# Patient Record
Sex: FEMALE | Race: AMERICAN INDIAN OR ALASKA NATIVE | Employment: OTHER | URBAN - METROPOLITAN AREA
[De-identification: names, ages, dates, MRNs, and addresses within clinical notes are randomized per-mention and may not be internally consistent; named-entity substitution may affect disease eponyms.]

---

## 2017-01-27 RX ORDER — LEVOTHYROXINE SODIUM 88 UG/1
TABLET ORAL
Qty: 90 TAB | Refills: 3 | Status: SHIPPED | OUTPATIENT
Start: 2017-01-27 | End: 2018-01-05 | Stop reason: SDUPTHER

## 2017-05-03 ENCOUNTER — OFFICE VISIT (OUTPATIENT)
Dept: INTERNAL MEDICINE CLINIC | Age: 77
End: 2017-05-03

## 2017-05-03 VITALS
DIASTOLIC BLOOD PRESSURE: 78 MMHG | BODY MASS INDEX: 35.3 KG/M2 | SYSTOLIC BLOOD PRESSURE: 112 MMHG | HEIGHT: 60 IN | WEIGHT: 179.8 LBS | RESPIRATION RATE: 16 BRPM | OXYGEN SATURATION: 98 % | TEMPERATURE: 98 F | HEART RATE: 75 BPM

## 2017-05-03 DIAGNOSIS — M85.89 OSTEOPENIA OF MULTIPLE SITES: ICD-10-CM

## 2017-05-03 DIAGNOSIS — E78.5 HYPERLIPIDEMIA WITH TARGET LDL LESS THAN 100: ICD-10-CM

## 2017-05-03 DIAGNOSIS — I10 HYPERTENSION, ESSENTIAL: ICD-10-CM

## 2017-05-03 DIAGNOSIS — Z71.89 ACP (ADVANCE CARE PLANNING): ICD-10-CM

## 2017-05-03 DIAGNOSIS — E03.9 ACQUIRED HYPOTHYROIDISM: Primary | ICD-10-CM

## 2017-05-03 DIAGNOSIS — R73.01 IMPAIRED FASTING GLUCOSE: ICD-10-CM

## 2017-05-03 DIAGNOSIS — F32.5 MAJOR DEPRESSIVE DISORDER WITH SINGLE EPISODE, IN FULL REMISSION (HCC): ICD-10-CM

## 2017-05-03 RX ORDER — MIRTAZAPINE 15 MG/1
7.5 TABLET, FILM COATED ORAL
Qty: 30 TAB | Refills: 0
Start: 2017-05-03 | End: 2017-10-13

## 2017-05-03 NOTE — PROGRESS NOTES
Lupe Ames is a 78 y.o. female who was seen in clinic today (5/3/2017). Patient was seen with Dr Sirena Erickson (R2 at [facility]). Assessment & Plan: Cece Serrano was seen today for hypertension, cholesterol problem and osteoporosis. Diagnoses and all orders for this visit:    Acquired hypothyroidism- well controlled, continue current treatment pending review of labs   -     TSH 3RD GENERATION    Hypertension, essential- at goal, continue current treatment pending review of labs   -     METABOLIC PANEL, COMPREHENSIVE    Hyperlipidemia with target LDL less than 100- at goal, continue current treatment pending review of labs   -     METABOLIC PANEL, COMPREHENSIVE  -     LIPID PANEL    Impaired fasting glucose-   -     METABOLIC PANEL, COMPREHENSIVE  -     HEMOGLOBIN A1C WITH EAG    Osteopenia of multiple sites- reviewed tx options, will repeat imaging and then she will reassess & reconsider treatment options  -     DEXA BONE DENSITY STUDY AXIAL; Future    Major depressive disorder with single episode, in full remission ([facility] [address]- stable, reviewed treatment options with her, reviewed pros/cons to taking medications regularly (daily vs every other day), continue current treatment as it is working well for her   -     mirtazapine (REMERON) 15 mg tablet; Take 0.5 Tabs by mouth every three (3) days. ACP (advance care planning)- see AVS         Follow-up Disposition:  Return in about 6 months (around 11/3/2017) for FULL PHYSICAL - 30 minutes. ----------------------------------------------------------------------    Subjective:  Endocrine Review  Patient is seen for followup of hypothyroidism. Since last visit: no changes. She reports medication compliance: all the time and is taking separate from all her other meds. She reports the following concerns/problems/med side effects: none. Lab review: labs reviewed and discussed with patient. She is seen for pre-diabetes. Since last visit: no significant changes.   Home glucose monitoring: is not performed. Diabetic diet compliance: noncompliant some of the time. Lab review: labs reviewed and discussed with patient. Cardiovascular Review  The patient has hypertension and hyperlipidemia. Since last visit: no changes. She reports taking medications as instructed, no medication side effects noted, patient does not perform home BP monitoring. Diet and Lifestyle: generally follows a low fat low cholesterol diet, generally follows a low sodium diet, exercises sporadically. Lab review: labs reviewed and discussed with patient. Endocrine Review  She is seen for osteopenia w/ abnormal FRAX. Since last visit: no changes. She is on the following treatment: none. She  reports compliance with all meds, and denies any med side effects. She denies any falls or joint pain. Mental Health Review  Patient is seen for depression. She has been taking Remeron every 3-4 days. She reports she is scared to stop it all together. She does not think she needs it every day. She likes to be in control. She denies any: depressed mood, anhedonia. PHQ-9 reviewed. Prior to Admission medications    Medication Sig Start Date End Date Taking? Authorizing Provider   GUAIFENESIN (MUCINEX PO) Take  by mouth daily as needed. Yes Historical Provider   SYNTHROID 88 mcg tablet TAKE 1 TABLET BY MOUTH DAILY BEFORE BREAKFAST 1/27/17  Yes Peggy Maria MD   losartan-hydroCHLOROthiazide [facility]) 100-25 mg per tablet Take 1 Tab by mouth daily. 12/7/16  Yes Joe Pinto MD   atorvastatin (LIPITOR) 20 mg tablet TAKE 1 TABLET BY MOUTH DAILY 12/6/16  Yes Joe Pinto MD   FEXOFENADINE HCL (ALLEGRA PO) Take  by mouth daily. Yes Historical Provider   cholecalciferol (VITAMIN D3) 1,000 unit tablet Take  by mouth daily. Yes Historical Provider   b complex vitamins tablet Take 1 Tab by mouth daily.    Yes Historical Provider          Allergies   Allergen Reactions    Codeine Nausea Only    Epinephrine Palpitations    Erythromycin Other (comments)     GI upset    Pcn [Penicillins] Hives           Review of Systems   Constitutional: Negative for malaise/fatigue and weight loss. Respiratory: Negative for cough and shortness of breath. Cardiovascular: Negative for chest pain, palpitations and leg swelling. Gastrointestinal: Negative for abdominal pain, constipation, diarrhea, heartburn, nausea and vomiting. Genitourinary: Negative for frequency. Musculoskeletal: Negative for joint pain and myalgias. Skin: Negative for rash. Neurological: Negative for tingling, sensory change, focal weakness and headaches. Psychiatric/Behavioral: Negative for depression. The patient is not nervous/anxious and does not have insomnia. Objective:   Physical Exam   Constitutional: She appears well-developed. No distress. obese   HENT:   Mouth/Throat: Mucous membranes are normal.   Eyes: Lids are normal.   Neck: Neck supple. No thyromegaly present. Cardiovascular: Regular rhythm and normal heart sounds. No murmur heard. Pulmonary/Chest: Effort normal and breath sounds normal. She has no wheezes. She has no rales. Abdominal: Soft. Bowel sounds are normal. She exhibits no mass. There is no hepatosplenomegaly. There is no tenderness. Musculoskeletal: She exhibits no edema. Lymphadenopathy:     She has no cervical adenopathy. Skin: No rash noted. Psychiatric: She has a normal mood and affect. Her behavior is normal.         Visit Vitals    /78    Pulse 75    Temp 98 °F (36.7 °C) (Oral)    Resp 16    Ht 5' (1.524 m)    Wt 179 lb 12.8 oz (81.6 kg)    SpO2 98%    BMI 35.11 kg/m2         Disclaimer:  Advised her to call back or return to office if symptoms worsen/change/persist.  Discussed expected course/resolution/complications of diagnosis in detail with patient.     Medication risks/benefits/costs/interactions/alternatives discussed with patient. She was given an after visit summary which includes diagnoses, current medications, & vitals. She expressed understanding with the diagnosis and plan.         Malcolm Mckoy MD

## 2017-05-03 NOTE — ACP (ADVANCE CARE PLANNING)
Advance Care Planning (ACP) Provider Note - Comprehensive     Date of ACP Conversation: 05/03/17  Persons included in Conversation:  patient      Authorized Decision Maker (if patient is incapable of making informed decisions): This person is:  Healthcare Agent/Medical Power of  under Advance Directive        General ACP for ALL Patients with Decision Making Capacity:  Importance of advance care planning, including choosing a healthcare agent to communicate patient's healthcare decisions if patient lost the ability to make decisions, such as after a sudden illness or accident  Understanding of the healthcare agent role was assessed and information provided  Opportunity offered to explore how cultural, Taoist, spiritual, or personal beliefs would affect decisions for future care     For Serious or Chronic Illness:  Her medical problems were reviewed with them. Understanding of medical condition    Understanding of CPR, goals and expected outcomes, benefits and burdens discussed. Information on CPR success rates provided (e.g. for CPR in hospital, survival to d/c at two weeks is 22%, for chronically ill or elderly/frail survival is less than 3%); Individual asked to communicate understanding of information in his/her own words. Interventions Provided:  Recommended communicating the plan and making copies for the healthcare agent, personal physician, and others as appropriate (e.g., health system)  Recommended review of completed ACP document annually or upon change in health status      She has an advanced directive - a copy HAS NOT been provided. Reviewed DNR/DNI and patient is interested in remaining a DNR, no changes made.

## 2017-06-06 ENCOUNTER — HOSPITAL ENCOUNTER (OUTPATIENT)
Dept: LAB | Age: 77
Discharge: HOME | End: 2017-06-06
Payer: MEDICARE

## 2017-06-06 PROCEDURE — 80061 LIPID PANEL: CPT

## 2017-06-06 PROCEDURE — 80053 COMPREHEN METABOLIC PANEL: CPT

## 2017-06-06 PROCEDURE — 84443 ASSAY THYROID STIM HORMONE: CPT

## 2017-06-06 PROCEDURE — 83036 HEMOGLOBIN GLYCOSYLATED A1C: CPT

## 2017-06-07 LAB
ALBUMIN SERPL-MCNC: 4 G/DL (ref 3.5–4.8)
ALBUMIN/GLOB SERPL: 1.9 {RATIO} (ref 1.2–2.2)
ALP SERPL-CCNC: 59 IU/L (ref 39–117)
ALT SERPL-CCNC: 17 IU/L (ref 0–32)
AST SERPL-CCNC: 21 IU/L (ref 0–40)
BILIRUB SERPL-MCNC: 0.4 MG/DL (ref 0–1.2)
BUN SERPL-MCNC: 16 MG/DL (ref 8–27)
BUN/CREAT SERPL: 21 (ref 12–28)
CALCIUM SERPL-MCNC: 9.7 MG/DL (ref 8.7–10.3)
CHLORIDE SERPL-SCNC: 99 MMOL/L (ref 96–106)
CHOLEST SERPL-MCNC: 202 MG/DL (ref 100–199)
CO2 SERPL-SCNC: 25 MMOL/L (ref 18–29)
CREAT SERPL-MCNC: 0.78 MG/DL (ref 0.57–1)
EST. AVERAGE GLUCOSE BLD GHB EST-MCNC: 126 MG/DL
GLOBULIN SER CALC-MCNC: 2.1 G/DL (ref 1.5–4.5)
GLUCOSE SERPL-MCNC: 97 MG/DL (ref 65–99)
HBA1C MFR BLD: 6 % (ref 4.8–5.6)
HDLC SERPL-MCNC: 70 MG/DL
LDLC SERPL CALC-MCNC: 111 MG/DL (ref 0–99)
POTASSIUM SERPL-SCNC: 4.4 MMOL/L (ref 3.5–5.2)
PROT SERPL-MCNC: 6.1 G/DL (ref 6–8.5)
SODIUM SERPL-SCNC: 141 MMOL/L (ref 134–144)
TRIGL SERPL-MCNC: 105 MG/DL (ref 0–149)
TSH SERPL DL<=0.005 MIU/L-ACNC: 0.42 UIU/ML (ref 0.45–4.5)
VLDLC SERPL CALC-MCNC: 21 MG/DL (ref 5–40)

## 2017-06-08 NOTE — PROGRESS NOTES
Results released to patient via [employer]. All labs are stable or at goal for her, except for low TSH. Has been decreasing. Just barely low. No changes, will repeat in 3 months.

## 2017-06-13 ENCOUNTER — HOSPITAL ENCOUNTER (OUTPATIENT)
Dept: MAMMOGRAPHY | Age: 77
Discharge: HOME | End: 2017-06-13
Payer: MEDICARE

## 2017-06-13 DIAGNOSIS — M85.89 OSTEOPENIA OF MULTIPLE SITES: ICD-10-CM

## 2017-06-13 PROCEDURE — 77080 DXA BONE DENSITY AXIAL: CPT

## 2017-06-22 RX ORDER — LOSARTAN POTASSIUM AND HYDROCHLOROTHIAZIDE 25; 100 MG/1; MG/1
TABLET ORAL
Qty: 90 TAB | Refills: 1 | Status: SHIPPED | OUTPATIENT
Start: 2017-06-22 | End: 2017-12-17 | Stop reason: SDUPTHER

## 2017-07-05 DIAGNOSIS — E78.5 HYPERLIPIDEMIA WITH TARGET LDL LESS THAN 100: ICD-10-CM

## 2017-07-05 RX ORDER — ATORVASTATIN CALCIUM 20 MG/1
TABLET, FILM COATED ORAL
Qty: 90 TAB | Refills: 1 | Status: SHIPPED | OUTPATIENT
Start: 2017-07-05 | End: 2017-12-30 | Stop reason: SDUPTHER

## 2017-09-01 ENCOUNTER — OFFICE VISIT (OUTPATIENT)
Dept: INTERNAL MEDICINE CLINIC | Age: 77
End: 2017-09-01

## 2017-09-01 ENCOUNTER — HOSPITAL ENCOUNTER (OUTPATIENT)
Dept: LAB | Age: 77
Discharge: HOME | End: 2017-09-01
Payer: MEDICARE

## 2017-09-01 VITALS
HEIGHT: 60 IN | DIASTOLIC BLOOD PRESSURE: 76 MMHG | OXYGEN SATURATION: 97 % | TEMPERATURE: 98.2 F | SYSTOLIC BLOOD PRESSURE: 106 MMHG | HEART RATE: 76 BPM | RESPIRATION RATE: 16 BRPM | BODY MASS INDEX: 34.36 KG/M2 | WEIGHT: 175 LBS

## 2017-09-01 DIAGNOSIS — G47.00 INSOMNIA, UNSPECIFIED TYPE: Primary | ICD-10-CM

## 2017-09-01 DIAGNOSIS — E03.9 ACQUIRED HYPOTHYROIDISM: ICD-10-CM

## 2017-09-01 PROCEDURE — 84443 ASSAY THYROID STIM HORMONE: CPT

## 2017-09-01 PROCEDURE — 36415 COLL VENOUS BLD VENIPUNCTURE: CPT

## 2017-09-01 NOTE — PROGRESS NOTES
Cristino De La Torre is a 78 y.o. female who was seen in clinic today (9/1/2017). Assessment & Plan:  Diagnoses and all orders for this visit:    1. Insomnia, unspecified type- new dx, differential dx reviewed with the patient, unclear if related to stopping Remeron or problems with thyroid. I spent 15 minutes with the patient and >50% of the time was spent counseling on med options, expectations, and sleep hygiene. Due to weight gain will avoid Remeron. See AVS.  Will start with melatonin and if that does not work try trazodone. That we reviewed Ambien versus Restoril. 2. Acquired hypothyroidism- uncontrolled, last labs were abnormal, unsure if this is related to any of her issues above, will continue current meds pending review of labs. -     TSH 3RD GENERATION         Follow-up Disposition:  Return if symptoms worsen or fail to improve.       ----------------------------------------------------------------------    Subjective: Nimo Segovia was seen today for Insomnia and Hypothyroidism    Mental Health Review  Patient is seen for insomnia. Since last visit she has stopped Remeron and weight has been decreasing. She reports some trouble getting to sleep, but most issues are staying asleep. She reports waking up after 4 hrs and sometimes can't get back to sleep. She can't remember if her sleep was better on the Remeron. She has not tried any OTC meds. She has never been on any other Rx meds      Endocrine Review  Patient is seen for followup of hypothyroidism. Since last visit: TSH was low  She reports medication compliance: all the time and is taking separate from all her other meds. She reports the following concerns/problems/med side effects: none. Lab review: labs reviewed and discussed with patient.       ----------------------------------------------------------------------      Prior to Admission medications    Medication Sig Start Date End Date Taking?  Authorizing Provider   atorvastatin (LIPITOR) 20 mg tablet TAKE 1 TABLET BY MOUTH DAILY 7/5/17  Yes Wilma Swann MD   losartan-hydroCHLOROthiazide [facility]) 100-25 mg per tablet TAKE 1 TABLET BY MOUTH DAILY 6/22/17  Yes Wilma Swann MD   GUAIFENESIN (MUCINEX PO) Take  by mouth daily as needed. Yes Historical Provider   SYNTHROID 88 mcg tablet TAKE 1 TABLET BY MOUTH DAILY BEFORE BREAKFAST 1/27/17  Yes Bess Vicente MD   FEXOFENADINE HCL (ALLEGRA PO) Take  by mouth daily. Yes Historical Provider   cholecalciferol (VITAMIN D3) 1,000 unit tablet Take  by mouth daily. Yes Historical Provider   b complex vitamins tablet Take 1 Tab by mouth daily. Yes Historical Provider   mirtazapine (REMERON) 15 mg tablet Take 0.5 Tabs by mouth every three (3) days. 5/3/17   Wilma Swann MD          Allergies   Allergen Reactions    Codeine Nausea Only    Epinephrine Palpitations    Erythromycin Other (comments)     GI upset    Pcn [Penicillins] Hives           Review of Systems   Constitutional: Positive for malaise/fatigue and weight loss. Negative for chills and fever. Cardiovascular: Positive for palpitations (on/off). Psychiatric/Behavioral: Negative for depression. The patient is not nervous/anxious. Objective:   Physical Exam- well groomed, good eye contact      Visit Vitals    /76    Pulse 76    Temp 98.2 °F (36.8 °C) (Oral)    Resp 16    Ht 5' (1.524 m)    Wt 175 lb (79.4 kg)    SpO2 97%    BMI 34.18 kg/m2         Disclaimer:  Advised her to call back or return to office if symptoms worsen/change/persist.  Discussed expected course/resolution/complications of diagnosis in detail with patient. Medication risks/benefits/costs/interactions/alternatives discussed with patient. She was given an after visit summary which includes diagnoses, current medications, & vitals. She expressed understanding with the diagnosis and plan.         Wilma Swann MD

## 2017-09-01 NOTE — PATIENT INSTRUCTIONS
Sleep Medication Recommendations:    Over the counter:  Unisom (Doxylamine) - AVOID  Benadryl (Benadryl) - AVOID  Melatonin - start with 2 or 3mg, if no changes in 5 days then increase to 4 or 6mg    Prescriptions:  Trazodone - this would be the next option  Elavil (Amitriptyline) - AVOID    Hypnotics:  Ambien (Zolpidem) - POSSIBLE  Lunesta (Eszopiclone)  Sonata (Zaleplon)    Benzodiazapine:  Restoril (Temazepam)  Ativan (Lorazepam) - AVOID  Xanax (Alprazolam)  - AVOID           Learning About Sleeping Well  What does sleeping well mean? Sleeping well means getting enough sleep. How much sleep is enough varies among people. The number of hours you sleep is not as important as how you feel when you wake up. If you do not feel refreshed, you probably need more sleep. Another sign of not getting enough sleep is feeling tired during the day. The average total nightly sleep time is 7½ to 8 hours. Healthy adults may need a little more or a little less than this. Why is getting enough sleep important? Getting enough quality sleep is a basic part of good health. When your sleep suffers, your mood and your thoughts can suffer too. You may find yourself feeling more grumpy or stressed. Not getting enough sleep also can lead to serious problems, including injury, accidents, anxiety, and depression. What might cause poor sleeping? Many things can cause sleep problems, including:  · Stress. Stress can be caused by fear about a single event, such as giving a speech. Or you may have ongoing stress, such as worry about work or school. · Depression, anxiety, and other mental or emotional conditions. · Changes in your sleep habits or surroundings. This includes changes that happen where you sleep, such as noise, light, or sleeping in a different bed. It also includes changes in your sleep pattern, such as having jet lag or working a late shift.   · Health problems, such as pain, breathing problems, and restless legs syndrome. · Lack of regular exercise. How can you help yourself? Here are some tips that may help you sleep more soundly and wake up feeling more refreshed. Your sleeping area  · Use your bedroom only for sleeping and sex. A bit of light reading may help you fall asleep. But if it doesn't, do your reading elsewhere in the house. Don't watch TV in bed. · Be sure your bed is big enough to stretch out comfortably, especially if you have a sleep partner. · Keep your bedroom quiet, dark, and cool. Use curtains, blinds, or a sleep mask to block out light. To block out noise, use earplugs, soothing music, or a \"white noise\" machine. Your evening and bedtime routine  · Create a relaxing bedtime routine. You might want to take a warm shower or bath, listen to soothing music, or drink a cup of noncaffeinated tea. · Go to bed at the same time every night. And get up at the same time every morning, even if you feel tired. What to avoid  · Limit caffeine (coffee, tea, caffeinated sodas) during the day, and don't have any for at least 4 to 6 hours before bedtime. · Don't drink alcohol before bedtime. Alcohol can cause you to wake up more often during the night. · Don't smoke or use tobacco, especially in the evening. Nicotine can keep you awake. · Don't take naps during the day, especially close to bedtime. · Don't lie in bed awake for too long. If you can't fall asleep, or if you wake up in the middle of the night and can't get back to sleep within 15 minutes or so, get out of bed and go to another room until you feel sleepy. · Don't take medicine right before bed that may keep you awake or make you feel hyper or energized. Your doctor can tell you if your medicine may do this and if you can take it earlier in the day. If you can't sleep  · Imagine yourself in a peaceful, pleasant scene. Focus on the details and feelings of being in a place that is relaxing.   · Get up and do a quiet or boring activity until you feel sleepy. · Don't drink any liquids after 6 p.m. if you wake up often because you have to go to the bathroom. Where can you learn more? Go to http://francisco-isaac.info/. Enter L247 in the search box to learn more about \"Learning About Sleeping Well. \"  Current as of: July 26, 2016  Content Version: 11.3  © 0567-9069 [employer]. Care instructions adapted under license by [employer] (which disclaims liability or warranty for this information). If you have questions about a medical condition or this instruction, always ask your healthcare professional. [place] [address] any warranty or liability for your use of this information.

## 2017-09-01 NOTE — MR AVS SNAPSHOT
Visit Information Date & Time Provider Department Dept. Phone Encounter #  
 9/1/2017 10:15 AM Wilma Swann, [address] [facility] [phone] [id number] Follow-up Instructions Return if symptoms worsen or fail to improve. Your Appointments 10/13/2017  9:30 AM  
Medicare Physical with Wilma Swann MD  
[facility] Colin Cali) Appt Note: CPE (1yr) [address] [facility] [address]  
[address] [facility] [place] [address] Upcoming Health Maintenance Date Due Pneumococcal 65+ Low/Medium Risk (1 of 2 - PCV13) 4/13/2007 DTaP/Tdap/Td series (1 - Tdap) 6/3/2015 MEDICARE YEARLY EXAM 6/7/2017 INFLUENZA AGE 9 TO ADULT 8/1/2017 GLAUCOMA SCREENING Q2Y 1/30/2019 COLONOSCOPY 8/15/2022 Allergies as of 9/1/2017  Review Complete On: 9/1/2017 By: Wilma Swann MD  
  
 Severity Noted Reaction Type Reactions Codeine  02/27/2013    Nausea Only Epinephrine  02/27/2013    Palpitations Erythromycin  02/27/2013    Other (comments) GI upset Pcn [Penicillins]  02/27/2013    Hives Current Immunizations  Reviewed on 9/1/2017 Name Date Influenza High Dose Vaccine PF 11/1/2016, 10/9/2015 Pneumococcal Polysaccharide (PPSV-23) 6/2/2005 Td 6/2/2015 Zoster Vaccine, Live 1/9/2013 Reviewed by Earnestine Steve RN on 9/1/2017 at 10:14 AM  
You Were Diagnosed With   
  
 Codes Comments Insomnia, unspecified type    -  Primary ICD-10-CM: G47.00 ICD-9-CM: 780.52 Acquired hypothyroidism     ICD-10-CM: E03.9 ICD-9-CM: 787. 9 Vitals BP Pulse Temp Resp Height(growth percentile) Weight(growth percentile) 106/76 76 98.2 °F (36.8 °C) (Oral) 16 5' (1.524 m) 175 lb (79.4 kg) SpO2 BMI OB Status Smoking Status 97% 34.18 kg/m2 Hysterectomy Never Smoker Vitals History BMI and BSA Data Body Mass Index Body Surface Area  
 34.18 kg/m 2 1.83 m 2 Preferred Pharmacy Pharmacy Name Phone [address] [facility] STORE [address], [facility] [address] [phone] Your Updated Medication List  
  
   
This list is accurate as of: 9/1/17 10:39 AM.  Always use your most recent med list. ALLEGRA PO Take  by mouth daily. atorvastatin 20 mg tablet Commonly known as:  LIPITOR  
TAKE 1 TABLET BY MOUTH DAILY  
  
 b complex vitamins tablet Take 1 Tab by mouth daily. losartan-hydroCHLOROthiazide 100-25 mg per tablet Commonly known as:  HYZAAR  
TAKE 1 TABLET BY MOUTH DAILY  
  
 mirtazapine 15 mg tablet Commonly known as:  Marney Gold Take 0.5 Tabs by mouth every three (3) days. MUCINEX PO Take  by mouth daily as needed. SYNTHROID 88 mcg tablet Generic drug:  levothyroxine TAKE 1 TABLET BY MOUTH DAILY BEFORE BREAKFAST  
  
 VITAMIN D3 1,000 unit tablet Generic drug:  cholecalciferol Take  by mouth daily. We Performed the Following TSH 3RD GENERATION [81061 CPT(R)] Follow-up Instructions Return if symptoms worsen or fail to improve. Patient Instructions Sleep Medication Recommendations: 
 
Over the counter: 
Unisom (Doxylamine) - AVOID Benadryl (Benadryl) - AVOID Melatonin - start with 2 or 3mg, if no changes in 5 days then increase to 4 or 6mg Prescriptions: 
Trazodone - this would be the next option Elavil (Amitriptyline) - AVOID Hypnotics: 
Ambien (Zolpidem) - POSSIBLE Lunesta (Eszopiclone) Sonata (Zaleplon) Benzodiazapine: 
Restoril (Temazepam) Ativan (Lorazepam) - AVOID Xanax (Alprazolam)  - AVOID Learning About Sleeping Well What does sleeping well mean? Sleeping well means getting enough sleep. How much sleep is enough varies among people.  
The number of hours you sleep is not as important as how you feel when you wake up. If you do not feel refreshed, you probably need more sleep. Another sign of not getting enough sleep is feeling tired during the day. The average total nightly sleep time is 7½ to 8 hours. Healthy adults may need a little more or a little less than this. Why is getting enough sleep important? Getting enough quality sleep is a basic part of good health. When your sleep suffers, your mood and your thoughts can suffer too. You may find yourself feeling more grumpy or stressed. Not getting enough sleep also can lead to serious problems, including injury, accidents, anxiety, and depression. What might cause poor sleeping? Many things can cause sleep problems, including: · Stress. Stress can be caused by fear about a single event, such as giving a speech. Or you may have ongoing stress, such as worry about work or school. · Depression, anxiety, and other mental or emotional conditions. · Changes in your sleep habits or surroundings. This includes changes that happen where you sleep, such as noise, light, or sleeping in a different bed. It also includes changes in your sleep pattern, such as having jet lag or working a late shift. · Health problems, such as pain, breathing problems, and restless legs syndrome. · Lack of regular exercise. How can you help yourself? Here are some tips that may help you sleep more soundly and wake up feeling more refreshed. Your sleeping area · Use your bedroom only for sleeping and sex. A bit of light reading may help you fall asleep. But if it doesn't, do your reading elsewhere in the house. Don't watch TV in bed. · Be sure your bed is big enough to stretch out comfortably, especially if you have a sleep partner. · Keep your bedroom quiet, dark, and cool. Use curtains, blinds, or a sleep mask to block out light. To block out noise, use earplugs, soothing music, or a \"white noise\" machine. Your evening and bedtime routine · Create a relaxing bedtime routine. You might want to take a warm shower or bath, listen to soothing music, or drink a cup of noncaffeinated tea. · Go to bed at the same time every night. And get up at the same time every morning, even if you feel tired. What to avoid · Limit caffeine (coffee, tea, caffeinated sodas) during the day, and don't have any for at least 4 to 6 hours before bedtime. · Don't drink alcohol before bedtime. Alcohol can cause you to wake up more often during the night. · Don't smoke or use tobacco, especially in the evening. Nicotine can keep you awake. · Don't take naps during the day, especially close to bedtime. · Don't lie in bed awake for too long. If you can't fall asleep, or if you wake up in the middle of the night and can't get back to sleep within 15 minutes or so, get out of bed and go to another room until you feel sleepy. · Don't take medicine right before bed that may keep you awake or make you feel hyper or energized. Your doctor can tell you if your medicine may do this and if you can take it earlier in the day. If you can't sleep · Imagine yourself in a peaceful, pleasant scene. Focus on the details and feelings of being in a place that is relaxing. · Get up and do a quiet or boring activity until you feel sleepy. · Don't drink any liquids after 6 p.m. if you wake up often because you have to go to the bathroom. Where can you learn more? Go to http://francisco-isaac.info/. Enter X202 in the search box to learn more about \"Learning About Sleeping Well. \" Current as of: July 26, 2016 Content Version: 11.3 © 4963-0976 [employer]. Care instructions adapted under license by [employer] (which disclaims liability or warranty for this information).  If you have questions about a medical condition or this instruction, always ask your healthcare professional. [employer] disclaims any warranty or liability for your use of this information. Introducing [facility]! Dear Nhan Nuñez: 
Thank you for requesting a [employer] account. Our records indicate that you already have an active [employer] account. You can access your account anytime at [email or web address] Did you know that you can access your hospital and ER discharge instructions at any time in [employer]? You can also review all of your test results from your hospital stay or ER visit. Additional Information If you have questions, please visit the Frequently Asked Questions section of the [employer] website at [email or web address] Remember, [employer] is NOT to be used for urgent needs. For medical emergencies, dial 911. Now available from your iPhone and Android! Please provide this summary of care documentation to your next provider. Your primary care clinician is listed as Donna Person. If you have any questions after today's visit, please call [phone].

## 2017-09-02 LAB — TSH SERPL DL<=0.005 MIU/L-ACNC: 0.58 UIU/ML (ref 0.45–4.5)

## 2017-10-13 ENCOUNTER — OFFICE VISIT (OUTPATIENT)
Dept: INTERNAL MEDICINE CLINIC | Age: 77
End: 2017-10-13

## 2017-10-13 ENCOUNTER — HOSPITAL ENCOUNTER (OUTPATIENT)
Dept: LAB | Age: 77
Discharge: HOME | End: 2017-10-13
Payer: MEDICARE

## 2017-10-13 VITALS
SYSTOLIC BLOOD PRESSURE: 112 MMHG | RESPIRATION RATE: 16 BRPM | HEIGHT: 64 IN | DIASTOLIC BLOOD PRESSURE: 74 MMHG | BODY MASS INDEX: 29.53 KG/M2 | TEMPERATURE: 97.6 F | WEIGHT: 173 LBS | OXYGEN SATURATION: 98 %

## 2017-10-13 DIAGNOSIS — Z23 ENCOUNTER FOR IMMUNIZATION: ICD-10-CM

## 2017-10-13 DIAGNOSIS — E03.9 ACQUIRED HYPOTHYROIDISM: ICD-10-CM

## 2017-10-13 DIAGNOSIS — F51.01 PRIMARY INSOMNIA: ICD-10-CM

## 2017-10-13 DIAGNOSIS — F32.5 MAJOR DEPRESSIVE DISORDER WITH SINGLE EPISODE, IN FULL REMISSION (HCC): ICD-10-CM

## 2017-10-13 DIAGNOSIS — I10 HYPERTENSION, ESSENTIAL: ICD-10-CM

## 2017-10-13 DIAGNOSIS — Z71.89 ACP (ADVANCE CARE PLANNING): ICD-10-CM

## 2017-10-13 DIAGNOSIS — Z00.00 MEDICARE ANNUAL WELLNESS VISIT, SUBSEQUENT: Primary | ICD-10-CM

## 2017-10-13 DIAGNOSIS — Z13.39 SCREENING FOR ALCOHOLISM: ICD-10-CM

## 2017-10-13 DIAGNOSIS — M85.89 OSTEOPENIA OF MULTIPLE SITES: ICD-10-CM

## 2017-10-13 DIAGNOSIS — E78.00 PURE HYPERCHOLESTEROLEMIA: ICD-10-CM

## 2017-10-13 PROCEDURE — 85027 COMPLETE CBC AUTOMATED: CPT

## 2017-10-13 PROCEDURE — 84443 ASSAY THYROID STIM HORMONE: CPT

## 2017-10-13 PROCEDURE — 36415 COLL VENOUS BLD VENIPUNCTURE: CPT

## 2017-10-13 NOTE — ACP (ADVANCE CARE PLANNING)
Advance Care Planning (ACP) Provider Note - Comprehensive     Date of ACP Conversation: 10/13/17  Persons included in Conversation:  patient  Length of ACP Conversation in minutes: <16 minutes (Non-Billable)    Authorized Decision Maker (if patient is incapable of making informed decisions): This person is: Other Legally Authorized Decision Maker (e.g. Next of Kin)          General ACP for ALL Patients with Decision Making Capacity:  Importance of advance care planning, including choosing a healthcare agent to communicate patient's healthcare decisions if patient lost the ability to make decisions, such as after a sudden illness or accident  Understanding of the healthcare agent role was assessed and information provided  Opportunity offered to explore how cultural, Zoroastrianism, spiritual, or personal beliefs would affect decisions for future care  Exploration of values, goals, and preferences if recovery is not expected, even with continued medical treatment in the event of: Imminent death or severe, permanent brain injury    For Serious or Chronic Illness:  Understanding of CPR, goals and expected outcomes, benefits and burdens discussed. Understanding of medical condition  Information on CPR success rates provided (e.g. for CPR in hospital, survival to d/c at two weeks is 22%, for chronically ill or elderly/frail survival is less than 3%)    Interventions Provided:  Recommended communicating the plan and making copies for the healthcare agent, personal physician, and others as appropriate (e.g., health system)  Recommended review of completed ACP document annually or upon change in health status       She has an advanced directive - a copy HAS NOT been provided. Reviewed DNR/DNI and patient is interested in remaining a DNR, no changes made.

## 2017-10-13 NOTE — PROGRESS NOTES
Annual wellness. Wants to discuss sleeping concerns. Ezequiel Lyles is a 78 y.o. female  who present for routine immunizations. she  denies any symptoms , reactions or allergies that would exclude them from being immunized today. Risks and adverse reactions were discussed and the VIS was given to them. All questions were addressed. she was observed for 5 min post injection. There were no reactions observed.     Denilson Vivas RN

## 2017-10-13 NOTE — PATIENT INSTRUCTIONS
Preventing Falls: Care Instructions  Your Care Instructions  Getting around your home safely can be a challenge if you have injuries or health problems that make it easy for you to fall. Loose rugs and furniture in walkways are among the dangers for many older people who have problems walking or who have poor eyesight. People who have conditions such as arthritis, osteoporosis, or dementia also have to be careful not to fall. You can make your home safer with a few simple measures. Follow-up care is a key part of your treatment and safety. Be sure to make and go to all appointments, and call your doctor if you are having problems. It's also a good idea to know your test results and keep a list of the medicines you take. How can you care for yourself at home? Taking care of yourself  · You may get dizzy if you do not drink enough water. To prevent dehydration, drink plenty of fluids, enough so that your urine is light yellow or clear like water. Choose water and other caffeine-free clear liquids. If you have kidney, heart, or liver disease and have to limit fluids, talk with your doctor before you increase the amount of fluids you drink. · Exercise regularly to improve your strength, muscle tone, and balance. Walk if you can. Swimming may be a good choice if you cannot walk easily. · Have your vision and hearing checked each year or any time you notice a change. If you have trouble seeing and hearing, you might not be able to avoid objects and could lose your balance. · Know the side effects of the medicines you take. Ask your doctor or pharmacist whether the medicines you take can affect your balance. Sleeping pills or sedatives can affect your balance. · Limit the amount of alcohol you drink. Alcohol can impair your balance and other senses. · Ask your doctor whether calluses or corns on your feet need to be removed.  If you wear loose-fitting shoes because of calluses or corns, you can lose your balance and fall. · Talk to your doctor if you have numbness in your feet. Preventing falls at home  · Remove raised doorway thresholds, throw rugs, and clutter. Repair loose carpet or raised areas in the floor. · Move furniture and electrical cords to keep them out of walking paths. · Use nonskid floor wax, and wipe up spills right away, especially on ceramic tile floors. · If you use a walker or cane, put rubber tips on it. If you use crutches, clean the bottoms of them regularly with an abrasive pad, such as steel wool. · Keep your house well lit, especially E[employer]est [employer], and outside walkways. Use night-lights in areas such as hallways and bathrooms. Add extra light switches or use remote switches (such as switches that go on or off when you clap your hands) to make it easier to turn lights on if you have to get up during the night. · Install sturdy handrails on stairways. · Move items in your cabinets so that the things you use a lot are on the lower shelves (about waist level). · Keep a cordless phone and a flashlight with new batteries by your bed. If possible, put a phone in each of the main rooms of your house, or carry a cell phone in case you fall and cannot reach a phone. Or, you can wear a device around your neck or wrist. You push a button that sends a signal for help. · Wear low-heeled shoes that fit well and give your feet good support. Use footwear with nonskid soles. Check the heels and soles of your shoes for wear. Repair or replace worn heels or soles. · Do not wear socks without shoes on wood floors. · Walk on the grass when the sidewalks are slippery. If you live in an area that gets snow and ice in the winter, sprinkle salt on slippery steps and sidewalks. Preventing falls in the bath  · Install grab bars and nonskid mats inside and outside your shower or tub and near the toilet and sinks. · Use shower chairs and bath benches.   · Use a hand-held shower head that will allow you to sit while showering. · Get into a tub or shower by putting the weaker leg in first. Get out of a tub or shower with your strong side first.  · Repair loose toilet seats and consider installing a raised toilet seat to make getting on and off the toilet easier. · Keep your bathroom door unlocked while you are in the shower. Where can you learn more? Go to http://francisco-isaac.info/. Enter 0476 [id number] 69 [phone] in the search box to learn more about \"Preventing Falls: Care Instructions. \"  Current as of: August 4, 2016  Content Version: 11.3  © 2298-6662 [employer]. Care instructions adapted under license by [employer] (which disclaims liability or warranty for this information). If you have questions about a medical condition or this instruction, always ask your healthcare professional. [place] [address] any warranty or liability for your use of this information. Hearing Evaluations: [facility]   At [employer] [facility]  [phone]    [facility] (off [address])  [place] ([address] [facility] [address])  [place] ([address])  [place] (off [address])      [address] [facility]   [email or web address]    [phone] --> [address], [address]  [phone] --> [address]  ([place])  [phone] --> [address] [facility]  ([address] [facility])         Medicare Wellness Visit, Female    The best way to live healthy is to have a healthy lifestyle by eating a well-balanced diet, exercising regularly, limiting alcohol and stopping smoking. Regular physical exams and screening tests are another way to keep healthy. Preventive exams provided by your health care provider can find health problems before they become diseases or illnesses. Preventive services including immunizations, screening tests, monitoring and exams can help you take care of your own health.     All people over age 72 should have a pneumovax  and and a prevnar shot to prevent pneumonia. These are once in a lifetime unless you and your provider decide differently. All people over 65 should have a yearly flu shot and a tetanus vaccine every 10 years. A bone mass density to screen for osteoporosis or thinning of the bones should be done every 2 years after 65. Screening for diabetes mellitus with a blood sugar test should be done every year. Glaucoma is a disease of the eye due to increased ocular pressure that can lead to blindness and it should be done every year by an eye professional.    Cardiovascular screening tests that check for elevated lipids (fatty part of blood) which can lead to heart disease and strokes should be done every 5 years. Colorectal screening that evaluates for blood or polyps in your colon should be done yearly as a stool test or every five years as a flexible sigmoidoscope or every 10 years as a colonoscopy up to age 76. Breast cancer screening with a mammogram is recommended biennially  for women age 54-69. Screening for cervical cancer with a pap smear and pelvic exam is recommended for women after age 72 years every 2 years up to age 79 or when the provider and patient decide to stop. If there is a history of cervical abnormalities or other increased risk for cancer then the test is recommended yearly. Hepatitis C screening is also recommended for anyone born between [birth year] through [address]. A shingles vaccine is also recommended once in a lifetime after age 61. Your Medicare Wellness Exam is recommended annually.     Here is a list of your current Health Maintenance items with a due date:  Health Maintenance Due   Topic Date Due    Pneumococcal Vaccine (1 of 2 - PCV13) 04/13/2007    DTaP/Tdap/Td  (1 - Tdap) 06/03/2015    Annual Well Visit  06/07/2017    Flu Vaccine  08/01/2017

## 2017-10-13 NOTE — PROGRESS NOTES
Robyn Art is a 78 y.o. female who was seen in clinic today (10/13/2017) for a full physical.      Assessment & Plan:   Diagnoses and all orders for this visit:    1. Medicare annual wellness visit, subsequent    2. ACP (advance care planning)    3. Encounter for immunization  -     INFLUENZA VIRUS VACCINE, HIGH DOSE SEASONAL, PRESERVATIVE FREE  -     ADMIN INFLUENZA VIRUS VAC  -     pneumococcal 13 alice conj dip (PREVNAR-13) 0.5 mL syrg injection; 0.5 mL by IntraMUSCular route once for 1 dose.  -     diph,pertuss,acel,,tetanus vac,PF, (ADACEL) 2 Lf-(2.5-5-3-5 mcg)-5Lf/0.5 mL syrg vaccine; 0.5 mL by IntraMUSCular route once for 1 dose. 4. Screening for alcoholism  -     Annual  Alcohol Screen 15 min ()    5. Acquired hypothyroidism- levels fluctuating, will repeat labs, no med changes pending review of labs  -     TSH 3RD GENERATION    6. Major depressive disorder with single episode, in full [facility] ([facility]- well controlled, reviewed treatment options with her, reviewed life style changes to help improve mood, cont off meds. 7. Hypertension, essential- well controlled, continue current treatment   -     CBC W/O DIFF    8. Pure hypercholesterolemia- at goal, continue current treatment   -     CBC W/O DIFF    9. Primary insomnia- not as well controlled since stopping Remeron, reviewed med options (will increase Melatonin) and reviewed Trazodone if this does not work    10. Osteopenia of multiple sites- reviewed imaging w/ her, reviewed when to repeat, increase weight bearing exercises. Follow-up Disposition:  Return in about 6 months (around 4/13/2018), or if symptoms worsen or fail to improve.        ------------------------------------------------------------------------------------------    Subjective: Annual Wellness Visit- Subsequent Visit    End of Life Planning: This was discussed with her today and she has an advanced directive - a copy HAS NOT been provided.   Reviewed DNR/DNI and patient is not interested. Depression Screen:  PHQ over the last two weeks 10/13/2017   Little interest or pleasure in doing things Not at all   Feeling down, depressed or hopeless Not at all   Total Score PHQ 2 0   Trouble falling or staying asleep, or sleeping too much Nearly every day   Feeling tired or having little energy More than half the days   Poor appetite or overeating Not at all   Feeling bad about yourself - or that you are a failure or have let yourself or your family down Not at all   Trouble concentrating on things such as school, work, reading or watching TV Not at all   Moving or speaking so slowly that other people could have noticed; or the opposite being so fidgety that others notice Not at all   Thoughts of being better off dead, or hurting yourself in some way Not at all   PHQ 9 Score 5   How difficult have these problems made it for you to do your work, take care of your home and get along with others Not difficult at all         Fall Risk:   Fall Risk Assessment, last 12 mths 10/13/2017   Able to walk? Yes   Fall in past 12 months? Yes   Fall with injury? Yes   Number of falls in past 12 months 1   Fall Risk Score 2       Abuse Screen:  Abuse Screening Questionnaire 10/13/2017   Do you ever feel afraid of your partner? N   Are you in a relationship with someone who physically or mentally threatens you? N   Is it safe for you to go home? Y         Alcohol Risk Factor Screening: On any occasion during the past 3 months, have you had more than 3 drinks containing alcohol? No  Do you average more than 7 drinks per week? No    Hearing Loss: The patient needs further evaluation. Cognition Screen:  Has your family/caregiver stated any concerns about your memory: no  appropriate for age attention/concentration and appropriate safety awareness    Activities of Daily Living:    Requires assistance with: no ADLs  Home contains: no safety equipment.   Exercise: not active    Adult Nutrition Screen:  No risk factors noted. Health Maintenance  Daily Aspirin: yes  Bone Density: UTD - done in 6/13/17  Glaucoma Screening: UTD    Immunizations:    Influenza: will get this done today. Tetanus: she will let me know, she thinks she had it, nothing in [facility] website. Shingles: up to date. Pneumonia: she will let me know, she thinks she had it, nothing in [facility] website. Cancer screening:    Cervical: reviewed guidelines, n/a. Breast: reviewed guidelines, reviewed SBE with her, UTD, records requested. Colon: guidelines reviewed, UTD. Patient Care Team:  Gail García MD as PCP - General (Internal Medicine)  Nick Perla MD (Endocrinology)  Bhavik Moya MD (Gastroenterology)  Rigoberto Valencia MD (Radiology)  Sarina Phillips MD (Obstetrics & Gynecology)  Gino Perez MD (Ophthalmology)       In addition to the above issues we also reviewed the following acute and/or chronic problems:    Endocrine Review  Patient is seen for followup of hypothyroidism. She reports medication compliance: all the time and is taking separate from all her other meds. She reports the following concerns/problems/med side effects: none. Lab review: labs reviewed and discussed with patient. Cardiovascular Review  The patient has hypertension and hyperlipidemia. Since last visit: weight is down and is now out of the obesity range. She reports taking medications as instructed, no medication side effects noted, patient does not perform home BP monitoring. Diet and Lifestyle: generally follows a low fat low cholesterol diet, generally follows a low sodium diet, sedentary. Labs: reviewed and discussed with patient. The following sections were reviewed & updated as appropriate: PMH, PSH, FH, and SH.       Patient Active Problem List   Diagnosis Code    Acquired hypothyroidism E03.9    Impaired fasting glucose R73.01    Hypertension, essential I10    Pure hypercholesterolemia E78.00    Allergic rhinitis J30.9    Depression F32.9    Osteopenia M85.80    Uterine prolapse N81.4    Overweight E66.3          Prior to Admission medications    Medication Sig Start Date End Date Taking? Authorizing Provider   atorvastatin (LIPITOR) 20 mg tablet TAKE 1 TABLET BY MOUTH DAILY 7/5/17  Yes Matias Scott MD   losartan-hydroCHLOROthiazide [facility]) 100-25 mg per tablet TAKE 1 TABLET BY MOUTH DAILY 6/22/17  Yes Matias Scott MD   GUAIFENESIN (MUCINEX PO) Take  by mouth daily as needed. Yes Historical Provider   SYNTHROID 88 mcg tablet TAKE 1 TABLET BY MOUTH DAILY BEFORE BREAKFAST 1/27/17  Yes Yon Aquino MD   FEXOFENADINE HCL (ALLEGRA PO) Take  by mouth daily. Yes Historical Provider   cholecalciferol (VITAMIN D3) 1,000 unit tablet Take  by mouth daily. Yes Historical Provider   b complex vitamins tablet Take 1 Tab by mouth daily. Yes Historical Provider          Allergies   Allergen Reactions    Codeine Nausea Only    Epinephrine Palpitations    Erythromycin Other (comments)     GI upset    Pcn [Penicillins] Hives           Review of Systems   Constitutional: Negative for malaise/fatigue and weight loss. Respiratory: Negative for cough and shortness of breath. Cardiovascular: Negative for chest pain, palpitations and leg swelling. Gastrointestinal: Negative for abdominal pain, constipation, diarrhea, heartburn, nausea and vomiting. Genitourinary: Negative for frequency. Musculoskeletal: Negative for joint pain and myalgias. Skin: Negative for rash. Neurological: Negative for tingling, sensory change, focal weakness and headaches. Psychiatric/Behavioral: Negative for depression. The patient is not nervous/anxious and does not have insomnia. Objective:   Physical Exam   Constitutional: She appears well-developed. No distress. HENT:   Mouth/Throat: Mucous membranes are normal.   Eyes: Conjunctivae and lids are normal. No scleral icterus. Neck: Neck supple. No thyromegaly present. Cardiovascular: Regular rhythm and normal heart sounds. No murmur heard. Pulmonary/Chest: Effort normal and breath sounds normal. She has no wheezes. She has no rales. Abdominal: Soft. Bowel sounds are normal. She exhibits no mass. There is no hepatosplenomegaly. There is no tenderness. Musculoskeletal: She exhibits no edema. Lymphadenopathy:     She has no cervical adenopathy. Skin: No rash noted. Psychiatric: She has a normal mood and affect. Her behavior is normal.          Visit Vitals    /74    Temp 97.6 °F (36.4 °C) (Oral)    Resp 16    Ht 5' 4\" (1.626 m)    Wt 173 lb (78.5 kg)    SpO2 98%    PF 70 L/min    BMI 29.7 kg/m2          Advised her to call back or return to office if symptoms worsen/change/persist.  Discussed expected course/resolution/complications of diagnosis in detail with patient. Medication risks/benefits/costs/interactions/alternatives discussed with patient. She was given an after visit summary which includes diagnoses, current medications, & vitals. She expressed understanding with the diagnosis and plan.         Irena Adler MD

## 2017-10-14 LAB
ERYTHROCYTE [DISTWIDTH] IN BLOOD BY AUTOMATED COUNT: 13.5 % (ref 12.3–15.4)
HCT VFR BLD AUTO: 40.4 % (ref 34–46.6)
HGB BLD-MCNC: 13.2 G/DL (ref 11.1–15.9)
MCH RBC QN AUTO: 29.5 PG (ref 26.6–33)
MCHC RBC AUTO-ENTMCNC: 32.7 G/DL (ref 31.5–35.7)
MCV RBC AUTO: 90 FL (ref 79–97)
PLATELET # BLD AUTO: 180 X10E3/UL (ref 150–379)
RBC # BLD AUTO: 4.48 X10E6/UL (ref 3.77–5.28)
TSH SERPL DL<=0.005 MIU/L-ACNC: 0.64 UIU/ML (ref 0.45–4.5)
WBC # BLD AUTO: 7 X10E3/UL (ref 3.4–10.8)

## 2017-10-20 ENCOUNTER — OFFICE VISIT (OUTPATIENT)
Dept: INTERNAL MEDICINE CLINIC | Age: 77
End: 2017-10-20

## 2017-10-20 VITALS
RESPIRATION RATE: 14 BRPM | HEIGHT: 64 IN | OXYGEN SATURATION: 98 % | WEIGHT: 174.4 LBS | SYSTOLIC BLOOD PRESSURE: 102 MMHG | HEART RATE: 80 BPM | TEMPERATURE: 98.2 F | DIASTOLIC BLOOD PRESSURE: 72 MMHG | BODY MASS INDEX: 29.78 KG/M2

## 2017-10-20 DIAGNOSIS — F51.01 PRIMARY INSOMNIA: Primary | ICD-10-CM

## 2017-10-20 RX ORDER — TRAZODONE HYDROCHLORIDE 100 MG/1
50-100 TABLET ORAL
Qty: 30 TAB | Refills: 1 | Status: SHIPPED | OUTPATIENT
Start: 2017-10-20 | End: 2017-12-11 | Stop reason: SDUPTHER

## 2017-10-20 NOTE — PROGRESS NOTES
Marni Xie is a 78 y.o. female who was seen in clinic today (10/20/2017). Assessment & Plan:  Diagnoses and all orders for this visit:    1. Primary insomnia- chronic issue, uncontrolled, I spent 15 minutes with the patient and >50% of the time was spent counseling on sleep hygiene and med options (see AVS). Will stop Melatonin and will start on meds below. Reviewed expectations. She will up date me in 2wks. -     traZODone (DESYREL) 100 mg tablet; Take 0.5-1 Tabs by mouth nightly. Follow-up Disposition:  Return if symptoms worsen or fail to improve.       ----------------------------------------------------------------------    Subjective: Fransisco Pineda was seen today for Insomnia    Mental Health Review  Patient is seen for insomnia. In September she stopped Remeron (weight issues). We then tried sleep hygiene w/o any success. Last week we started on Melatonin. She has increased from 2mg to 6mg without much success. It will put her to sleep but only for a few hours. She does not feel like she is getting a good nights sleep. Prior to Admission medications    Medication Sig Start Date End Date Taking? Authorizing Provider   atorvastatin (LIPITOR) 20 mg tablet TAKE 1 TABLET BY MOUTH DAILY 7/5/17  Yes Luis Antonio Pritchett MD   losartan-hydroCHLOROthiazide [facility]) 100-25 mg per tablet TAKE 1 TABLET BY MOUTH DAILY 6/22/17  Yes Luis Antonio Pritchett MD   SYNTHROID 88 mcg tablet TAKE 1 TABLET BY MOUTH DAILY BEFORE BREAKFAST 1/27/17  Yes Rosemarie Matamoros MD   FEXOFENADINE HCL (ALLEGRA PO) Take  by mouth daily. Yes Historical Provider   cholecalciferol (VITAMIN D3) 1,000 unit tablet Take  by mouth daily. Yes Historical Provider   b complex vitamins tablet Take 1 Tab by mouth daily. Yes Historical Provider   GUAIFENESIN (MUCINEX PO) Take  by mouth daily as needed.     Historical Provider          Allergies   Allergen Reactions    Codeine Nausea Only    Epinephrine Palpitations    Erythromycin Other (comments)     GI upset    Pcn [Penicillins] Hives           ROS - deferred        Objective:   Physical Exam - deferred      Visit Vitals    /72    Pulse 80    Temp 98.2 °F (36.8 °C) (Oral)    Resp 14    Ht 5' 4\" (1.626 m)    Wt 174 lb 6.4 oz (79.1 kg)    SpO2 98%    BMI 29.94 kg/m2         Disclaimer:  Advised her to call back or return to office if symptoms worsen/change/persist.  Discussed expected course/resolution/complications of diagnosis in detail with patient. Medication risks/benefits/costs/interactions/alternatives discussed with patient. She was given an after visit summary which includes diagnoses, current medications, & vitals. She expressed understanding with the diagnosis and plan.         Elvin Lopez MD

## 2017-10-20 NOTE — MR AVS SNAPSHOT
Visit Information Date & Time Provider Department Dept. Phone Encounter #  
 10/20/2017  4:30 PM Pete Tabor, [address] [facility] Internal Medicine [phone] [id number] Upcoming Health Maintenance Date Due Pneumococcal 65+ Low/Medium Risk (1 of 2 - PCV13) 4/13/2007 DTaP/Tdap/Td series (1 - Tdap) 6/3/2015 MEDICARE YEARLY EXAM 10/14/2018 GLAUCOMA SCREENING Q2Y 1/30/2019 COLONOSCOPY 8/15/2022 Allergies as of 10/20/2017  Review Complete On: 10/20/2017 By: Pete Tabor MD  
  
 Severity Noted Reaction Type Reactions Codeine  02/27/2013    Nausea Only Epinephrine  02/27/2013    Palpitations Erythromycin  02/27/2013    Other (comments) GI upset Pcn [Penicillins]  02/27/2013    Hives Current Immunizations  Reviewed on 10/20/2017 Name Date Influenza High Dose Vaccine PF 10/13/2017, 11/1/2016, 10/9/2015 Pneumococcal Polysaccharide (PPSV-23) 6/2/2005 Td 6/2/2015 Zoster Vaccine, Live 1/9/2013 Reviewed by Freedom Henderson RN on 10/20/2017 at  4:30 PM  
You Were Diagnosed With   
  
 Codes Comments Primary insomnia    -  Primary ICD-10-CM: F51.01 
ICD-9-CM: 307.42 Vitals BP Pulse Temp Resp Height(growth percentile) Weight(growth percentile) 102/72 80 98.2 °F (36.8 °C) (Oral) 14 5' 4\" (1.626 m) 174 lb 6.4 oz (79.1 kg) SpO2 BMI OB Status Smoking Status 98% 29.94 kg/m2 Hysterectomy Never Smoker BMI and BSA Data Body Mass Index Body Surface Area  
 29.94 kg/m 2 1.89 m 2 Preferred Pharmacy Pharmacy Name Phone [address], [facility] [address] [phone] Your Updated Medication List  
  
   
This list is accurate as of: 10/20/17  5:00 PM.  Always use your most recent med list. ALLEGRA PO Take  by mouth daily. atorvastatin 20 mg tablet Commonly known as:  LIPITOR TAKE 1 TABLET BY MOUTH DAILY  
  
 b complex vitamins tablet Take 1 Tab by mouth daily. losartan-hydroCHLOROthiazide 100-25 mg per tablet Commonly known as:  HYZAAR  
TAKE 1 TABLET BY MOUTH DAILY MUCINEX PO Take  by mouth daily as needed. SYNTHROID 88 mcg tablet Generic drug:  levothyroxine TAKE 1 TABLET BY MOUTH DAILY BEFORE BREAKFAST  
  
 traZODone 100 mg tablet Commonly known as:  Jamar Handy Take 0.5-1 Tabs by mouth nightly. VITAMIN D3 1,000 unit tablet Generic drug:  cholecalciferol Take  by mouth daily. Prescriptions Sent to Pharmacy Refills  
 traZODone (DESYREL) 100 mg tablet 1 Sig: Take 0.5-1 Tabs by mouth nightly. Class: Normal  
 Pharmacy: [facility] [address] [place] [address], [address] [place] [address] [address] AT [facility] [address]  #: [phone] Route: Oral  
  
Patient Instructions Sleep Medication Recommendations: 
 
Over the counter: 
Unisom (Doxylamine) Benadryl (Benadryl) Melatonin Prescriptions: 
Trazodone Elavil (Amitriptyline) Hypnotics: 
Ambien (Zolpidem) Lunesta (Eszopiclone) Sonata (Zaleplon) Benzodiazapine: 
Restoril (Temazepam) Ativan (Lorazepam) Xanax (Alprazolam) Learning About Sleeping Well What does sleeping well mean? Sleeping well means getting enough sleep. How much sleep is enough varies among people. The number of hours you sleep is not as important as how you feel when you wake up. If you do not feel refreshed, you probably need more sleep. Another sign of not getting enough sleep is feeling tired during the day. The average total nightly sleep time is 7½ to 8 hours. Healthy adults may need a little more or a little less than this. Why is getting enough sleep important? Getting enough quality sleep is a basic part of good health. When your sleep suffers, your mood and your thoughts can suffer too.  You may find yourself feeling more grumpy or stressed. Not getting enough sleep also can lead to serious problems, including injury, accidents, anxiety, and depression. What might cause poor sleeping? Many things can cause sleep problems, including: · Stress. Stress can be caused by fear about a single event, such as giving a speech. Or you may have ongoing stress, such as worry about work or school. · Depression, anxiety, and other mental or emotional conditions. · Changes in your sleep habits or surroundings. This includes changes that happen where you sleep, such as noise, light, or sleeping in a different bed. It also includes changes in your sleep pattern, such as having jet lag or working a late shift. · Health problems, such as pain, breathing problems, and restless legs syndrome. · Lack of regular exercise. How can you help yourself? Here are some tips that may help you sleep more soundly and wake up feeling more refreshed. Your sleeping area · Use your bedroom only for sleeping and sex. A bit of light reading may help you fall asleep. But if it doesn't, do your reading elsewhere in the house. Don't watch TV in bed. · Be sure your bed is big enough to stretch out comfortably, especially if you have a sleep partner. · Keep your bedroom quiet, dark, and cool. Use curtains, blinds, or a sleep mask to block out light. To block out noise, use earplugs, soothing music, or a \"white noise\" machine. Your evening and bedtime routine · Create a relaxing bedtime routine. You might want to take a warm shower or bath, listen to soothing music, or drink a cup of noncaffeinated tea. · Go to bed at the same time every night. And get up at the same time every morning, even if you feel tired. What to avoid · Limit caffeine (coffee, tea, caffeinated sodas) during the day, and don't have any for at least 4 to 6 hours before bedtime. · Don't drink alcohol before bedtime.  Alcohol can cause you to wake up more often during the night. · Don't smoke or use tobacco, especially in the evening. Nicotine can keep you awake. · Don't take naps during the day, especially close to bedtime. · Don't lie in bed awake for too long. If you can't fall asleep, or if you wake up in the middle of the night and can't get back to sleep within 15 minutes or so, get out of bed and go to another room until you feel sleepy. · Don't take medicine right before bed that may keep you awake or make you feel hyper or energized. Your doctor can tell you if your medicine may do this and if you can take it earlier in the day. If you can't sleep · Imagine yourself in a peaceful, pleasant scene. Focus on the details and feelings of being in a place that is relaxing. · Get up and do a quiet or boring activity until you feel sleepy. · Don't drink any liquids after 6 p.m. if you wake up often because you have to go to the bathroom. Where can you learn more? Go to http://franciscoNeoAccelisaac.info/. Enter B515 in the search box to learn more about \"Learning About Sleeping Well. \" Current as of: July 26, 2016 Content Version: 11.3 © 8764-1561 [employer]. Care instructions adapted under license by [employer] (which disclaims liability or warranty for this information). If you have questions about a medical condition or this instruction, always ask your healthcare professional. [place] [address] any warranty or liability for your use of this information. Introducing [facility]! Dear Julian Price: 
Thank you for requesting a Five minutes account. Our records indicate that you already have an active Five minutes account. You can access your account anytime at [email or web address] Did you know that you can access your hospital and ER discharge instructions at any time in Five minutes? You can also review all of your test results from your hospital stay or ER visit. Additional Information If you have questions, please visit the Frequently Asked Questions section of the Augmenti[employer] website at [email or web address] Remember, Mamaherb is NOT to be used for urgent needs. For medical emergencies, dial 911. Now available from your iPhone and Android! Please provide this summary of care documentation to your next provider. Your primary care clinician is listed as Khushi Wu. If you have any questions after today's visit, please call [phone].

## 2017-10-20 NOTE — PATIENT INSTRUCTIONS
Sleep Medication Recommendations:    Over the counter:  Unisom (Doxylamine)  Benadryl (Benadryl)  Melatonin    Prescriptions:  Trazodone  Elavil (Amitriptyline)    Hypnotics:  Ambien (Zolpidem)  Lunesta (Eszopiclone)  Sonata (Zaleplon)    Benzodiazapine:  Restoril (Temazepam)  Ativan (Lorazepam)  Xanax (Alprazolam)               Learning About Sleeping Well  What does sleeping well mean? Sleeping well means getting enough sleep. How much sleep is enough varies among people. The number of hours you sleep is not as important as how you feel when you wake up. If you do not feel refreshed, you probably need more sleep. Another sign of not getting enough sleep is feeling tired during the day. The average total nightly sleep time is 7½ to 8 hours. Healthy adults may need a little more or a little less than this. Why is getting enough sleep important? Getting enough quality sleep is a basic part of good health. When your sleep suffers, your mood and your thoughts can suffer too. You may find yourself feeling more grumpy or stressed. Not getting enough sleep also can lead to serious problems, including injury, accidents, anxiety, and depression. What might cause poor sleeping? Many things can cause sleep problems, including:  · Stress. Stress can be caused by fear about a single event, such as giving a speech. Or you may have ongoing stress, such as worry about work or school. · Depression, anxiety, and other mental or emotional conditions. · Changes in your sleep habits or surroundings. This includes changes that happen where you sleep, such as noise, light, or sleeping in a different bed. It also includes changes in your sleep pattern, such as having jet lag or working a late shift. · Health problems, such as pain, breathing problems, and restless legs syndrome. · Lack of regular exercise. How can you help yourself?   Here are some tips that may help you sleep more soundly and wake up feeling more refreshed. Your sleeping area  · Use your bedroom only for sleeping and sex. A bit of light reading may help you fall asleep. But if it doesn't, do your reading elsewhere in the house. Don't watch TV in bed. · Be sure your bed is big enough to stretch out comfortably, especially if you have a sleep partner. · Keep your bedroom quiet, dark, and cool. Use curtains, blinds, or a sleep mask to block out light. To block out noise, use earplugs, soothing music, or a \"white noise\" machine. Your evening and bedtime routine  · Create a relaxing bedtime routine. You might want to take a warm shower or bath, listen to soothing music, or drink a cup of noncaffeinated tea. · Go to bed at the same time every night. And get up at the same time every morning, even if you feel tired. What to avoid  · Limit caffeine (coffee, tea, caffeinated sodas) during the day, and don't have any for at least 4 to 6 hours before bedtime. · Don't drink alcohol before bedtime. Alcohol can cause you to wake up more often during the night. · Don't smoke or use tobacco, especially in the evening. Nicotine can keep you awake. · Don't take naps during the day, especially close to bedtime. · Don't lie in bed awake for too long. If you can't fall asleep, or if you wake up in the middle of the night and can't get back to sleep within 15 minutes or so, get out of bed and go to another room until you feel sleepy. · Don't take medicine right before bed that may keep you awake or make you feel hyper or energized. Your doctor can tell you if your medicine may do this and if you can take it earlier in the day. If you can't sleep  · Imagine yourself in a peaceful, pleasant scene. Focus on the details and feelings of being in a place that is relaxing. · Get up and do a quiet or boring activity until you feel sleepy. · Don't drink any liquids after 6 p.m. if you wake up often because you have to go to the bathroom. Where can you learn more?   Go to http://enoch.info/. Enter W567 in the search box to learn more about \"Learning About Sleeping Well. \"  Current as of: July 26, 2016  Content Version: 11.3  © 0857-6156 [employer], Incorporated. Care instructions adapted under license by [employer] (which disclaims liability or warranty for this information). If you have questions about a medical condition or this instruction, always ask your healthcare professional. [place] [address] any warranty or liability for your use of this information.

## 2017-10-23 ENCOUNTER — TELEPHONE (OUTPATIENT)
Dept: INTERNAL MEDICINE CLINIC | Age: 77
End: 2017-10-23

## 2017-10-23 NOTE — TELEPHONE ENCOUNTER
She was just here on Friday. We had a long discussion about this. I am okay with that, but she had side effects (weight gain). I would try the Trazodone. Give it a good 1 week. If still not helping then yes we can go back to Remeron.

## 2017-10-23 NOTE — TELEPHONE ENCOUNTER
Pt is calling and asking if she can go back to taking Remeron. She states she feels she needs to go back on it. She states she knows herself and she feels she needs to go back on it. Will Dr Sridevi Prado allow her to go back on it since she feels she needs it.

## 2017-10-23 NOTE — TELEPHONE ENCOUNTER
Called pt and informed her that Dr Jean Carlos Moreira wants pt to try Trazadone 1st for 1 week and if does not help pt can go back to Remeron. Left pt detailed message.

## 2017-11-17 PROBLEM — R92.8 ABNORMAL MAMMOGRAM OF LEFT BREAST: Status: ACTIVE | Noted: 2017-11-15

## 2017-12-05 ENCOUNTER — OFFICE VISIT (OUTPATIENT)
Dept: INTERNAL MEDICINE CLINIC | Age: 77
End: 2017-12-05

## 2017-12-05 VITALS
HEIGHT: 70 IN | BODY MASS INDEX: 23.91 KG/M2 | TEMPERATURE: 97.8 F | RESPIRATION RATE: 16 BRPM | SYSTOLIC BLOOD PRESSURE: 104 MMHG | OXYGEN SATURATION: 98 % | WEIGHT: 167 LBS | HEART RATE: 76 BPM | DIASTOLIC BLOOD PRESSURE: 76 MMHG

## 2017-12-05 DIAGNOSIS — J30.89 CHRONIC NON-SEASONAL ALLERGIC RHINITIS, UNSPECIFIED TRIGGER: Primary | ICD-10-CM

## 2017-12-05 DIAGNOSIS — F51.01 PRIMARY INSOMNIA: ICD-10-CM

## 2017-12-05 DIAGNOSIS — Z23 ENCOUNTER FOR IMMUNIZATION: ICD-10-CM

## 2017-12-05 DIAGNOSIS — H90.6 MIXED CONDUCTIVE AND SENSORINEURAL HEARING LOSS OF BOTH EARS: ICD-10-CM

## 2017-12-05 RX ORDER — SIMETHICONE 80 MG
80 TABLET,CHEWABLE ORAL
COMMUNITY
End: 2019-02-25

## 2017-12-05 RX ORDER — FLUTICASONE PROPIONATE 50 MCG
2 SPRAY, SUSPENSION (ML) NASAL DAILY
Qty: 1 BOTTLE | Refills: 1 | Status: SHIPPED | OUTPATIENT
Start: 2017-12-05 | End: 2019-02-25

## 2017-12-05 NOTE — PROGRESS NOTES
Saw an ENT who suggested possible carotid dopplers. Dull headache. Belching and has a lot of drainage. Decreased vision, seeing Dr. Colin Smalls.   Had aspiration of left breast, benign

## 2017-12-05 NOTE — PROGRESS NOTES
Rebecca Rodriguez is a 78 y.o. female who was seen in clinic today (12/5/2017). Assessment & Plan:   Diagnoses and all orders for this visit:    1. Chronic non-seasonal allergic rhinitis, unspecified trigger- worsening, uncontrolled, no signs of infection, will adjust meds, reviewed OTC meds  -     fluticasone (FLONASE) 50 mcg/actuation nasal spray; 2 Sprays by Both Nostrils route daily. 2. Primary insomnia- improved, will cont current meds, do not think h/a is due to med side effect    3. Encounter for immunization  -     pneumococcal 13 alice conj dip (PREVNAR-13) 0.5 mL syrg injection; 0.5 mL by IntraMUSCular route once for 1 dose. 4. 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 Subjective: Alejandra Curiel was seen today for Headache    Mental Health Review  Patient is seen for insomnia. Since last visit she changed to Trazodone. She reports it is helping. Her weight is down. Her only concern is increase in headache and sinus congestion. She also reports post nasal drip, headache, rhinorrhea and sinus congestion. Treatments have included: Allegra. She also reports increase in belching after meals. Prior to Admission medications    Medication Sig Start Date End Date Taking? Authorizing Provider   simethicone (GAS-X) 80 mg chewable tablet Take 80 mg by mouth every six (6) hours as needed for Flatulence. Yes Historical Provider   traZODone (DESYREL) 100 mg tablet Take 0.5-1 Tabs by mouth nightly.  10/20/17  Yes Malou Garza MD   atorvastatin (LIPITOR) 20 mg tablet TAKE 1 TABLET BY MOUTH DAILY 7/5/17  Yes Malou Garza MD   losartan-hydroCHLOROthiazide [facility]) 100-25 mg per tablet TAKE 1 TABLET BY MOUTH DAILY 6/22/17  Yes Malou Garza MD   GUAIFENESIN (MUCINEX PO) Take  by mouth daily as needed. Yes Historical Provider   SYNTHROID 88 mcg tablet TAKE 1 TABLET BY MOUTH DAILY BEFORE BREAKFAST 1/27/17  Yes Marisa Swift MD   FEXOFENADINE HCL (ALLEGRA PO) Take  by mouth daily. Yes Historical Provider   cholecalciferol (VITAMIN D3) 1,000 unit tablet Take  by mouth daily. Yes Historical Provider   b complex vitamins tablet Take 1 Tab by mouth daily. Yes Historical Provider          Allergies   Allergen Reactions    Codeine Nausea Only    Epinephrine Palpitations    Erythromycin Other (comments)     GI upset    Pcn [Penicillins] Hives           Review of Systems   Respiratory: Positive for cough. Negative for shortness of breath. Cardiovascular: Negative for chest pain and palpitations. Gastrointestinal: Negative for abdominal pain, constipation, diarrhea, nausea and vomiting. Psychiatric/Behavioral: Negative for depression. The patient is not nervous/anxious and does not have insomnia. Objective:   Physical Exam   Constitutional: No distress. HENT:   Right Ear: Tympanic membrane is not erythematous and not bulging. No middle ear effusion. Left Ear: Tympanic membrane is not erythematous and not bulging. No middle ear effusion. Nose: No mucosal edema or rhinorrhea. Right sinus exhibits no maxillary sinus tenderness and no frontal sinus tenderness. Left sinus exhibits no maxillary sinus tenderness and no frontal sinus tenderness. Mouth/Throat: Uvula is midline and mucous membranes are normal. No oropharyngeal exudate or posterior oropharyngeal erythema. Eyes: Conjunctivae are normal. No scleral icterus. Neck: Neck supple. Cardiovascular: Regular rhythm and normal heart sounds. No murmur heard. Pulmonary/Chest: Effort normal and breath sounds normal. She has no wheezes. She has no rales. Lymphadenopathy:     She has no cervical adenopathy.          Visit Vitals    /76    Pulse 76    Temp 97.8 °F (36.6 °C) (Oral)    Resp 16    Ht 5' 10\" (1.778 m)    Wt 167 lb (75.8 kg)    SpO2 98%    BMI 23.96 kg/m2         Disclaimer:  Advised her to call back or return to office if symptoms worsen/change/persist.  Discussed expected course/resolution/complications of diagnosis in detail with patient. Medication risks/benefits/costs/interactions/alternatives discussed with patient. She was given an after visit summary which includes diagnoses, current medications, & vitals. She expressed understanding with the diagnosis and plan. Aspects of this note may have been generated using voice recognition software. Despite editing, there may be some syntax errors.        Cindi Corona MD

## 2017-12-11 DIAGNOSIS — F51.01 PRIMARY INSOMNIA: ICD-10-CM

## 2017-12-11 DIAGNOSIS — H93.A3 PULSATILE TINNITUS OF BOTH EARS: Primary | ICD-10-CM

## 2017-12-11 NOTE — PROGRESS NOTES
ENT records reviewed. Specialist recommended carotid doppler due to pulsatile tinnitus.     Please notify pt order placed

## 2017-12-12 RX ORDER — TRAZODONE HYDROCHLORIDE 100 MG/1
TABLET ORAL
Qty: 30 TAB | Refills: 1 | Status: SHIPPED | OUTPATIENT
Start: 2017-12-12 | End: 2018-02-15 | Stop reason: SDUPTHER

## 2017-12-17 RX ORDER — LOSARTAN POTASSIUM AND HYDROCHLOROTHIAZIDE 25; 100 MG/1; MG/1
TABLET ORAL
Qty: 90 TAB | Refills: 1 | Status: SHIPPED | OUTPATIENT
Start: 2017-12-17 | End: 2018-06-13 | Stop reason: SDUPTHER

## 2017-12-17 RX ORDER — LOSARTAN POTASSIUM AND HYDROCHLOROTHIAZIDE 25; 100 MG/1; MG/1
TABLET ORAL
Qty: 90 TAB | Refills: 0 | OUTPATIENT
Start: 2017-12-17

## 2017-12-30 DIAGNOSIS — E78.5 HYPERLIPIDEMIA WITH TARGET LDL LESS THAN 100: ICD-10-CM

## 2017-12-30 RX ORDER — ATORVASTATIN CALCIUM 20 MG/1
TABLET, FILM COATED ORAL
Qty: 90 TAB | Refills: 1 | Status: SHIPPED | OUTPATIENT
Start: 2017-12-30 | End: 2018-06-29 | Stop reason: SDUPTHER

## 2018-01-05 RX ORDER — LEVOTHYROXINE SODIUM 88 UG/1
TABLET ORAL
Qty: 90 TAB | Refills: 1 | Status: SHIPPED | OUTPATIENT
Start: 2018-01-05 | End: 2018-07-14 | Stop reason: SDUPTHER

## 2018-01-05 NOTE — TELEPHONE ENCOUNTER
Given she is seeing Dr. Josh Poe and does not have an appointment to come see me and he cecilio her last thyroid tests, I will forward refill for synthroid to him to fill.

## 2018-02-15 DIAGNOSIS — F51.01 PRIMARY INSOMNIA: ICD-10-CM

## 2018-02-16 RX ORDER — TRAZODONE HYDROCHLORIDE 100 MG/1
TABLET ORAL
Qty: 90 TAB | Refills: 1 | Status: SHIPPED | OUTPATIENT
Start: 2018-02-16 | End: 2018-02-23 | Stop reason: SINTOL

## 2018-02-23 ENCOUNTER — OFFICE VISIT (OUTPATIENT)
Dept: INTERNAL MEDICINE CLINIC | Age: 78
End: 2018-02-23

## 2018-02-23 VITALS
WEIGHT: 162 LBS | HEIGHT: 70 IN | SYSTOLIC BLOOD PRESSURE: 92 MMHG | BODY MASS INDEX: 23.19 KG/M2 | DIASTOLIC BLOOD PRESSURE: 66 MMHG | RESPIRATION RATE: 16 BRPM | OXYGEN SATURATION: 97 % | HEART RATE: 84 BPM | TEMPERATURE: 97.5 F

## 2018-02-23 DIAGNOSIS — R63.4 WEIGHT LOSS: ICD-10-CM

## 2018-02-23 DIAGNOSIS — J30.89 CHRONIC NON-SEASONAL ALLERGIC RHINITIS, UNSPECIFIED TRIGGER: ICD-10-CM

## 2018-02-23 DIAGNOSIS — F51.01 PRIMARY INSOMNIA: Primary | ICD-10-CM

## 2018-02-23 RX ORDER — MIRTAZAPINE 15 MG/1
15 TABLET, FILM COATED ORAL
Qty: 30 TAB | Refills: 2 | Status: SHIPPED | OUTPATIENT
Start: 2018-02-23 | End: 2018-06-06 | Stop reason: SDUPTHER

## 2018-02-23 NOTE — PROGRESS NOTES
Wants to discuss Trazodone. Feels hunger in the morning. Reports slight headache and occasional dizziness.

## 2018-02-23 NOTE — PATIENT INSTRUCTIONS

## 2018-02-23 NOTE — PROGRESS NOTES
Grover Reed is a 78 y.o. female who was seen in clinic today (2/23/2018). Assessment & Plan:   Diagnoses and all orders for this visit:    1. Primary insomnia- uncontrolled, have been trying several medications w/o much success. Will change back to Remeron x 2 months and then reassess. Curious if any of her complaints (h/a, fatigue) is related to meds. -     mirtazapine (REMERON) 15 mg tablet; Take 1 Tab by mouth nightly. 2. Chronic non-seasonal allergic rhinitis, unspecified trigger- uncontrolled, though improved with change from Allegra to Zyrtec, will continue Zyrtec and Flonase, but change medications to AM from PM.  Reviewed no evidence of silent reflux at this time, but will consider starting PPI. 3. Weight loss- has continued to decline, labs reviewed, no signs/symptoms of anything concerning, will restart Remeron to see if this is related. If weight does go up will be less concerning. If weight continues to decrease will need to further investigate. Follow-up Disposition:  Return in about 2 months (around 4/23/2018), or if symptoms worsen or fail to improve. Subjective: Nehemias Nixon was seen today for Sleep Problem    Mental Health Review  Patient is seen for insomnia. Was started on Trazodone recently. She reports feeling hung over during the day. also having some on/off dull headaches. She denies: trouble falling asleep and trouble staying asleep. These have all improved. Allergies  She has Allergic Rhinitis that be consistant throughout the year. Current symptoms: postnasal drip, sore throat, and it is gagging. She is using Mucinex as needed, Flonase each night, and Zyrtec each night. She denies heart burn, but is having some belching. Prior to Admission medications    Medication Sig Start Date End Date Taking? Authorizing Provider   Cetirizine (ZYRTEC) 10 mg cap Take  by mouth daily.    Yes Historical Provider   traZODone (DESYREL) 100 mg tablet TAKE 1/2 TO 1 TABLET BY MOUTH EVERY NIGHT 2/16/18  Yes Soni Ivey MD   SYNTHROID 88 mcg tablet TAKE 1 TABLET BY MOUTH DAILY BEFORE BREAKFAST 1/5/18  Yes Soni Ivey MD   atorvastatin (LIPITOR) 20 mg tablet TAKE 1 TABLET BY MOUTH DAILY 12/30/17  Yes Soni Ivey MD   losartan-hydroCHLOROthiazide [facility]) 100-25 mg per tablet TAKE 1 TABLET BY MOUTH DAILY 12/17/17  Yes Soni Ivey MD   simethicone (GAS-X) 80 mg chewable tablet Take 80 mg by mouth every six (6) hours as needed for Flatulence. Yes Historical Provider   fluticasone (FLONASE) 50 mcg/actuation nasal spray 2 Sprays by Both Nostrils route daily. 12/5/17  Yes Soni Ivey MD   GUAIFENESIN (MUCINEX PO) Take  by mouth daily as needed. Yes Historical Provider   cholecalciferol (VITAMIN D3) 1,000 unit tablet Take  by mouth daily. Yes Historical Provider   b complex vitamins tablet Take 1 Tab by mouth daily. Yes Historical Provider          Allergies   Allergen Reactions    Codeine Nausea Only    Epinephrine Palpitations    Erythromycin Other (comments)     GI upset    Pcn [Penicillins] Hives           Review of Systems   Constitutional: Positive for malaise/fatigue and weight loss. Negative for chills and fever. Respiratory: Negative for cough and shortness of breath. Cardiovascular: Negative for chest pain and palpitations. Gastrointestinal: Negative for abdominal pain, constipation, diarrhea, nausea and vomiting. Neurological: Positive for headaches. Psychiatric/Behavioral: The patient has insomnia. Objective:   Physical Exam   HENT:   Mouth/Throat: No oropharyngeal exudate or posterior oropharyngeal erythema. Neck: No thyromegaly present. Cardiovascular: Regular rhythm and normal heart sounds. No murmur heard. Pulmonary/Chest: Effort normal and breath sounds normal. She has no wheezes. She has no rales. Abdominal: Bowel sounds are normal. She exhibits no mass.  There is no hepatosplenomegaly. There is no tenderness. Lymphadenopathy:     She has no cervical adenopathy. Visit Vitals    BP 92/66    Pulse 84    Temp 97.5 °F (36.4 °C) (Oral)    Resp 16    Ht 5' 10\" (1.778 m)    Wt 162 lb (73.5 kg)    SpO2 97%    BMI 23.24 kg/m2         Disclaimer:  Advised her to call back or return to office if symptoms worsen/change/persist.  Discussed expected course/resolution/complications of diagnosis in detail with patient. Medication risks/benefits/costs/interactions/alternatives discussed with patient. She was given an after visit summary which includes diagnoses, current medications, & vitals. She expressed understanding with the diagnosis and plan. Aspects of this note may have been generated using voice recognition software. Despite editing, there may be some syntax errors.        Bernardo Verduzco MD

## 2018-05-23 ENCOUNTER — OFFICE VISIT (OUTPATIENT)
Dept: INTERNAL MEDICINE CLINIC | Age: 78
End: 2018-05-23

## 2018-05-23 ENCOUNTER — HOSPITAL ENCOUNTER (OUTPATIENT)
Dept: LAB | Age: 78
Discharge: HOME | End: 2018-05-23
Payer: MEDICARE

## 2018-05-23 VITALS
WEIGHT: 167 LBS | BODY MASS INDEX: 23.91 KG/M2 | HEIGHT: 70 IN | TEMPERATURE: 97.6 F | HEART RATE: 70 BPM | SYSTOLIC BLOOD PRESSURE: 122 MMHG | RESPIRATION RATE: 18 BRPM | DIASTOLIC BLOOD PRESSURE: 76 MMHG | OXYGEN SATURATION: 98 %

## 2018-05-23 DIAGNOSIS — Z23 ENCOUNTER FOR IMMUNIZATION: ICD-10-CM

## 2018-05-23 DIAGNOSIS — E03.9 ACQUIRED HYPOTHYROIDISM: ICD-10-CM

## 2018-05-23 DIAGNOSIS — I10 HYPERTENSION, ESSENTIAL: Primary | ICD-10-CM

## 2018-05-23 DIAGNOSIS — J30.89 NON-SEASONAL ALLERGIC RHINITIS, UNSPECIFIED TRIGGER: ICD-10-CM

## 2018-05-23 DIAGNOSIS — F51.01 PRIMARY INSOMNIA: ICD-10-CM

## 2018-05-23 DIAGNOSIS — R73.01 IMPAIRED FASTING GLUCOSE: ICD-10-CM

## 2018-05-23 DIAGNOSIS — E78.00 PURE HYPERCHOLESTEROLEMIA: ICD-10-CM

## 2018-05-23 PROCEDURE — 80053 COMPREHEN METABOLIC PANEL: CPT

## 2018-05-23 PROCEDURE — 83036 HEMOGLOBIN GLYCOSYLATED A1C: CPT

## 2018-05-23 PROCEDURE — 80061 LIPID PANEL: CPT

## 2018-05-23 PROCEDURE — 84443 ASSAY THYROID STIM HORMONE: CPT

## 2018-05-23 PROCEDURE — 85027 COMPLETE CBC AUTOMATED: CPT

## 2018-05-23 PROCEDURE — 36415 COLL VENOUS BLD VENIPUNCTURE: CPT

## 2018-05-23 NOTE — PROGRESS NOTES
Wendie Poe is a 70 y.o. female who was seen in clinic today (5/23/2018). Assessment & Plan:   Diagnoses and all orders for this visit:    1. Hypertension, essential- well controlled, continue current treatment pending review of labs   -     METABOLIC PANEL, COMPREHENSIVE  -     CBC W/O DIFF    2. Pure hypercholesterolemia- at goal, continue current treatment pending review of labs   -     METABOLIC PANEL, COMPREHENSIVE  -     LIPID PANEL    3. Acquired hypothyroidism- at goal, continue current treatment pending review of labs   -     TSH 3RD GENERATION    4. Impaired fasting glucose -due for labs, she reports diet has been poor  -     TSH 3RD GENERATION  -     HEMOGLOBIN A1C WITH EAG    5. Primary insomnia- improved, cont current meds, will monitor weight    6. Non-seasonal allergic rhinitis, unspecified trigger- well controlled, has changed OTC meds w/ good relief    7. Encounter for immunization  -     varicella-zoster recombinant, PF, (SHINGRIX, PF,) 50 mcg/0.5 mL susr injection; 0.5 ml IM once and then repeat in 2-6 months. -     pneumococcal 13 alice conj dip (PREVNAR-13) 0.5 mL syrg injection; 0.5 mL by IntraMUSCular route once for 1 dose. Follow-up Disposition:  Return in about 6 months (around 11/23/2018) for FULL PHYSICAL - 30 minutes. Subjective: Vito Bhat was seen today for Hypothyroidism; Hypertension; Insomnia; and Cholesterol Problem    Cardiovascular Review  The patient has hypertension and hyperlipidemia. Since last visit: no changes. She reports taking medications as instructed, no medication side effects noted, patient does not perform home BP monitoring. Diet and Lifestyle: generally follows a low fat low cholesterol diet, generally follows a low sodium diet, sedentary. Labs: reviewed and discussed with patient. Endocrine Review  Patient is seen for followup of hypothyroidism. Since last visit: no changes.   She reports medication compliance: all the time and is taking separate from all her other meds. She reports the following concerns/problems/med side effects: none. Lab review: labs reviewed and discussed with patient. She is seen for pre-diabetes. Since last visit: weight has increased. Home glucose monitoring: is not performed. Diabetic diet compliance: compliant most of the time, but reports chocolate has been her downfall recently. Lab review: labs reviewed and discussed with patient. Mental Health Review  Patient is seen for insomnia. Was started back on Remeron. Her weight is up. she reports feeling much better. She is taking 1/2 tab nightly. She denies: trouble falling asleep and trouble staying asleep. She denies any hung over feeling in the morning or fatigue. Brief Labs:     Lab Results   Component Value Date/Time    Sodium 141 06/06/2017 11:31 AM    Potassium 4.4 06/06/2017 11:31 AM    Creatinine 0.78 06/06/2017 11:31 AM    Cholesterol, total 202 06/06/2017 11:31 AM    HDL Cholesterol 70 06/06/2017 11:31 AM    LDL, calculated 111 06/06/2017 11:31 AM    Triglyceride 105 06/06/2017 11:31 AM    Hemoglobin A1c 6.0 06/06/2017 11:31 AM    TSH 0.638 10/13/2017 12:00 AM          Prior to Admission medications    Medication Sig Start Date End Date Taking? Authorizing Provider   Cetirizine (ZYRTEC) 10 mg cap Take  by mouth daily. Yes Historical Provider   mirtazapine (REMERON) 15 mg tablet Take 1 Tab by mouth nightly. 2/23/18  Yes Debra Phelps MD   SYNTHROID 88 mcg tablet TAKE 1 TABLET BY MOUTH DAILY BEFORE BREAKFAST 1/5/18  Yes Debra Phelps MD   atorvastatin (LIPITOR) 20 mg tablet TAKE 1 TABLET BY MOUTH DAILY 12/30/17  Yes Debra Phelps MD   losartan-hydroCHLOROthiazide [facility]) 100-25 mg per tablet TAKE 1 TABLET BY MOUTH DAILY 12/17/17  Yes Debra Phelps MD   simethicone (GAS-X) 80 mg chewable tablet Take 80 mg by mouth every six (6) hours as needed for Flatulence.    Yes Historical Provider   cholecalciferol (VITAMIN D3) 1,000 unit tablet Take  by mouth daily. Yes Historical Provider   b complex vitamins tablet Take 1 Tab by mouth daily. Yes Historical Provider   fluticasone (FLONASE) 50 mcg/actuation nasal spray 2 Sprays by Both Nostrils route daily. 12/5/17   Wilma Swann MD          Allergies   Allergen Reactions    Codeine Nausea Only    Epinephrine Palpitations    Erythromycin Other (comments)     GI upset    Pcn [Penicillins] Hives           Review of Systems   Constitutional: Negative for malaise/fatigue and weight loss. Respiratory: Negative for cough and shortness of breath. Cardiovascular: Negative for chest pain, palpitations and leg swelling. Gastrointestinal: Negative for abdominal pain, blood in stool, constipation, diarrhea, heartburn, melena, nausea and vomiting. Musculoskeletal: Negative for joint pain and myalgias. Skin: Negative for rash. Neurological: Negative for tingling, sensory change, focal weakness and headaches. Psychiatric/Behavioral: Negative for depression. The patient is not nervous/anxious and does not have insomnia. Objective:   Physical Exam   Constitutional: She appears well-developed. No distress. HENT:   Mouth/Throat: Mucous membranes are normal.   Eyes: Conjunctivae and lids are normal. No scleral icterus. Neck: Neck supple. No thyromegaly present. Cardiovascular: Regular rhythm and normal heart sounds. No murmur heard. Pulmonary/Chest: Effort normal and breath sounds normal. She has no wheezes. She has no rales. Abdominal: Soft. Bowel sounds are normal. She exhibits no mass. There is no hepatosplenomegaly. There is no tenderness. Musculoskeletal: She exhibits no edema. Lymphadenopathy:     She has no cervical adenopathy. Skin: No rash noted. Psychiatric: She has a normal mood and affect.  Her behavior is normal.         Visit Vitals    /76    Pulse 70    Temp 97.6 °F (36.4 °C) (Oral)    Resp 18    Ht 5' 10\" (1.778 m)    Wt 167 lb (75.8 kg)    SpO2 98%    BMI 23.96 kg/m2         Disclaimer:  Advised her to call back or return to office if symptoms worsen/change/persist.  Discussed expected course/resolution/complications of diagnosis in detail with patient. Medication risks/benefits/costs/interactions/alternatives discussed with patient. She was given an after visit summary which includes diagnoses, current medications, & vitals. She expressed understanding with the diagnosis and plan. Aspects of this note may have been generated using voice recognition software. Despite editing, there may be some syntax errors.        Carrie Smith MD

## 2018-05-24 LAB
ALBUMIN SERPL-MCNC: 4.2 G/DL (ref 3.5–4.8)
ALBUMIN/GLOB SERPL: 1.9 {RATIO} (ref 1.2–2.2)
ALP SERPL-CCNC: 55 IU/L (ref 39–117)
ALT SERPL-CCNC: 19 IU/L (ref 0–32)
AST SERPL-CCNC: 18 IU/L (ref 0–40)
BILIRUB SERPL-MCNC: 0.6 MG/DL (ref 0–1.2)
BUN SERPL-MCNC: 14 MG/DL (ref 8–27)
BUN/CREAT SERPL: 18 (ref 12–28)
CALCIUM SERPL-MCNC: 9.7 MG/DL (ref 8.7–10.3)
CHLORIDE SERPL-SCNC: 99 MMOL/L (ref 96–106)
CHOLEST SERPL-MCNC: 220 MG/DL (ref 100–199)
CO2 SERPL-SCNC: 27 MMOL/L (ref 18–29)
CREAT SERPL-MCNC: 0.77 MG/DL (ref 0.57–1)
ERYTHROCYTE [DISTWIDTH] IN BLOOD BY AUTOMATED COUNT: 13.5 % (ref 12.3–15.4)
EST. AVERAGE GLUCOSE BLD GHB EST-MCNC: 120 MG/DL
GFR SERPLBLD CREATININE-BSD FMLA CKD-EPI: 75 ML/MIN/1.73
GFR SERPLBLD CREATININE-BSD FMLA CKD-EPI: 87 ML/MIN/1.73
GLOBULIN SER CALC-MCNC: 2.2 G/DL (ref 1.5–4.5)
GLUCOSE SERPL-MCNC: 103 MG/DL (ref 65–99)
HBA1C MFR BLD: 5.8 % (ref 4.8–5.6)
HCT VFR BLD AUTO: 39.9 % (ref 34–46.6)
HDLC SERPL-MCNC: 80 MG/DL
HGB BLD-MCNC: 13.2 G/DL (ref 11.1–15.9)
LDLC SERPL CALC-MCNC: 118 MG/DL (ref 0–99)
MCH RBC QN AUTO: 29.9 PG (ref 26.6–33)
MCHC RBC AUTO-ENTMCNC: 33.1 G/DL (ref 31.5–35.7)
MCV RBC AUTO: 90 FL (ref 79–97)
PLATELET # BLD AUTO: 186 X10E3/UL (ref 150–379)
POTASSIUM SERPL-SCNC: 3.9 MMOL/L (ref 3.5–5.2)
PROT SERPL-MCNC: 6.4 G/DL (ref 6–8.5)
RBC # BLD AUTO: 4.42 X10E6/UL (ref 3.77–5.28)
SODIUM SERPL-SCNC: 140 MMOL/L (ref 134–144)
TRIGL SERPL-MCNC: 110 MG/DL (ref 0–149)
TSH SERPL DL<=0.005 MIU/L-ACNC: 0.27 UIU/ML (ref 0.45–4.5)
VLDLC SERPL CALC-MCNC: 22 MG/DL (ref 5–40)
WBC # BLD AUTO: 7 X10E3/UL (ref 3.4–10.8)

## 2018-05-24 NOTE — PROGRESS NOTES
Results released to patient via [employer]. All labs are stable or at goal for her, except for her TSH. Lipids & pre-DM are stable. TSH has been fluctuating. Will not make any changes this time. She is on brandname medication.

## 2018-06-06 DIAGNOSIS — F51.01 PRIMARY INSOMNIA: ICD-10-CM

## 2018-06-06 RX ORDER — MIRTAZAPINE 15 MG/1
15 TABLET, FILM COATED ORAL
Qty: 30 TAB | Refills: 4 | Status: SHIPPED | OUTPATIENT
Start: 2018-06-06 | End: 2018-09-20 | Stop reason: SDUPTHER

## 2018-06-13 RX ORDER — LOSARTAN POTASSIUM AND HYDROCHLOROTHIAZIDE 25; 100 MG/1; MG/1
TABLET ORAL
Qty: 90 TAB | Refills: 1 | Status: SHIPPED | OUTPATIENT
Start: 2018-06-13 | End: 2018-12-16 | Stop reason: SDUPTHER

## 2018-06-29 DIAGNOSIS — E78.5 HYPERLIPIDEMIA WITH TARGET LDL LESS THAN 100: ICD-10-CM

## 2018-06-29 RX ORDER — ATORVASTATIN CALCIUM 20 MG/1
TABLET, FILM COATED ORAL
Qty: 90 TAB | Refills: 1 | Status: SHIPPED | OUTPATIENT
Start: 2018-06-29 | End: 2018-12-19 | Stop reason: SDUPTHER

## 2018-07-15 RX ORDER — LEVOTHYROXINE SODIUM 88 UG/1
TABLET ORAL
Qty: 90 TAB | Refills: 1 | Status: SHIPPED | OUTPATIENT
Start: 2018-07-15 | End: 2018-12-17 | Stop reason: SDUPTHER

## 2018-09-20 DIAGNOSIS — F51.01 PRIMARY INSOMNIA: ICD-10-CM

## 2018-09-20 RX ORDER — MIRTAZAPINE 15 MG/1
TABLET, FILM COATED ORAL
Qty: 90 TAB | Refills: 0 | Status: SHIPPED | OUTPATIENT
Start: 2018-09-20 | End: 2019-03-09 | Stop reason: SDUPTHER

## 2018-10-16 ENCOUNTER — OFFICE VISIT (OUTPATIENT)
Dept: INTERNAL MEDICINE CLINIC | Age: 78
End: 2018-10-16

## 2018-10-16 VITALS
HEIGHT: 64 IN | HEART RATE: 66 BPM | WEIGHT: 171 LBS | BODY MASS INDEX: 29.19 KG/M2 | OXYGEN SATURATION: 97 % | DIASTOLIC BLOOD PRESSURE: 80 MMHG | SYSTOLIC BLOOD PRESSURE: 124 MMHG | RESPIRATION RATE: 14 BRPM | TEMPERATURE: 97.6 F

## 2018-10-16 DIAGNOSIS — R73.01 IMPAIRED FASTING GLUCOSE: ICD-10-CM

## 2018-10-16 DIAGNOSIS — Z13.31 SCREENING FOR DEPRESSION: ICD-10-CM

## 2018-10-16 DIAGNOSIS — Z71.89 ACP (ADVANCE CARE PLANNING): ICD-10-CM

## 2018-10-16 DIAGNOSIS — E03.9 ACQUIRED HYPOTHYROIDISM: ICD-10-CM

## 2018-10-16 DIAGNOSIS — Z23 ENCOUNTER FOR IMMUNIZATION: ICD-10-CM

## 2018-10-16 DIAGNOSIS — Z13.39 SCREENING FOR ALCOHOLISM: ICD-10-CM

## 2018-10-16 DIAGNOSIS — I10 HYPERTENSION, ESSENTIAL: ICD-10-CM

## 2018-10-16 DIAGNOSIS — F51.01 PRIMARY INSOMNIA: ICD-10-CM

## 2018-10-16 DIAGNOSIS — E78.00 PURE HYPERCHOLESTEROLEMIA: ICD-10-CM

## 2018-10-16 DIAGNOSIS — F33.42 RECURRENT MAJOR DEPRESSIVE DISORDER, IN FULL REMISSION (HCC): ICD-10-CM

## 2018-10-16 DIAGNOSIS — Z00.00 MEDICARE ANNUAL WELLNESS VISIT, SUBSEQUENT: Primary | ICD-10-CM

## 2018-10-16 DIAGNOSIS — E66.3 OVERWEIGHT (BMI 25.0-29.9): ICD-10-CM

## 2018-10-16 NOTE — PROGRESS NOTES
Birdie Cabrales is a 70 y.o. female who was seen in clinic today (10/16/2018) for a full physical.     
 
Assessment & Plan:  
Diagnoses and all orders for this visit: 
 
1. Medicare annual wellness visit, subsequent 2. ACP (advance care planning) 3. Encounter for immunization 
-     varicella-zoster recombinant, PF, (SHINGRIX) 50 mcg/0.5 mL susr injection; 0.5 mL IM once now and then repeat in 2-6 months 4. Screening for alcoholism -     Annual  Alcohol Screen 15 min () 5. Screening for depression -     Depression Screen Annual 
 
6. Recurrent major depressive disorder, in full remission ([facility] [address]- well controlled off medications 7. Acquired hypothyroidism- asymptomatic, has been fluctuating, will repeat labs (low at last check). -     TSH 3RD GENERATION 8. Impaired fasting glucose- due for labs since she is having weight gain, reviewed correlation -     METABOLIC PANEL, COMPREHENSIVE 
-     HEMOGLOBIN A1C WITH EAG 
 
9. Hypertension, essential- at goal, continue current treatment pending review of labs -     METABOLIC PANEL, COMPREHENSIVE 10. Pure hypercholesterolemia- at goal, continue current treatment -     METABOLIC PANEL, COMPREHENSIVE 11. Primary insomnia- stable, continue current treatment, [facility]  reviewed 12. Overweight (BMI 25.0-29. 9)- poorly controlled, worsening, I have reviewed/discussed the above normal BMI with the patient. I have recommended the following interventions: encourage exercise and lifestyle education regarding diet. Follow-up Disposition: 
Return in about 6 months (around 4/16/2019), or if symptoms worsen or fail to improve.     
 
------------------------------------------------------------------------------------------ Subjective: Annual Wellness Visit- Subsequent VisitEnd of Life Planning:  This was discussed with her today and she has an advanced directive - a copy HAS NOT been provided. Reviewed DNR/DNI and patient is interested in remaining a DNR, no changes made. Depression Screen: PHQ over the last two weeks 10/16/2018 Little interest or pleasure in doing things Not at all Feeling down, depressed, irritable, or hopeless Not at all Total Score PHQ 2 0 Fall Risk:  
Fall Risk Assessment, last 12 mths 10/16/2018 Able to walk? Yes Fall in past 12 months? No  
Fall with injury? -  
Number of falls in past 12 months - Fall Risk Score -  
 
 
Abuse Screen: 
Abuse Screening Questionnaire 10/16/2018 Do you ever feel afraid of your partner? Rigoberto Binder Are you in a relationship with someone who physically or mentally threatens you? Rigoberto Binder Is it safe for you to go home? Ana Pryor Alcohol Risk Factor Screening: On any occasion during the past 3 months, have you had more than 3 drinks containing alcohol? No 
Do you average more than 7 drinks per week? No 
 
Hearing Loss: The patient wears hearing aids. Cognition Screen: 
Has your family/caregiver stated any concerns about your memory: no 
 
Activities of Daily Living:   
Requires assistance with: no ADLs Home contains: no safety equipment. Exercise: very active Adult Nutrition Screen: No risk factors noted. Health Maintenance Daily Aspirin: no 
Bone Density: UTD - done in 6/13/17 Glaucoma Screening: UTD Immunizations:  
 Influenza: up to date. Tetanus: not UTD- script provided. Shingles: not up to date - prescription provided. Pneumonia: up to date. Cancer screening:  
 Cervical: reviewed guidelines, n/a. Breast: reviewed guidelines, reviewed SBE with her, UTD. Colon: guidelines reviewed, UTD. Patient Care Team: 
Marcio Henriquez MD as PCP - General (Internal Medicine) Urvashi Castillo MD (Endocrinology) Rob Sylvester MD (Gastroenterology) Jeff Easton MD (Radiology) Antoni Riley MD (Obstetrics & Gynecology) Bjorn Holstein, MD as Physician (Ophthalmology) Milton Calderón MD as Physician (Otolaryngology) Jesus Navarro MD as Physician (Obstetrics & Gynecology) In addition to the above issues we also reviewed the following acute and/or chronic problems: 
 
Cardiovascular Review The patient has hypertension and hyperlipidemia. Since last visit: no changes. She reports taking medications as instructed, no medication side effects noted, patient does not perform home BP monitoring. Diet and Lifestyle: generally follows a low fat low cholesterol diet, generally follows a low sodium diet, exercising regularly. Labs: reviewed and discussed with patient.    
  
Endocrine Review Patient is seen for followup of hypothyroidism. Since last visit: no changes. She reports medication compliance: all the time and is taking separate from all her other meds. She reports the following concerns/problems/med side effects: none. Lab review: labs reviewed and discussed with patient.   
  
She is seen for pre-diabetes. Since last visit: weight has increased. Home glucose monitoring: is not performed. Diabetic diet compliance: compliant most of the time, but reports chocolate has been her downfall recently. Lab review: labs reviewed and discussed with patient.     
  
Mental Health Review Patient is seen for insomnia and depression.  Since last visit: no concerns or changes. She is taking 1/2 tab nightly.  She denies: trouble falling asleep and trouble staying asleep.   
  
 
 
The following sections were reviewed & updated as appropriate: PMH, PSH, FH, and SH. Patient Active Problem List  
Diagnosis Code  Acquired hypothyroidism E03.9  Impaired fasting glucose R73.01  
 Hypertension, essential I10  Pure hypercholesterolemia E78.00  Allergic rhinitis J30.9  Depression F32.9  Osteopenia M85.80  Uterine prolapse N81.4  Primary insomnia F51.01  
 Abnormal mammogram of left breast R92.8 Prior to Admission medications Medication Sig Start Date End Date Taking? Authorizing Provider  
mirtazapine (REMERON) 15 mg tablet TAKE 1 TABLET BY MOUTH EVERY NIGHT 9/20/18  Yes Altagracia Collins MD  
SYNTHROID 88 mcg tablet TAKE 1 TABLET BY MOUTH DAILY BEFORE BREAKFAST 7/15/18  Yes Altagracia Collins MD  
atorvastatin (LIPITOR) 20 mg tablet TAKE 1 TABLET BY MOUTH DAILY 6/29/18  Yes Altagracia Collins MD  
losartan-hydroCHLOROthiazide [facility]) 100-25 mg per tablet TAKE 1 TABLET BY MOUTH DAILY 6/13/18  Yes Altagracia Collins MD  
Cetirizine (ZYRTEC) 10 mg cap Take  by mouth daily. Yes Historical Provider  
simethicone (GAS-X) 80 mg chewable tablet Take 80 mg by mouth every six (6) hours as needed for Flatulence. Yes Historical Provider  
cholecalciferol (VITAMIN D3) 1,000 unit tablet Take  by mouth daily. Yes Historical Provider  
b complex vitamins tablet Take 1 Tab by mouth daily. Yes Historical Provider  
fluticasone (FLONASE) 50 mcg/actuation nasal spray 2 Sprays by Both Nostrils route daily. 12/5/17   Altagracia Collins MD  
  
 
 
Allergies Allergen Reactions  Codeine Nausea Only  Epinephrine Palpitations  Erythromycin Other (comments) GI upset  Pcn [Penicillins] Hives Review of Systems Constitutional: Negative for chills and fever. Respiratory: Negative for cough and shortness of breath. Cardiovascular: Negative for chest pain and palpitations. Gastrointestinal: Negative for abdominal pain, blood in stool, constipation, diarrhea, heartburn, nausea and vomiting. Musculoskeletal: Positive for joint pain (L knee on/off, no h/o trauma, not locking up or giving out). Negative for myalgias. Neurological: Negative for tingling, focal weakness and headaches. Endo/Heme/Allergies: Does not bruise/bleed easily. Psychiatric/Behavioral: Negative for depression. The patient is not nervous/anxious and does not have insomnia. Objective: Physical Exam  
Constitutional: She appears well-developed. No distress. HENT:  
Mouth/Throat: Mucous membranes are normal.  
Eyes: Conjunctivae and lids are normal. No scleral icterus. Neck: Neck supple. No thyromegaly present. Cardiovascular: Regular rhythm and normal heart sounds. No murmur heard. Pulmonary/Chest: Effort normal and breath sounds normal. She has no wheezes. She has no rales. Abdominal: Soft. Bowel sounds are normal. She exhibits no mass. There is no hepatosplenomegaly. There is no tenderness. Musculoskeletal: She exhibits no edema. Lymphadenopathy:  
  She has no cervical adenopathy. Skin: No rash noted. Psychiatric: She has a normal mood and affect. Her behavior is normal.  
  
 
 
Visit Vitals  /80  Pulse 66  Temp 97.6 °F (36.4 °C) (Oral)  Resp 14  
 Ht 5' 3.9\" (1.623 m)  Wt 171 lb (77.6 kg)  SpO2 97%  BMI 29.44 kg/m2 Advised her to call back or return to office if symptoms worsen/change/persist. 
Discussed expected course/resolution/complications of diagnosis in detail with patient. Medication risks/benefits/costs/interactions/alternatives discussed with patient. She was given an after visit summary which includes diagnoses, current medications, & vitals. She expressed understanding with the diagnosis and plan. Aspects of this note may have been generated using voice recognition software. Despite editing, there may be some syntax errors.   
 
 
Ulysses Eduardo MD

## 2018-10-16 NOTE — PATIENT INSTRUCTIONS
Medicare Wellness Visit, Female The best way to live healthy is to have a lifestyle where you eat a well-balanced diet, exercise regularly, limit alcohol use, and quit all forms of tobacco/nicotine, if applicable. Regular preventive services are another way to keep healthy. Preventive services (vaccines, screening tests, monitoring & exams) can help personalize your care plan, which helps you manage your own care. Screening tests can find health problems at the earliest stages, when they are easiest to treat. [facility] follows the current, evidence-based guidelines published by the [facility] ([facility]) when recommending preventive services for our patients. Because we follow these guidelines, sometimes recommendations change over time as research supports it. (For example, mammograms used to be recommended annually. Even though Medicare will still pay for an annual mammogram, the newer guidelines recommend a mammogram every two years for women of average risk.) Of course, you and your doctor may decide to screen more often for some diseases, based on your risk and your health status. Preventive services for you include: - Medicare offers their members a free annual wellness visit, which is time for you and your primary care provider to discuss and plan for your preventive service needs. Take advantage of this benefit every year! 
-All adults over the age of 72 should receive the recommended pneumonia vaccines. Current USPSTF guidelines recommend a series of two vaccines for the best pneumonia protection.  
-All adults should have a flu vaccine yearly and a tetanus vaccine every 10 years. All adults age 61 and older should receive a shingles vaccine once in their lifetime.   
-A bone mass density test is recommended when a woman turns 65 to screen for osteoporosis. This test is only recommended one time, as a screening. Some providers will use this same test as a disease monitoring tool if you already have osteoporosis. -All adults age 38-68 who are overweight should have a diabetes screening test once every three years.  
-Other screening tests and preventive services for persons with diabetes include: an eye exam to screen for diabetic retinopathy, a kidney function test, a foot exam, and stricter control over your cholesterol.  
-Cardiovascular screening for adults with routine risk involves an electrocardiogram (ECG) at intervals determined by your doctor.  
-Colorectal cancer screenings should be done for adults age 54-65 with no increased risk factors for colorectal cancer. There are a number of acceptable methods of screening for this type of cancer. Each test has its own benefits and drawbacks. Discuss with your doctor what is most appropriate for you during your annual wellness visit. The different tests include: colonoscopy (considered the best screening method), a fecal occult blood test, a fecal DNA test, and sigmoidoscopy. -Breast cancer screenings are recommended every other year for women of normal risk, age 54-69. 
-Cervical cancer screenings for women over age 72 are only recommended with certain risk factors.  
-All adults born between [facility] should be screened once for Hepatitis C. Here is a list of your current Health Maintenance items (your personalized list of preventive services) with a due date: 
Health Maintenance Due Topic Date Due  Shingles Vaccine (1 of 2) 04/13/1992  
 DTaP/Tdap/Td  (1 - Tdap) 06/03/2015 [place] Annual Well Visit  10/14/2018  Breast Cancer Screening  11/15/2018 [address] What is a living will? A living will is a legal form you use to write down the kind of care you want at the end of your life. It is used by the health professionals who will treat you if you aren't able to decide for yourself. If you put your wishes in writing, your loved ones and others will know what kind of care you want. They won't need to guess. This can ease your mind and be helpful to others. A living will is not the same as an estate or property will. An estate will explains what you want to happen with your money and property after you die. Is a living will a legal document? A living will is a legal document. Each state has its own laws about living love. If you move to another state, make sure that your living will is legal in the state where you now live. Or you might use a universal form that has been approved by many states. This kind of form can sometimes be completed and stored online. Your electronic copy will then be available wherever you have a connection to the Internet. In most cases, doctors will respect your wishes even if you have a form from a different state. · You don't need an  to complete a living will. But legal advice can be helpful if your state's laws are unclear, your health history is complicated, or your family can't agree on what should be in your living will. · You can change your living will at any time. Some people find that their wishes about end-of-life care change as their health changes. · In addition to making a living will, think about completing a medical power of  form. This form lets you name the person you want to make end-of-life treatment decisions for you (your \"health care agent\") if you're not able to. Many hospitals and nursing homes will give you the forms you need to complete a living will and a medical power of . · Your living will is used only if you can't make or communicate decisions for yourself anymore. If you become able to make decisions again, you can accept or refuse any treatment, no matter what you wrote in your living will. · Your state may offer an online registry.  This is a place where you can store your living will online so the doctors and nurses who need to treat you can find it right away. What should you think about when creating a living will? Talk about your end-of-life wishes with your family members and your doctor. Let them know what you want. That way the people making decisions for you won't be surprised by your choices. Think about these questions as you make your living will: · Do you know enough about life support methods that might be used? If not, talk to your doctor so you know what might be done if you can't breathe on your own, your heart stops, or you're unable to swallow. · What things would you still want to be able to do after you receive life-support methods? Would you want to be able to walk? To speak? To eat on your own? To live without the help of machines? · If you have a choice, where do you want to be cared for? In your home? At a hospital or nursing home? · Do you want certain Yazdanism practices performed if you become very ill? · If you have a choice at the end of your life, where would you prefer to die? At home? In a hospital or nursing home? Somewhere else? · Would you prefer to be buried or cremated? · Do you want your organs to be donated after you die? What should you do with your living will? · Make sure that your family members and your health care agent have copies of your living will. · Give your doctor a copy of your living will to keep in your medical record. If you have more than one doctor, make sure that each one has a copy. · You may want to put a copy of your living will where it can be easily found. Where can you learn more? Go to http://francisco-isaac.info/. Enter S665 in the search box to learn more about \"Learning About Living Perroy. \" Current as of: August 8, 2016 Content Version: 11.3 © 1926-8413 [employer].  Care instructions adapted under license by [address] (which disclaims liability or warranty for this information). If you have questions about a medical condition or this instruction, always ask your healthcare professional. [address] any warranty or liability for your use of this information.

## 2018-10-16 NOTE — ACP (ADVANCE CARE PLANNING)
Advance Care Planning    Advance Care Planning (ACP) Provider Note - Comprehensive     Date of ACP Conversation: 10/16/18  Persons included in Conversation:  patient  Length of ACP Conversation in minutes: <16 minutes (Non-Billable)    Authorized Decision Maker (if patient is incapable of making informed decisions):   Healthcare Agent/Medical Power of  under Advance Directive      She has an advanced directive - a copy HAS NOT been provided. Reviewed DNR/DNI and patient is interested in remaining a DNR, no changes made. General ACP for ALL Patients with Decision Making Capacity:  Importance of advance care planning, including choosing a healthcare agent to communicate patient's healthcare decisions if patient lost the ability to make decisions, such as after a sudden illness or accident  Understanding of the healthcare agent role was assessed and information provided  Opportunity offered to explore how cultural, Confucianist, spiritual, or personal beliefs would affect decisions for future care  Exploration of values, goals, and preferences if recovery is not expected, even with continued medical treatment in the event of: Imminent death or severe, permanent brain injury    For Serious or Chronic Illness:  Understanding of CPR, goals and expected outcomes, benefits and burdens discussed.   Understanding of medical condition  Information on CPR success rates provided (e.g. for CPR in hospital, survival to d/c at two weeks is 22%, for chronically ill or elderly/frail survival is less than 3%)    Interventions Provided:  Recommended communicating the plan and making copies for the healthcare agent, personal physician, and others as appropriate (e.g., health system)  Recommended review of completed ACP document annually or upon change in health status

## 2018-12-16 ENCOUNTER — HOSPITAL ENCOUNTER (EMERGENCY)
Age: 78
Discharge: HOME | End: 2018-12-16
Attending: EMERGENCY MEDICINE
Payer: MEDICARE

## 2018-12-16 VITALS
HEIGHT: 64 IN | SYSTOLIC BLOOD PRESSURE: 155 MMHG | DIASTOLIC BLOOD PRESSURE: 95 MMHG | HEART RATE: 88 BPM | WEIGHT: 179.01 LBS | TEMPERATURE: 97.4 F | RESPIRATION RATE: 16 BRPM | OXYGEN SATURATION: 97 % | BODY MASS INDEX: 30.56 KG/M2

## 2018-12-16 DIAGNOSIS — N30.01 ACUTE CYSTITIS WITH HEMATURIA: Primary | ICD-10-CM

## 2018-12-16 LAB
APPEARANCE UR: ABNORMAL
BACTERIA URNS QL MICRO: ABNORMAL /HPF
BILIRUB UR QL: NEGATIVE
COLOR UR: ABNORMAL
EPITH CASTS URNS QL MICRO: ABNORMAL /LPF
GLUCOSE UR STRIP.AUTO-MCNC: NEGATIVE MG/DL
HGB UR QL STRIP: ABNORMAL
KETONES UR QL STRIP.AUTO: NEGATIVE MG/DL
LEUKOCYTE ESTERASE UR QL STRIP.AUTO: ABNORMAL
NITRITE UR QL STRIP.AUTO: NEGATIVE
PH UR STRIP: 6.5 [PH] (ref 5–8)
PROT UR STRIP-MCNC: 30 MG/DL
RBC #/AREA URNS HPF: ABNORMAL /HPF (ref 0–5)
SP GR UR REFRACTOMETRY: 1.01 (ref 1–1.03)
UR CULT HOLD, URHOLD: NORMAL
UROBILINOGEN UR QL STRIP.AUTO: 0.2 EU/DL (ref 0.2–1)
WBC CASTS URNS QL MICRO: ABNORMAL /LPF
WBC URNS QL MICRO: >100 /HPF (ref 0–4)

## 2018-12-16 PROCEDURE — 99283 EMERGENCY DEPT VISIT LOW MDM: CPT

## 2018-12-16 PROCEDURE — 81001 URINALYSIS AUTO W/SCOPE: CPT

## 2018-12-16 PROCEDURE — 87186 SC STD MICRODIL/AGAR DIL: CPT

## 2018-12-16 PROCEDURE — 87086 URINE CULTURE/COLONY COUNT: CPT

## 2018-12-16 PROCEDURE — 87077 CULTURE AEROBIC IDENTIFY: CPT

## 2018-12-16 RX ORDER — PHENAZOPYRIDINE HYDROCHLORIDE 200 MG/1
200 TABLET, FILM COATED ORAL 3 TIMES DAILY
Qty: 6 TAB | Refills: 0 | Status: SHIPPED | OUTPATIENT
Start: 2018-12-16 | End: 2018-12-18

## 2018-12-16 RX ORDER — CEPHALEXIN 250 MG/1
250 CAPSULE ORAL 3 TIMES DAILY
Qty: 15 CAP | Refills: 0 | Status: SHIPPED | OUTPATIENT
Start: 2018-12-16 | End: 2018-12-21

## 2018-12-16 RX ORDER — LOSARTAN POTASSIUM AND HYDROCHLOROTHIAZIDE 25; 100 MG/1; MG/1
TABLET ORAL
Qty: 90 TAB | Refills: 0 | Status: SHIPPED | OUTPATIENT
Start: 2018-12-16 | End: 2019-02-25

## 2018-12-16 NOTE — ED TRIAGE NOTES
Triage note: Pt states she has urinary frequency and burning. States the sx started Friday. Denies hematuria. Hx of bladder prolapse without surgery. A&Ox4. Skin warm and dry. RR even and unlabored.  NAD

## 2018-12-16 NOTE — DISCHARGE INSTRUCTIONS
Urinary Tract Infection in Women: Care Instructions  Your Care Instructions    A urinary tract infection, or UTI, is a general term for an infection anywhere between the kidneys and the urethra (where urine comes out). Most UTIs are bladder infections. They often cause pain or burning when you urinate. UTIs are caused by bacteria and can be cured with antibiotics. Be sure to complete your treatment so that the infection goes away. Follow-up care is a key part of your treatment and safety. Be sure to make and go to all appointments, and call your doctor if you are having problems. It's also a good idea to know your test results and keep a list of the medicines you take. How can you care for yourself at home? · Take your antibiotics as directed. Do not stop taking them just because you feel better. You need to take the full course of antibiotics. · Drink extra water and other fluids for the next day or two. This may help wash out the bacteria that are causing the infection. (If you have kidney, heart, or liver disease and have to limit fluids, talk with your doctor before you increase your fluid intake.)  · Avoid drinks that are carbonated or have caffeine. They can irritate the bladder. · Urinate often. Try to empty your bladder each time. · To relieve pain, take a hot bath or lay a heating pad set on low over your lower belly or genital area. Never go to sleep with a heating pad in place. To prevent UTIs  · Drink plenty of water each day. This helps you urinate often, which clears bacteria from your system. (If you have kidney, heart, or liver disease and have to limit fluids, talk with your doctor before you increase your fluid intake.)  · Urinate when you need to. · Urinate right after you have sex. · Change sanitary pads often. · Avoid douches, bubble baths, feminine hygiene sprays, and other feminine hygiene products that have deodorants.   · After going to the bathroom, wipe from front to back.  When should you call for help? Call your doctor now or seek immediate medical care if:    · Symptoms such as fever, chills, nausea, or vomiting get worse or appear for the first time.     · You have new pain in your back just below your rib cage. This is called flank pain.     · There is new blood or pus in your urine.     · You have any problems with your antibiotic medicine.    Watch closely for changes in your health, and be sure to contact your doctor if:    · You are not getting better after taking an antibiotic for 2 days.     · Your symptoms go away but then come back. Where can you learn more? Go to http://francisco-isaac.info/. Enter V871 in the search box to learn more about \"Urinary Tract Infection in Women: Care Instructions. \"  Current as of: March 21, 2018  Content Version: 11.8  © 2983-7167 [employer]. Care instructions adapted under license by [employer] (which disclaims liability or warranty for this information). If you have questions about a medical condition or this instruction, always ask your healthcare professional. [address] any warranty or liability for your use of this information.

## 2018-12-16 NOTE — ED PROVIDER NOTES
82-year-old white female presents to the emergency department with dysuria. Patient has had 2 days of intermittent dysuria. She reports some mild pain with urination. She also reports she's been urinating more frequently. She has had this before it was diagnosed as urinary tract infection. No fevers. No back pain. No vomiting. No abdominal pain. No shortness of breath or chest pain. No other symptoms at this time. Patient is otherwise healthy. No tobacco or alcohol.                Past Medical History:   Diagnosis Date    Allergic rhinitis     Arthritis     Depression     Hyperlipidemia     Impaired fasting glucose     Unspecified essential hypertension     Unspecified hypothyroidism     Uterine prolapse     Vertigo        Past Surgical History:   Procedure Laterality Date    HX BREAST BIOPSY Bilateral     >5, all benign    HX BREAST BIOPSY Left 11/29/2017    benign    HX COLONOSCOPY  8/15/12    diverticulosis    HX ENDOSCOPY  8/15/12    hiatal hernia    HX HAMMER TOE REPAIR Left     w/ bunion surgery (per previous PCP)    HX PARTIAL HYSTERECTOMY      HX TONSILLECTOMY           Family History:   Problem Relation Age of Onset    Thyroid Disease Father         hypo    Coronary Artery Disease Father         s/p CABG    Heart Disease Father         s/p valve replacement    Diabetes Brother     Thyroid Disease Brother     Cancer Brother         kidney    Coronary Artery Disease Brother         s/p CABG    Dementia Mother         vascular    Elevated Lipids Mother     Coronary Artery Disease Mother     Obesity Son     Hypertension Son     No Known Problems Son     Diabetes Maternal Grandmother     Cancer Maternal Grandmother         pancreatic    Diabetes Paternal Grandmother        Social History     Socioeconomic History    Marital status:      Spouse name: Not on file    Number of children: Not on file    Years of education: Not on file    Highest education level: Not on file Social Needs    Financial resource strain: Not on file    Food insecurity - worry: Not on file    Food insecurity - inability: Not on file   [employer] needs - medical: Not on file   [employer] needs - non-medical: Not on file   Occupational History    Not on file   Tobacco Use    Smoking status: Never Smoker    Smokeless tobacco: Never Used   Substance and Sexual Activity    Alcohol use: No    Drug use: No    Sexual activity: Not Currently     Partners: Male   Other Topics Concern    Not on file   Social History Narrative    Lives in [facility] with  of 48 years. Has 2 sons. Retired. Used to work as a  for [employer] mostly at the middle school level. Likes to read. ALLERGIES: Codeine; Epinephrine; Erythromycin; and Pcn [penicillins]    Review of Systems   Constitutional: Negative for fever. HENT: Negative for facial swelling and nosebleeds. Eyes: Negative for pain and redness. Respiratory: Negative for cough, chest tightness and shortness of breath. Cardiovascular: Negative for chest pain and leg swelling. Gastrointestinal: Negative for abdominal distention, abdominal pain and vomiting. Genitourinary: Positive for dysuria and frequency. Negative for flank pain. Musculoskeletal: Negative for arthralgias and back pain. Skin: Negative for color change. Neurological: Negative for headaches. Hematological: Does not bruise/bleed easily. Psychiatric/Behavioral: Negative for confusion. All other systems reviewed and are negative. Vitals:    12/16/18 1213   BP: (!) 155/95   Pulse: 88   Resp: 16   Temp: 97.4 °F (36.3 °C)   SpO2: 97%   Weight: 81.2 kg (179 lb 0.2 oz)   Height: 5' 4\" (1.626 m)            Physical Exam   Constitutional: She is oriented to person, place, and time. She appears well-developed and well-nourished. HENT:   Head: Normocephalic and atraumatic.    Right Ear: External ear normal.   Left Ear: External ear normal.   Nose: Nose normal.   Mouth/Throat: Oropharynx is clear and moist.   Eyes: EOM are normal. Pupils are equal, round, and reactive to light. No scleral icterus. Neck: Normal range of motion. Neck supple. No JVD present. No tracheal deviation present. No thyromegaly present. Cardiovascular: Normal rate, regular rhythm, normal heart sounds and intact distal pulses. Exam reveals no friction rub. No murmur heard. Pulmonary/Chest: Effort normal and breath sounds normal. No stridor. No respiratory distress. She has no wheezes. She has no rales. She exhibits no tenderness. Abdominal: Soft. Bowel sounds are normal. She exhibits no distension. There is no tenderness. There is no rebound and no guarding. Musculoskeletal: Normal range of motion. She exhibits no edema or tenderness. Lymphadenopathy:     She has no cervical adenopathy. Neurological: She is alert and oriented to person, place, and time. She has normal reflexes. No cranial nerve deficit. Coordination normal.   Skin: Skin is warm and dry. No rash noted. No erythema. Psychiatric: She has a normal mood and affect. Her behavior is normal. Judgment and thought content normal.   Nursing note and vitals reviewed. MDM  Number of Diagnoses or Management Options  Diagnosis management comments: Patient looks well and nontoxic. Exam is normal. We'll check urinalysis. Will reassess shortly after urine test. Patient agrees. Amount and/or Complexity of Data Reviewed  Clinical lab tests: ordered and reviewed  Tests in the medicine section of CPT®: ordered and reviewed  Decide to obtain previous medical records or to obtain history from someone other than the patient: yes  Review and summarize past medical records: yes  Independent visualization of images, tracings, or specimens: yes           Procedures    Pt w/ UTI on UA    Will treat w/ Keflex    Good return precautions given to patient. Close follow up with PCP recommended.  Patient and/or family voices understanding of this plan. Discharge instructions were explained by me and all concerns were addressed.

## 2018-12-17 RX ORDER — LEVOTHYROXINE SODIUM 88 UG/1
TABLET ORAL
Qty: 90 TAB | Refills: 0 | Status: SHIPPED | OUTPATIENT
Start: 2018-12-17 | End: 2019-03-14 | Stop reason: SDUPTHER

## 2018-12-17 NOTE — TELEPHONE ENCOUNTER
Please call patient. Never had labs done from last visit, needs to get theme done ASAP.   Was in ER on 12/16, if not feeling better needs to be seen

## 2018-12-18 LAB
BACTERIA SPEC CULT: ABNORMAL
CC UR VC: ABNORMAL
SERVICE CMNT-IMP: ABNORMAL

## 2018-12-18 RX ORDER — CIPROFLOXACIN 250 MG/1
250 TABLET, FILM COATED ORAL 2 TIMES DAILY
Qty: 6 TAB | Refills: 0 | Status: SHIPPED | OUTPATIENT
Start: 2018-12-18 | End: 2018-12-21

## 2018-12-18 NOTE — TELEPHONE ENCOUNTER
Writer spoke with patient earlier this morning related to completing labs. Labs have been reprinted and available for .  Patient verbalized understanding

## 2018-12-18 NOTE — TELEPHONE ENCOUNTER
Spoke with patient using 2 identifiers, name and . Patient notified per Dr. Seamus Fernandez recommendation: labs need to be completed. Patient reported feeling much better since recent ED visit and having lost lab slips. Writer will reprint labs and leave at  for completion.  Patient  verbalized understanding

## 2018-12-18 NOTE — PROGRESS NOTES
I called and spoke with patient. We discussed culture results. She would like to change antibiotics. She is not worse. No complicating factors.   [address] for cipro 250 mg bid x 3 d to [facility]

## 2018-12-18 NOTE — TELEPHONE ENCOUNTER
Please call patient. Will refill meds x 1, needs to get labs done, has not had this done since Oct visit.

## 2018-12-19 ENCOUNTER — HOSPITAL ENCOUNTER (OUTPATIENT)
Dept: LAB | Age: 78
Discharge: HOME | End: 2018-12-19
Payer: MEDICARE

## 2018-12-19 DIAGNOSIS — E78.5 HYPERLIPIDEMIA WITH TARGET LDL LESS THAN 100: ICD-10-CM

## 2018-12-19 PROCEDURE — 83036 HEMOGLOBIN GLYCOSYLATED A1C: CPT

## 2018-12-19 PROCEDURE — 36415 COLL VENOUS BLD VENIPUNCTURE: CPT

## 2018-12-19 PROCEDURE — 80053 COMPREHEN METABOLIC PANEL: CPT

## 2018-12-19 PROCEDURE — 84443 ASSAY THYROID STIM HORMONE: CPT

## 2018-12-19 RX ORDER — ATORVASTATIN CALCIUM 20 MG/1
TABLET, FILM COATED ORAL
Qty: 90 TAB | Refills: 0 | Status: SHIPPED | OUTPATIENT
Start: 2018-12-19 | End: 2019-03-18 | Stop reason: SDUPTHER

## 2018-12-20 LAB
ALBUMIN SERPL-MCNC: 4.2 G/DL (ref 3.5–4.8)
ALBUMIN/GLOB SERPL: 1.7 {RATIO} (ref 1.2–2.2)
ALP SERPL-CCNC: 63 IU/L (ref 39–117)
ALT SERPL-CCNC: 20 IU/L (ref 0–32)
AST SERPL-CCNC: 23 IU/L (ref 0–40)
BILIRUB SERPL-MCNC: 0.4 MG/DL (ref 0–1.2)
BUN SERPL-MCNC: 20 MG/DL (ref 8–27)
BUN/CREAT SERPL: 23 (ref 12–28)
CALCIUM SERPL-MCNC: 9.5 MG/DL (ref 8.7–10.3)
CHLORIDE SERPL-SCNC: 97 MMOL/L (ref 96–106)
CO2 SERPL-SCNC: 23 MMOL/L (ref 20–29)
CREAT SERPL-MCNC: 0.88 MG/DL (ref 0.57–1)
EST. AVERAGE GLUCOSE BLD GHB EST-MCNC: 111 MG/DL
GLOBULIN SER CALC-MCNC: 2.5 G/DL (ref 1.5–4.5)
GLUCOSE SERPL-MCNC: 102 MG/DL (ref 65–99)
HBA1C MFR BLD: 5.5 % (ref 4.8–5.6)
POTASSIUM SERPL-SCNC: 4.1 MMOL/L (ref 3.5–5.2)
PROT SERPL-MCNC: 6.7 G/DL (ref 6–8.5)
SODIUM SERPL-SCNC: 139 MMOL/L (ref 134–144)
TSH SERPL DL<=0.005 MIU/L-ACNC: 0.6 UIU/ML (ref 0.45–4.5)

## 2018-12-20 NOTE — PROGRESS NOTES
Results released to patient via [employer]. All labs are stable or at goal for her. FBS stable but A1c improved. TSH at goal.  No changes.

## 2019-01-31 ENCOUNTER — HOSPITAL ENCOUNTER (EMERGENCY)
Age: 79
Discharge: HOME | End: 2019-01-31
Attending: EMERGENCY MEDICINE
Payer: MEDICARE

## 2019-01-31 ENCOUNTER — APPOINTMENT (OUTPATIENT)
Dept: GENERAL RADIOLOGY | Age: 79
End: 2019-01-31
Attending: EMERGENCY MEDICINE
Payer: MEDICARE

## 2019-01-31 VITALS
WEIGHT: 174.16 LBS | HEART RATE: 82 BPM | DIASTOLIC BLOOD PRESSURE: 82 MMHG | RESPIRATION RATE: 16 BRPM | BODY MASS INDEX: 29.73 KG/M2 | OXYGEN SATURATION: 98 % | SYSTOLIC BLOOD PRESSURE: 135 MMHG | TEMPERATURE: 97.5 F | HEIGHT: 64 IN

## 2019-01-31 DIAGNOSIS — M17.12 ARTHRITIS OF LEFT KNEE: ICD-10-CM

## 2019-01-31 DIAGNOSIS — S80.02XA CONTUSION OF LEFT KNEE, INITIAL ENCOUNTER: Primary | ICD-10-CM

## 2019-01-31 PROCEDURE — 73562 X-RAY EXAM OF KNEE 3: CPT

## 2019-01-31 PROCEDURE — 99283 EMERGENCY DEPT VISIT LOW MDM: CPT

## 2019-01-31 NOTE — ED TRIAGE NOTES
TRIAGE NOTE: Patient arrived from home with c/o LEFT knee pain after tripping over the rug at home. Denies LOC or hitting head. Took advil with little relief.

## 2019-01-31 NOTE — DISCHARGE INSTRUCTIONS
Patient Education   Patient Education   Patient Education        Learning About RICE (Rest, Ice, Compression, and Elevation)  What is RICE? RICE is a way to care for an injury. RICE helps relieve pain and swelling. It may also help with healing and flexibility. RICE stands for:  · Rest and protect the injured or sore area. · Ice or a cold pack used as soon as possible. · Compression, or wrapping the injured or sore area with an elastic bandage. · Elevation (propping up) the injured or sore area. How do you do RICE? You can use RICE for home treatment when you have general aches and pains or after an injury or surgery. Rest  · Do not put weight on the injury for at least 24 to 48 hours. · Use crutches for a badly sprained knee or ankle. · Support a sprained wrist, elbow, or shoulder with a sling. Ice  · Put ice or a cold pack on the injury right away to reduce pain and swelling. Frozen vegetables will also work as an ice pack. Put a thin cloth between the ice or cold pack and your skin. The cloth protects the injured area from getting too cold. · Use ice for 10 to 15 minutes at a time for the first 48 to 72 hours. Compression  · Use compression for sprains, strains, and surgeries of the arms and legs. · Wrap the injured area with an elastic bandage or compression sleeve to reduce swelling. · Don't wrap it too tightly. If the area below it feels numb, tingles, or feels cool, loosen the wrap. Elevation  · Use elevation for areas of the body that can be propped up, such as arms and legs. · Prop up the injured area on pillows whenever you use ice. Keep it propped up anytime you sit or lie down. · Try to keep the injured area at or above the level of your heart. This will help reduce swelling and bruising. Where can you learn more? Go to http://francisco-isaac.info/.   Enter D030 in the search box to learn more about \"Learning About RICE (Rest, Ice, Compression, and Elevation). \"  Current as of: September 20, 2018  Content Version: 11.9  © 5279-5222 [employer]. Care instructions adapted under license by [employer] (which disclaims liability or warranty for this information). If you have questions about a medical condition or this instruction, always ask your healthcare professional. [address] any warranty or liability for your use of this information. Knee Arthritis: Exercises  Your Care Instructions  Here are some examples of exercises for knee arthritis. Start each exercise slowly. Ease off the exercise if you start to have pain. Your doctor or physical therapist will tell you when you can start these exercises and which ones will work best for you. How to do the exercises  Knee flexion with heel slide    1. Lie on your back with your knees bent. 2. Slide your heel back by bending your affected knee as far as you can. Then hook your other foot around your ankle to help pull your heel even farther back. 3. Hold for about 6 seconds, then rest for up to 10 seconds. 4. Repeat 8 to 12 times. 5. Switch legs and repeat steps 1 through 4, even if only one knee is sore. Quad sets    1. Sit with your affected leg straight and supported on the floor or a firm bed. Place a small, rolled-up towel under your knee. Your other leg should be bent, with that foot flat on the floor. 2. Tighten the thigh muscles of your affected leg by pressing the back of your knee down into the towel. 3. Hold for about 6 seconds, then rest for up to 10 seconds. 4. Repeat 8 to 12 times. 5. Switch legs and repeat steps 1 through 4, even if only one knee is sore. Straight-leg raises to the front    1. Lie on your back with your good knee bent so that your foot rests flat on the floor. Your affected leg should be straight. Make sure that your low back has a normal curve.  You should be able to slip your hand in between the floor and the small of your back, with your palm touching the floor and your back touching the back of your hand. 2. Tighten the thigh muscles in your affected leg by pressing the back of your knee flat down to the floor. Hold your knee straight. 3. Keeping the thigh muscles tight and your leg straight, lift your affected leg up so that your heel is about 12 inches off the floor. Hold for about 6 seconds, then lower slowly. 4. Relax for up to 10 seconds between repetitions. 5. Repeat 8 to 12 times. 6. Switch legs and repeat steps 1 through 5, even if only one knee is sore. Active knee flexion    1. Lie on your stomach with your knees straight. If your kneecap is uncomfortable, roll up a washcloth and put it under your leg just above your kneecap. 2. Lift the foot of your affected leg by bending the knee so that you bring the foot up toward your buttock. If this motion hurts, try it without bending your knee quite as far. This may help you avoid any painful motion. 3. Slowly move your leg up and down. 4. Repeat 8 to 12 times. 5. Switch legs and repeat steps 1 through 4, even if only one knee is sore. Quadriceps stretch (facedown)    1. Lie flat on your stomach, and rest your face on the floor. 2. Wrap a towel or belt strap around the lower part of your affected leg. Then use the towel or belt strap to slowly pull your heel toward your buttock until you feel a stretch. 3. Hold for about 15 to 30 seconds, then relax your leg against the towel or belt strap. 4. Repeat 2 to 4 times. 5. Switch legs and repeat steps 1 through 4, even if only one knee is sore. Stationary exercise bike    1. If you do not have a stationary exercise bike at home, you can find one to ride at your local health club or community center. 2. Adjust the height of the bike seat so that your knee is slightly bent when your leg is extended downward.  If your knee hurts when the pedal reaches the top, you can raise the seat so that your knee does not bend as much. 3. Start slowly. At first, try to do 5 to 10 minutes of cycling with little to no resistance. Then increase your time and the resistance bit by bit until you can do 20 to 30 minutes without pain. 4. If you start to have pain, rest your knee until your pain gets back to the level that is normal for you. Or cycle for less time or with less effort. Follow-up care is a key part of your treatment and safety. Be sure to make and go to all appointments, and call your doctor if you are having problems. It's also a good idea to know your test results and keep a list of the medicines you take. Where can you learn more? Go to http://francisco-isaac.info/. Enter C159 in the search box to learn more about \"Knee Arthritis: Exercises. \"  Current as of: September 20, 2018  Content Version: 11.9  © 5045-0639 [employer]. Care instructions adapted under license by [employer] (which disclaims liability or warranty for this information). If you have questions about a medical condition or this instruction, always ask your healthcare professional. [place] [address] any warranty or liability for your use of this information. Knee Arthritis: Care Instructions  Your Care Instructions    Knee arthritis is a breakdown of the cartilage that cushions your knee joint. When the cartilage wears down, your bones rub against each other. This causes pain and stiffness. Knee arthritis tends to get worse with time. Treatment for knee arthritis involves reducing pain, making the leg muscles stronger, and staying at a healthy body weight. The treatment usually does not improve the health of the cartilage, but it can reduce pain and improve how well your knee works. You can take simple measures to protect your knee joints, ease your pain, and help you stay active. Follow-up care is a key part of your treatment and safety.  Be sure to make and go to all appointments, and call your doctor if you are having problems. It's also a good idea to know your test results and keep a list of the medicines you take. How can you care for yourself at home? · Know that knee arthritis will cause more pain on some days than on others. · Stay at a healthy weight. Lose weight if you are overweight. When you stand up, the pressure on your knees from every pound of body weight is multiplied four times. So if you lose 10 pounds, you will reduce the pressure on your knees by 40 pounds. · Talk to your doctor or physical therapist about exercises that will help ease joint pain. ? Stretch to help prevent stiffness and to prevent injury before you exercise. You may enjoy gentle forms of yoga to help keep your knee joints and muscles flexible. ? Walk instead of jog.  ? Ride a bike. This makes your thigh muscles stronger and takes pressure off your knee. ? Wear well-fitting and comfortable shoes. ? Exercise in chest-deep water. This can help you exercise longer with less pain. ? Avoid exercises that include squatting or kneeling. They can put a lot of strain on your knees. ? Talk to your doctor to make sure that the exercise you do is not making the arthritis worse. · Do not sit for long periods of time. Try to walk once in a while to keep your knee from getting stiff. · Ask your doctor or physical therapist whether shoe inserts may reduce your arthritis pain. · If you can afford it, get new athletic shoes at least every year. This can help reduce the strain on your knees. · Use a device to help you do everyday activities. ? A cane or walking stick can help you keep your balance when you walk. Hold the cane or walking stick in the hand opposite the painful knee. ? If you feel like you may fall when you walk, try using crutches or a front-wheeled walker. These can prevent falls that could cause more damage to your knee. ?  A knee brace may help keep your knee stable and prevent pain.  ? You also can use other things to make life easier, such as a higher toilet seat and handrails in the bathtub or shower. · Take pain medicines exactly as directed. ? Do not wait until you are in severe pain. You will get better results if you take it sooner. ? If you are not taking a prescription pain medicine, take an over-the-counter medicine such as acetaminophen (Tylenol), ibuprofen (Advil, Motrin), or naproxen (Aleve). Read and follow all instructions on the label. ? Do not take two or more pain medicines at the same time unless the doctor told you to. Many pain medicines have acetaminophen, which is Tylenol. Too much acetaminophen (Tylenol) can be harmful. ? Tell your doctor if you take a blood thinner, have diabetes, or have allergies to shellfish. · Ask your doctor if you might benefit from a shot of steroid medicine into your knee. This may provide pain relief for several months. · Many people take the supplements glucosamine and chondroitin for osteoarthritis. Some people feel they help, but the medical research does not show that they work. Talk to your doctor before you take these supplements. When should you call for help? Call your doctor now or seek immediate medical care if:    · You have sudden swelling, warmth, or pain in your knee.     · You have knee pain and a fever or rash.     · You have such bad pain that you cannot use your knee.    Watch closely for changes in your health, and be sure to contact your doctor if you have any problems. Where can you learn more? Go to http://francisco-isaac.info/. Enter D032 in the search box to learn more about \"Knee Arthritis: Care Instructions. \"  Current as of: Joanne 10, 2018  Content Version: 11.9  © 9042-3004 [employer]. Care instructions adapted under license by [employer] (which disclaims liability or warranty for this information).  If you have questions about a medical condition or this instruction, always ask your healthcare professional. [place] [address] any warranty or liability for your use of this information.

## 2019-02-11 ENCOUNTER — HOSPITAL ENCOUNTER (OUTPATIENT)
Dept: LAB | Age: 79
Discharge: HOME | End: 2019-02-11
Payer: MEDICARE

## 2019-02-11 ENCOUNTER — OFFICE VISIT (OUTPATIENT)
Dept: INTERNAL MEDICINE CLINIC | Age: 79
End: 2019-02-11

## 2019-02-11 VITALS
TEMPERATURE: 97.7 F | HEIGHT: 64 IN | HEART RATE: 84 BPM | RESPIRATION RATE: 18 BRPM | DIASTOLIC BLOOD PRESSURE: 68 MMHG | WEIGHT: 170.4 LBS | BODY MASS INDEX: 29.09 KG/M2 | OXYGEN SATURATION: 98 % | SYSTOLIC BLOOD PRESSURE: 100 MMHG

## 2019-02-11 DIAGNOSIS — M25.562 ACUTE PAIN OF LEFT KNEE: ICD-10-CM

## 2019-02-11 DIAGNOSIS — R19.5 CHANGE IN CONSISTENCY OF STOOL: ICD-10-CM

## 2019-02-11 DIAGNOSIS — R00.2 PALPITATIONS: Primary | ICD-10-CM

## 2019-02-11 DIAGNOSIS — R55 VASOVAGAL SYNCOPE: ICD-10-CM

## 2019-02-11 PROCEDURE — 85027 COMPLETE CBC AUTOMATED: CPT

## 2019-02-11 PROCEDURE — 36415 COLL VENOUS BLD VENIPUNCTURE: CPT

## 2019-02-11 PROCEDURE — 80053 COMPREHEN METABOLIC PANEL: CPT

## 2019-02-11 RX ORDER — DEXTROMETHORPHAN HYDROBROMIDE, GUAIFENESIN 5; 100 MG/5ML; MG/5ML
650 LIQUID ORAL EVERY 8 HOURS
COMMUNITY
End: 2019-05-13

## 2019-02-11 RX ORDER — OMEPRAZOLE 40 MG/1
40 CAPSULE, DELAYED RELEASE ORAL DAILY
Qty: 90 CAP | Refills: 0 | Status: SHIPPED | OUTPATIENT
Start: 2019-02-11 | End: 2020-08-25

## 2019-02-11 NOTE — PROGRESS NOTES
Samantha Norton is a 70 y.o. female who was seen in clinic today (2/11/2019) for an acute visit. Assessment & Plan:   Diagnoses and all orders for this visit:    1. Palpitations- this is a new problem, symptoms are: intermittent, differential dx reviewed with the patient, exact etiology is unclear at this time. Having some reflux symptoms that may be related but also concerning for cardiac etiology. will check labs as below, will get her set up for holter, will start on PPI. Red flags were reviewed with the patient to RTC or notify me, expected time course for resolution reviewed. -     CARDIAC HOLTER MONITOR; Future  -     omeprazole (PRILOSEC) 40 mg capsule; Take 1 Cap by mouth daily. 2. Change in consistency of stool- this is a chronic problem but new to me, symptoms are: worsened, differential dx reviewed with the patient, exact etiology is unclear at this time. Favor diet related more then medication. Reviewed starting fiber supplement (Benifiber). Reviewed when to see GI. Red flags were reviewed with the patient to RTC or notify me. 3. Acute pain of left knee- chronic issue, agree w/ ortho, recommended trial of NSAIDs, reviewed her concerns about medications, reviewed using for short duration only  -     omeprazole (PRILOSEC) 40 mg capsule; Take 1 Cap by mouth daily. 4. Vasovagal syncope- this is a new problem, has not returned, favor vasovagal, differential dx reviewed with the patient and likely dehydration related. Will push fluids. Will check labs. Will get cardiac monitor. Red flags were reviewed with the patient to RTC or notify me. -     CBC W/O DIFF  -     METABOLIC PANEL, COMPREHENSIVE  -     CARDIAC HOLTER MONITOR; Future         Follow-up Disposition:  Return in about 2 weeks (around 2/25/2019) for Regular follow up. Subjective: Lazarus Angus was seen today for Palpitations and Knee Pain    She reports seeing ortho on 2/5 for left knee pain. She was given Mobic.   She did not start it due to concerns about the medication and the relationship to heart disease. She reports waking up in the AM, 2-3 times per week, with flushing sensation and palpitations (racing), lasting for up to 5 minutes, and the only other symptom is some belching. Never happens during the day. This is new in the last few months. She reports loose and frequent stools for the last 4-6 months, she reports it is getting worse. She reports 1-5 BM's per day. She reports some urgency but no incontinence. No blood in the stool or pain w/ defecation. She reports stools are semi soft to soft, not liquid or solid. She has tried Imodium w/ temporary relief. No new medications or diet changes. Last colonoscopy was in 2012, w/ diverticulosis. She reports having a h/o lactose intolerance and no change w/ avoiding diary. This afternoon, making a sandwich, she did not feel good. She sat down and called to her . He reports eyes rolled back and she had a brief < 30 second episode of syncope. No b/c incontinence. No tongue trauma. No seizure activity. She reports no post ictal state.  held her, no trauma or fall. Prior to Admission medications    Medication Sig Start Date End Date Taking? Authorizing Provider   MULTIVITAMIN PO Take  by mouth daily as needed. Yes Provider, Historical   CALCIUM PO Take  by mouth daily as needed. Yes Provider, Historical   acetaminophen (TYLENOL ARTHRITIS PAIN) 650 mg TbER Take 650 mg by mouth every eight (8) hours.    Yes Provider, Historical   atorvastatin (LIPITOR) 20 mg tablet TAKE 1 TABLET BY MOUTH DAILY 12/19/18  Yes Jh Soto MD   SYNTHROID 88 mcg tablet TAKE 1 TABLET BY MOUTH DAILY BEFORE BREAKFAST 12/17/18  Yes Jh Soto MD   losartan-hydroCHLOROthiazide [facility]) 100-25 mg per tablet TAKE 1 TABLET BY MOUTH DAILY 12/16/18  Yes Jh Soto MD   mirtazapine (REMERON) 15 mg tablet TAKE 1 TABLET BY MOUTH EVERY NIGHT 9/20/18  Yes Yoana Sexton MD   Cetirizine (ZYRTEC) 10 mg cap Take  by mouth daily. Yes Provider, Historical   simethicone (GAS-X) 80 mg chewable tablet Take 80 mg by mouth every six (6) hours as needed for Flatulence. Yes Provider, Historical   fluticasone (FLONASE) 50 mcg/actuation nasal spray 2 Sprays by Both Nostrils route daily. 12/5/17  Yes Yoana Sexton MD   cholecalciferol (VITAMIN D3) 1,000 unit tablet Take  by mouth daily. Yes Provider, Historical   b complex vitamins tablet Take 1 Tab by mouth daily. Yes Provider, Historical          Allergies   Allergen Reactions    Codeine Nausea Only    Epinephrine Palpitations    Erythromycin Other (comments)     GI upset    Pcn [Penicillins] Hives           Review of Systems   Respiratory: Negative for cough and shortness of breath. Cardiovascular: Negative for chest pain and palpitations. Gastrointestinal: Positive for diarrhea and heartburn. Negative for abdominal pain, blood in stool, constipation, melena, nausea and vomiting. Neurological: Positive for loss of consciousness. Objective:   Physical Exam   Constitutional: No distress. Eyes: Conjunctivae are normal. No scleral icterus. Neck: No thyromegaly present. Cardiovascular: Regular rhythm and normal heart sounds. No murmur heard. Pulmonary/Chest: Effort normal and breath sounds normal. She has no wheezes. She has no rales. Abdominal: Bowel sounds are normal. She exhibits no mass. There is no hepatosplenomegaly. There is no tenderness. Lymphadenopathy:     She has no cervical adenopathy.          Visit Vitals  /68   Pulse 84   Temp 97.7 °F (36.5 °C) (Oral)   Resp 18   Ht 5' 4\" (1.626 m)   Wt 170 lb 6.4 oz (77.3 kg)   SpO2 98%   BMI 29.25 kg/m²         Disclaimer:  Advised her to call back or return to office if symptoms worsen/change/persist.  Discussed expected course/resolution/complications of diagnosis in detail with patient. Medication risks/benefits/costs/interactions/alternatives discussed with patient. She was given an after visit summary which includes diagnoses, current medications, & vitals. She expressed understanding with the diagnosis and plan. Aspects of this note may have been generated using voice recognition software. Despite editing, there may be some syntax errors.        Edna Jimenes MD

## 2019-02-11 NOTE — PROGRESS NOTES
Rohan Mason 01/31/19, saw a doctor who prescribed Meloxicam, did not start because of side effects. Has noticed her heart is beating rapidly when she awakens. Belching. Has been having loose stools. Fainted this morning.

## 2019-02-12 ENCOUNTER — TELEPHONE (OUTPATIENT)
Dept: INTERNAL MEDICINE CLINIC | Age: 79
End: 2019-02-12

## 2019-02-12 LAB
ALBUMIN SERPL-MCNC: 4.5 G/DL (ref 3.5–4.8)
ALBUMIN/GLOB SERPL: 1.7 {RATIO} (ref 1.2–2.2)
ALP SERPL-CCNC: 78 IU/L (ref 39–117)
ALT SERPL-CCNC: 18 IU/L (ref 0–32)
AST SERPL-CCNC: 20 IU/L (ref 0–40)
BILIRUB SERPL-MCNC: 0.5 MG/DL (ref 0–1.2)
BUN SERPL-MCNC: 22 MG/DL (ref 8–27)
BUN/CREAT SERPL: 18 (ref 12–28)
CALCIUM SERPL-MCNC: 10.3 MG/DL (ref 8.7–10.3)
CHLORIDE SERPL-SCNC: 96 MMOL/L (ref 96–106)
CO2 SERPL-SCNC: 25 MMOL/L (ref 20–29)
CREAT SERPL-MCNC: 1.25 MG/DL (ref 0.57–1)
ERYTHROCYTE [DISTWIDTH] IN BLOOD BY AUTOMATED COUNT: 13.7 % (ref 12.3–15.4)
GLOBULIN SER CALC-MCNC: 2.6 G/DL (ref 1.5–4.5)
GLUCOSE SERPL-MCNC: 139 MG/DL (ref 65–99)
HCT VFR BLD AUTO: 43.6 % (ref 34–46.6)
HGB BLD-MCNC: 14.9 G/DL (ref 11.1–15.9)
MCH RBC QN AUTO: 30.3 PG (ref 26.6–33)
MCHC RBC AUTO-ENTMCNC: 34.2 G/DL (ref 31.5–35.7)
MCV RBC AUTO: 89 FL (ref 79–97)
PLATELET # BLD AUTO: 303 X10E3/UL (ref 150–379)
POTASSIUM SERPL-SCNC: 4.3 MMOL/L (ref 3.5–5.2)
PROT SERPL-MCNC: 7.1 G/DL (ref 6–8.5)
RBC # BLD AUTO: 4.92 X10E6/UL (ref 3.77–5.28)
SODIUM SERPL-SCNC: 138 MMOL/L (ref 134–144)
WBC # BLD AUTO: 13.1 X10E3/UL (ref 3.4–10.8)

## 2019-02-12 NOTE — TELEPHONE ENCOUNTER
Appt scheduled for a Holter monitor at [facility] location, for 2/14/19 @ 11am. Patient notified of appointment.

## 2019-02-13 NOTE — PROGRESS NOTES
Results released to patient via [employer]. All labs are stable or at goal for her, except for elevated WBC and slight bump in Cr. Elevated BS, non-fasting. Unclear etiology of elevated WBC but bump in Cr likely dehydration related. Will have her f/u in 1-2 wks.

## 2019-02-14 NOTE — TELEPHONE ENCOUNTER
Patient called to confirm appointment for Holter monitor at [facility] For tomorrow 11/15/19 at 11am.     Appt canceled for 2/18 - this was scheduled in error,by central scheduling. Order was placed as \"ancillary\" (performed in hospital)  needs to read \"clinic performed\" (for ambulatory setting) in the future to avoid double scheduling.

## 2019-02-15 ENCOUNTER — CLINICAL SUPPORT (OUTPATIENT)
Dept: CARDIOLOGY CLINIC | Age: 79
End: 2019-02-15

## 2019-02-15 DIAGNOSIS — R00.2 PALPITATIONS: ICD-10-CM

## 2019-02-15 DIAGNOSIS — R55 VASOVAGAL SYNCOPE: ICD-10-CM

## 2019-02-22 ENCOUNTER — HOSPITAL ENCOUNTER (OUTPATIENT)
Dept: LAB | Age: 79
Discharge: HOME | End: 2019-02-22
Payer: MEDICARE

## 2019-02-22 ENCOUNTER — OFFICE VISIT (OUTPATIENT)
Dept: INTERNAL MEDICINE CLINIC | Age: 79
End: 2019-02-22

## 2019-02-22 VITALS
RESPIRATION RATE: 18 BRPM | DIASTOLIC BLOOD PRESSURE: 82 MMHG | HEART RATE: 80 BPM | WEIGHT: 176 LBS | SYSTOLIC BLOOD PRESSURE: 110 MMHG | OXYGEN SATURATION: 98 % | TEMPERATURE: 98.3 F | BODY MASS INDEX: 30.05 KG/M2 | HEIGHT: 64 IN

## 2019-02-22 DIAGNOSIS — N28.9 RENAL INSUFFICIENCY: ICD-10-CM

## 2019-02-22 DIAGNOSIS — D72.829 LEUKOCYTOSIS, UNSPECIFIED TYPE: ICD-10-CM

## 2019-02-22 DIAGNOSIS — I10 HYPERTENSION, ESSENTIAL: ICD-10-CM

## 2019-02-22 DIAGNOSIS — R00.2 PALPITATIONS: Primary | ICD-10-CM

## 2019-02-22 PROCEDURE — 36415 COLL VENOUS BLD VENIPUNCTURE: CPT

## 2019-02-22 PROCEDURE — 80053 COMPREHEN METABOLIC PANEL: CPT

## 2019-02-22 PROCEDURE — 85025 COMPLETE CBC W/AUTO DIFF WBC: CPT

## 2019-02-22 RX ORDER — MELOXICAM 7.5 MG/1
7.5 TABLET ORAL 2 TIMES DAILY
COMMUNITY
End: 2019-02-28 | Stop reason: SINTOL

## 2019-02-22 NOTE — PROGRESS NOTES
Hima Bell is a 70 y.o. female who was seen in clinic today (2/22/2019). Assessment & Plan:   Diagnoses and all orders for this visit:    1. Palpitations- improved, unclear etiology, will f/u on holter when available, will continue to monitor. 2. Hypertension, essential- well controlled on lower dose, will continue on current dose at this time, and repeat labs due to previous renal insufficiency  -     METABOLIC PANEL, COMPREHENSIVE    3. Renal insufficiency- new etiology, not sure if related to #1, will repeat labs due to dose reduction.  -     METABOLIC PANEL, COMPREHENSIVE    4. Leukocytosis, unspecified type- asymptomatic, unclear etiology, will repeat. -     CBC WITH AUTOMATED DIFF         Follow-up Disposition:  Return if symptoms worsen or fail to improve. Subjective: Jennifer Garcia was seen today for Palpitations and Diarrhea    She RTC for 2 week f/u. She reports palpitations have resoled. She did turn in holter monitor on Monday, but report is unavailable. She reports 1 lead came off for a few hours. She did not have any symptoms while wearing monitor. She has been taking 1/2 tab of losartan/HCTZ since last visit. She is checking BP at home and reports they have been good (she reports all SBP < 130). She has started to take mobic for her knee, but is not sure of the exact dose. Brief Labs:     Lab Results   Component Value Date/Time    Sodium 138 02/11/2019 04:59 PM    Potassium 4.3 02/11/2019 04:59 PM    Creatinine 1.25 02/11/2019 04:59 PM    Cholesterol, total 220 05/23/2018 12:53 PM    HDL Cholesterol 80 05/23/2018 12:53 PM    LDL, calculated 118 05/23/2018 12:53 PM    Triglyceride 110 05/23/2018 12:53 PM    Hemoglobin A1c 5.5 12/19/2018 11:40 AM    TSH 0.603 12/19/2018 11:40 AM          Prior to Admission medications    Medication Sig Start Date End Date Taking? Authorizing Provider   meloxicam (MOBIC) 7.5 mg tablet Take 7.5 mg by mouth two (2) times a day.    Yes Provider, Historical   MULTIVITAMIN PO Take  by mouth daily as needed. Yes Provider, Historical   CALCIUM PO Take  by mouth daily as needed. Yes Provider, Historical   acetaminophen (TYLENOL ARTHRITIS PAIN) 650 mg TbER Take 650 mg by mouth every eight (8) hours. Yes Provider, Historical   omeprazole (PRILOSEC) 40 mg capsule Take 1 Cap by mouth daily. 2/11/19  Yes Luiz Vera MD   atorvastatin (LIPITOR) 20 mg tablet TAKE 1 TABLET BY MOUTH DAILY 12/19/18  Yes Luiz Vera MD   SYNTHROID 88 mcg tablet TAKE 1 TABLET BY MOUTH DAILY BEFORE BREAKFAST 12/17/18  Yes Luiz Vera MD   losartan-hydroCHLOROthiazide [facility]) 100-25 mg per tablet TAKE 1 TABLET BY MOUTH DAILY 12/16/18  Yes Luiz Vera MD   mirtazapine (REMERON) 15 mg tablet TAKE 1 TABLET BY MOUTH EVERY NIGHT 9/20/18  Yes Luiz Vera MD   Cetirizine (ZYRTEC) 10 mg cap Take  by mouth daily. Yes Provider, Historical   simethicone (GAS-X) 80 mg chewable tablet Take 80 mg by mouth every six (6) hours as needed for Flatulence. Yes Provider, Historical   fluticasone (FLONASE) 50 mcg/actuation nasal spray 2 Sprays by Both Nostrils route daily. 12/5/17  Yes Luiz Vera MD   cholecalciferol (VITAMIN D3) 1,000 unit tablet Take  by mouth daily. Yes Provider, Historical   b complex vitamins tablet Take 1 Tab by mouth daily. Yes Provider, Historical          Allergies   Allergen Reactions    Codeine Nausea Only    Epinephrine Palpitations    Erythromycin Other (comments)     GI upset    Pcn [Penicillins] Hives           Review of Systems   Constitutional: Negative for malaise/fatigue and weight loss. Respiratory: Negative for cough and shortness of breath. Cardiovascular: Negative for chest pain and palpitations. Gastrointestinal: Negative for abdominal pain, constipation, diarrhea, nausea and vomiting. Musculoskeletal: Positive for joint pain. Neurological: Negative for dizziness and headaches. Objective:   Physical Exam   Constitutional: No distress. Cardiovascular: Regular rhythm and normal heart sounds. No murmur heard. Pulmonary/Chest: Effort normal and breath sounds normal. She has no wheezes. She has no rales. Psychiatric: She has a normal mood and affect. Her behavior is normal.         Visit Vitals  /82   Pulse 80   Temp 98.3 °F (36.8 °C) (Oral)   Resp 18   Ht 5' 4\" (1.626 m)   Wt 176 lb (79.8 kg)   SpO2 98%   BMI 30.21 kg/m²         Disclaimer:  Advised her to call back or return to office if symptoms worsen/change/persist.  Discussed expected course/resolution/complications of diagnosis in detail with patient. Medication risks/benefits/costs/interactions/alternatives discussed with patient. She was given an after visit summary which includes diagnoses, current medications, & vitals. She expressed understanding with the diagnosis and plan. Aspects of this note may have been generated using voice recognition software. Despite editing, there may be some syntax errors.        Pete Brooks MD

## 2019-02-23 LAB
ALBUMIN SERPL-MCNC: 4.1 G/DL (ref 3.5–4.8)
ALBUMIN/GLOB SERPL: 1.7 {RATIO} (ref 1.2–2.2)
ALP SERPL-CCNC: 103 IU/L (ref 39–117)
ALT SERPL-CCNC: 16 IU/L (ref 0–32)
AST SERPL-CCNC: 16 IU/L (ref 0–40)
BASOPHILS # BLD AUTO: 0.1 X10E3/UL (ref 0–0.2)
BASOPHILS NFR BLD AUTO: 1 %
BILIRUB SERPL-MCNC: 0.4 MG/DL (ref 0–1.2)
BUN SERPL-MCNC: 14 MG/DL (ref 8–27)
BUN/CREAT SERPL: 16 (ref 12–28)
CALCIUM SERPL-MCNC: 9.7 MG/DL (ref 8.7–10.3)
CHLORIDE SERPL-SCNC: 98 MMOL/L (ref 96–106)
CO2 SERPL-SCNC: 26 MMOL/L (ref 20–29)
CREAT SERPL-MCNC: 0.9 MG/DL (ref 0.57–1)
EOSINOPHIL # BLD AUTO: 0.1 X10E3/UL (ref 0–0.4)
EOSINOPHIL NFR BLD AUTO: 1 %
ERYTHROCYTE [DISTWIDTH] IN BLOOD BY AUTOMATED COUNT: 14 % (ref 12.3–15.4)
GLOBULIN SER CALC-MCNC: 2.4 G/DL (ref 1.5–4.5)
GLUCOSE SERPL-MCNC: 122 MG/DL (ref 65–99)
HCT VFR BLD AUTO: 37.4 % (ref 34–46.6)
HGB BLD-MCNC: 12.5 G/DL (ref 11.1–15.9)
IMM GRANULOCYTES # BLD AUTO: 0 X10E3/UL (ref 0–0.1)
IMM GRANULOCYTES NFR BLD AUTO: 1 %
LYMPHOCYTES # BLD AUTO: 1 X10E3/UL (ref 0.7–3.1)
LYMPHOCYTES NFR BLD AUTO: 12 %
MCH RBC QN AUTO: 29.9 PG (ref 26.6–33)
MCHC RBC AUTO-ENTMCNC: 33.4 G/DL (ref 31.5–35.7)
MCV RBC AUTO: 90 FL (ref 79–97)
MONOCYTES # BLD AUTO: 0.6 X10E3/UL (ref 0.1–0.9)
MONOCYTES NFR BLD AUTO: 7 %
NEUTROPHILS # BLD AUTO: 6.5 X10E3/UL (ref 1.4–7)
NEUTROPHILS NFR BLD AUTO: 78 %
PLATELET # BLD AUTO: 273 X10E3/UL (ref 150–379)
POTASSIUM SERPL-SCNC: 4.3 MMOL/L (ref 3.5–5.2)
PROT SERPL-MCNC: 6.5 G/DL (ref 6–8.5)
RBC # BLD AUTO: 4.18 X10E6/UL (ref 3.77–5.28)
SODIUM SERPL-SCNC: 137 MMOL/L (ref 134–144)
WBC # BLD AUTO: 8.3 X10E3/UL (ref 3.4–10.8)

## 2019-02-24 ENCOUNTER — HOSPITAL ENCOUNTER (EMERGENCY)
Age: 79
Discharge: HOME | End: 2019-02-24
Attending: EMERGENCY MEDICINE
Payer: MEDICARE

## 2019-02-24 VITALS
BODY MASS INDEX: 29.96 KG/M2 | RESPIRATION RATE: 14 BRPM | TEMPERATURE: 97.1 F | OXYGEN SATURATION: 99 % | HEIGHT: 64 IN | HEART RATE: 87 BPM | SYSTOLIC BLOOD PRESSURE: 157 MMHG | WEIGHT: 175.49 LBS | DIASTOLIC BLOOD PRESSURE: 85 MMHG

## 2019-02-24 DIAGNOSIS — B37.2 YEAST DERMATITIS: Primary | ICD-10-CM

## 2019-02-24 PROCEDURE — 99281 EMR DPT VST MAYX REQ PHY/QHP: CPT

## 2019-02-24 RX ORDER — KETOCONAZOLE 20 MG/G
CREAM TOPICAL DAILY
Qty: 15 G | Refills: 0 | Status: SHIPPED | OUTPATIENT
Start: 2019-02-24 | End: 2019-02-25

## 2019-02-24 NOTE — PROGRESS NOTES
Results released to patient via [employer]. All labs are stable or at goal for her. Renal fxn back to baseline. WBC back to baseline. BS is non-fasting.

## 2019-02-24 NOTE — ED PROVIDER NOTES
'rash awoke me last night/ very itchy'; has not taken anything for it - it is in my crotch, atmpits only'; pt denies HA, vison changes, diff swallowing, CP, SOB, Abd pain, F/Ch, N/V, D/Cons or other current systemic complaints The history is provided by the patient. Rash This is a new problem. The current episode started 12 to 24 hours ago. The problem has not changed since onset. The problem is associated with nothing. There has been no fever. The rash is present on the groin (R/L armpit). The patient is experiencing no pain. The pain has been constant since onset. Associated symptoms include itching. Pertinent negatives include no blisters, no pain, no weeping and no hives. Risk factors: 'pre-diabetic' She has tried nothing for the symptoms. The treatment provided no relief. Past Medical History:  
Diagnosis Date  Allergic rhinitis  Arthritis  Depression  Hyperlipidemia  Impaired fasting glucose  Unspecified essential hypertension  Unspecified hypothyroidism  Uterine prolapse  Vertigo Past Surgical History:  
Procedure Laterality Date  HX BREAST BIOPSY Bilateral   
 >5, all benign  HX BREAST BIOPSY Left 11/29/2017  
 benign  HX COLONOSCOPY  8/15/12  
 diverticulosis  HX ENDOSCOPY  8/15/12  
 hiatal hernia  HX HAMMER TOE REPAIR Left   
 w/ bunion surgery (per previous PCP)  HX PARTIAL HYSTERECTOMY  HX TONSILLECTOMY Family History:  
Problem Relation Age of Onset  Thyroid Disease Father   
     hypo  Coronary Artery Disease Father   
     s/p CABG  
 Heart Disease Father   
     s/p valve replacement  Diabetes Brother  Thyroid Disease Brother  Cancer Brother   
     kidney  Coronary Artery Disease Brother   
     s/p CABG  Dementia Mother   
     vascular  Elevated Lipids Mother  Coronary Artery Disease Mother  Obesity Son   
[facility] Hypertension Son  No Known Problems Son   
  Diabetes Maternal Grandmother  Cancer Maternal Grandmother   
     pancreatic  Diabetes Paternal Grandmother Social History Socioeconomic History  Marital status:  Spouse name: Not on file  Number of children: Not on file  Years of education: Not on file  Highest education level: Not on file Social Needs  Financial resource strain: Not on file  Food insecurity - worry: Not on file  Food insecurity - inability: Not on file  Transportation needs - medical: Not on file  Transportation needs - non-medical: Not on file Occupational History  Not on file Tobacco Use  Smoking status: Never Smoker  Smokeless tobacco: Never Used Substance and Sexual Activity  Alcohol use: No  
 Drug use: No  
 Sexual activity: Not Currently Partners: Male Other Topics Concern  Not on file Social History Narrative Lives in [facility] with  of 48 years. Has 2 sons. Retired. Used to work as a  for [employer] mostly at the middle school level. Likes to read. ALLERGIES: Codeine; Epinephrine; Erythromycin; and Pcn [penicillins] Review of Systems Skin: Positive for itching and rash. All other systems reviewed and are negative. There were no vitals filed for this visit. Physical Exam  
Constitutional: She is oriented to person, place, and time. She appears well-developed and well-nourished. NAD, AxOx4, speaking in complete sentences HENT:  
Head: Normocephalic and atraumatic. No stridor/ wheezes/ airway intact Eyes: Conjunctivae are normal. Pupils are equal, round, and reactive to light. Right eye exhibits no discharge. No scleral icterus. Neck: Normal range of motion. Neck supple. No JVD present. No tracheal deviation present. Cardiovascular: Normal rate, regular rhythm, normal heart sounds and intact distal pulses. Exam reveals no gallop and no friction rub. No murmur heard. Pulmonary/Chest: Effort normal and breath sounds normal. No respiratory distress. She has no wheezes. She has no rales. She exhibits no tenderness. Abdominal: Soft. Bowel sounds are normal. There is no tenderness. There is no rebound and no guarding. Genitourinary: No vaginal discharge found. Musculoskeletal: Normal range of motion. She exhibits no edema or tenderness. Neurological: She is alert and oriented to person, place, and time. No cranial nerve deficit. She exhibits normal muscle tone. Coordination normal.  
Skin: Skin is warm and dry. Capillary refill takes less than 2 seconds. Rash noted. No erythema. No pallor. Rash consistent w/ yeast dermatitis noted; itches/ satellite lesions; groin/ R/L axilla;   
Psychiatric: She has a normal mood and affect. Her behavior is normal. Thought content normal.  
Nursing note and vitals reviewed. MDM Procedures 6:01 PM 
Ilana Devi's  results have been reviewed with her. She has been counseled regarding her diagnosis. She verbally conveys understanding and agreement of the signs, symptoms, diagnosis, treatment and prognosis and additionally agrees to Call/ Arrange follow up as recommended with Dr. Josselyn Ordaz MD in 24 - 48 hours. She also agrees with the care-plan and conveys that all of her questions have been answered. I have also put together some discharge instructions for her that include: 1) educational information regarding their diagnosis, 2) how to care for their diagnosis at home, as well a 3) list of reasons why they would want to return to the ED prior to their follow-up appointment, should their condition change or for concerns.

## 2019-02-24 NOTE — DISCHARGE INSTRUCTIONS
Patient Education        Yeast Skin Infection: Care Instructions  Your Care Instructions    Yeast normally lives on your skin. Sometimes too much yeast can overgrow in certain areas of the skin and cause an infection. The infection causes red, scaly, moist patches on your skin that may itch. Common areas for skin yeast infections are skin folds under the breasts or belly area. The warm and moist areas in the skin folds can make it easier for yeast to overgrow. Yeast infections also can be found on other parts of the body such as the groin or armpits. You will probably get a cream or ointment that contains an antifungal medicine. Examples of these are miconazole and clotrimazole. You put it on your skin to treat the infection. Your doctor may give you a prescription for the cream or ointment. Or you may be able to buy it without a prescription at most drugstores. If the infection is severe, the doctor will prescribe antifungal pills. A yeast infection usually goes away after about a week of treatment. But it's important to use the medicine for as long as your doctor tells you to. Follow-up care is a key part of your treatment and safety. Be sure to make and go to all appointments, and call your doctor if you are having problems. It's also a good idea to know your test results and keep a list of the medicines you take. How can you care for yourself at home? · Be safe with medicines. Take your medicines exactly as prescribed. Call your doctor if you think you are having a problem with your medicine. · Keep your skin clean and dry. Your doctor may suggest using powder that contains an antifungal medicine in the skin folds. · Wear loose clothing. When should you call for help? Call your doctor now or seek immediate medical care if:    · You have symptoms of infection, such as:  ? Increased pain, swelling, warmth, or redness. ? Red streaks leading from the area. ? Pus draining from the area. ? A fever.  Watch closely for changes in your health, and be sure to contact your doctor if:    · You do not get better as expected. Where can you learn more? Go to http://francisco-isaac.info/. Enter R282 in the search box to learn more about \"Yeast Skin Infection: Care Instructions. \"  Current as of: April 17, 2018  Content Version: 11.9  © 0918-6916 [employer]. Care instructions adapted under license by [employer] (which disclaims liability or warranty for this information). If you have questions about a medical condition or this instruction, always ask your healthcare professional. [address] any warranty or liability for your use of this information.

## 2019-02-25 ENCOUNTER — HOSPITAL ENCOUNTER (EMERGENCY)
Age: 79
Discharge: HOME | End: 2019-02-25
Attending: EMERGENCY MEDICINE | Admitting: EMERGENCY MEDICINE
Payer: MEDICARE

## 2019-02-25 VITALS
OXYGEN SATURATION: 96 % | BODY MASS INDEX: 30 KG/M2 | RESPIRATION RATE: 17 BRPM | DIASTOLIC BLOOD PRESSURE: 77 MMHG | TEMPERATURE: 97.8 F | WEIGHT: 175.71 LBS | HEIGHT: 64 IN | SYSTOLIC BLOOD PRESSURE: 131 MMHG | HEART RATE: 65 BPM

## 2019-02-25 DIAGNOSIS — T78.40XA ALLERGIC REACTION, INITIAL ENCOUNTER: Primary | ICD-10-CM

## 2019-02-25 PROCEDURE — 99283 EMERGENCY DEPT VISIT LOW MDM: CPT

## 2019-02-25 PROCEDURE — 74011250636 HC RX REV CODE- 250/636: Performed by: EMERGENCY MEDICINE

## 2019-02-25 PROCEDURE — 96374 THER/PROPH/DIAG INJ IV PUSH: CPT

## 2019-02-25 PROCEDURE — 74011000250 HC RX REV CODE- 250: Performed by: EMERGENCY MEDICINE

## 2019-02-25 PROCEDURE — 96375 TX/PRO/DX INJ NEW DRUG ADDON: CPT

## 2019-02-25 RX ORDER — KETOCONAZOLE 20 MG/G
CREAM TOPICAL
Qty: 45 G | Refills: 0 | Status: SHIPPED | OUTPATIENT
Start: 2019-02-25 | End: 2019-03-04

## 2019-02-25 RX ORDER — DIPHENHYDRAMINE HYDROCHLORIDE 50 MG/ML
50 INJECTION, SOLUTION INTRAMUSCULAR; INTRAVENOUS
Status: COMPLETED | OUTPATIENT
Start: 2019-02-25 | End: 2019-02-25

## 2019-02-25 RX ADMIN — DIPHENHYDRAMINE HYDROCHLORIDE 50 MG: 50 INJECTION, SOLUTION INTRAMUSCULAR; INTRAVENOUS at 09:49

## 2019-02-25 RX ADMIN — METHYLPREDNISOLONE SODIUM SUCCINATE 125 MG: 125 INJECTION, POWDER, FOR SOLUTION INTRAMUSCULAR; INTRAVENOUS at 09:49

## 2019-02-25 RX ADMIN — FAMOTIDINE 20 MG: 10 INJECTION, SOLUTION INTRAVENOUS at 09:48

## 2019-02-25 NOTE — ED PROVIDER NOTES
The history is provided by the patient. No  was used. Allergic Reaction This is a new problem. The current episode started 3 to 5 hours ago. The problem has been gradually worsening. She ingested an unknown item. She has not vomited. Pertinent negatives include no unusual behavior, no slurred speech, no walking unsteadily, no nausea, no vomiting, no depression, no suicidal ideas, no confusion and no shortness of breath. Past Medical History:  
Diagnosis Date  Allergic rhinitis  Arthritis  Depression  Hyperlipidemia  Impaired fasting glucose  Unspecified essential hypertension  Unspecified hypothyroidism  Uterine prolapse  Vertigo Past Surgical History:  
Procedure Laterality Date  HX BREAST BIOPSY Bilateral   
 >5, all benign  HX BREAST BIOPSY Left 11/29/2017  
 benign  HX COLONOSCOPY  8/15/12  
 diverticulosis  HX ENDOSCOPY  8/15/12  
 hiatal hernia  HX HAMMER TOE REPAIR Left   
 w/ bunion surgery (per previous PCP)  HX PARTIAL HYSTERECTOMY  HX TONSILLECTOMY Family History:  
Problem Relation Age of Onset  Thyroid Disease Father   
     hypo  Coronary Artery Disease Father   
     s/p CABG  
 Heart Disease Father   
     s/p valve replacement  Diabetes Brother  Thyroid Disease Brother  Cancer Brother   
     kidney  Coronary Artery Disease Brother   
     s/p CABG  Dementia Mother   
     vascular  Elevated Lipids Mother  Coronary Artery Disease Mother  Obesity Son   
[place] Hypertension Son  No Known Problems Son  Diabetes Maternal Grandmother  Cancer Maternal Grandmother   
     pancreatic  Diabetes Paternal Grandmother Social History Socioeconomic History  Marital status:  Spouse name: Not on file  Number of children: Not on file  Years of education: Not on file  Highest education level: Not on file Social Needs  Financial resource strain: Not on file  Food insecurity - worry: Not on file  Food insecurity - inability: Not on file  Transportation needs - medical: Not on file  Transportation needs - non-medical: Not on file Occupational History  Not on file Tobacco Use  Smoking status: Never Smoker  Smokeless tobacco: Never Used Substance and Sexual Activity  Alcohol use: No  
 Drug use: No  
 Sexual activity: Not Currently Partners: Male Other Topics Concern  Not on file Social History Narrative Lives in [facility] with  of 48 years. Has 2 sons. Retired. Used to work as a  for [employer] mostly at the middle school level. Likes to read. ALLERGIES: Codeine; Epinephrine; Erythromycin; and Pcn [penicillins] Review of Systems Constitutional: Negative for activity change, chills and fever. HENT: Positive for facial swelling. Negative for nosebleeds, sore throat, tinnitus, trouble swallowing and voice change. Eyes: Negative for blurred vision and visual disturbance. Respiratory: Negative for shortness of breath and wheezing. Cardiovascular: Negative for chest pain and palpitations. Gastrointestinal: Negative for abdominal pain, constipation, diarrhea, nausea and vomiting. Genitourinary: Negative for difficulty urinating, dysuria, hematuria and urgency. Musculoskeletal: Negative for back pain, neck pain and neck stiffness. Skin: Negative for color change. Allergic/Immunologic: Negative for immunocompromised state. Neurological: Negative for dizziness, seizures, syncope, weakness, light-headedness, numbness and headaches. Psychiatric/Behavioral: Negative for behavioral problems, confusion, depression, hallucinations, memory loss, self-injury and suicidal ideas. Vitals:  
 02/25/19 0910 BP: 137/75 Pulse: 92 Resp: 17 Temp: 97.5 °F (36.4 °C) SpO2: 98% Weight: 79.7 kg (175 lb 11.3 oz) Height: 5' 4\" (1.626 m) Physical Exam  
Constitutional: She is oriented to person, place, and time. She appears well-developed and well-nourished. No distress. HENT:  
Head: Normocephalic and atraumatic. Mouth/Throat: Oropharynx is clear and moist and mucous membranes are normal. No posterior oropharyngeal edema. Eyes: Pupils are equal, round, and reactive to light. Neck: Normal range of motion. Neck supple. Cardiovascular: Normal rate, regular rhythm and normal heart sounds. Exam reveals no gallop and no friction rub. No murmur heard. Pulmonary/Chest: Effort normal and breath sounds normal. No respiratory distress. She has no wheezes. Abdominal: Soft. Bowel sounds are normal. She exhibits no distension. There is no tenderness. There is no rebound and no guarding. Musculoskeletal: Normal range of motion. Neurological: She is alert and oriented to person, place, and time. Skin: Skin is warm. No rash noted. She is not diaphoretic. Psychiatric: She has a normal mood and affect. Her behavior is normal. Judgment and thought content normal.  
Nursing note and vitals reviewed. MDM This is a 33-year-old female with past medical history, review of systems, physical exam as above, presenting with complaints of angioedema. Patient states she woke this morning, with swelling, isolated to the lower lip, without shortness of breath, tongue swelling, or respiratory distress. Patient states she recently started a topical antifungal, prescribed here yesterday for candidiasis, states she is taking oral NSAIDs, losartan for blood pressure. Consider reaction to medication, versus reaction to losartan. Plan to provide H1 blocker, H2 blocker, IV steroids, observe for worsening angioedema, requiring admission, versus discharge home with return precautions. Procedures 10:43 AM 
Patient remains in NAD, states symptoms unchanged.  
 
11:38 AM 
 Patient without further changes, states she feels better. Likely medication reaction, advised to DC Hyzaar, discuss replacement with PCP, return precautions given.

## 2019-02-25 NOTE — ED NOTES
Patient verbalizes understanding of discharge instructions. 1 prescription given. Ambulatory and in no acute distress at discharge.

## 2019-02-25 NOTE — DISCHARGE INSTRUCTIONS
Patient Education        Allergic Reaction: Care Instructions  Your Care Instructions    An allergic reaction is an excessive response from your immune system to a medicine, chemical, food, insect bite, or other substance. A reaction can range from mild to life-threatening. Some people have a mild rash, hives, and itching or stomach cramps. In severe reactions, swelling of your tongue and throat can close up your airway so that you cannot breathe. Follow-up care is a key part of your treatment and safety. Be sure to make and go to all appointments, and call your doctor if you are having problems. It's also a good idea to know your test results and keep a list of the medicines you take. How can you care for yourself at home? · If you know what caused your allergic reaction, be sure to avoid it. Your allergy may become more severe each time you have a reaction. · Take an over-the-counter antihistamine, such as cetirizine (Zyrtec) or loratadine (Claritin), to treat mild symptoms. Read and follow directions on the label. Some antihistamines can make you feel sleepy. Do not give antihistamines to a child unless you have checked with your doctor first. Mild symptoms include sneezing or an itchy or runny nose; an itchy mouth; a few hives or mild itching; and mild nausea or stomach discomfort. · Do not scratch hives or a rash. Put a cold, moist towel on them or take cool baths to relieve itching. Put ice packs on hives, swelling, or insect stings for 10 to 15 minutes at a time. Put a thin cloth between the ice pack and your skin. Do not take hot baths or showers. They will make the itching worse. · Your doctor may prescribe a shot of epinephrine to carry with you in case you have a severe reaction. Learn how to give yourself the shot and keep it with you at all times. Make sure it is not .   · Go to the emergency room every time you have a severe reaction, even if you have used your shot of epinephrine and are feeling better. Symptoms can come back after a shot. · Wear medical alert jewelry that lists your allergies. You can buy this at most drugstores. · If your child has a severe allergy, make sure that his or her teachers, babysitters, coaches, and other caregivers know about the allergy. They should have an epinephrine shot, know how and when to give it, and have a plan to take your child to the hospital.  When should you call for help? Give an epinephrine shot if:    · You think you are having a severe allergic reaction.     · You have symptoms in more than one body area, such as mild nausea and an itchy mouth.    After giving an epinephrine shot call 911, even if you feel better.   Call 911 if:    · You have symptoms of a severe allergic reaction. These may include:  ? Sudden raised, red areas (hives) all over your body. ? Swelling of the throat, mouth, lips, or tongue. ? Trouble breathing. ? Passing out (losing consciousness). Or you may feel very lightheaded or suddenly feel weak, confused, or restless.     · You have been given an epinephrine shot, even if you feel better.    Call your doctor now or seek immediate medical care if:    · You have symptoms of an allergic reaction, such as:  ? A rash or hives (raised, red areas on the skin). ? Itching. ? Swelling. ? Belly pain, nausea, or vomiting.    Watch closely for changes in your health, and be sure to contact your doctor if:    · You do not get better as expected. Where can you learn more? Go to http://francisco-isaac.info/. Enter I988 in the search box to learn more about \"Allergic Reaction: Care Instructions. \"  Current as of: June 27, 2018  Content Version: 11.9  © 8164-3479 [employer]. Care instructions adapted under license by [employer] (which disclaims liability or warranty for this information).  If you have questions about a medical condition or this instruction, always ask your healthcare professional. [address] any warranty or liability for your use of this information.

## 2019-02-25 NOTE — ED TRIAGE NOTES
Triage Note: Patient came to ED yesterday for a yeast infection that appeared as \"splotches\" covering bilateral upper legs and trunk area. Patient woke up this morning with lower lip swelling. SpO2 97% on room air. No obvious difficulty breathing noted.

## 2019-02-25 NOTE — ED NOTES
Patient resting comfortably. States she feels better after receiving the medication and is \"not as itchy\". MD notified. Will continue to monitor.

## 2019-02-26 ENCOUNTER — DOCUMENTATION ONLY (OUTPATIENT)
Dept: CARDIOLOGY CLINIC | Age: 79
End: 2019-02-26

## 2019-02-26 NOTE — PROGRESS NOTES
Faxed Holter Report to Dr Gilford [facility] office. Sent original copy to scanning.

## 2019-02-28 ENCOUNTER — OFFICE VISIT (OUTPATIENT)
Dept: INTERNAL MEDICINE CLINIC | Age: 79
End: 2019-02-28

## 2019-02-28 VITALS
TEMPERATURE: 97.5 F | BODY MASS INDEX: 29.88 KG/M2 | OXYGEN SATURATION: 96 % | HEART RATE: 86 BPM | DIASTOLIC BLOOD PRESSURE: 70 MMHG | WEIGHT: 175 LBS | HEIGHT: 64 IN | RESPIRATION RATE: 18 BRPM | SYSTOLIC BLOOD PRESSURE: 124 MMHG

## 2019-02-28 DIAGNOSIS — I10 HYPERTENSION, ESSENTIAL: ICD-10-CM

## 2019-02-28 DIAGNOSIS — T78.40XD ALLERGIC REACTION, SUBSEQUENT ENCOUNTER: Primary | ICD-10-CM

## 2019-02-28 RX ORDER — AMLODIPINE BESYLATE 5 MG/1
5 TABLET ORAL DAILY
Qty: 90 TAB | Refills: 0 | Status: SHIPPED | OUTPATIENT
Start: 2019-02-28 | End: 2019-03-27

## 2019-02-28 RX ORDER — DIPHENHYDRAMINE HCL 25 MG
25 CAPSULE ORAL
COMMUNITY
End: 2020-08-25

## 2019-02-28 RX ORDER — HYDROCHLOROTHIAZIDE 25 MG/1
25 TABLET ORAL DAILY
Qty: 90 TAB | Refills: 0 | Status: SHIPPED | OUTPATIENT
Start: 2019-02-28 | End: 2019-05-24 | Stop reason: SDUPTHER

## 2019-02-28 NOTE — PROGRESS NOTES
Grant Coronado is a 70 y.o. female who was seen in clinic today (2/28/2019). Assessment & Plan:   Diagnoses and all orders for this visit:    1. Allergic reaction, subsequent encounter- new dx, resolving, would favor losartan due to reported family history but could also be mobic as this is the only new medication. Will avoid both at this time. Both listed as allergies. Reviewed we may retry Mobic in the future but would not retry losartan. Reviewed needs to avoid ARB and ACEi. 2. Hypertension, essential- well controlled, but likely to go up. See AVS.  will start on HCTZ and then Amlodipine if it gets worse. -     hydroCHLOROthiazide (HYDRODIURIL) 25 mg tablet; Take 1 Tab by mouth daily. -     amLODIPine (NORVASC) 5 mg tablet; Take 1 Tab by mouth daily. Follow-up Disposition:  Return in about 1 month (around 3/28/2019) for Regular follow up. Subjective: Thomas Duran was seen today for Hospital Follow Up    Follow Up  Grant Coronado is seen for follow up from recent ED visit to [facility] on 2/24 and then again on 2/25. We reviewed the records. She presented initially with a rash in groin and axilla that was itching. She was prescribed ketoconazole cream.  She returned to the ER the next day with swelling of the lower lip, w/o any SOB. Has been having some puffiness around her eyes and swollen ear lobes. Only medication changes at this time was the ketoconazole was stopped and she was taken off Losartan/HCTZ (which she has been on for 5+ yrs). She reports on further recollection her mother had issues w/ losartan later in her life. She also stopped Mobic. She reports symptoms are improved.            Brief Labs:     Lab Results   Component Value Date/Time    Sodium 137 02/22/2019 04:25 PM    Potassium 4.3 02/22/2019 04:25 PM    Creatinine 0.90 02/22/2019 04:25 PM    Cholesterol, total 220 05/23/2018 12:53 PM    HDL Cholesterol 80 05/23/2018 12:53 PM    LDL, calculated 118 05/23/2018 12:53 PM    Triglyceride 110 05/23/2018 12:53 PM    Hemoglobin A1c 5.5 12/19/2018 11:40 AM    TSH 0.603 12/19/2018 11:40 AM          Prior to Admission medications    Medication Sig Start Date End Date Taking? Authorizing Provider   diphenhydrAMINE (BENADRYL) 25 mg capsule Take 25 mg by mouth every six (6) hours as needed. Yes Provider, Historical   calcium citrate/vitamin D3 (CALCIUM CITRATE + D PO) Take 1 Tab by mouth as needed. Yes Other, Phys, MD   BENEFIBER, WHEAT DEXTRIN, PO Take  by mouth Before breakfast, lunch, and dinner. Yes Other, MD Carlo   ketoconazole (NIZORAL) 2 % topical cream Apply to affected area daily 2/25/19 3/4/19 Yes Helena Cam MD   atorvastatin (LIPITOR) 20 mg tablet TAKE 1 TABLET BY MOUTH DAILY 12/19/18  Yes Gerardo Alvarez MD   SYNTHROID 88 mcg tablet TAKE 1 TABLET BY MOUTH DAILY BEFORE BREAKFAST 12/17/18  Yes Gerardo Alvarez MD   mirtazapine (REMERON) 15 mg tablet TAKE 1 TABLET BY MOUTH EVERY NIGHT 9/20/18  Yes Gerardo Alvarez MD   cholecalciferol (VITAMIN D3) 1,000 unit tablet Take  by mouth daily. Yes Provider, Historical   b complex vitamins tablet Take 1 Tab by mouth daily. Yes Provider, Historical   meloxicam (MOBIC) 7.5 mg tablet Take 7.5 mg by mouth two (2) times a day. Provider, Historical   acetaminophen (TYLENOL ARTHRITIS PAIN) 650 mg TbER Take 650 mg by mouth every eight (8) hours. Provider, Historical   omeprazole (PRILOSEC) 40 mg capsule Take 1 Cap by mouth daily. 2/11/19   Gerardo Alvarez MD   Cetirizine (ZYRTEC) 10 mg cap Take  by mouth daily. Provider, Historical          Allergies   Allergen Reactions    Codeine Nausea Only    Epinephrine Palpitations    Erythromycin Other (comments)     GI upset    Pcn [Penicillins] Hives           Review of Systems   Constitutional: Negative for chills and fever. HENT: Negative for sore throat.     Respiratory: Negative for cough, shortness of breath, wheezing and stridor. Cardiovascular: Negative for chest pain and palpitations. Gastrointestinal: Negative for abdominal pain, constipation, diarrhea, nausea and vomiting. Objective:   Physical Exam   Constitutional: No distress. HENT:   Mouth/Throat: Uvula is midline and mucous membranes are normal.   Eyes: Conjunctivae are normal. No scleral icterus. Cardiovascular: Regular rhythm and normal heart sounds. No murmur heard. Pulmonary/Chest: Effort normal and breath sounds normal. She has no wheezes. She has no rales. Lymphadenopathy:     She has no cervical adenopathy. Visit Vitals  /70   Pulse 86   Temp 97.5 °F (36.4 °C) (Oral)   Resp 18   Ht 5' 4\" (1.626 m)   Wt 175 lb (79.4 kg)   SpO2 96%   BMI 30.04 kg/m²         Disclaimer:  Advised her to call back or return to office if symptoms worsen/change/persist.  Discussed expected course/resolution/complications of diagnosis in detail with patient. Medication risks/benefits/costs/interactions/alternatives discussed with patient. She was given an after visit summary which includes diagnoses, current medications, & vitals. She expressed understanding with the diagnosis and plan. Aspects of this note may have been generated using voice recognition software. Despite editing, there may be some syntax errors.        Colin Hargrove MD

## 2019-02-28 NOTE — PATIENT INSTRUCTIONS
Checking your blood pressure at home:    Please check your blood pressure 1 times per day. [place] BP is 120/80. If you BP increases and the top number is over 140 and/or the bottom number is over 90 then start the HCTZ (hydrochlorothiazide). If this does not help improve your BP then you can start the amlodipine. Please update me every week for the next few weeks with your BP readings and how you are doing.     Stay off the LOSARTAN and MOBIC (meloxicam)

## 2019-03-04 ENCOUNTER — TELEPHONE (OUTPATIENT)
Dept: INTERNAL MEDICINE CLINIC | Age: 79
End: 2019-03-04

## 2019-03-09 DIAGNOSIS — F51.01 PRIMARY INSOMNIA: ICD-10-CM

## 2019-03-09 RX ORDER — MIRTAZAPINE 15 MG/1
TABLET, FILM COATED ORAL
Qty: 90 TAB | Refills: 0 | Status: SHIPPED | OUTPATIENT
Start: 2019-03-09 | End: 2019-05-25 | Stop reason: SDUPTHER

## 2019-03-14 RX ORDER — LEVOTHYROXINE SODIUM 88 UG/1
TABLET ORAL
Qty: 90 TAB | Refills: 1 | Status: SHIPPED | OUTPATIENT
Start: 2019-03-14 | End: 2019-08-22 | Stop reason: SDUPTHER

## 2019-03-18 DIAGNOSIS — E78.5 HYPERLIPIDEMIA WITH TARGET LDL LESS THAN 100: ICD-10-CM

## 2019-03-18 RX ORDER — ATORVASTATIN CALCIUM 20 MG/1
TABLET, FILM COATED ORAL
Qty: 90 TAB | Refills: 0 | Status: SHIPPED | OUTPATIENT
Start: 2019-03-18 | End: 2019-06-05 | Stop reason: SDUPTHER

## 2019-03-27 ENCOUNTER — OFFICE VISIT (OUTPATIENT)
Dept: INTERNAL MEDICINE CLINIC | Age: 79
End: 2019-03-27

## 2019-03-27 VITALS
OXYGEN SATURATION: 98 % | WEIGHT: 173 LBS | HEART RATE: 82 BPM | TEMPERATURE: 97.7 F | DIASTOLIC BLOOD PRESSURE: 76 MMHG | RESPIRATION RATE: 16 BRPM | SYSTOLIC BLOOD PRESSURE: 110 MMHG | HEIGHT: 64 IN | BODY MASS INDEX: 29.53 KG/M2

## 2019-03-27 DIAGNOSIS — F51.01 PRIMARY INSOMNIA: ICD-10-CM

## 2019-03-27 DIAGNOSIS — I10 HYPERTENSION, ESSENTIAL: Primary | ICD-10-CM

## 2019-03-27 DIAGNOSIS — Z23 ENCOUNTER FOR IMMUNIZATION: ICD-10-CM

## 2019-03-27 DIAGNOSIS — E78.00 PURE HYPERCHOLESTEROLEMIA: ICD-10-CM

## 2019-03-27 DIAGNOSIS — F33.42 RECURRENT MAJOR DEPRESSIVE DISORDER, IN FULL REMISSION (HCC): ICD-10-CM

## 2019-03-27 DIAGNOSIS — R92.8 ABNORMAL MAMMOGRAM OF LEFT BREAST: ICD-10-CM

## 2019-03-27 DIAGNOSIS — E03.9 ACQUIRED HYPOTHYROIDISM: ICD-10-CM

## 2019-03-27 DIAGNOSIS — R73.01 IMPAIRED FASTING GLUCOSE: ICD-10-CM

## 2019-03-27 NOTE — PROGRESS NOTES
Gilda Green is a 70 y.o. female who was seen in clinic today (3/27/2019). Assessment & Plan:     Diagnoses and all orders for this visit:    1. Hypertension, essential- well controlled, continue current treatment  -     METABOLIC PANEL, COMPREHENSIVE    2. Pure hypercholesterolemia- at goal, continue current treatment pending review of labs   -     LIPID PANEL  -     METABOLIC PANEL, COMPREHENSIVE    3. Acquired hypothyroidism- well controlled, continue current treatment pending review of labs   -     TSH 3RD GENERATION    4. Recurrent major depressive disorder, in full remission ([facility] [address]- well controlled off meds, improved w/ improved sleep    5. Primary insomnia- stable, continue current treatment, [facility]  [facility]     6. Abnormal mammogram of left breast- she is overdue for f/u, she will schedule w/ her OBGYN    7. Impaired fasting glucose- last few labs have been elevated, but non-fasting, will repeat fasting & w/ A1c.    -     HEMOGLOBIN A1C WITH EAG  -     METABOLIC PANEL, COMPREHENSIVE    8. Encounter for immunization- she is aware she is overdue, she will investigate and let me know    9. Knee pain- left, chronic, list of surgeons provided at [address] [place] [address]. Follow-up and Dispositions    · Return in about 6 months (around 9/27/2019) for FULL PHYSICAL - 30 minutes. Subjective: Monica Barron was seen today for Hypertension; Cholesterol Problem; and Hypothyroidism    Cardiovascular Review  The patient has hypertension and hyperlipidemia. Since last visit: stopped losartan and started her back on HCTZ. She was also given a Rx for Amlodipine but did not need to start it. She reports taking medications as instructed, no medication side effects noted, home BP monitoring in range of 684-373'M systolic over 66-67'C diastolic. Diet and Lifestyle: generally follows a low fat low cholesterol diet, generally follows a low sodium diet, no formal exercise but active during the day.   Labs: reviewed and discussed with patient. She brought in her BP cuff today and was calibrated - it is accurate. Mental Health Review  Patient is seen for insomnia & depression. Since last visit: no changes. She reports trouble staying asleep but on medications this is not an issue. Reports experiences the following side effects from the treatment: none. Endocrine Review  Patient is seen for followup of hypothyroidism. Since last visit: no changes. She reports medication compliance: all the time and is taking separate from all other meds. She reports the following concerns/problems/med side effects: none. Lab review: labs reviewed and discussed with patient. GI Review  She has a history of GERD. She was started on prilosec due to palpitations and concerns it was reflux related. She reports no further palpitations. She denies: abdominal bloating, heartburn, midepigastric pain and nausea. Medical therapy currently involves Prilosec. Brief Labs:     Lab Results   Component Value Date/Time    Sodium 137 02/22/2019 04:25 PM    Potassium 4.3 02/22/2019 04:25 PM    Creatinine 0.90 02/22/2019 04:25 PM    Cholesterol, total 220 05/23/2018 12:53 PM    HDL Cholesterol 80 05/23/2018 12:53 PM    LDL, calculated 118 05/23/2018 12:53 PM    Triglyceride 110 05/23/2018 12:53 PM    Hemoglobin A1c 5.5 12/19/2018 11:40 AM    TSH 0.603 12/19/2018 11:40 AM          Prior to Admission medications    Medication Sig Start Date End Date Taking? Authorizing Provider   atorvastatin (LIPITOR) 20 mg tablet TAKE 1 TABLET BY MOUTH DAILY 3/18/19  Yes Supa Gilbert MD   SYNTHROID 88 mcg tablet TAKE 1 TABLET BY MOUTH DAILY BEFORE BREAKFAST 3/14/19  Yes Supa Gilbert MD   mirtazapine (REMERON) 15 mg tablet TAKE 1 TABLET BY MOUTH EVERY NIGHT 3/9/19  Yes Supa Gilbert MD   diphenhydrAMINE (BENADRYL) 25 mg capsule Take 25 mg by mouth every six (6) hours as needed.    Yes Provider, Historical hydroCHLOROthiazide (HYDRODIURIL) 25 mg tablet Take 1 Tab by mouth daily. 2/28/19  Yes Margarita Oliveros MD   calcium citrate/vitamin D3 (CALCIUM CITRATE + D PO) Take 1 Tab by mouth as needed. Yes Other, Phys, MD   BENEFIBER, WHEAT DEXTRIN, PO Take  by mouth Before breakfast, lunch, and dinner. Yes Other, MD Carlo   omeprazole (PRILOSEC) 40 mg capsule Take 1 Cap by mouth daily. 2/11/19  Yes Margarita Oliveros MD   Cetirizine (ZYRTEC) 10 mg cap Take  by mouth daily. Yes Provider, Historical   cholecalciferol (VITAMIN D3) 1,000 unit tablet Take  by mouth daily. Yes Provider, Historical   b complex vitamins tablet Take 1 Tab by mouth daily. Yes Provider, Historical   acetaminophen (TYLENOL ARTHRITIS PAIN) 650 mg TbER Take 650 mg by mouth every eight (8) hours. Provider, Historical          Allergies   Allergen Reactions    Codeine Nausea Only    Epinephrine Palpitations    Erythromycin Other (comments)     GI upset    Losartan Swelling     Lip swelling    Mobic [Meloxicam] Swelling     Lip swelling    Pcn [Penicillins] Hives           Review of Systems   Constitutional: Negative for malaise/fatigue and weight loss. Respiratory: Negative for cough and shortness of breath. Cardiovascular: Negative for chest pain, palpitations and leg swelling. Gastrointestinal: Negative for abdominal pain, constipation, diarrhea, heartburn, nausea and vomiting. Genitourinary: Negative for frequency. Musculoskeletal: Positive for joint pain (L knee, asking about surgeons to see). Negative for myalgias. Skin: Negative for rash. Neurological: Negative for tingling, sensory change, focal weakness and headaches. Psychiatric/Behavioral: Negative for depression. The patient is not nervous/anxious and does not have insomnia. Objective:   Physical Exam   Constitutional: She appears well-developed. No distress.    HENT:   Mouth/Throat: Mucous membranes are normal.   Eyes: Conjunctivae and lids are normal. No scleral icterus. Neck: Neck supple. No thyromegaly present. Cardiovascular: Regular rhythm and normal heart sounds. No murmur heard. Pulmonary/Chest: Effort normal and breath sounds normal. She has no wheezes. She has no rales. Abdominal: Soft. Bowel sounds are normal. She exhibits no mass. There is no hepatosplenomegaly. There is no tenderness. Musculoskeletal: She exhibits no edema. Lymphadenopathy:     She has no cervical adenopathy. Skin: No rash noted. Psychiatric: She has a normal mood and affect. Her behavior is normal.         Visit Vitals  /76   Pulse 82   Temp 97.7 °F (36.5 °C) (Oral)   Resp 16   Ht 5' 4\" (1.626 m)   Wt 173 lb (78.5 kg)   SpO2 98%   BMI 29.70 kg/m²         Disclaimer:  Advised her to call back or return to office if symptoms worsen/change/persist.  Discussed expected course/resolution/complications of diagnosis in detail with patient. Medication risks/benefits/costs/interactions/alternatives discussed with patient. She was given an after visit summary which includes diagnoses, current medications, & vitals. She expressed understanding with the diagnosis and plan. Aspects of this note may have been generated using voice recognition software. Despite editing, there may be some syntax errors.        Brittnee Ag MD

## 2019-05-01 ENCOUNTER — OFFICE VISIT (OUTPATIENT)
Dept: INTERNAL MEDICINE CLINIC | Age: 79
End: 2019-05-01

## 2019-05-01 VITALS
HEIGHT: 64 IN | OXYGEN SATURATION: 97 % | WEIGHT: 171 LBS | DIASTOLIC BLOOD PRESSURE: 76 MMHG | RESPIRATION RATE: 16 BRPM | HEART RATE: 75 BPM | TEMPERATURE: 98.1 F | BODY MASS INDEX: 29.19 KG/M2 | SYSTOLIC BLOOD PRESSURE: 112 MMHG

## 2019-05-01 DIAGNOSIS — I44.7 LBBB (LEFT BUNDLE BRANCH BLOCK): ICD-10-CM

## 2019-05-01 DIAGNOSIS — M17.12 PRIMARY OSTEOARTHRITIS OF LEFT KNEE: Primary | ICD-10-CM

## 2019-05-01 DIAGNOSIS — R73.01 IMPAIRED FASTING GLUCOSE: ICD-10-CM

## 2019-05-01 DIAGNOSIS — I10 HYPERTENSION, ESSENTIAL: ICD-10-CM

## 2019-05-01 DIAGNOSIS — Z01.818 PRE-OP EXAMINATION: ICD-10-CM

## 2019-05-01 DIAGNOSIS — E78.00 PURE HYPERCHOLESTEROLEMIA: ICD-10-CM

## 2019-05-01 DIAGNOSIS — M85.89 OSTEOPENIA OF MULTIPLE SITES: ICD-10-CM

## 2019-05-01 NOTE — PROGRESS NOTES
Preoperative Evaluation:   Blayne Bhat is 70 y.o. female (1940) who presents for preoperative evaluation. Procedure/Surgery: Left Rylee   Date of Procedure/Surgery: 5/20/2019   Surgeon: Dr Tea Alvarenga: [facility]  Primary Physician: Jael Grider MD       Reason for surgery: pain and limping      Latex Allergy: NO  Recent use of: No recent use of aspirin (ASA), NSAIDS or steroids  Tetanus up to date: last tetanus booster within 10 years  Anesthesia Complications: Yes: Personal Hx - she reports walking up dizzy from a previous surgery  History of abnormal bleeding : None  History of Blood Transfusions: no  Health Care Directive or Living Will: she has AD/LW at home, instructed to bring a copy to surgery. She has a DNR on file, reviewed this will be suspended for surgery. EKG: EKG FINDINGS - no baseline to compare to, LBBB  CXR: n/a  Labs: have ordered some labs, could not get insurance to cover MRSA screen or PT/INR, will defer to pre-op testing. After reviewing the patient's history & exam she is moderate risk - based on abnormal EKG for cardiac complications. Prior to surgery she will need stress test to see if any underlying etiology of LBBB. She reports stress test 10+ yrs ago that was normal, this is not available. Discussed this with her and she understands. Prior to Admission medications    Medication Sig Start Date End Date Taking? Authorizing Provider   atorvastatin (LIPITOR) 20 mg tablet TAKE 1 TABLET BY MOUTH DAILY 3/18/19  Yes Jael Grider MD   SYNTHROID 88 mcg tablet TAKE 1 TABLET BY MOUTH DAILY BEFORE BREAKFAST 3/14/19  Yes Jael Grider MD   mirtazapine (REMERON) 15 mg tablet TAKE 1 TABLET BY MOUTH EVERY NIGHT 3/9/19  Yes Jael Grider MD   diphenhydrAMINE (BENADRYL) 25 mg capsule Take 25 mg by mouth every six (6) hours as needed.    Yes Provider, Historical   hydroCHLOROthiazide (HYDRODIURIL) 25 mg tablet Take 1 Tab by mouth daily. 2/28/19  Yes Tonny Soriano MD   calcium citrate/vitamin D3 (CALCIUM CITRATE + D PO) Take 1 Tab by mouth as needed. Yes Other, MD Carlo   BENEFIBER, WHEAT DEXTRIN, PO Take  by mouth Before breakfast, lunch, and dinner. Yes Other, MD Carlo   acetaminophen (TYLENOL ARTHRITIS PAIN) 650 mg TbER Take 650 mg by mouth every eight (8) hours. Yes Provider, Historical   omeprazole (PRILOSEC) 40 mg capsule Take 1 Cap by mouth daily. 2/11/19  Yes Tonny Soriano MD   Cetirizine (ZYRTEC) 10 mg cap Take  by mouth daily. Yes Provider, Historical   cholecalciferol (VITAMIN D3) 1,000 unit tablet Take  by mouth daily. Yes Provider, Historical   b complex vitamins tablet Take 1 Tab by mouth daily.    Yes Provider, Historical        Allergies   Allergen Reactions    Codeine Nausea Only    Epinephrine Palpitations    Erythromycin Other (comments)     GI upset    Losartan Swelling     Lip swelling    Mobic [Meloxicam] Swelling     Lip swelling    Pcn [Penicillins] Hives          Patient Active Problem List    Diagnosis Date Noted    Abnormal mammogram of left breast 11/15/2017    Primary insomnia 10/13/2017    Uterine prolapse     Osteopenia 10/14/2015    Allergic rhinitis     Recurrent major depressive disorder (HCC)     Acquired hypothyroidism     Impaired fasting glucose     Hypertension, essential     Pure hypercholesterolemia        Past Medical History:   Diagnosis Date    Allergic rhinitis     Arthritis     Depression     Hyperlipidemia     Impaired fasting glucose     Unspecified essential hypertension     Unspecified hypothyroidism     Uterine prolapse     Vertigo        Past Surgical History:   Procedure Laterality Date    HX BREAST BIOPSY Bilateral     >5, all benign    HX BREAST BIOPSY Left 11/29/2017    benign    HX COLONOSCOPY  8/15/12    diverticulosis    HX ENDOSCOPY  8/15/12    hiatal hernia    HX HAMMER TOE REPAIR Left     w/ bunion surgery (per previous PCP)    HX PARTIAL HYSTERECTOMY      HX TONSILLECTOMY         Social History     Tobacco Use    Smoking status: Never Smoker    Smokeless tobacco: Never Used   Substance Use Topics    Alcohol use: No     Frequency: Never     Binge frequency: Never               Review of Systems   Constitutional: Negative for malaise/fatigue and weight loss. Respiratory: Negative for cough and shortness of breath. Cardiovascular: Negative for chest pain, palpitations and leg swelling. Gastrointestinal: Positive for heartburn (on/off, well controlled w/ Gas-X or Zantac). Negative for abdominal pain, constipation, diarrhea, nausea and vomiting. Genitourinary: Negative for frequency. Musculoskeletal: Positive for joint pain (L knee and bilateral wrists). Negative for myalgias. Skin: Negative for rash. Neurological: Negative for tingling, sensory change, focal weakness and headaches. Psychiatric/Behavioral: Negative for depression. The patient is not nervous/anxious and does not have insomnia. Physical Exam   Constitutional: No distress. Eyes: Conjunctivae are normal. No scleral icterus. Cardiovascular: Regular rhythm and normal heart sounds. No murmur heard. Pulmonary/Chest: Effort normal and breath sounds normal. She has no wheezes. She has no rales. Abdominal: Bowel sounds are normal. She exhibits no mass. There is no hepatosplenomegaly. There is no tenderness. Musculoskeletal: She exhibits no edema. Lymphadenopathy:     She has no cervical adenopathy. Neurological: She has normal strength. No cranial nerve deficit or sensory deficit. Psychiatric: She has a normal mood and affect. Her behavior is normal.          Visit Vitals  /76   Pulse 75   Temp 98.1 °F (36.7 °C) (Oral)   Resp 16   Ht 5' 4\" (1.626 m)   Wt 171 lb (77.6 kg)   SpO2 97%   BMI 29.35 kg/m²             Assessment & Plan:  Diagnoses and all orders for this visit:    1.  Primary osteoarthritis of left knee    2. Pre-op examination  -     AMB POC EKG ROUTINE W/ 12 LEADS, INTER & REP  -     NUCLEAR CARDIAC STRESS TEST; Future    3. LBBB (left bundle branch block)- this is a new problem, she is asymptomatic, differential dx reviewed with the patient, and will need stress test to determine if this is evidence of previous infarct. No changes pending testing.   -     NUCLEAR CARDIAC STRESS TEST; Future    4. Hypertension, essential- at goal, continue current treatment     5. Impaired fasting glucose- well controlled, labs have been ordered    6. Pure hypercholesterolemia- previously acceptable, on moderate intensity statin, no changes pending evaluation of #3    7. Osteopenia of multiple sites         Follow-up and Dispositions    · Return if symptoms worsen or fail to improve. Advised her to call back or return to office if symptoms worsen/change/persist.  Discussed expected course/resolution/complications of diagnosis in detail with patient. Medication risks/benefits/costs/interactions/alternatives discussed with patient. She was given an after visit summary which includes diagnoses, current medications, & vitals. She expressed understanding with the diagnosis and plan. Aspects of this note may have been generated using voice recognition software. Despite editing, there may be some syntax errors.        Kamini Lyles MD

## 2019-05-01 NOTE — PATIENT INSTRUCTIONS
Amada Tucker 69[facility] on 6th floor  Friday May 3rd at 1pm arrival 12:45  Nothing to eat or drink for 2 hours prior (execpt water)  Wear comfortable clothing, no flip flops  No Tobacco 6 hours  No Caffeine 12 hours prior     Learning About Total Knee Replacement Surgery  What is a total knee replacement? A total knee replacement replaces the worn ends of the thighbone (femur) and the lower leg bone (tibia) where they meet at the knee. Sometimes the surface of the patella (kneecap) is replaced too. You may want this surgery if you have knee pain, stiffness, swelling, or problems moving your knee that you cannot treat in other ways. For most people, these problems are caused by arthritis. They can also be caused by a knee injury. If you need to have both knees replaced, you may have both surgeries at the same time. Or your doctor may recommend doing one knee at a time. Your doctor would replace the second knee after you recover from the first knee surgery. Recovery after a double knee replacement takes longer than after a single replacement. How is a total knee replacement done? Before surgery, you will get medicine to make you sleep or feel relaxed. If you will be awake during surgery, you will also get a shot of medicine into your spine to make your legs numb. Your doctor makes a 4- to 10-inch cut, called an incision, on the front of your knee. Your doctor then:  · Replaces the damaged part of your femur with a metal piece. · Replaces the damaged part of your tibia with a metal piece and plastic surface. · May replace part of your kneecap with plastic. The doctor finishes the surgery by closing your incision with stitches, staples, tissue glue, or tape strips. If stitches or staples are used, they are removed 10 to 21 days after surgery. Glue or tape strips will fall off on their own over time.  The incision will leave a scar that fades with time.  There are two types of replacement joints. They are:  · Cemented joints. The cement acts as glue, attaching the new joint to the bone. · Uncemented joints. These have a metal coating with many small openings. Over time, new bone grows and fills up the openings. This new bone attaches the joint to the bone. Your doctor may also use a combination of cemented and uncemented parts. Talk to your doctor about the best type of knee joint for you. Knee replacement surgery may take about 2 to 3 hours. What can you expect after a total knee replacement? An artificial knee will allow you to do normal daily activities with little or no pain. You may be able to hike, dance, ride a bike, and play golf. Talk to your doctor about whether you can do more strenuous activities. You will need to do months of physical rehabilitation (rehab) after a knee replacement. Rehab will help you strengthen the muscles of the knee and regain movement in your knee. Your doctor will let you know if you will stay in the hospital or if you can go home the day of surgery. If you have both knees done at the same time, you may need to be in the hospital for a few days. Your rehabilitation program (rehab) will start before you leave the hospital. When you go home, you will be able to move around with crutches or a walker. But you will need someone to help you at home for the next few weeks until your energy level returns and you can get around more easily. If there is no one to help you at home, you may go to a rehabilitation center. Your knee will be swollen and hurt when you move it. You will need to take pain medicine for a time after surgery. You will start to walk with a walker or crutches the day after surgery. You will also begin doing simple leg exercises. You will probably need about 6 to 12 weeks before you can get back to your job. Your knee may be sore for up to 3 months.   The rehab after a knee replacement takes a lot of time and effort. You will have to stretch and do exercises daily to strengthen your knee and regain movement. The goal of rehab is to bend the knee at a 90-degree angle, which is like the letter \"L. \" But most people who complete all of the physical therapy do better than that. You need to remain active after you finish rehab to keep your new knee flexible and strong. You should be able to walk, swim, golf, dance, and ride a bicycle on flat ground. But you may not be able to run, ski, or ride a bicycle on hilly ground. Follow-up care is a key part of your treatment and safety. Be sure to make and go to all appointments, and call your doctor if you are having problems. It's also a good idea to know your test results and keep a list of the medicines you take. Where can you learn more? Go to http://francisco-isaac.info/. Enter Z536 in the search box to learn more about \"Learning About Total Knee Replacement Surgery. \"  Current as of: September 20, 2018  Content Version: 11.9  © 4971-1592 [employer]. Care instructions adapted under license by [employer] (which disclaims liability or warranty for this information). If you have questions about a medical condition or this instruction, always ask your healthcare professional. [address] any warranty or liability for your use of this information.

## 2019-05-01 NOTE — PROGRESS NOTES
Procedure/Surgery: Left knee replacement   Date of Procedure/Surgery: 5/20/2019   Surgeon: Waldemar Burrows MD  Hospital/Surgical Facility: [facility]    Fax  [phone]

## 2019-05-13 ENCOUNTER — HOSPITAL ENCOUNTER (OUTPATIENT)
Dept: PREADMISSION TESTING | Age: 79
Discharge: HOME | End: 2019-05-13
Attending: ORTHOPAEDIC SURGERY
Payer: MEDICARE

## 2019-05-13 VITALS
DIASTOLIC BLOOD PRESSURE: 72 MMHG | HEART RATE: 70 BPM | OXYGEN SATURATION: 94 % | SYSTOLIC BLOOD PRESSURE: 148 MMHG | BODY MASS INDEX: 28.39 KG/M2 | HEIGHT: 65 IN | RESPIRATION RATE: 16 BRPM | WEIGHT: 170.42 LBS

## 2019-05-13 LAB
ABO + RH BLD: NORMAL
ALBUMIN SERPL-MCNC: 4 G/DL (ref 3.5–5)
ALBUMIN/GLOB SERPL: 1.1 {RATIO} (ref 1.1–2.2)
ALP SERPL-CCNC: 69 U/L (ref 45–117)
ALT SERPL-CCNC: 21 U/L (ref 12–78)
ANION GAP SERPL CALC-SCNC: 5 MMOL/L (ref 5–15)
APPEARANCE UR: CLEAR
AST SERPL-CCNC: 19 U/L (ref 15–37)
BACTERIA URNS QL MICRO: NEGATIVE /HPF
BILIRUB SERPL-MCNC: 0.5 MG/DL (ref 0.2–1)
BILIRUB UR QL: NEGATIVE
BLOOD GROUP ANTIBODIES SERPL: NORMAL
BUN SERPL-MCNC: 13 MG/DL (ref 6–20)
BUN/CREAT SERPL: 17 (ref 12–20)
CALCIUM SERPL-MCNC: 9.5 MG/DL (ref 8.5–10.1)
CHLORIDE SERPL-SCNC: 100 MMOL/L (ref 97–108)
CO2 SERPL-SCNC: 31 MMOL/L (ref 21–32)
COLOR UR: NORMAL
CREAT SERPL-MCNC: 0.75 MG/DL (ref 0.55–1.02)
EPITH CASTS URNS QL MICRO: NORMAL /LPF
ERYTHROCYTE [DISTWIDTH] IN BLOOD BY AUTOMATED COUNT: 13.1 % (ref 11.5–14.5)
EST. AVERAGE GLUCOSE BLD GHB EST-MCNC: 111 MG/DL
GLOBULIN SER CALC-MCNC: 3.6 G/DL (ref 2–4)
GLUCOSE SERPL-MCNC: 104 MG/DL (ref 65–100)
GLUCOSE UR STRIP.AUTO-MCNC: NEGATIVE MG/DL
HBA1C MFR BLD: 5.5 % (ref 4.2–6.3)
HCT VFR BLD AUTO: 44.9 % (ref 35–47)
HGB BLD-MCNC: 14.1 G/DL (ref 11.5–16)
HGB UR QL STRIP: NEGATIVE
HYALINE CASTS URNS QL MICRO: NORMAL /LPF (ref 0–5)
INR PPP: 1 (ref 0.9–1.1)
KETONES UR QL STRIP.AUTO: NEGATIVE MG/DL
LEUKOCYTE ESTERASE UR QL STRIP.AUTO: NEGATIVE
MCH RBC QN AUTO: 29.3 PG (ref 26–34)
MCHC RBC AUTO-ENTMCNC: 31.4 G/DL (ref 30–36.5)
MCV RBC AUTO: 93.3 FL (ref 80–99)
NITRITE UR QL STRIP.AUTO: NEGATIVE
NRBC # BLD: 0 K/UL (ref 0–0.01)
NRBC BLD-RTO: 0 PER 100 WBC
PH UR STRIP: 6 [PH] (ref 5–8)
PLATELET # BLD AUTO: 213 K/UL (ref 150–400)
PMV BLD AUTO: 10.4 FL (ref 8.9–12.9)
POTASSIUM SERPL-SCNC: 4 MMOL/L (ref 3.5–5.1)
PROT SERPL-MCNC: 7.6 G/DL (ref 6.4–8.2)
PROT UR STRIP-MCNC: NEGATIVE MG/DL
PROTHROMBIN TIME: 10.1 SEC (ref 9–11.1)
RBC # BLD AUTO: 4.81 M/UL (ref 3.8–5.2)
RBC #/AREA URNS HPF: NORMAL /HPF (ref 0–5)
SODIUM SERPL-SCNC: 136 MMOL/L (ref 136–145)
SP GR UR REFRACTOMETRY: <1.005 (ref 1–1.03)
SPECIMEN EXP DATE BLD: NORMAL
UA: UC IF INDICATED,UAUC: NORMAL
UROBILINOGEN UR QL STRIP.AUTO: 0.2 EU/DL (ref 0.2–1)
WBC # BLD AUTO: 7.4 K/UL (ref 3.6–11)
WBC URNS QL MICRO: NORMAL /HPF (ref 0–4)

## 2019-05-13 PROCEDURE — 85027 COMPLETE CBC AUTOMATED: CPT

## 2019-05-13 PROCEDURE — 36415 COLL VENOUS BLD VENIPUNCTURE: CPT

## 2019-05-13 PROCEDURE — 86900 BLOOD TYPING SEROLOGIC ABO: CPT

## 2019-05-13 PROCEDURE — 81001 URINALYSIS AUTO W/SCOPE: CPT

## 2019-05-13 PROCEDURE — 85610 PROTHROMBIN TIME: CPT

## 2019-05-13 PROCEDURE — 83036 HEMOGLOBIN GLYCOSYLATED A1C: CPT

## 2019-05-13 PROCEDURE — 80053 COMPREHEN METABOLIC PANEL: CPT

## 2019-05-13 RX ORDER — RANITIDINE 150 MG/1
150 TABLET, FILM COATED ORAL AS NEEDED
COMMUNITY
End: 2019-10-16

## 2019-05-13 RX ORDER — SIMETHICONE 125 MG
125 CAPSULE ORAL
COMMUNITY

## 2019-05-13 RX ORDER — ACETAMINOPHEN 325 MG/1
325 TABLET ORAL
COMMUNITY
End: 2020-04-22

## 2019-05-13 NOTE — PERIOP NOTES
[facility] Preoperative Instructions Surgery Date 5/20/2019          Time of Arrival:   To Be Called 1. On the day of your surgery, please report to the Surgical Services Registration Desk and sign in at your designated time. The Surgery Center is located to the right of the Emergency Room. 2. You must have someone with you to drive you home. You should not drive a car for 24 hours following surgery. Please make arrangements for a friend or family member to stay with you for the first 24 hours after your surgery. 3. Do not have anything to eat or drink (including water, gum, mints, coffee, juice) after midnight 5/19/2019?? Greg Taylor ? This may not apply to medications prescribed by your physician. ?(Please note below the special instructions with medications to take the morning of your procedure.) 4. We recommend you do not drink any alcoholic beverages for 24 hours before and after your surgery. 5. Contact your surgeons office for instructions on the following medications: non-steroidal anti-inflammatory drugs (i.e. Advil, Aleve), vitamins, and supplements. (Some surgeons will want you to stop these medications prior to surgery and others may allow you to take them) **If you are currently taking Plavix, Coumadin, Aspirin and/or other blood-thinning agents, contact your surgeon for instructions. ** Your surgeon will partner with the physician prescribing these medications to determine if it is safe to stop or if you need to continue taking. Please do not stop taking these medications without instructions from your surgeon 6. Wear comfortable clothes. Wear glasses instead of contacts. Do not bring any money or jewelry. Please bring picture ID, insurance card, and any prearranged co-payment or hospital payment. Do not wear make-up, particularly mascara the morning of your surgery.   Do not wear nail polish, particularly if you are having foot /hand surgery. Wear your hair loose or down, no ponytails, buns, teri pins or clips. All body piercings must be removed. Please shower with antibacterial soap for three consecutive days before and on the morning of surgery, but do not apply any lotions, powders or deodorants after the shower on the day of surgery. Please use a fresh towels after each shower. Please sleep in clean clothes and change bed linens the night before surgery. Please do not shave for 48 hours prior to surgery. Shaving of the face is acceptable. 7. You should understand that if you do not follow these instructions your surgery may be cancelled. If your physical condition changes (I.e. fever, cold or flu) please contact your surgeon as soon as possible. 8. It is important that you be on time. If a situation occurs where you may be late, please call [phone] (OR Holding Area). 9. If you have any questions and or problems, please call [phone] (Pre-admission Testing). 10. Your surgery time may be subject to change. You will receive a phone call the evening prior if your time changes. 11.  If having outpatient surgery, you must have someone to drive you here, stay with you during the duration of your stay, and to drive you home at time of discharge. 12.   In an effort to improve the efficiency, privacy, and safety for all of our Pre-op patients visitors are not allowed in the Holding area. Once you arrive and are registered your family/visitors will be asked to remain in the waiting room. The Pre-op staff will get you from the Surgical Waiting Area and will explain to you and your family/visitors that the Pre-op phase is beginning. The staff will answer any questions and provide instructions for tracking of the patient, by use of the existing tracking number and color-coded status board in the waiting room.   At this time the staff will also ask for your designated spokesperson information in the event that the physician or staff need to provide an update or obtain any pertinent information. The designated spokesperson will be notified if the physician needs to speak to family during the pre-operative phase. If at any time your family/visitors has questions or concerns they may approach the volunteer desk in the waiting area for assistance. Special Instructions:Bring Incentive Spirometer with you to the hospital the day of surgery. Confirm with Surgeon when to stop Vitamin Supplements prior to surgery. MEDICATIONS TO TAKE THE MORNING OF SURGERY WITH A SIP OF WATER:Cetirizine, Hydrochlorothiazide, Synthroid, Zantac if needed I understand a pre-operative phone call will be made to verify my surgery time. In the event that I am not available, I give permission for a message to be left on my answering service and/or with another person? Yes [phone] 
 
 
 
 ___________________      __________   _________ 
  (Signature of Patient)             (Witness)                (Date and Time)

## 2019-05-13 NOTE — PERIOP NOTES
Incentive [place] Reeys Using the incentive spirometer helps expand the small air sacs of your lungs, helps you breathe deeply, and helps improve your lung function. Use your incentive spirometer twice a day (10 breaths each time) prior to surgery. How to Use Your Incentive Spirometer: 1. Hold the incentive spirometer in an upright position. 2. Breathe out as usual.  
3. Place the mouthpiece in your mouth and seal your lips tightly around it. 4. Take a deep breath. Breathe in slowly and as deeply as possible. Keep the blue flow rate guide between the arrows. 5. Hold your breath as long as possible. Then exhale slowly and allow the piston to fall to the bottom of the column. 6. Rest for a few seconds and repeat steps one through five at least 10 times. PAT Tidal Volume___1750_______________  x_____2___________  Date__5/13/2019_____________________ BRING THE INCENTIVE SPIROMETER WITH YOU TO THE HOSPITAL ON THE DAY OF YOUR SURGERY. Opportunity given to ask and answer questions as well as to observe return demonstration. Patient signature_____________________________    Estefanía Pang RN___________________________

## 2019-05-13 NOTE — PERIOP NOTES
Preventing Infections Before and After  Your SurgeryIMPORTANT INSTRUCTIONSPlease read and follow these instructions carefully. If you are unable to comply with the below instructions your procedure will be cancelled. Every Night for Three (3) nights before your surgery: 1. Shower with an antibacterial soap, such as Dial, or the soap provided at your preassessment appointment. A shower is better than a bath for cleaning your skin. 2. If needed, ask someone to help you reach all areas of your body. Dont forget to clean your belly button with every shower. The night before your surgery: If you lose your Hibiclens please contact surgery center or you can purchase it at a local pharmacy 1. On the night before your surgery, shower with an antibacterial soap, such as Dial, or the soap provided at your preassessment appointment. 2. With one packet of Hibiclens in hand, turn water off. 
3. Apply Hibiclens antiseptic skin cleanser with a clean, freshly washed washcloth. ? Gently apply to your body from chin to toes (except the genital area) and especially the area(s) where your incision(s) will be. ? Leave Hibiclens on your skin for at least 20 seconds. CAUTION: If needed, Hibiclens may be used to clean the folds of skin of the legs (such as in the area of the groin) and on your buttocks and hips. However, do not use Hibiclens above the neck or in the genital area (your bottom) or put inside any area of your body. 4. Turn the water back on and rinse. 5. Dry gently with a clean, freshly washed towel. 6. After your shower, do not use any powder, deodorant, perfumes or lotion. 7. Use clean, freshly washed towels and washcloths every time you shower. 8. Wear clean, freshly washed pajamas to bed the night before surgery. 9. Sleep on clean, freshly washed sheets. 10. Do not allow pets to sleep in your bed with you. The Morning of your surgery: 1. Shower again thoroughly with an antibacterial soap, such as Dial or the soap provided at your preassessment appointment. If needed, ask someone for help to reach all areas of your body. Dont forget to clean your belly button! Rinse. 2. Dry gently with a clean, freshly washed towel. 3. After your shower, do not use any powder, deodorant, perfumes or lotion prior to surgery. 4. Put on clean, freshly washed clothing. Tips to help prevent infections after your surgery: 1. Protect your surgical wound from germs: 
? Hand washing is the most important thing you and your caregivers can do to prevent infections. ? Keep your bandage clean and dry! ? Do not touch your surgical wound. 2. Use clean, freshly washed towels and washcloths every time you shower; do not share bath linens with others. 3. Until your surgical wound is healed, wear clothing and sleep on bed linens each day that are clean and freshly washed. 4. Do not allow pets to sleep in your bed with you or touch your surgical wound. 5. Do not smoke  smoking delays wound healing. This may be a good time to stop smoking. 6. If you have diabetes, it is important for you to manage your blood sugar levels properly before your surgery as well as after your surgery. Poorly managed blood sugar levels slow down wound healing and prevent you from healing completely. If you lose your Hibiclens, please call the [facility], or it is available for purchase at your pharmacy. ___________________      ___________________      ________________ 
(Signature of Patient)          (Witness)                   (Date and Time)

## 2019-05-14 LAB
BACTERIA SPEC CULT: NORMAL
BACTERIA SPEC CULT: NORMAL
SERVICE CMNT-IMP: NORMAL

## 2019-05-15 NOTE — ADVANCED PRACTICE NURSE
PAT Nurse Practitioner Pre-Operative Chart Review/Assessment:-ORTHOPEDIC/NEUROSURGICAL SPINE Patient Name:  Khanh Encinas Age:   70 y.o.    :  1940 Today's Date:  5/15/2019 Date of PAT:   19 Date of Surgery:    19 Procedure(s):   left Total Knee Replacement Surgeon:   Kavita Fraser Medical Clearance:  Dr. Airam Moreland PLAN: 
 
  1)  Cardiac Clearance:  Not indicated 2)  Diabetic Treatment Consult:  Not indicated A1C 5.5 3)  Sleep Apnea evaluation:   Not indicated BELINDA 3 
 
  4) Treatment for MRSA/Staph Aureus:  Negative 5) Additional Concerns:  Hx of vertigo Vital Signs: 
 
    
Vitals:  
 19 1037 BP: 148/72 Pulse: 70 Resp: 16 SpO2: 94% Weight: 77.3 kg (170 lb 6.7 oz) Height: 5' 5\" (1.651 m)  
 
  
 
 
____________________________________________ PAST MEDICAL HISTORY Past Medical History:  
Diagnosis Date  Allergic rhinitis  Anesthesia complication Pt reports dizziness after anesthesia  Arrhythmia Left Bundle Branch Block  Arthritis  Depression History of not currently  GERD (gastroesophageal reflux disease)  Hyperlipidemia  Impaired fasting glucose  Unspecified essential hypertension  Unspecified hypothyroidism  Uterine prolapse  Vertigo   
  
____________________________________________ PAST SURGICAL HISTORY Past Surgical History:  
Procedure Laterality Date  HX BREAST BIOPSY Bilateral   
 >5, all benign  HX BREAST BIOPSY Left 2017  
 benign  HX COLONOSCOPY  8/15/12  
 diverticulosis  HX ENDOSCOPY  8/15/12  
 hiatal hernia  HX HAMMER TOE REPAIR Left   
 w/ bunion surgery (per previous PCP)  HX PARTIAL HYSTERECTOMY  HX TONSILLECTOMY    
  
____________________________________________ HOME MEDICATIONS Current Outpatient Medications Medication Sig  
 guaiFENesin (MUCINEX) 1,200 mg Ta12 ER tablet Take 1,200 mg by mouth daily.  simethicone (GAS-X) 125 mg capsule Take 125 mg by mouth four (4) times daily as needed for Flatulence.  acetaminophen (TYLENOL) 325 mg tablet Take 325 mg by mouth every four (4) hours as needed for Pain.  raNITIdine (ZANTAC) 150 mg tablet Take 150 mg by mouth as needed for Indigestion.  atorvastatin (LIPITOR) 20 mg tablet TAKE 1 TABLET BY MOUTH DAILY (Patient taking differently: TAKE 1 TABLET BY MOUTH DAILY EVERY EVENING)  SYNTHROID 88 mcg tablet TAKE 1 TABLET BY MOUTH DAILY BEFORE BREAKFAST  mirtazapine (REMERON) 15 mg tablet TAKE 1 TABLET BY MOUTH EVERY NIGHT  hydroCHLOROthiazide (HYDRODIURIL) 25 mg tablet Take 1 Tab by mouth daily.  calcium citrate/vitamin D3 (CALCIUM CITRATE + D PO) Take 1 Tab by mouth as needed.  BENEFIBER, WHEAT DEXTRIN, PO Take  by mouth Before breakfast, lunch, and dinner.  Cetirizine (ZYRTEC) 10 mg cap Take  by mouth every evening.  cholecalciferol (VITAMIN D3) 1,000 unit tablet Take  by mouth daily.  b complex vitamins tablet Take 1 Tab by mouth daily.  diphenhydrAMINE (BENADRYL) 25 mg capsule Take 25 mg by mouth every six (6) hours as needed.  omeprazole (PRILOSEC) 40 mg capsule Take 1 Cap by mouth daily. No current facility-administered medications for this encounter.   
  
____________________________________________ ALLERGIES Allergies Allergen Reactions  Codeine Nausea Only  Epinephrine Palpitations  Erythromycin Other (comments) GI upset  Losartan Swelling Lip swelling  Mobic [Meloxicam] Swelling Lip swelling  Pcn [Penicillins] Hives Fainted  
  
____________________________________________ SOCIAL HISTORY Social History Tobacco Use  Smoking status: Never Smoker  Smokeless tobacco: Never Used Substance Use Topics  Alcohol use: No  
  Frequency: Never Binge frequency: Never ____________________________________________ Labs:  
Results for Heike Mendez (MRN [id number]) as of 5/15/2019 13:57 Ref. Range 5/13/2019 10:41 WBC Latest Ref Range: 3.6 - 11.0 K/uL 7.4 NRBC Latest Ref Range: 0  WBC 0.0  
RBC Latest Ref Range: 3.80 - 5.20 M/uL 4.81 HGB Latest Ref Range: 11.5 - 16.0 g/dL 14.1 HCT Latest Ref Range: 35.0 - 47.0 % 44.9 MCV Latest Ref Range: 80.0 - 99.0 FL 93.3 MCH Latest Ref Range: 26.0 - 34.0 PG 29.3 MCHC Latest Ref Range: 30.0 - 36.5 g/dL 31.4 RDW Latest Ref Range: 11.5 - 14.5 % 13.1 PLATELET Latest Ref Range: 150 - 400 K/uL 213 MPV Latest Ref Range: 8.9 - 12.9 FL 10.4 ABSOLUTE NRBC Latest Ref Range: 0.00 - 0.01 K/uL 0.00 Color Latest Units:   YELLOW/STRAW Appearance Latest Ref Range: CLEAR   CLEAR Specific gravity Latest Ref Range: 1.003 - 1.030  <1.005  
pH (UA) Latest Ref Range: 5.0 - 8.0   6.0 Protein Latest Ref Range: NEG mg/dL NEGATIVE Glucose Latest Ref Range: NEG mg/dL NEGATIVE Ketone Latest Ref Range: NEG mg/dL NEGATIVE Blood Latest Ref Range: NEG   NEGATIVE Bilirubin Latest Ref Range: NEG   NEGATIVE Urobilinogen Latest Ref Range: 0.2 - 1.0 EU/dL 0.2 Nitrites Latest Ref Range: NEG   NEGATIVE Leukocyte Esterase Latest Ref Range: NEG   NEGATIVE Epithelial cells Latest Ref Range: FEW /lpf FEW  
WBC Latest Ref Range: 0 - 4 /hpf 0-4 RBC Latest Ref Range: 0 - 5 /hpf 0-5 Bacteria Latest Ref Range: NEG /hpf NEGATIVE Hyaline cast Latest Ref Range: 0 - 5 /lpf 0-2 INR Latest Ref Range: 0.9 - 1.1   1.0 Prothrombin time Latest Ref Range: 9.0 - 11.1 sec 10.1 Sodium Latest Ref Range: 136 - 145 mmol/L 136 Potassium Latest Ref Range: 3.5 - 5.1 mmol/L 4.0 Chloride Latest Ref Range: 97 - 108 mmol/L 100 CO2 Latest Ref Range: 21 - 32 mmol/L 31 Anion gap Latest Ref Range: 5 - 15 mmol/L 5 Glucose Latest Ref Range: 65 - 100 mg/dL 104 (H) BUN Latest Ref Range: 6 - 20 MG/DL 13  
 Creatinine Latest Ref Range: 0.55 - 1.02 MG/DL 0.75  
BUN/Creatinine ratio Latest Ref Range: 12 - 20   17 Calcium Latest Ref Range: 8.5 - 10.1 MG/DL 9.5  
GFR est non-AA Latest Ref Range: >60 ml/min/1.73m2 >60  
GFR est AA Latest Ref Range: >60 ml/min/1.73m2 >60 Bilirubin, total Latest Ref Range: 0.2 - 1.0 MG/DL 0.5 Protein, total Latest Ref Range: 6.4 - 8.2 g/dL 7.6 Albumin Latest Ref Range: 3.5 - 5.0 g/dL 4.0 Globulin Latest Ref Range: 2.0 - 4.0 g/dL 3.6 A-G Ratio Latest Ref Range: 1.1 - 2.2   1.1 ALT (SGPT) Latest Ref Range: 12 - 78 U/L 21 AST Latest Ref Range: 15 - 37 U/L 19 Alk. phosphatase Latest Ref Range: 45 - 117 U/L 69 Hemoglobin A1c, (calculated) Latest Ref Range: 4.2 - 6.3 % 5.5 Est. average glucose Latest Units: mg/dL 111 CULTURE, MRSA Unknown Rpt Crossmatch Expiration Unknown 05/23/2019 TYPE & SCREEN Unknown Rpt Skin:  
Denies open wounds, sores, rashes or other areas of concern in PAT assessment.   
 
 
Nicole Dallas NP

## 2019-05-20 ENCOUNTER — OFFICE VISIT (OUTPATIENT)
Dept: FAMILY MEDICINE CLINIC | Age: 79
End: 2019-05-20

## 2019-05-20 VITALS
WEIGHT: 170.6 LBS | HEIGHT: 65 IN | HEART RATE: 68 BPM | SYSTOLIC BLOOD PRESSURE: 119 MMHG | DIASTOLIC BLOOD PRESSURE: 75 MMHG | RESPIRATION RATE: 16 BRPM | BODY MASS INDEX: 28.42 KG/M2 | TEMPERATURE: 97.4 F | OXYGEN SATURATION: 97 %

## 2019-05-20 DIAGNOSIS — J06.9 VIRAL URI: Primary | ICD-10-CM

## 2019-05-20 NOTE — PROGRESS NOTES
Bobbi Butler is a 70 y.o. female Chief Complaint Patient presents with  Cough Cough with noted yellow mucus with blowing nose States hr  has PNA in the hospital.  States she was supposed to have knee surgery and cancelled due to . States she is having sinus congestion and this morning she had a cough and had yellow mucous when she blew her nose. No fever no chills. No SOB no wheeze, no ear pain. she is a 70y.o. year old female who presents for evalution. Reviewed PmHx, RxHx, FmHx, SocHx, AllgHx and updated and dated in the chart. Review of Systems - negative except as listed above in the HPI Objective:  
 
Vitals:  
 05/20/19 1149 BP: 119/75 Pulse: 68 Resp: 16 Temp: 97.4 °F (36.3 °C) TempSrc: Oral  
SpO2: 97% Weight: 170 lb 9.6 oz (77.4 kg) Height: 5' 5\" (1.651 m) Current Outpatient Medications Medication Sig  
 guaiFENesin (MUCINEX) 1,200 mg Ta12 ER tablet Take 1,200 mg by mouth daily.  simethicone (GAS-X) 125 mg capsule Take 125 mg by mouth four (4) times daily as needed for Flatulence.  acetaminophen (TYLENOL) 325 mg tablet Take 325 mg by mouth every four (4) hours as needed for Pain.  raNITIdine (ZANTAC) 150 mg tablet Take 150 mg by mouth as needed for Indigestion.  atorvastatin (LIPITOR) 20 mg tablet TAKE 1 TABLET BY MOUTH DAILY (Patient taking differently: TAKE 1 TABLET BY MOUTH DAILY EVERY EVENING)  SYNTHROID 88 mcg tablet TAKE 1 TABLET BY MOUTH DAILY BEFORE BREAKFAST  mirtazapine (REMERON) 15 mg tablet TAKE 1 TABLET BY MOUTH EVERY NIGHT  diphenhydrAMINE (BENADRYL) 25 mg capsule Take 25 mg by mouth every six (6) hours as needed.  hydroCHLOROthiazide (HYDRODIURIL) 25 mg tablet Take 1 Tab by mouth daily.  calcium citrate/vitamin D3 (CALCIUM CITRATE + D PO) Take 1 Tab by mouth as needed.  BENEFIBER, WHEAT DEXTRIN, PO Take  by mouth Before breakfast, lunch, and dinner.  Cetirizine (ZYRTEC) 10 mg cap Take  by mouth every evening.  cholecalciferol (VITAMIN D3) 1,000 unit tablet Take  by mouth daily.  b complex vitamins tablet Take 1 Tab by mouth daily.  omeprazole (PRILOSEC) 40 mg capsule Take 1 Cap by mouth daily. No current facility-administered medications for this visit. Physical Examination: General appearance - alert, well appearing, and in no distress Ears - bilateral TM's and external ear canals normal 
Nose - mucosal congestion Mouth - mucous membranes moist, pharynx normal without lesions Neck - supple, no significant adenopathy Chest - clear to auscultation, no wheezes, rales or rhonchi, symmetric air entry Heart - normal rate, regular rhythm, normal S1, S2, no murmurs, rubs, clicks or gallops Assessment/ Plan:  
Diagnoses and all orders for this visit: 1. Viral URI Supportive care discussed with pt Follow-up and Dispositions · Return if symptoms worsen or fail to improve. I have discussed the diagnosis with the patient and the intended plan as seen in the above orders. The patient has received an after-visit summary and questions were answered concerning future plans. Pt conveyed understanding of plan. Medication Side Effects and Warnings were discussed with patient Zabrina Acosta,

## 2019-05-20 NOTE — PATIENT INSTRUCTIONS
A Healthy Lifestyle: Care Instructions Your Care Instructions A healthy lifestyle can help you feel good, stay at a healthy weight, and have plenty of energy for both work and play. A healthy lifestyle is something you can share with your whole family. A healthy lifestyle also can lower your risk for serious health problems, such as high blood pressure, heart disease, and diabetes. You can follow a few steps listed below to improve your health and the health of your family. Follow-up care is a key part of your treatment and safety. Be sure to make and go to all appointments, and call your doctor if you are having problems. It's also a good idea to know your test results and keep a list of the medicines you take. How can you care for yourself at home? · Do not eat too much sugar, fat, or fast foods. You can still have dessert and treats now and then. The goal is moderation. · Start small to improve your eating habits. Pay attention to portion sizes, drink less juice and soda pop, and eat more fruits and vegetables. ? Eat a healthy amount of food. A 3-ounce serving of meat, for example, is about the size of a deck of cards. Fill the rest of your plate with vegetables and whole grains. ? Limit the amount of soda and sports drinks you have every day. Drink more water when you are thirsty. ? Eat at least 5 servings of fruits and vegetables every day. It may seem like a lot, but it is not hard to reach this goal. A serving or helping is 1 piece of fruit, 1 cup of vegetables, or 2 cups of leafy, raw vegetables. Have an apple or some carrot sticks as an afternoon snack instead of a candy bar. Try to have fruits and/or vegetables at every meal. 
· Make exercise part of your daily routine. You may want to start with simple activities, such as walking, bicycling, or slow swimming. Try to be active 30 to 60 minutes every day.  You do not need to do all 30 to 60 minutes all at once. For example, you can exercise 3 times a day for 10 or 20 minutes. Moderate exercise is safe for most people, but it is always a good idea to talk to your doctor before starting an exercise program. 
· Keep moving. [employer] the lawn, work in the garden, or [employer]. Take the stairs instead of the elevator at work. · If you smoke, quit. People who smoke have an increased risk for heart attack, stroke, cancer, and other lung illnesses. Quitting is hard, but there are ways to boost your chance of quitting tobacco for good. ? Use nicotine gum, patches, or lozenges. ? Ask your doctor about stop-smoking programs and medicines. ? Keep trying. In addition to reducing your risk of diseases in the future, you will notice some benefits soon after you stop using tobacco. If you have shortness of breath or asthma symptoms, they will likely get better within a few weeks after you quit. · Limit how much alcohol you drink. Moderate amounts of alcohol (up to 2 drinks a day for men, 1 drink a day for women) are okay. But drinking too much can lead to liver problems, high blood pressure, and other health problems. Family health If you have a family, there are many things you can do together to improve your health. · Eat meals together as a family as often as possible. · Eat healthy foods. This includes fruits, vegetables, lean meats and dairy, and whole grains. · Include your family in your fitness plan. Most people think of activities such as jogging or tennis as the way to fitness, but there are many ways you and your family can be more active. Anything that makes you breathe hard and gets your heart pumping is exercise. Here are some tips: 
? Walk to do errands or to take your child to school or the bus. 
? Go for a family bike ride after dinner instead of watching TV. Where can you learn more? Go to http://francisco-isaac.info/. Enter R189 in the search box to learn more about \"A Healthy Lifestyle: Care Instructions. \" Current as of: September 11, 2018 Content Version: 11.9 © 6753-8684 [employer]. Care instructions adapted under license by [employer] (which disclaims liability or warranty for this information). If you have questions about a medical condition or this instruction, always ask your healthcare professional. [place] [address] any warranty or liability for your use of this information.

## 2019-05-24 DIAGNOSIS — I10 HYPERTENSION, ESSENTIAL: ICD-10-CM

## 2019-05-24 NOTE — TELEPHONE ENCOUNTER
Patient states she is doing well on this med. Called her pharmacy for a refill and was told she did not have any, to call her doctor.

## 2019-05-25 DIAGNOSIS — F51.01 PRIMARY INSOMNIA: ICD-10-CM

## 2019-05-27 RX ORDER — MIRTAZAPINE 15 MG/1
TABLET, FILM COATED ORAL
Qty: 90 TAB | Refills: 0 | Status: SHIPPED | OUTPATIENT
Start: 2019-05-27 | End: 2019-11-20 | Stop reason: SDUPTHER

## 2019-05-30 ENCOUNTER — TELEPHONE (OUTPATIENT)
Dept: INTERNAL MEDICINE CLINIC | Age: 79
End: 2019-05-30

## 2019-05-30 RX ORDER — HYDROCHLOROTHIAZIDE 25 MG/1
25 TABLET ORAL DAILY
Qty: 90 TAB | Refills: 0 | Status: SHIPPED | OUTPATIENT
Start: 2019-05-30 | End: 2019-08-06 | Stop reason: SDUPTHER

## 2019-05-30 NOTE — TELEPHONE ENCOUNTER
Patient stopped by the office this afternoon to check on the refill request for HCTZ from 5/24. She only has 1 pill left for Friday now. Is there a reason this has not been refilled? Please send to pharmacy asap!

## 2019-06-05 DIAGNOSIS — E78.5 HYPERLIPIDEMIA WITH TARGET LDL LESS THAN 100: ICD-10-CM

## 2019-06-06 RX ORDER — ATORVASTATIN CALCIUM 20 MG/1
TABLET, FILM COATED ORAL
Qty: 90 TAB | Refills: 1 | Status: SHIPPED | OUTPATIENT
Start: 2019-06-06 | End: 2019-11-20 | Stop reason: SDUPTHER

## 2019-08-06 DIAGNOSIS — I10 HYPERTENSION, ESSENTIAL: ICD-10-CM

## 2019-08-06 RX ORDER — HYDROCHLOROTHIAZIDE 25 MG/1
TABLET ORAL
Qty: 90 TAB | Refills: 1 | Status: SHIPPED | OUTPATIENT
Start: 2019-08-06 | End: 2020-02-09

## 2019-08-22 RX ORDER — LEVOTHYROXINE SODIUM 88 UG/1
TABLET ORAL
Qty: 90 TAB | Refills: 0 | Status: SHIPPED | OUTPATIENT
Start: 2019-08-22 | End: 2019-11-20 | Stop reason: SDUPTHER

## 2019-08-26 NOTE — TELEPHONE ENCOUNTER
Spoke to patient reminded her to get labs drawn that were ordered back in March. She states she's had a lot going on. She says she will come in on Wednesday morning, lab slip placed up front. Advised per Dr. Joelle Pacheco we cannot refill anymore meds without lab results. She verbalized understanding.

## 2019-08-28 ENCOUNTER — HOSPITAL ENCOUNTER (OUTPATIENT)
Dept: LAB | Age: 79
Discharge: HOME | End: 2019-08-28
Payer: MEDICARE

## 2019-08-28 PROCEDURE — 80053 COMPREHEN METABOLIC PANEL: CPT

## 2019-08-28 PROCEDURE — 36415 COLL VENOUS BLD VENIPUNCTURE: CPT

## 2019-08-28 PROCEDURE — 84443 ASSAY THYROID STIM HORMONE: CPT

## 2019-08-28 PROCEDURE — 83036 HEMOGLOBIN GLYCOSYLATED A1C: CPT

## 2019-08-28 PROCEDURE — 80061 LIPID PANEL: CPT

## 2019-08-29 LAB
ALBUMIN SERPL-MCNC: 4.3 G/DL (ref 3.5–4.8)
ALBUMIN/GLOB SERPL: 1.9 {RATIO} (ref 1.2–2.2)
ALP SERPL-CCNC: 65 IU/L (ref 39–117)
ALT SERPL-CCNC: 17 IU/L (ref 0–32)
AST SERPL-CCNC: 19 IU/L (ref 0–40)
BILIRUB SERPL-MCNC: 0.4 MG/DL (ref 0–1.2)
BUN SERPL-MCNC: 14 MG/DL (ref 8–27)
BUN/CREAT SERPL: 17 (ref 12–28)
CALCIUM SERPL-MCNC: 9.7 MG/DL (ref 8.7–10.3)
CHLORIDE SERPL-SCNC: 97 MMOL/L (ref 96–106)
CHOLEST SERPL-MCNC: 208 MG/DL (ref 100–199)
CO2 SERPL-SCNC: 24 MMOL/L (ref 20–29)
CREAT SERPL-MCNC: 0.81 MG/DL (ref 0.57–1)
EST. AVERAGE GLUCOSE BLD GHB EST-MCNC: 114 MG/DL
GLOBULIN SER CALC-MCNC: 2.3 G/DL (ref 1.5–4.5)
GLUCOSE SERPL-MCNC: 96 MG/DL (ref 65–99)
HBA1C MFR BLD: 5.6 % (ref 4.8–5.6)
HDLC SERPL-MCNC: 70 MG/DL
LDLC SERPL CALC-MCNC: 117 MG/DL (ref 0–99)
POTASSIUM SERPL-SCNC: 4.6 MMOL/L (ref 3.5–5.2)
PROT SERPL-MCNC: 6.6 G/DL (ref 6–8.5)
SODIUM SERPL-SCNC: 138 MMOL/L (ref 134–144)
TRIGL SERPL-MCNC: 105 MG/DL (ref 0–149)
TSH SERPL DL<=0.005 MIU/L-ACNC: 1.14 UIU/ML (ref 0.45–4.5)
VLDLC SERPL CALC-MCNC: 21 MG/DL (ref 5–40)

## 2019-08-29 NOTE — PROGRESS NOTES
Results released to patient via [email or web address]. All labs are stable or at goal for her. A1c wnl. FBS improved and normal.  Labs ordered on 3/27/19 and not completed until 8/28/19.

## 2019-09-11 ENCOUNTER — TELEPHONE (OUTPATIENT)
Dept: INTERNAL MEDICINE CLINIC | Age: 79
End: 2019-09-11

## 2019-09-25 LAB — EF %, EXTERNAL: NORMAL

## 2019-10-16 ENCOUNTER — OFFICE VISIT (OUTPATIENT)
Dept: INTERNAL MEDICINE CLINIC | Age: 79
End: 2019-10-16

## 2019-10-16 VITALS
WEIGHT: 175 LBS | TEMPERATURE: 97.9 F | HEART RATE: 72 BPM | RESPIRATION RATE: 16 BRPM | OXYGEN SATURATION: 97 % | SYSTOLIC BLOOD PRESSURE: 122 MMHG | HEIGHT: 64 IN | DIASTOLIC BLOOD PRESSURE: 80 MMHG | BODY MASS INDEX: 29.88 KG/M2

## 2019-10-16 DIAGNOSIS — E66.9 OBESITY (BMI 30.0-34.9): ICD-10-CM

## 2019-10-16 DIAGNOSIS — Z00.00 MEDICARE ANNUAL WELLNESS VISIT, SUBSEQUENT: Primary | ICD-10-CM

## 2019-10-16 DIAGNOSIS — F51.01 PRIMARY INSOMNIA: ICD-10-CM

## 2019-10-16 DIAGNOSIS — M17.12 PRIMARY OSTEOARTHRITIS OF LEFT KNEE: ICD-10-CM

## 2019-10-16 DIAGNOSIS — I10 HYPERTENSION, ESSENTIAL: ICD-10-CM

## 2019-10-16 DIAGNOSIS — F33.42 RECURRENT MAJOR DEPRESSIVE DISORDER, IN FULL REMISSION (HCC): ICD-10-CM

## 2019-10-16 DIAGNOSIS — E03.9 ACQUIRED HYPOTHYROIDISM: ICD-10-CM

## 2019-10-16 DIAGNOSIS — Z12.31 ENCOUNTER FOR SCREENING MAMMOGRAM FOR MALIGNANT NEOPLASM OF BREAST: ICD-10-CM

## 2019-10-16 DIAGNOSIS — Z13.39 SCREENING FOR ALCOHOLISM: ICD-10-CM

## 2019-10-16 DIAGNOSIS — Z71.89 ADVANCED CARE PLANNING/COUNSELING DISCUSSION: ICD-10-CM

## 2019-10-16 DIAGNOSIS — M85.89 OSTEOPENIA OF MULTIPLE SITES: ICD-10-CM

## 2019-10-16 DIAGNOSIS — E78.00 PURE HYPERCHOLESTEROLEMIA: ICD-10-CM

## 2019-10-16 DIAGNOSIS — Z23 ENCOUNTER FOR IMMUNIZATION: ICD-10-CM

## 2019-10-16 DIAGNOSIS — I44.7 LBBB (LEFT BUNDLE BRANCH BLOCK): ICD-10-CM

## 2019-10-16 NOTE — ACP (ADVANCE CARE PLANNING)
Advance Care Planning (ACP) Provider Note - Comprehensive     Date of ACP Conversation: 10/16/19  Persons included in Conversation:  patient  Length of ACP Conversation in minutes: <16 minutes (Non-Billable)    Authorized Decision Maker (if patient is incapable of making informed decisions):   Healthcare Agent/Medical Power of  under Advance Directive      She has an advanced directive - a copy HAS NOT been provided. Reviewed DNR/DNI and patient is interested in remaining a DNR, no changes made. General ACP for ALL Patients with Decision Making Capacity:  Importance of advance care planning, including choosing a healthcare agent to communicate patient's healthcare decisions if patient lost the ability to make decisions, such as after a sudden illness or accident  Understanding of the healthcare agent role was assessed and information provided  Opportunity offered to explore how cultural, Sabianist, spiritual, or personal beliefs would affect decisions for future care  Exploration of values, goals, and preferences if recovery is not expected, even with continued medical treatment in the event of: Imminent death or severe, permanent brain injury    For Serious or Chronic Illness:  Understanding of CPR, goals and expected outcomes, benefits and burdens discussed.   Understanding of medical condition  Information on CPR success rates provided (e.g. for CPR in hospital, survival to d/c at two weeks is 22%, for chronically ill or elderly/frail survival is less than 3%)    Interventions Provided:  Recommended communicating the plan and making copies for the healthcare agent, personal physician, and others as appropriate (e.g., health system)  Recommended review of completed ACP document annually or upon change in health status

## 2019-10-16 NOTE — PROGRESS NOTES
Annual wellness. Scheduled for Left total knee replacement on 10/22/19. Says she already has clearance.   Will provide copy of AMD.

## 2019-10-16 NOTE — PROGRESS NOTES
Elsie Stearns is a 70 y.o. female who was seen in clinic today (10/16/2019) for a full physical.           Assessment & Plan:   Diagnoses and all orders for this visit:    1. Medicare annual wellness visit, subsequent    2. Advanced care planning/counseling discussion    3. Encounter for immunization    4. Screening for alcoholism  -     NM ANNUAL ALCOHOL SCREEN 15 MIN    5. Encounter for screening mammogram for malignant neoplasm of breast  -     ANGELICA MAMMO BI SCREENING INCL CAD; Future    6. Recurrent major depressive disorder, in full remission ([facility] [place] [address]- well controlled, continue current treatment     7. Primary insomnia- not ideally controlled, overall stable, will continue current meds, reviewed increasing dose of meds in the future, no changes until surgery    8. Acquired hypothyroidism- well controlled, continue current treatment     9. Hypertension, essential- at goal, continue current treatment     10. Pure hypercholesterolemia- well controlled, continue current treatment     11. LBBB (left bundle branch block)- was a new dx at last visit, has established w/ VCS, work up negative, benign    12. Osteopenia of multiple sites- stable, due for repeat DEXA, will defer until next f/u b/c of surgery    13. Primary osteoarthritis of left knee- no contra-indications to planned surgery    14. Obesity (BMI 30.0-34.9)- stable, poorly controlled, needs improvement, I have recommended the following interventions: encourage exercise and lifestyle education regarding diet. Follow-up and Dispositions    · Return in about 6 months (around 4/16/2020), or if symptoms worsen or fail to improve, for Regular follow up.          ------------------------------------------------------------------------------------------    Subjective: Annual Wellness Visit- Subsequent Visit    End of Life Planning: This was discussed with her today and she has an advanced directive - a copy HAS NOT been provided.   Reviewed DNR/DNI and patient is interested in remaining a DNR, no changes made. Depression Screen:  3 most recent PHQ Screens 10/16/2019   Little interest or pleasure in doing things Not at all   Feeling down, depressed, irritable, or hopeless Not at all   Total Score PHQ 2 0   Trouble falling or staying asleep, or sleeping too much Nearly every day   Feeling tired or having little energy Nearly every day   Poor appetite, weight loss, or overeating Not at all   Feeling bad about yourself - or that you are a failure or have let yourself or your family down Not at all   Trouble concentrating on things such as school, work, reading, or watching TV Not at all   Moving or speaking so slowly that other people could have noticed; or the opposite being so fidgety that others notice Not at all   Thoughts of being better off dead, or hurting yourself in some way Not at all   PHQ 9 Score 6   How difficult have these problems made it for you to do your work, take care of your home and get along with others Not difficult at all         Fall Risk:   Fall Risk Assessment, last 12 mths 5/20/2019   Able to walk? Yes   Fall in past 12 months? Yes   Fall with injury? No   Number of falls in past 12 months 3   Fall Risk Score 3       Abuse Screen:  Abuse Screening Questionnaire 10/16/2018   Do you ever feel afraid of your partner? N   Are you in a relationship with someone who physically or mentally threatens you? N   Is it safe for you to go home? Y         Alcohol Risk Factor Screening: On any occasion during the past 3 months, have you had more than 3 drinks containing alcohol? No  Do you average more than 7 drinks per week? No    Hearing Loss: The patient wears hearing aids.     Cognition Screen:  Has your family/caregiver stated any concerns about your memory: no  Cognition: appears appropriate for age attention/concentration and appears appropriate safety awareness    Activities of Daily Living:    Requires assistance with: no ADLs  Home contains: no safety equipment. Exercise: not active    Adult Nutrition Screen:  No risk factors noted. Health Maintenance  Daily Aspirin: no  Bone Density: UTD - done 5/3/17- osteopenia, will defer to next visit due to upcoming surgery  Glaucoma Screening: UTD    Immunizations:   Influenza: up to date  Tetanus: up to date  Shingles: not up to date - she is shopping around & can't find it  Pneumonia: up to date  Cancer screening:   Cervical: reviewed guidelines, n/a  Breast: reviewed guidelines, order placed  Colon: reviewed guidelines, up to date       Patient Care Team:  Tayla Saavedra MD as PCP - General (Internal Medicine)  Ponce Judd MD (Endocrinology)  Cori Donovan MD (Gastroenterology)  Alanna Benjamin MD (Radiology)  Romulo Valentino MD (Obstetrics & Gynecology)  Bryant Cline MD as Physician (Ophthalmology)  Ely Gross MD as Physician (Otolaryngology)  Claribel Cardona MD as Physician (Obstetrics & Gynecology)       In addition to the above issues we also reviewed the following acute and/or chronic problems:    Cardiovascular Review  The patient has hypertension and hyperlipidemia. Since last visit: no changes. She reports taking medications as instructed, no medication side effects noted. She is not checking BP at home. Diet and Lifestyle: generally follows a low fat low cholesterol diet, generally follows a low sodium diet, sedentary. Labs: reviewed and discussed with patient.       Endocrine Review  Patient is seen for followup of hypothyroidism. Since last visit: no changes. She reports medication compliance: all the time and is taking separate from all other meds. She reports the following concerns/problems/med side effects: none. Lab review: labs reviewed and discussed with patient.       GI Review  She has a history of GERD. She stopped Prilosec due to not wanting to me on medication. She stopped Zantac due to the news.   She is using Gas-X once or twice a week w/o good relief. She reports belching. Previous medications did help somewhat.  was started on prilosec due to palpitations and concerns it was reflux related.         Mental Health Review  Patient is seen for insomnia & depression. Since last visit: no changes. She is taking 1/2 tab. She reports medication is helping but not working as well. She reports feeling tired when she wakes up, this is new on the lower dos. She reports trouble staying asleep but on medications this is not an issue. Reports experiences the following side effects from the treatment: none. The following sections were reviewed & updated as appropriate: PMH, PSH, FH, and SH. Patient Active Problem List   Diagnosis Code    Acquired hypothyroidism E03.9    Impaired fasting glucose R73.01    Hypertension, essential I10    Pure hypercholesterolemia E78.00    Allergic rhinitis J30.9    Recurrent major depressive disorder ([facility] [address] F33.9    Osteopenia M85.80    Uterine prolapse N81.4    Primary insomnia F51.01    Abnormal mammogram of left breast R92.8    LBBB (left bundle branch block) I44.7          Prior to Admission medications    Medication Sig Start Date End Date Taking? Authorizing Provider   SYNTHROID 88 mcg tablet TAKE 1 TABLET BY MOUTH DAILY BEFORE BREAKFAST 8/22/19  Yes Romina Norris MD   hydroCHLOROthiazide (HYDRODIURIL) 25 mg tablet TAKE 1 TABLET BY MOUTH EVERY DAY 8/6/19  Yes Romina Norris MD   atorvastatin (LIPITOR) 20 mg tablet TAKE 1 TABLET BY MOUTH DAILY EVERY EVENING 6/6/19  Yes Romina Norris MD   mirtazapine (REMERON) 15 mg tablet TAKE 1 TABLET BY MOUTH EVERY NIGHT 5/27/19  Yes Romina Norris MD   guaiFENesin (MUCINEX) 1,200 mg Ta12 ER tablet Take 1,200 mg by mouth daily. Yes Provider, Historical   simethicone (GAS-X) 125 mg capsule Take 125 mg by mouth four (4) times daily as needed for Flatulence.    Yes Provider, Historical   acetaminophen (TYLENOL) 325 mg tablet Take 325 mg by mouth every four (4) hours as needed for Pain. Yes Provider, Historical   diphenhydrAMINE (BENADRYL) 25 mg capsule Take 25 mg by mouth every six (6) hours as needed. Yes Provider, Historical   calcium citrate/vitamin D3 (CALCIUM CITRATE + D PO) Take 1 Tab by mouth as needed. Yes Other, Phys, MD   BENEFIBER, WHEAT DEXTRIN, PO Take  by mouth Before breakfast, lunch, and dinner. Yes Other, MD Carlo   Cetirizine (ZYRTEC) 10 mg cap Take  by mouth every evening. Yes Provider, Historical   cholecalciferol (VITAMIN D3) 1,000 unit tablet Take  by mouth daily. Yes Provider, Historical   b complex vitamins tablet Take 1 Tab by mouth daily. Yes Provider, Historical   raNITIdine (ZANTAC) 150 mg tablet Take 150 mg by mouth as needed for Indigestion. 10/16/19  Provider, Historical   omeprazole (PRILOSEC) 40 mg capsule Take 1 Cap by mouth daily. 2/11/19   Riley Castillo MD          Allergies   Allergen Reactions    Codeine Nausea Only    Epinephrine Palpitations    Erythromycin Other (comments)     GI upset    Losartan Swelling     Lip swelling    Mobic [Meloxicam] Swelling     Lip swelling    Pcn [Penicillins] Hives     Fainted              Review of Systems   Constitutional: Negative for malaise/fatigue and weight loss. Respiratory: Negative for cough and shortness of breath. Cardiovascular: Negative for chest pain, palpitations and leg swelling. Gastrointestinal: Negative for abdominal pain, constipation, diarrhea, heartburn, nausea and vomiting. Genitourinary: Negative for frequency. Musculoskeletal: Positive for joint pain (knees). Negative for myalgias. Skin: Negative for rash. Neurological: Positive for tingling (both hands, on/off, no obvious triggers). Negative for sensory change, focal weakness and headaches. Psychiatric/Behavioral: Negative for depression. The patient is not nervous/anxious and does not have insomnia.           Objective:   Physical Exam Constitutional: She appears well-developed. No distress. obese   HENT:   Mouth/Throat: Mucous membranes are normal.   Eyes: Conjunctivae and lids are normal. No scleral icterus. Neck: Neck supple. No thyromegaly present. Cardiovascular: Regular rhythm and normal heart sounds. No murmur heard. Pulmonary/Chest: Effort normal and breath sounds normal. She has no wheezes. She has no rales. Abdominal: Soft. Bowel sounds are normal. She exhibits no mass. There is no hepatosplenomegaly. There is no tenderness. Musculoskeletal: She exhibits no edema. Lymphadenopathy:     She has no cervical adenopathy. Neurological: She has normal strength. No cranial nerve deficit or sensory deficit. Skin: No rash noted. Psychiatric: She has a normal mood and affect. Her behavior is normal.          Visit Vitals  /80   Pulse 72   Temp 97.9 °F (36.6 °C) (Oral)   Resp 16   Ht 5' 3.5\" (1.613 m)   Wt 175 lb (79.4 kg)   SpO2 97%   BMI 30.51 kg/m²          Advised her to call back or return to office if symptoms worsen/change/persist.  Discussed expected course/resolution/complications of diagnosis in detail with patient. Medication risks/benefits/costs/interactions/alternatives discussed with patient. She was given an after visit summary which includes diagnoses, current medications, & vitals. She expressed understanding with the diagnosis and plan. Aspects of this note may have been generated using voice recognition software. Despite editing, there may be some syntax errors.        Kerri Homans, MD

## 2019-10-16 NOTE — PATIENT INSTRUCTIONS
Preventing Falls: Care Instructions Your Care Instructions Getting around your home safely can be a challenge if you have injuries or health problems that make it easy for you to fall. Loose rugs and furniture in walkways are among the dangers for many older people who have problems walking or who have poor eyesight. People who have conditions such as arthritis, osteoporosis, or dementia also have to be careful not to fall. You can make your home safer with a few simple measures. Follow-up care is a key part of your treatment and safety. Be sure to make and go to all appointments, and call your doctor if you are having problems. It's also a good idea to know your test results and keep a list of the medicines you take. How can you care for yourself at home? Taking care of yourself · You may get dizzy if you do not drink enough water. To prevent dehydration, drink plenty of fluids, enough so that your urine is light yellow or clear like water. Choose water and other caffeine-free clear liquids. If you have kidney, heart, or liver disease and have to limit fluids, talk with your doctor before you increase the amount of fluids you drink. · Exercise regularly to improve your strength, muscle tone, and balance. Walk if you can. Swimming may be a good choice if you cannot walk easily. · Have your vision and hearing checked each year or any time you notice a change. If you have trouble seeing and hearing, you might not be able to avoid objects and could lose your balance. · Know the side effects of the medicines you take. Ask your doctor or pharmacist whether the medicines you take can affect your balance. Sleeping pills or sedatives can affect your balance. · Limit the amount of alcohol you drink. Alcohol can impair your balance and other senses. · Ask your doctor whether calluses or corns on your feet need to be removed.  If you wear loose-fitting shoes because of calluses or corns, you can lose your balance and fall. · Talk to your doctor if you have numbness in your feet. Preventing falls at home · Remove raised doorway thresholds, throw rugs, and clutter. Repair loose carpet or raised areas in the floor. · Move furniture and electrical cords to keep them out of walking paths. · Use nonskid floor wax, and wipe up spills right away, especially on ceramic tile floors. · If you use a walker or cane, put rubber tips on it. If you use crutches, clean the bottoms of them regularly with an abrasive pad, such as steel wool. · Keep your house well lit, especially [place], and outside walkways. Use night-lights in areas such as hallways and bathrooms. Add extra light switches or use remote switches (such as switches that go on or off when you clap your hands) to make it easier to turn lights on if you have to get up during the night. · Install sturdy handrails on stairways. · Move items in your cabinets so that the things you use a lot are on the lower shelves (about waist level). · Keep a cordless phone and a flashlight with new batteries by your bed. If possible, put a phone in each of the main rooms of your house, or carry a cell phone in case you fall and cannot reach a phone. Or, you can wear a device around your neck or wrist. You push a button that sends a signal for help. · Wear low-heeled shoes that fit well and give your feet good support. Use footwear with nonskid soles. Check the heels and soles of your shoes for wear. Repair or replace worn heels or soles. · Do not wear socks without shoes on wood floors. · Walk on the grass when the sidewalks are slippery. If you live in an area that gets snow and ice in the winter, sprinkle salt on slippery steps and sidewalks. Preventing falls in the bath · Install grab bars and nonskid mats inside and outside your shower or tub and near the toilet and sinks. · Use shower chairs and bath benches. · Use a hand-held shower head that will allow you to sit while showering. · Get into a tub or shower by putting the weaker leg in first. Get out of a tub or shower with your strong side first. 
· Repair loose toilet seats and consider installing a raised toilet seat to make getting on and off the toilet easier. · Keep your bathroom door unlocked while you are in the shower. Where can you learn more? Go to http://francisco-isaac.info/. Enter [id number] in the search box to learn more about \"Preventing Falls: Care Instructions. \" Current as of: November 7, 2018 Content Version: 12.2 © 8702-9067 [employer]. Care instructions adapted under license by [employer] (which disclaims liability or warranty for this information). If you have questions about a medical condition or this instruction, always ask your healthcare professional. [address] any warranty or liability for your use of this information. Medicare Wellness Visit, Female The best way to live healthy is to have a lifestyle where you eat a well-balanced diet, exercise regularly, limit alcohol use, and quit all forms of tobacco/nicotine, if applicable. Regular preventive services are another way to keep healthy. Preventive services (vaccines, screening tests, monitoring & exams) can help personalize your care plan, which helps you manage your own care. Screening tests can find health problems at the earliest stages, when they are easiest to treat. [facility] follows the current, evidence-based guidelines published by the [facility] [place] (USPSTF) when recommending preventive services for our patients. Because we follow these guidelines, sometimes recommendations change over time as research supports it. (For example, mammograms used to be recommended annually.  Even though Medicare will still pay for an annual mammogram, the newer guidelines recommend a mammogram every two years for women of average risk.) Of course, you and your doctor may decide to screen more often for some diseases, based on your risk and your health status. Preventive services for you include: - Medicare offers their members a free annual wellness visit, which is time for you and your primary care provider to discuss and plan for your preventive service needs. Take advantage of this benefit every year! 
-All adults over the age of 72 should receive the recommended pneumonia vaccines. Current USPSTF guidelines recommend a series of two vaccines for the best pneumonia protection.  
-All adults should have a flu vaccine yearly and a tetanus vaccine every 10 years. All adults age 61 and older should receive a shingles vaccine once in their lifetime.   
-A bone mass density test is recommended when a woman turns 65 to screen for osteoporosis. This test is only recommended one time, as a screening. Some providers will use this same test as a disease monitoring tool if you already have osteoporosis. -All adults age 38-68 who are overweight should have a diabetes screening test once every three years.  
-Other screening tests and preventive services for persons with diabetes include: an eye exam to screen for diabetic retinopathy, a kidney function test, a foot exam, and stricter control over your cholesterol.  
-Cardiovascular screening for adults with routine risk involves an electrocardiogram (ECG) at intervals determined by your doctor.  
-Colorectal cancer screenings should be done for adults age 54-65 with no increased risk factors for colorectal cancer. There are a number of acceptable methods of screening for this type of cancer. Each test has its own benefits and drawbacks. Discuss with your doctor what is most appropriate for you during your annual wellness visit.  The different tests include: colonoscopy (considered the best screening method), a fecal occult blood test, a fecal DNA test, and sigmoidoscopy. -Breast cancer screenings are recommended every other year for women of normal risk, age 54-69. 
-Cervical cancer screenings for women over age 72 are only recommended with certain risk factors.  
-All adults born between [facility] should be screened once for Hepatitis C. Here is a list of your current Health Maintenance items (your personalized list of preventive services) with a due date: 
Health Maintenance Due Topic Date Due  Shingles Vaccine (1 of 2) 04/13/1992  
 DTaP/Tdap/Td  (1 - Tdap) 06/03/2015  Mammogram  11/15/2018 [employer] Annual Well Visit  10/17/2019 [place] [address] What is a living will? A living will is a legal form you use to write down the kind of care you want at the end of your life. It is used by the health professionals who will treat you if you aren't able to decide for yourself. If you put your wishes in writing, your loved ones and others will know what kind of care you want. They won't need to guess. This can ease your mind and be helpful to others. A living will is not the same as an estate or property will. An estate will explains what you want to happen with your money and property after you die. Is a living will a legal document? A living will is a legal document. Each state has its own laws about living love. If you move to another state, make sure that your living will is legal in the state where you now live. Or you might use a universal form that has been approved by many states. This kind of form can sometimes be completed and stored online. Your electronic copy will then be available wherever you have a connection to the Internet. In most cases, doctors will respect your wishes even if you have a form from a different state. · You don't need an  to complete a living will.  But legal advice can be helpful if your state's laws are unclear, your health history is complicated, or your family can't agree on what should be in your living will. · You can change your living will at any time. Some people find that their wishes about end-of-life care change as their health changes. · In addition to making a living will, think about completing a medical power of  form. This form lets you name the person you want to make end-of-life treatment decisions for you (your \"health care agent\") if you're not able to. Many hospitals and nursing homes will give you the forms you need to complete a living will and a medical power of . · Your living will is used only if you can't make or communicate decisions for yourself anymore. If you become able to make decisions again, you can accept or refuse any treatment, no matter what you wrote in your living will. · Your state may offer an online registry. This is a place where you can store your living will online so the doctors and nurses who need to treat you can find it right away. What should you think about when creating a living will? Talk about your end-of-life wishes with your family members and your doctor. Let them know what you want. That way the people making decisions for you won't be surprised by your choices. Think about these questions as you make your living will: · Do you know enough about life support methods that might be used? If not, talk to your doctor so you know what might be done if you can't breathe on your own, your heart stops, or you're unable to swallow. · What things would you still want to be able to do after you receive life-support methods? Would you want to be able to walk? To speak? To eat on your own? To live without the help of machines? · If you have a choice, where do you want to be cared for? In your home? At a hospital or nursing home? · Do you want certain [employer] practices performed if you become very ill? · If you have a choice at the end of your life, where would you prefer to die? At home? In a hospital or nursing home? Somewhere else? · Would you prefer to be buried or cremated? · Do you want your organs to be donated after you die? What should you do with your living will? · Make sure that your family members and your health care agent have copies of your living will. · Give your doctor a copy of your living will to keep in your medical record. If you have more than one doctor, make sure that each one has a copy. · You may want to put a copy of your living will where it can be easily found. Where can you learn more? Go to http://francisco-isaac.info/. Enter S862 in the search box to learn more about \"Learning About Living Zac [place]. \" Current as of: August 8, 2016 Content Version: 11.3 © 5161-0004 [employer]. Care instructions adapted under license by [employer] (which disclaims liability or warranty for this information). If you have questions about a medical condition or this instruction, always ask your healthcare professional. [place] [address] any warranty or liability for your use of this information.

## 2019-11-20 DIAGNOSIS — F51.01 PRIMARY INSOMNIA: ICD-10-CM

## 2019-11-20 DIAGNOSIS — E78.5 HYPERLIPIDEMIA WITH TARGET LDL LESS THAN 100: ICD-10-CM

## 2019-11-20 RX ORDER — ATORVASTATIN CALCIUM 20 MG/1
TABLET, FILM COATED ORAL
Qty: 90 TAB | Refills: 1 | Status: SHIPPED | OUTPATIENT
Start: 2019-11-20 | End: 2020-04-19

## 2019-11-20 RX ORDER — LEVOTHYROXINE SODIUM 88 UG/1
TABLET ORAL
Qty: 90 TAB | Refills: 1 | Status: SHIPPED | OUTPATIENT
Start: 2019-11-20 | End: 2020-07-12

## 2019-11-20 RX ORDER — MIRTAZAPINE 15 MG/1
TABLET, FILM COATED ORAL
Qty: 90 TAB | Refills: 0 | Status: SHIPPED | OUTPATIENT
Start: 2019-11-20 | End: 2020-02-19

## 2019-12-09 ENCOUNTER — OFFICE VISIT (OUTPATIENT)
Dept: INTERNAL MEDICINE CLINIC | Age: 79
End: 2019-12-09

## 2019-12-09 VITALS
HEART RATE: 82 BPM | DIASTOLIC BLOOD PRESSURE: 76 MMHG | RESPIRATION RATE: 20 BRPM | OXYGEN SATURATION: 98 % | WEIGHT: 174 LBS | TEMPERATURE: 98.3 F | BODY MASS INDEX: 29.71 KG/M2 | HEIGHT: 64 IN | SYSTOLIC BLOOD PRESSURE: 118 MMHG

## 2019-12-09 DIAGNOSIS — M17.12 PRIMARY OSTEOARTHRITIS OF LEFT KNEE: Primary | ICD-10-CM

## 2019-12-09 DIAGNOSIS — Z96.652 STATUS POST LEFT KNEE REPLACEMENT: ICD-10-CM

## 2019-12-09 RX ORDER — OXYCODONE HYDROCHLORIDE 5 MG/1
TABLET ORAL
COMMUNITY
Start: 2019-11-13 | End: 2020-04-22

## 2019-12-09 RX ORDER — TALC
250 POWDER (GRAM) TOPICAL AS NEEDED
COMMUNITY
End: 2020-08-25

## 2019-12-09 RX ORDER — ACETAMINOPHEN 500 MG
1000 TABLET ORAL
COMMUNITY

## 2019-12-09 NOTE — Clinical Note
Please call pt. Hospital labs reviewed. HGB down slightly. Nothing significant. Does not need to repeat labs, order cancelled.   Continue prenatal MVI x 1 month then can stop

## 2019-12-09 NOTE — PROGRESS NOTES
Lydia Smith is a 70 y.o. female who was seen in clinic today (12/9/2019). U 2/5- Geoffrey    Assessment & Plan:   Diagnoses and all orders for this visit:    1. Primary osteoarthritis of left knee- s/p surgery    2. Status post left knee replacement- new dx, encouraged to continue w/ PT, pain medications per specialist, ASA per specialist.  She was asking about repeating labs. CBC reviewed after she left, HGB was 12.8 after surgery. Baseline has fluctuated, but 13's. No indication to repeat labs, will d/c labs. Will notify pt she does NOT need to repeat labs  -     CBC W/O DIFF        Follow-up and Dispositions    · Follow up - all ready scheduled          Subjective: Cesilia Jaden was seen today for Hospital Follow Up    Follow Up  Lydia Smith is seen for follow up from recent admission to [address] [place] [address] on 10/22. We requested the records. She presented for scheduled L knee replacement. She reports surgery went well. She is asking about repeating labs (? Anemia). She is using pain medications (oxycodone) prior to PT only. She is on aspirin 81mg daily to prevent clots. She was also started on prenatal multivitamin. She is taking her medications as directed & without any side effects. She reports knee is improving, minimal pain. She is active. Brief Labs:     Lab Results   Component Value Date/Time    Sodium 138 08/28/2019 10:53 AM    Potassium 4.6 08/28/2019 10:53 AM    Creatinine 0.81 08/28/2019 10:53 AM    Cholesterol, total 208 08/28/2019 10:53 AM    HDL Cholesterol 70 08/28/2019 10:53 AM    LDL, calculated 117 08/28/2019 10:53 AM    Triglyceride 105 08/28/2019 10:53 AM    Hemoglobin A1c 5.6 08/28/2019 10:53 AM    TSH 1.140 08/28/2019 10:53 AM          Prior to Admission medications    Medication Sig Start Date End Date Taking?  Authorizing Provider   atorvastatin (LIPITOR) 20 mg tablet TAKE 1 TABLET BY MOUTH EVERY DAY IN THE EVENING 11/20/19  Yes Artis Murray MD   SYNTHROID 88 mcg tablet TAKE 1 TABLET BY MOUTH DAILY BEFORE BREAKFAST 11/20/19  Yes Myra Hernandez MD   mirtazapine (REMERON) 15 mg tablet TAKE 1 TABLET BY MOUTH EVERY NIGHT 11/20/19  Yes Myra Hernandez MD   hydroCHLOROthiazide (HYDRODIURIL) 25 mg tablet TAKE 1 TABLET BY MOUTH EVERY DAY 8/6/19  Yes Myra Hernandez MD   simethicone (GAS-X) 125 mg capsule Take 125 mg by mouth four (4) times daily as needed for Flatulence. Yes Provider, Historical   acetaminophen (TYLENOL) 325 mg tablet Take 325 mg by mouth every four (4) hours as needed for Pain. Yes Provider, Historical   diphenhydrAMINE (BENADRYL) 25 mg capsule Take 25 mg by mouth every six (6) hours as needed. Yes Provider, Historical   calcium citrate/vitamin D3 (CALCIUM CITRATE + D PO) Take 1 Tab by mouth as needed. Yes Other, MD Carlo   BENEFIBER, WHEAT DEXTRIN, PO Take  by mouth Before breakfast, lunch, and dinner. Yes Other, MD Carlo   omeprazole (PRILOSEC) 40 mg capsule Take 1 Cap by mouth daily. 2/11/19  Yes Myra Hernandez MD   Cetirizine (ZYRTEC) 10 mg cap Take  by mouth every evening. Yes Provider, Historical   cholecalciferol (VITAMIN D3) 1,000 unit tablet Take  by mouth daily. Yes Provider, Historical   b complex vitamins tablet Take 1 Tab by mouth daily. Yes Provider, Historical   b complex-vitamin c-folic acid (NEPHROCAPS) 1 mg capsule DAILY AM for SUPPLEMENT    Provider, Historical   psyllium seed, with dextrose, (FIBER PO) THREE TIMES A DAY for SUPPLEMENT    Provider, Historical   magnesium oxide 250 mg magnesium tablet 250 mg as needed. Provider, Historical   oxyCODONE IR (ROXICODONE) 5 mg immediate release tablet  11/13/19   Provider, Historical   acetaminophen (TYLENOL) 500 mg tablet 1,000 mg. Provider, Historical   guaiFENesin (MUCINEX) 1,200 mg Ta12 ER tablet Take 1,200 mg by mouth daily.   12/9/19  Provider, Historical          Allergies   Allergen Reactions    Amlodipine Unknown (comments)    Codeine Nausea Only    Epinephrine Palpitations    Erythromycin Other (comments)     GI upset    Losartan Swelling     Lip swelling    Mobic [Meloxicam] Swelling     Lip swelling    Pcn [Penicillins] Hives     Fainted           Review of Systems   Respiratory: Negative for cough and shortness of breath. Cardiovascular: Negative for chest pain and palpitations. Gastrointestinal: Negative for abdominal pain, constipation, diarrhea, nausea and vomiting. Musculoskeletal: Positive for joint pain. Negative for back pain. Objective:   Physical Exam  Cardiovascular:      Rate and Rhythm: Regular rhythm. Heart sounds: Normal heart sounds. No murmur. Pulmonary:      Effort: Pulmonary effort is normal.      Breath sounds: Normal breath sounds. No wheezing or rales. Abdominal:      General: Bowel sounds are normal.      Palpations: There is no mass. Tenderness: There is no tenderness. Visit Vitals  /76   Pulse 82   Temp 98.3 °F (36.8 °C) (Oral)   Resp 20   Ht 5' 3.5\" (1.613 m)   Wt 174 lb (78.9 kg)   SpO2 98%   BMI 30.34 kg/m²         Disclaimer:  Advised her to call back or return to office if symptoms worsen/change/persist.  Discussed expected course/resolution/complications of diagnosis in detail with patient. Medication risks/benefits/costs/interactions/alternatives discussed with patient. She was given an after visit summary which includes diagnoses, current medications, & vitals. She expressed understanding with the diagnosis and plan. Aspects of this note may have been generated using voice recognition software. Despite editing, there may be some syntax errors.        Maria A Barrera MD
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
## 2020-02-08 DIAGNOSIS — I10 HYPERTENSION, ESSENTIAL: ICD-10-CM

## 2020-02-09 RX ORDER — HYDROCHLOROTHIAZIDE 25 MG/1
TABLET ORAL
Qty: 90 TAB | Refills: 1 | Status: SHIPPED | OUTPATIENT
Start: 2020-02-09 | End: 2020-07-12

## 2020-02-15 DIAGNOSIS — F51.01 PRIMARY INSOMNIA: ICD-10-CM

## 2020-02-19 RX ORDER — MIRTAZAPINE 15 MG/1
TABLET, FILM COATED ORAL
Qty: 90 TAB | Refills: 0 | Status: SHIPPED | OUTPATIENT
Start: 2020-02-19 | End: 2020-05-10

## 2020-03-17 ENCOUNTER — TELEPHONE (OUTPATIENT)
Dept: INTERNAL MEDICINE CLINIC | Age: 80
End: 2020-03-17

## 2020-03-17 NOTE — TELEPHONE ENCOUNTER
Call from patient, identified with  2 identifiers. She has noticed within the past week her left hand is swollen and she has numbness in her right hand. She wonders if this may be related to the multiple falls she had when her knee was injured. Asking if she should schedule appointment now or if she should wait until she can't stand it anymore and deal with it then. Advised would send to Dr. Adam Garcia for recommendations.

## 2020-03-18 NOTE — TELEPHONE ENCOUNTER
I was planning on calling her today. I need more information. I can't wrap my head around L hand pain and R hand numbness. Not sure if they are connected. Please get more information (how long is it going on for? Getting better or worse? History of trauma? Etc, etc).

## 2020-03-18 NOTE — TELEPHONE ENCOUNTER
Call to patient, identified with 2 identifiers. She reports left hand is swollen, stiff, she would not really say painful but definitely swollen, has been for about 2 weeks. Right hand has been numb for longer, about 2-1/2 weeks. Numbness varies, not constant. No known injury except for the multiple falls she had before her knee surgery which was months ago. She feels like she was using her hands a lot after surgery with helping herself to get up, but otherwise, nothing she can think of. As an aside, she cut right finger at her nail, 3 days ago. Not really painful, just tender. Keeping it clean and using Neosporin type ointment.

## 2020-04-16 ENCOUNTER — PATIENT MESSAGE (OUTPATIENT)
Dept: INTERNAL MEDICINE CLINIC | Age: 80
End: 2020-04-16

## 2020-04-18 DIAGNOSIS — E78.5 HYPERLIPIDEMIA WITH TARGET LDL LESS THAN 100: ICD-10-CM

## 2020-04-19 RX ORDER — ATORVASTATIN CALCIUM 20 MG/1
TABLET, FILM COATED ORAL
Qty: 90 TAB | Refills: 0 | Status: SHIPPED | OUTPATIENT
Start: 2020-04-19 | End: 2020-07-12

## 2020-04-22 ENCOUNTER — VIRTUAL VISIT (OUTPATIENT)
Dept: INTERNAL MEDICINE CLINIC | Age: 80
End: 2020-04-22

## 2020-04-22 DIAGNOSIS — R22.32 LOCALIZED SWELLING ON LEFT HAND: Primary | ICD-10-CM

## 2020-04-22 DIAGNOSIS — R20.0 NUMBNESS OF RIGHT HAND: ICD-10-CM

## 2020-04-22 RX ORDER — CHLORHEXIDINE GLUCONATE 1.2 MG/ML
15 RINSE ORAL
COMMUNITY
End: 2020-08-25

## 2020-04-22 NOTE — PROGRESS NOTES
Sasha Whyte is a 68 y.o. female evaluated via telephone on 4/22/2020. Consent:  She and/or health care decision maker is aware that that she may receive a bill for this telephone service, depending on her insurance coverage, and has provided verbal consent to proceed: Yes. She was set up for a video visit but she could not get [employer] video or Doxy. Me to work. The following sections were reviewed and/or updated:  PSH, SH, Meds, and Allergies      Documentation:  I communicated with the patient and/or health care decision maker regarding:     Pain Review:  She presents do to swelling of her left hand. It is the posterior aspect and it is secondary to no known injury and started 5-6 weeks ago. It is has not changed in the last few weeks. It inovlves the whole back of her hand. No issues w/ bending or moving her fingers. No rash. No swelling in her elbow, forearm, and wrist.  Exacerbating factors identifiable by patient are none. She presents do to pain of her all 5 fingers of her right hand that is secondary to no known injury and started 5-6 weeks ago. Since it started her pain has not changed. She describes the pain as tingling & numbness. It is constant but fluctuating in intensity. She reports chronic weakness but nothing worse. Exacerbating factors identifiable by patient are none. She has tried the following: rest.  No neck, shoulder, elbow, or wrist pain. Last PDMP [place] as Reviewed:  Review User Review Instant Review Result   EMI MADDOX 4/22/2020  3:14 PM Reviewed PDMP [1]             Details of this discussion including any medical advice provided is documented below. Diagnoses and all orders for this visit:    1. Localized swelling on left hand-  new problem to me, new issue that is not improving or changing, differential dx reviewed with the patient, exact etiology is unclear at this time. She will send a picture.   Reviewed using ice, rest, and a brace in case it is tendonitis. Red flags were reviewed with the patient to RTC or notify me, expected time course for resolution reviewed. If no changes to hand surgeon as I don't think imaging will help. 2. Numbness of right hand- this is a new problem, symptoms are: stable, differential dx reviewed with the patient, exact etiology is unclear at this time. Does not fit one dermatome. Will start with wrist brace. Reviewed rest.  Reviewed triggers to start looking for. Red flags were reviewed with the patient to RTC or notify me, expected time course for resolution reviewed. Discussed with her I can't correlate these two issues together. Follow-up and Dispositions    · Return in about 1 month (around 5/22/2020) for Regular follow up. I affirm this is a Patient Initiated Episode with an Established Patient who has not had a related appointment within my department in the past 7 days or scheduled within the next 24 hours.     Total Time: minutes: 11-20 minutes    Note: not billable if this call serves to triage the patient into an appointment for the relevant concern      Iliana Kunz MD

## 2020-05-09 DIAGNOSIS — F51.01 PRIMARY INSOMNIA: ICD-10-CM

## 2020-05-10 RX ORDER — MIRTAZAPINE 15 MG/1
TABLET, FILM COATED ORAL
Qty: 90 TAB | Refills: 0 | Status: SHIPPED | OUTPATIENT
Start: 2020-05-10 | End: 2020-07-30

## 2020-06-12 ENCOUNTER — TELEPHONE (OUTPATIENT)
Dept: INTERNAL MEDICINE CLINIC | Age: 80
End: 2020-06-12

## 2020-06-16 ENCOUNTER — HOSPITAL ENCOUNTER (OUTPATIENT)
Dept: ULTRASOUND IMAGING | Age: 80
Discharge: HOME | End: 2020-06-16
Attending: NURSE PRACTITIONER
Payer: MEDICARE

## 2020-06-16 ENCOUNTER — TELEPHONE (OUTPATIENT)
Dept: INTERNAL MEDICINE CLINIC | Age: 80
End: 2020-06-16

## 2020-06-16 ENCOUNTER — OFFICE VISIT (OUTPATIENT)
Dept: INTERNAL MEDICINE CLINIC | Age: 80
End: 2020-06-16

## 2020-06-16 ENCOUNTER — HOSPITAL ENCOUNTER (OUTPATIENT)
Dept: GENERAL RADIOLOGY | Age: 80
Discharge: HOME | End: 2020-06-16
Attending: NURSE PRACTITIONER
Payer: MEDICARE

## 2020-06-16 VITALS
RESPIRATION RATE: 18 BRPM | HEIGHT: 64 IN | OXYGEN SATURATION: 98 % | HEART RATE: 64 BPM | DIASTOLIC BLOOD PRESSURE: 90 MMHG | TEMPERATURE: 98 F | SYSTOLIC BLOOD PRESSURE: 130 MMHG | WEIGHT: 184.2 LBS | BODY MASS INDEX: 31.45 KG/M2

## 2020-06-16 DIAGNOSIS — M25.532 PAIN AND SWELLING OF LEFT WRIST: Primary | ICD-10-CM

## 2020-06-16 DIAGNOSIS — M79.641 RIGHT HAND PAIN: ICD-10-CM

## 2020-06-16 DIAGNOSIS — M79.642 PAIN OF LEFT HAND: Primary | ICD-10-CM

## 2020-06-16 DIAGNOSIS — M25.532 PAIN AND SWELLING OF LEFT WRIST: ICD-10-CM

## 2020-06-16 DIAGNOSIS — M25.432 PAIN AND SWELLING OF LEFT WRIST: ICD-10-CM

## 2020-06-16 DIAGNOSIS — R73.01 IMPAIRED FASTING GLUCOSE: ICD-10-CM

## 2020-06-16 DIAGNOSIS — M25.432 PAIN AND SWELLING OF LEFT WRIST: Primary | ICD-10-CM

## 2020-06-16 PROCEDURE — 73130 X-RAY EXAM OF HAND: CPT

## 2020-06-16 PROCEDURE — 72052 X-RAY EXAM NECK SPINE 6/>VWS: CPT

## 2020-06-16 PROCEDURE — 76882 US LMTD JT/FCL EVL NVASC XTR: CPT

## 2020-06-16 PROCEDURE — 73110 X-RAY EXAM OF WRIST: CPT

## 2020-06-16 RX ORDER — PREDNISONE 10 MG/1
10 TABLET ORAL
Qty: 30 TAB | Refills: 0 | Status: SHIPPED | OUTPATIENT
Start: 2020-06-16 | End: 2020-06-24 | Stop reason: SDUPTHER

## 2020-06-16 NOTE — PROGRESS NOTES
HISTORY OF PRESENT ILLNESS  Aneta Roe is a 68 y.o. female. Patient reports left wrist pain and swelling and right hand pain for a few months. Pain in right hand is through whole hand,no swelling. Patient states she got a new mattress a couple weeks before the right hand pain started, which elevates at head and feet. She has taken tylenol and tried ice with no improvement. No known injury. Swelling has gotten worse on top of left wrist. Pain is worse with palpation and while gripping objects, such as the steering wheel. She has tried wearing a brace on her wrists with no improvement. Visit Vitals  /90 (BP 1 Location: Left arm, BP Patient Position: Sitting)   Pulse 64   Temp 98 °F (36.7 °C) (Temporal)   Resp 18   Ht 5' 3.5\" (1.613 m)   Wt 184 lb 3.2 oz (83.6 kg)   SpO2 98%   BMI 32.12 kg/m²       HPI    Review of Systems   Musculoskeletal:        Left wrist pain and right hand pain       Physical Exam  Constitutional:       Appearance: Normal appearance. Musculoskeletal:      Left wrist: She exhibits tenderness (dorsal left wrist with painful soft lump; no swelling below the wrist; pain worst with palpation, not range of motion). Right hand: She exhibits tenderness (throughout hand with grasp; not pain with palpation). She exhibits normal range of motion, no bony tenderness and no swelling. Normal strength noted. Skin:     General: Skin is warm and dry. Neurological:      General: No focal deficit present. Mental Status: She is alert and oriented to person, place, and time. Psychiatric:         Mood and Affect: Mood normal.         Behavior: Behavior normal.     Phalen's and Tinel's test negative    ASSESSMENT and PLAN    ICD-10-CM ICD-9-CM    1. Pain and swelling of left wrist M25.532 719.43 US EXT NONVAS LT LTD    M25.432 719.03 XR WRIST LT AP/LAT/OBL MIN 3V      REFERRAL TO ORTHOPEDICS   2.  Right hand pain M79.641 729.5 XR HAND RT MIN 3 V      REFERRAL TO ORTHOPEDICS      CANCELED: XR SPINE CERV 4 OR 5 V     Orders Placed This Encounter    US EXT NONVAS LT LTD    XR WRIST LT AP/LAT/OBL MIN 3V    XR HAND RT MIN 3 V    Blank ORTHO [facility]   ultrasound and xrays ordered  Refer to Dr. Marly Blake

## 2020-06-16 NOTE — TELEPHONE ENCOUNTER
Consulted with Dr. Sara Menjivar. Patient notified of xray results; labs ordered. Patient requested A1c add on. Hold ortho referral. Steroids ordered and will start tomorrow.

## 2020-06-17 ENCOUNTER — TELEPHONE (OUTPATIENT)
Dept: INTERNAL MEDICINE CLINIC | Age: 80
End: 2020-06-17

## 2020-06-17 ENCOUNTER — HOSPITAL ENCOUNTER (OUTPATIENT)
Dept: LAB | Age: 80
Discharge: HOME | End: 2020-06-17
Payer: MEDICARE

## 2020-06-17 PROCEDURE — 85651 RBC SED RATE NONAUTOMATED: CPT

## 2020-06-17 PROCEDURE — 83036 HEMOGLOBIN GLYCOSYLATED A1C: CPT

## 2020-06-17 PROCEDURE — 36415 COLL VENOUS BLD VENIPUNCTURE: CPT

## 2020-06-17 NOTE — TELEPHONE ENCOUNTER
Niru Vazquez (Self) [phone] (H)     Pt says that she saw candice yesterday and is scheduled today at 12:45 to have labs done, she wants candice to know that she did have an aunt that had lupus in case she wanted to add any test.

## 2020-06-18 LAB
EST. AVERAGE GLUCOSE BLD GHB EST-MCNC: 120 MG/DL
HBA1C MFR BLD: 5.8 % (ref 4.8–5.6)

## 2020-06-24 ENCOUNTER — TELEPHONE (OUTPATIENT)
Dept: INTERNAL MEDICINE CLINIC | Age: 80
End: 2020-06-24

## 2020-06-24 RX ORDER — PREDNISONE 10 MG/1
TABLET ORAL
Qty: 35 TAB | Refills: 0 | Status: SHIPPED | OUTPATIENT
Start: 2020-06-24 | End: 2020-07-24 | Stop reason: ALTCHOICE

## 2020-06-24 NOTE — TELEPHONE ENCOUNTER
Call to patient, identified with 2 identifiers. Advised of Dr. Clem Conde' note and recommendations. She will RTC to have labs drawn, will call to schedule appointment. She wanted to make sure she should complete current steroids as directed and then start new RX as ordered.

## 2020-06-24 NOTE — TELEPHONE ENCOUNTER
Labs [place] ordered were not completed. Only A1c done. Did not due RA labs. Needs to get these done ASAP, non-fasting. Will extend prednisone but this is not a long term solution.   Need to know these labs to come up with long term plan and if she needs to see rheumatology

## 2020-06-24 NOTE — TELEPHONE ENCOUNTER
Harry Alvarado (Self) [phone] (H)     Pt says that she is about to run out of the prednisone eddie put her on and she wonders the next step would be?

## 2020-06-25 LAB
14.3.3 ETA, RHEUM. ARTHRITIS: <0.2 NG/ML
CCP IGA+IGG SERPL IA-ACNC: <20 UNITS
ERYTHROCYTE [SEDIMENTATION RATE] IN BLOOD BY WESTERGREN METHOD: 27 MM/HR (ref 0–40)
RHEUMATOID FACT SERPL-ACNC: <14 UNITS/ML

## 2020-07-09 ENCOUNTER — TELEPHONE (OUTPATIENT)
Dept: INTERNAL MEDICINE CLINIC | Age: 80
End: 2020-07-09

## 2020-07-09 NOTE — TELEPHONE ENCOUNTER
Patient has questions about some labwork she and her  Wilmer Blum need to get done. Also has some other questions.

## 2020-07-10 NOTE — TELEPHONE ENCOUNTER
Needs to see rheumatology. Even though RA labs negative, this still sounds like an auto-immune issue. Provide her Dr Avi Cast or Artur's information. Also provide her with Dr Nery Choudhary information.

## 2020-07-10 NOTE — TELEPHONE ENCOUNTER
Call to patient, identified with 2 identifiers. Advised of Dr. Duy Multani' note and recommendations. Names and numbers given.

## 2020-07-10 NOTE — TELEPHONE ENCOUNTER
Call to patient, identified with 2 identifiers. She says she is feeling better, just took last steroid dose. Swelling is decreased. Still says though something isn't right, wants to know where to go from here. Told her would check with Dr. Desire Byrnes and let her know his response.

## 2020-07-11 DIAGNOSIS — I10 HYPERTENSION, ESSENTIAL: ICD-10-CM

## 2020-07-11 DIAGNOSIS — E78.5 HYPERLIPIDEMIA WITH TARGET LDL LESS THAN 100: ICD-10-CM

## 2020-07-12 RX ORDER — LEVOTHYROXINE SODIUM 88 UG/1
TABLET ORAL
Qty: 90 TAB | Refills: 0 | Status: SHIPPED | OUTPATIENT
Start: 2020-07-12 | End: 2020-10-09

## 2020-07-12 RX ORDER — ATORVASTATIN CALCIUM 20 MG/1
TABLET, FILM COATED ORAL
Qty: 90 TAB | Refills: 0 | Status: SHIPPED | OUTPATIENT
Start: 2020-07-12 | End: 2020-10-09

## 2020-07-12 RX ORDER — HYDROCHLOROTHIAZIDE 25 MG/1
TABLET ORAL
Qty: 90 TAB | Refills: 0 | Status: SHIPPED | OUTPATIENT
Start: 2020-07-12 | End: 2020-10-09

## 2020-07-24 RX ORDER — PREDNISONE 10 MG/1
10 TABLET ORAL DAILY
Qty: 30 TAB | Refills: 0 | Status: SHIPPED | OUTPATIENT
Start: 2020-07-24 | End: 2020-10-28

## 2020-07-30 DIAGNOSIS — F51.01 PRIMARY INSOMNIA: ICD-10-CM

## 2020-07-30 RX ORDER — MIRTAZAPINE 15 MG/1
TABLET, FILM COATED ORAL
Qty: 90 TAB | Refills: 0 | Status: SHIPPED | OUTPATIENT
Start: 2020-07-30 | End: 2021-07-08

## 2020-08-25 ENCOUNTER — HOSPITAL ENCOUNTER (OUTPATIENT)
Dept: LAB | Age: 80
Discharge: HOME | End: 2020-08-25
Payer: MEDICARE

## 2020-08-25 ENCOUNTER — OFFICE VISIT (OUTPATIENT)
Dept: RHEUMATOLOGY | Age: 80
End: 2020-08-25
Payer: MEDICARE

## 2020-08-25 VITALS
DIASTOLIC BLOOD PRESSURE: 67 MMHG | HEIGHT: 63 IN | SYSTOLIC BLOOD PRESSURE: 142 MMHG | WEIGHT: 190 LBS | RESPIRATION RATE: 18 BRPM | BODY MASS INDEX: 33.66 KG/M2 | TEMPERATURE: 98 F | HEART RATE: 76 BPM

## 2020-08-25 DIAGNOSIS — M65.839: ICD-10-CM

## 2020-08-25 DIAGNOSIS — E55.9 VITAMIN D DEFICIENCY, UNSPECIFIED: ICD-10-CM

## 2020-08-25 DIAGNOSIS — M06.09 SERONEGATIVE RHEUMATOID ARTHRITIS OF MULTIPLE SITES (HCC): Primary | ICD-10-CM

## 2020-08-25 DIAGNOSIS — G56.02 CARPAL TUNNEL SYNDROME OF LEFT WRIST: ICD-10-CM

## 2020-08-25 DIAGNOSIS — Z79.60 LONG-TERM USE OF IMMUNOSUPPRESSANT MEDICATION: ICD-10-CM

## 2020-08-25 PROCEDURE — G8536 NO DOC ELDER MAL SCRN: HCPCS | Performed by: INTERNAL MEDICINE

## 2020-08-25 PROCEDURE — 82306 VITAMIN D 25 HYDROXY: CPT

## 2020-08-25 PROCEDURE — 86480 TB TEST CELL IMMUN MEASURE: CPT

## 2020-08-25 PROCEDURE — 36415 COLL VENOUS BLD VENIPUNCTURE: CPT

## 2020-08-25 PROCEDURE — G9717 DOC PT DX DEP/BP F/U NT REQ: HCPCS | Performed by: INTERNAL MEDICINE

## 2020-08-25 PROCEDURE — 86803 HEPATITIS C AB TEST: CPT

## 2020-08-25 PROCEDURE — 99205 OFFICE O/P NEW HI 60 MIN: CPT | Performed by: INTERNAL MEDICINE

## 2020-08-25 PROCEDURE — 1090F PRES/ABSN URINE INCON ASSESS: CPT | Performed by: INTERNAL MEDICINE

## 2020-08-25 PROCEDURE — G0463 HOSPITAL OUTPT CLINIC VISIT: HCPCS | Performed by: INTERNAL MEDICINE

## 2020-08-25 PROCEDURE — 85651 RBC SED RATE NONAUTOMATED: CPT

## 2020-08-25 PROCEDURE — G8753 SYS BP > OR = 140: HCPCS | Performed by: INTERNAL MEDICINE

## 2020-08-25 PROCEDURE — 1101F PT FALLS ASSESS-DOCD LE1/YR: CPT | Performed by: INTERNAL MEDICINE

## 2020-08-25 PROCEDURE — G8399 PT W/DXA RESULTS DOCUMENT: HCPCS | Performed by: INTERNAL MEDICINE

## 2020-08-25 PROCEDURE — 80053 COMPREHEN METABOLIC PANEL: CPT

## 2020-08-25 PROCEDURE — G8417 CALC BMI ABV UP PARAM F/U: HCPCS | Performed by: INTERNAL MEDICINE

## 2020-08-25 PROCEDURE — 81374 HLA I TYPING 1 ANTIGEN LR: CPT

## 2020-08-25 PROCEDURE — 86140 C-REACTIVE PROTEIN: CPT

## 2020-08-25 PROCEDURE — 85025 COMPLETE CBC W/AUTO DIFF WBC: CPT

## 2020-08-25 PROCEDURE — G8427 DOCREV CUR MEDS BY ELIG CLIN: HCPCS | Performed by: INTERNAL MEDICINE

## 2020-08-25 PROCEDURE — 84165 PROTEIN E-PHORESIS SERUM: CPT

## 2020-08-25 PROCEDURE — G8754 DIAS BP LESS 90: HCPCS | Performed by: INTERNAL MEDICINE

## 2020-08-25 RX ORDER — METHOTREXATE 2.5 MG/1
15 TABLET ORAL
Qty: 72 TAB | Refills: 0 | Status: SHIPPED | OUTPATIENT
Start: 2020-08-29 | End: 2020-10-28 | Stop reason: SDUPTHER

## 2020-08-25 RX ORDER — FOLIC ACID 1 MG/1
1 TABLET ORAL DAILY
Qty: 90 TAB | Refills: 0 | Status: SHIPPED | OUTPATIENT
Start: 2020-08-25 | End: 2020-10-28 | Stop reason: SDUPTHER

## 2020-08-25 NOTE — LETTER
8/25/20 Patient: Elsie Stearns YOB: 1940 Date of Visit: 8/25/2020 Patricia Bourgeois MD 
[address] [facility] [id number] VIA In Basket Dear Patricia Bourgeois MD, Thank you for referring Ms. Elsie Stearns to [facility] for evaluation. My notes for this consultation are attached. If you have questions, please do not hesitate to call me. I look forward to following your patient along with you.  
 
 
Sincerely, 
 
Tran Knox MD

## 2020-08-25 NOTE — PATIENT INSTRUCTIONS
METHOTREXATE Methotrexate is a weekly regimen that is supplemented with daily folic acid to help counteract potential side effects. Potential Adverse Affects 
 
- Nausea - Vomiting - Upset stomach 
- Oral ulcers 
- Hair thinning - Infection - Liver function abnormalities - Blood count abnormalities - Rarely pneumonitis Medication Monitoring You will need routine blood counts and kidney and liver testing as a measure of monitoring for long term use of immunosuppressants. Things You Should Do You should avoid ill contacts You should get annual influenza vaccines and the pneumonia vaccines. You should apply SPF 50 sunscreen when out in the sun. You should avoid alcohol the day before, the day of and the day after your dose, as it may hasten hepatotoxicity. You should avoid pregnancy due to risk for birth defects. You should contact me if you are sick. You should hold methotrexate if you are sick and resume after your sickness resolves. If you have any problems or side effects with this medication, please contact me immediately to inform me. Plan I prescribed methotrexate 15 mg (6 tablets) every 7 days at bedtime with DAILY folic acid 1 mg. Do not start it until I receive and review your labs and give you the okay. If the folic acid 1 mg is not covered, then you may take two tablets of the over the counter [employer] folic acid 198 mcg (total of 800 mcg daily). Methotrexate may take 6 to 12 weeks to be effective. If you have a flare, please contact me immediately over [employer].

## 2020-08-25 NOTE — PROGRESS NOTES
REASON FOR VISIT    This is the initial evaluation for Ms. Joel Mcdermott a 68 y.o.  female for question of an inflammatory arthritis. The patient is referred to the [facility] at the request of Dr. Randy Perez. HISTORY OF PRESENT ILLNESS      I have reviewed and summarized old records from [facility] and Care EveryWhere (Bridget Mathias)    In 10/22/2019, she underwent left total knee replacement. In 3/15/2020, she developed pain and swelling in her hands and wrists associated with numbness. In 6/16/2020, [facility] Ultrasound of Left Hand showed the palpable left wrist mass corresponds with fluid and inflammation of the fourth dorsal wrist compartment. There is complex fluid within the tendon sheath which demonstrates mild increase blood flow. No tendon tear. Survey views of the right hand for comparison demonstrates mild tenosynovitis and synovial thickening in the right fourth dorsal wrist compartment. Cervical Spine radiograph showed The bones appear mildly osteopenic. Vertebral body heights are normal. There is mild reversal of cervical lordosis at C5-6 with perhaps 1 mm retrolisthesis. Alignment is otherwise anatomic. There is moderate C5-6 and mild C6-7 degenerative disc space narrowing with endplate osteophytes including in the uncovertebral regions bilaterally. The posterior processes are intact. There is moderate left facet osteoarthrosis at C3-4. There is mild left foraminal narrowing at C3-4 and mild right foraminal narrowing at C5-6. The odontoid process is intact and the C1-C2 relationship is normal. No prevertebral soft tissue swelling is shown. Right Hand radiograph showed mild-moderate diffuse osteopenia accentuated in the periarticular regions. There is mild soft tissue swelling at the dorsum and medial aspect of the carpus as well as along the metacarpophalangeal and proximal digital joints.  Mild uniform narrowing of all the metacarpophalangeal joints is shown though without demonstration of erosion. Moderate osteoarthrosis is noted at the thumb carpometacarpal joint and mild osteoarthrosis of the thumb MCP joint and at several of the digital interphalangeal joints. Right Wrist radiograph showed mild to moderate diffuse osteopenia accentuated in the periventricular regions. There is soft tissue swelling dorsally and medially at the carpus as well as equivocally along the metacarpophalangeal joints. Aside mild scaphomultangular and moderate thumb carpometacarpal osteoarthrosis, no substantial arthrosis is shown. No erosions are demonstrated. No fracture or dislocation is shown. No pathologic soft tissue  calcification is demonstrated. In 6/16/2020, she was started on prednisone as a taper, which helped. In 6/17/2020, labs showed negative anti-CCP, rheumatoid factor, 14.3.3 ETA, ESR 27 mm/hr, HbA1c 5.8%. Today, she reports feeling better while on the prednisone. Her pain resolved but she has swelling in her wrists. She denies stiffness. She cannot open a jar due to lack of strength. Today, was her last dose of prednisone 10 mg daily. Her left thumb is numb. She has gained weight from prednisone (17 lbs)    Therapy History includes:  Current DMARD therapy includes: none  Prior DMARD therapy includes: none  The following DMARDs have been ineffective: none  The following DMARDs were stopped because of side effects: none    REVIEW OF SYSTEMS    A 15 point review of systems was performed and summarized below. The questionnaire was reviewed with the patient and scanned into the patient's medical record.     General: endorses recent 17 lbs weight gain, fatigue, denies recent weight loss, weakness, fever, drenching night sweats  Musculoskeletal: endorses joint pain, joint swelling, denies morning stiffness, muscle pain  Ears: endorses ringing in ears, hearing loss, denies deafness  Eyes: endorses redness, dryness, denies pain, light sensitive, blindness, double vision, blurred vision, excess tearing,  foreign body sensation  Mouth: denies sore tongue, oral ulcers, loss of taste, dryness, increased dental caries  Nose: denies nosebleeds, nasal ulcers  Throat: denies food stuck when swallowing, difficulty with swallowing, hoarseness, pain in jaw while chewing  Neck: denies swollen glands, tender glands  Cardiopulmonary: denies pain in chest with deep breaths, pain in chest when lying down, murmurs, sudden changes in heart beat, wheezing, dry cough, productive cough, shortness of breath at rest, shortness of breath on exertion, coughing of blood  Gastrointestinal: endorses heartburn, chronic diarrhea, denies nausea, stomach pain relieved by food, chronic constipation, , blood in stools, black stools  Genitourinary: denies vaginal dryness, pain or burning on urination, blood in urine, cloudy urine, vaginal ulcers  Hematologic: endorses bleeding gums, denies anemia, bleeding tendency, blood clots  Skin: denies easy bruising, hair loss, rash, rash worsened after sun exposure, hives/urticaria, skin thickening, skin tightness, nodules/bumps, color changes of hands or feet in the cold (Raynaud's)  Neurologic: denies numbness or tingling in hands, numbness or tingling in feet, muscle weakness  Psychiatric: denies depression, excessive worries, PTSD, Bipolar    PAST MEDICAL HISTORY    She has a past medical history of Allergic rhinitis, Anesthesia complication, Arrhythmia, Arthritis, Depression, GERD (gastroesophageal reflux disease), Hyperlipidemia, Impaired fasting glucose, Unspecified essential hypertension, Unspecified hypothyroidism, Uterine prolapse, and Vertigo. FAMILY HISTORY    Her family history includes Cancer in her brother and maternal grandmother; Coronary Artery Disease in her brother, father, and mother; Dementia in her mother; Diabetes in her brother, maternal grandmother, and paternal grandmother; Elevated Lipids in her mother; Heart Disease in her father; Hypertension in her son;  No Known Problems in her son; Obesity in her son; Thyroid Disease in her brother and father. SOCIAL HISTORY    She reports that she has never smoked. She has never used smokeless tobacco. She reports that she does not drink alcohol or use drugs. GYNECOLOGIC HISTORY     2, Para 2, Living 2, Miscarriage 0    She denies severe pre-eclampsia, eclampsia or placental insufficiency    HEALTH MAINTENANCE    Immunizations  Immunization History   Administered Date(s) Administered    Influenza High Dose Vaccine PF 10/09/2015, 2016, 10/13/2017, 2018, 10/02/2019    Pneumococcal Conjugate (PCV-13) 2015, 2018    Pneumococcal Polysaccharide (PPSV-23) 2005, 10/02/2019    Td 2015    Zoster Vaccine, Live 2013       Age Appropriate Cancer Screening    Colonoscopy:   Mammogram:     MEDICATIONS    Current Outpatient Medications   Medication Sig Dispense Refill    [START ON 2020] methotrexate (RHEUMATREX) 2.5 mg tablet Take 6 Tabs by mouth Every Saturday. 72 Tab 0    folic acid (FOLVITE) 1 mg tablet Take 1 Tab by mouth daily. 90 Tab 0    mirtazapine (REMERON) 15 mg tablet TAKE 1 TABLET BY MOUTH EVERY NIGHT 90 Tab 0    predniSONE (DELTASONE) 10 mg tablet Take 10 mg by mouth daily. 30 Tab 0    Synthroid 88 mcg tablet TAKE 1 TABLET BY MOUTH DAILY BEFORE BREAKFAST 90 Tab 0    atorvastatin (LIPITOR) 20 mg tablet TAKE 1 TABLET BY MOUTH EVERY DAY IN THE EVENING 90 Tab 0    hydroCHLOROthiazide (HYDRODIURIL) 25 mg tablet TAKE 1 TABLET BY MOUTH EVERY DAY 90 Tab 0    b complex-vitamin c-folic acid (NEPHROCAPS) 1 mg capsule DAILY AM for SUPPLEMENT      acetaminophen (TYLENOL) 500 mg tablet 1,000 mg every six (6) hours as needed.  simethicone (GAS-X) 125 mg capsule Take 125 mg by mouth four (4) times daily as needed for Flatulence.  calcium citrate/vitamin D3 (CALCIUM CITRATE + D PO) Take 1 Tab by mouth as needed.       BENEFIBER, WHEAT DEXTRIN, PO Take  by mouth Before breakfast, lunch, and dinner.  Cetirizine (ZYRTEC) 10 mg cap Take  by mouth every evening.  cholecalciferol (VITAMIN D3) 1,000 unit tablet Take  by mouth daily. ALLERGIES    Allergies   Allergen Reactions    Amlodipine Unknown (comments)    Codeine Nausea Only    Epinephrine Palpitations    Erythromycin Other (comments)     GI upset    Losartan Swelling     Lip swelling    Mobic [Meloxicam] Swelling     Lip swelling    Pcn [Penicillins] Hives     Fainted       PHYSICAL EXAMINATION    Visit Vitals  /67   Pulse 76   Temp 98 °F (36.7 °C)   Resp 18   Ht 5' 3\" (1.6 m)   Wt 190 lb (86.2 kg)   BMI 33.66 kg/m²     Body mass index is 33.66 kg/m². General: Patient is alert, oriented x 3, not in acute distress    HEENT:   Conjunctiva are not injected and appear moist, there is no alopecia     Cardiovascular:  Heart is regular rate and rhythm, no murmurs. Chest:  Lungs are clear to auscultation bilaterally. Extremities:  Free of clubbing, cyanosis, edema, extremities well perfused. Neurological exam:  Muscle strength is full in upper and lower extremities. Skin exam:  There are no rashes, no tophi, no psoriasis, no active Raynaud's, no livedo reticularis, no periungual erythema. Musculoskeletal exam:  A comprehensive musculoskeletal exam was performed for all joints of each upper and lower extremity and assessed for swelling, tenderness and range of motion. Pertinent results are documented as below:    Bilateral extensor tenosynovitis  Bilateral Giuliano and Heberden nodes. Right knee crepitus without effusion.   Left knee replacement    Z-Deformities:   no  Banner Neck Deformities:  no  Boutonierre's Deformities:  Yes (bilateral 3rd)  Ulnar Deviation:   no  MCP Subluxation:  no    Joint Count 8/25/2020   Patient pain (0-100) 70   MHAQ 0   Left wrist- Tender 1   Left wrist- Swollen 1   Left 2nd MCP - Tender 0   Left 2nd MCP - Swollen 1   Left 3rd PIP - Tender 0   Left 3rd PIP - Swollen 1   Right wrist- Tender 0   Right wrist- Swollen 1   Right 2nd MCP - Tender 0   Right 2nd MCP - Swollen 1   Right 5th MCP - Tender 0   Right 5th MCP - Swollen 1   Right 3rd PIP - Tender 0   Right 3rd PIP - Swollen 1   Right 5th PIP - Tender 0   Right 5th PIP - Swollen 1   Tender Joint Count (Total) 1   Swollen Joint Count (Total) 8   Physician Assessment (0-10) 3   Patient Assessment (0-10) 7   CDAI Total (calculated) 19       DATA REVIEW    Prior medical records were reviewed and are summarized as below:    Laboratory data: summarized in the HPI    Imaging: summarized in the HPI. ASSESSMENT AND PLAN    1) Seronegative Rheumatoid Arthritis. She appears to have developed symmetric synovitis with wrist swelling causing median nerve entrapment. She had a great response to prednisone. We discussed initiation of DMARD therapy with methotrexate, which is first line therapy in Rheumatoid Arthritis. It is a weekly regimen that is supplemented with daily folic acid to help counteract potential side effects. I discussed with the patient the potential adverse effects, which include: nausea, vomiting, dyspepsia, oral ulcers, hair thinning, infection, liver function abnormalities, blood count abnormalities, and rarely pneumonitis or melanoma. The patient understood these possible adverse effects. I informed the patient of the need for routine CBC and CMP as a measure for long term use of immunosuppressants. I also instructed the patient to avoid ill contacts and the needs for annual influenza vaccines and the pneumonia vaccines. I asked the patient to apply SPF 50 sunscreen when out in the sun. The patient was also instructed to avoid alcohol as it may hasten hepatotoxicity. In childbearing patients, pregnancy is contra-indicated while on methotrexate due to risk for birth defects and early termination.  I prescribed methotrexate 15 mg every 7 days with daily folic acid 1 mg, but I asked her not to start treatment until I receive and review her hepatorenal function. I informed the patient that methotrexate may take 6 to 12 weeks to be effective. Patient was informed to contact me if they develop any adverse reaction or infection. I asked her to inform me if she flares. 2) Long Term Use of Immunosuppressants. The patient will initiate on immunomodulatory medications (methotrexate) and requires frequent toxicity monitoring by blood work. Respective labs were ordered (CBC and CMP). 3) Left Carpal Tunnel Syndrome. This is due to #1 and improved with prednisone and will resolve when her wrist synovitis resolves. 4) Bilateral Extensor Tenosynovitis. This is due to #1 and will resolve with DMARDs as it has improved with prednisone. The patient voiced understanding of the aforementioned assessment and plan. Summary of plan was provided in the After Visit Summary patient instructions. I also provided education about MyChart setup and utility.     TODAY'S ORDERS    Orders Placed This Encounter    QUANTIFERON-TB GOLD PLUS    CBC WITH AUTOMATED DIFF    CHRONIC HEPATITIS PANEL    METABOLIC PANEL, COMPREHENSIVE    C REACTIVE PROTEIN, QT    SED RATE (ESR)    PROTEIN ELECTROPHORESIS W/ REFLX ALESSANDRO    VITAMIN D, 25 HYDROXY    HLA-B27    methotrexate (RHEUMATREX) 2.5 mg tablet    folic acid (FOLVITE) 1 mg tablet     Future Appointments   Date Time Provider Christie Whaleyi   10/28/2020  4:00 PM Maria Victoria Gusman MD [facility] BS AMB   11/24/2020 10:30 AM Ashok Ray MD [facility] BS AMB     Radhika Johnson MD, [address]    Adult Rheumatology   Rheumatology Ultrasound Certified  [facility]  A Part of [facility], [address]   Phone [phone]  Fax [phone]

## 2020-08-31 LAB
25(OH)D3+25(OH)D2 SERPL-MCNC: 36.5 NG/ML (ref 30–100)
ALBUMIN SERPL ELPH-MCNC: 4.1 G/DL (ref 2.9–4.4)
ALBUMIN SERPL-MCNC: 4.6 G/DL (ref 3.7–4.7)
ALBUMIN/GLOB SERPL: 1.5 {RATIO} (ref 0.7–1.7)
ALBUMIN/GLOB SERPL: 2.1 {RATIO} (ref 1.2–2.2)
ALP SERPL-CCNC: 74 IU/L (ref 39–117)
ALPHA1 GLOB SERPL ELPH-MCNC: 0.2 G/DL (ref 0–0.4)
ALPHA2 GLOB SERPL ELPH-MCNC: 0.9 G/DL (ref 0.4–1)
ALT SERPL-CCNC: 20 IU/L (ref 0–32)
AST SERPL-CCNC: 21 IU/L (ref 0–40)
B-GLOBULIN SERPL ELPH-MCNC: 0.9 G/DL (ref 0.7–1.3)
BASOPHILS # BLD AUTO: 0.1 X10E3/UL (ref 0–0.2)
BASOPHILS NFR BLD AUTO: 1 %
BILIRUB SERPL-MCNC: 0.4 MG/DL (ref 0–1.2)
BUN SERPL-MCNC: 23 MG/DL (ref 8–27)
BUN/CREAT SERPL: 29 (ref 12–28)
CALCIUM SERPL-MCNC: 9.9 MG/DL (ref 8.7–10.3)
CHLORIDE SERPL-SCNC: 100 MMOL/L (ref 96–106)
CO2 SERPL-SCNC: 21 MMOL/L (ref 20–29)
COMMENT, 144067: NORMAL
CREAT SERPL-MCNC: 0.8 MG/DL (ref 0.57–1)
CRP SERPL-MCNC: <1 MG/L (ref 0–10)
EOSINOPHIL # BLD AUTO: 0.2 X10E3/UL (ref 0–0.4)
EOSINOPHIL NFR BLD AUTO: 2 %
ERYTHROCYTE [DISTWIDTH] IN BLOOD BY AUTOMATED COUNT: 13.4 % (ref 11.7–15.4)
ERYTHROCYTE [SEDIMENTATION RATE] IN BLOOD BY WESTERGREN METHOD: 21 MM/HR (ref 0–40)
GAMMA GLOB SERPL ELPH-MCNC: 0.7 G/DL (ref 0.4–1.8)
GAMMA INTERFERON BACKGROUND BLD IA-ACNC: 0.02 IU/ML
GLOBULIN SER CALC-MCNC: 2.2 G/DL (ref 1.5–4.5)
GLOBULIN SER CALC-MCNC: 2.7 G/DL (ref 2.2–3.9)
GLUCOSE SERPL-MCNC: 110 MG/DL (ref 65–99)
HBV CORE AB SERPL QL IA: NEGATIVE
HBV CORE IGM SERPL QL IA: NEGATIVE
HBV E AB SERPL QL IA: NEGATIVE
HBV E AG SERPL QL IA: NEGATIVE
HBV SURFACE AB SER QL: REACTIVE
HBV SURFACE AG SERPL QL IA: NEGATIVE
HCT VFR BLD AUTO: 40.5 % (ref 34–46.6)
HCV AB S/CO SERPL IA: <0.1 S/CO RATIO (ref 0–0.9)
HGB BLD-MCNC: 13.2 G/DL (ref 11.1–15.9)
HLA-B27 QL NAA+PROBE: NEGATIVE
IMM GRANULOCYTES # BLD AUTO: 0.1 X10E3/UL (ref 0–0.1)
IMM GRANULOCYTES NFR BLD AUTO: 1 %
LYMPHOCYTES # BLD AUTO: 2.2 X10E3/UL (ref 0.7–3.1)
LYMPHOCYTES NFR BLD AUTO: 26 %
M PROTEIN SERPL ELPH-MCNC: NORMAL G/DL
M TB IFN-G BLD-IMP: NEGATIVE
M TB IFN-G CD4+ BCKGRND COR BLD-ACNC: 0.02 IU/ML
MCH RBC QN AUTO: 29.9 PG (ref 26.6–33)
MCHC RBC AUTO-ENTMCNC: 32.6 G/DL (ref 31.5–35.7)
MCV RBC AUTO: 92 FL (ref 79–97)
MITOGEN IGNF BLD-ACNC: >10 IU/ML
MONOCYTES # BLD AUTO: 0.6 X10E3/UL (ref 0.1–0.9)
MONOCYTES NFR BLD AUTO: 8 %
NEUTROPHILS # BLD AUTO: 5.2 X10E3/UL (ref 1.4–7)
NEUTROPHILS NFR BLD AUTO: 62 %
PLATELET # BLD AUTO: 189 X10E3/UL (ref 150–450)
PLEASE NOTE, 011150: NORMAL
POTASSIUM SERPL-SCNC: 3.8 MMOL/L (ref 3.5–5.2)
PROT PATTERN SERPL ELPH-IMP: NORMAL
PROT SERPL-MCNC: 6.8 G/DL (ref 6–8.5)
QUANTIFERON INCUBATION, QF1T: NORMAL
QUANTIFERON TB2 AG: 0.01 IU/ML
RBC # BLD AUTO: 4.42 X10E6/UL (ref 3.77–5.28)
SERVICE CMNT-IMP: NORMAL
SODIUM SERPL-SCNC: 143 MMOL/L (ref 134–144)
WBC # BLD AUTO: 8.4 X10E3/UL (ref 3.4–10.8)

## 2020-09-01 NOTE — PROGRESS NOTES
The results were reviewed. All remaining labs are normal/negative. Start methotrexate and folic acid as prescribed.

## 2020-10-09 DIAGNOSIS — I10 HYPERTENSION, ESSENTIAL: ICD-10-CM

## 2020-10-09 DIAGNOSIS — E78.5 HYPERLIPIDEMIA WITH TARGET LDL LESS THAN 100: ICD-10-CM

## 2020-10-09 RX ORDER — LEVOTHYROXINE SODIUM 88 UG/1
TABLET ORAL
Qty: 90 TAB | Refills: 0 | Status: SHIPPED | OUTPATIENT
Start: 2020-10-09 | End: 2021-01-07

## 2020-10-09 RX ORDER — HYDROCHLOROTHIAZIDE 25 MG/1
TABLET ORAL
Qty: 90 TAB | Refills: 0 | Status: SHIPPED | OUTPATIENT
Start: 2020-10-09 | End: 2021-01-07

## 2020-10-09 RX ORDER — ATORVASTATIN CALCIUM 20 MG/1
TABLET, FILM COATED ORAL
Qty: 90 TAB | Refills: 0 | Status: SHIPPED | OUTPATIENT
Start: 2020-10-09 | End: 2021-01-07

## 2020-10-09 NOTE — TELEPHONE ENCOUNTER
Future Appointments   Date Time Provider Christie Corrina   10/28/2020  4:00 PM Sujit Murphy MD [facility] BS AMB   11/24/2020 10:30 AM Raquel Khanna MD [facility]O BS AMB

## 2020-10-28 ENCOUNTER — OFFICE VISIT (OUTPATIENT)
Dept: RHEUMATOLOGY | Age: 80
End: 2020-10-28
Payer: MEDICARE

## 2020-10-28 VITALS
RESPIRATION RATE: 18 BRPM | DIASTOLIC BLOOD PRESSURE: 77 MMHG | HEART RATE: 82 BPM | TEMPERATURE: 98.2 F | SYSTOLIC BLOOD PRESSURE: 126 MMHG | BODY MASS INDEX: 32.42 KG/M2 | WEIGHT: 183 LBS

## 2020-10-28 DIAGNOSIS — G56.02 CARPAL TUNNEL SYNDROME OF LEFT WRIST: ICD-10-CM

## 2020-10-28 DIAGNOSIS — M06.09 SERONEGATIVE RHEUMATOID ARTHRITIS OF MULTIPLE SITES (HCC): Primary | ICD-10-CM

## 2020-10-28 DIAGNOSIS — Z79.60 LONG-TERM USE OF IMMUNOSUPPRESSANT MEDICATION: ICD-10-CM

## 2020-10-28 DIAGNOSIS — M65.839: ICD-10-CM

## 2020-10-28 PROCEDURE — G8417 CALC BMI ABV UP PARAM F/U: HCPCS | Performed by: INTERNAL MEDICINE

## 2020-10-28 PROCEDURE — 1101F PT FALLS ASSESS-DOCD LE1/YR: CPT | Performed by: INTERNAL MEDICINE

## 2020-10-28 PROCEDURE — G0463 HOSPITAL OUTPT CLINIC VISIT: HCPCS | Performed by: INTERNAL MEDICINE

## 2020-10-28 PROCEDURE — G9717 DOC PT DX DEP/BP F/U NT REQ: HCPCS | Performed by: INTERNAL MEDICINE

## 2020-10-28 PROCEDURE — G8536 NO DOC ELDER MAL SCRN: HCPCS | Performed by: INTERNAL MEDICINE

## 2020-10-28 PROCEDURE — 99215 OFFICE O/P EST HI 40 MIN: CPT | Performed by: INTERNAL MEDICINE

## 2020-10-28 PROCEDURE — G8427 DOCREV CUR MEDS BY ELIG CLIN: HCPCS | Performed by: INTERNAL MEDICINE

## 2020-10-28 PROCEDURE — G8399 PT W/DXA RESULTS DOCUMENT: HCPCS | Performed by: INTERNAL MEDICINE

## 2020-10-28 PROCEDURE — G8752 SYS BP LESS 140: HCPCS | Performed by: INTERNAL MEDICINE

## 2020-10-28 PROCEDURE — G8754 DIAS BP LESS 90: HCPCS | Performed by: INTERNAL MEDICINE

## 2020-10-28 PROCEDURE — 1090F PRES/ABSN URINE INCON ASSESS: CPT | Performed by: INTERNAL MEDICINE

## 2020-10-28 RX ORDER — FOLIC ACID 1 MG/1
1 TABLET ORAL DAILY
Qty: 90 TAB | Refills: 5 | Status: SHIPPED | OUTPATIENT
Start: 2020-10-28 | End: 2021-11-29 | Stop reason: ALTCHOICE

## 2020-10-28 RX ORDER — METHOTREXATE 2.5 MG/1
20 TABLET ORAL
Qty: 96 TAB | Refills: 0 | Status: SHIPPED | OUTPATIENT
Start: 2020-10-28 | End: 2020-11-24

## 2020-10-28 NOTE — LETTER
10/28/20 Patient: Gisselle Reyes YOB: 1940 Date of Visit: 10/28/2020 Eddie Thrasher MD 
[address] [address] VIA In Basket Dear Eddie Thrasher MD, Thank you for referring Ms. Gisselle Reyes to [facility] for evaluation. My notes for this consultation are attached. If you have questions, please do not hesitate to call me. I look forward to following your patient along with you.  
 
 
Sincerely, 
 
Whitney Brunner MD

## 2020-10-28 NOTE — PROGRESS NOTES
REASON FOR VISIT    This is a follow-up visit for Ms. Arnaldo Marroquin for     ICD-10-CM   1. Seronegative rheumatoid arthritis of multiple sites ([facility]  M06.09     Inflammatory arthritis phenotype includes:  Anti-CCP positive: no  Rheumatoid factor positive: no  HLA-B27 positive: no  Erosive disease: no  Extra-articular manifestations include: none    Immunosuppression Screening (8/25/2020):  Quantiferon TB: negative  PPD:  Not performed  Hepatitis B: negative  Hepatitis C: negative    Therapy History includes:  Current DMARD therapy include: methotrexate 15 mg every Wednesday (9/02/2020 to present)  Prior DMARD therapy include: none  Discontinued DMARDs because of inefficacy: None  Discontinued DMARDs because of side effects: None    HISTORY OF PRESENT ILLNESS    Ms. Arnaldo Marroquin returns for a follow-up. On her last visit, I ordered labs and started methotrexate 15 mg every Wednesday, which she has taken with good tolerance. Today, she feels better. She denies pain, swelling, or stiffness in her hands. She has swelling in her wrists which has improved without pain or stiffness. She still has tingling and numbness in her left 1st and 2nd. She feels that the outside of her right 5th MTP, there is a growth like her right 2nd MCP. She denies fever, weight loss, blurred vision, vision loss, oral ulcers, ankle swelling, dry cough, dyspnea, nausea, vomiting, dysphagia, abdominal pain, black or bloody stool, fall since last visit, rash, easy bruising and increased thirst.    Last toxicity monitoring by blood work was done on 8/25/2020 and did not reveal any significant adverse effects. Most recent inflammatory markers from 8/25/2020 revealed a ESR 21 mm/hr and CRP <1 mg/L. The patient has not had any interval hospital admissions, infections, or surgeries.     REVIEW OF SYSTEMS    A comprehensive review of systems was performed and pertinent results are documented in the HPI, review of systems is otherwise non-contributory. PAST MEDICAL HISTORY    She has a past medical history of Allergic rhinitis, Anesthesia complication, Arrhythmia, Arthritis, Depression, GERD (gastroesophageal reflux disease), Hyperlipidemia, Impaired fasting glucose, Unspecified essential hypertension, Unspecified hypothyroidism, Uterine prolapse, and Vertigo. FAMILY HISTORY    Her family history includes Cancer in her brother and maternal grandmother; Coronary Artery Disease in her brother, father, and mother; Dementia in her mother; Diabetes in her brother, maternal grandmother, and paternal grandmother; Elevated Lipids in her mother; Heart Disease in her father; Hypertension in her son; No Known Problems in her son; Obesity in her son; Thyroid Disease in her brother and father. SOCIAL HISTORY    She reports that she has never smoked. She has never used smokeless tobacco. She reports that she does not drink alcohol or use drugs. MEDICATIONS    Current Outpatient Medications   Medication Sig Dispense Refill    methotrexate (RHEUMATREX) 2.5 mg tablet Take 8 Tabs by mouth every Wednesday. 96 Tab 0    folic acid (FOLVITE) 1 mg tablet Take 1 Tab by mouth daily. 90 Tab 5    hydroCHLOROthiazide (HYDRODIURIL) 25 mg tablet TAKE 1 TABLET BY MOUTH EVERY DAY 90 Tab 0    atorvastatin (LIPITOR) 20 mg tablet TAKE 1 TABLET BY MOUTH EVERY DAY IN THE EVENING 90 Tab 0    Synthroid 88 mcg tablet TAKE 1 TABLET BY MOUTH DAILY BEFORE BREAKFAST 90 Tab 0    mirtazapine (REMERON) 15 mg tablet TAKE 1 TABLET BY MOUTH EVERY NIGHT (Patient taking differently: 7.5 mg.) 90 Tab 0    b complex-vitamin c-folic acid (NEPHROCAPS) 1 mg capsule DAILY AM for SUPPLEMENT      acetaminophen (TYLENOL) 500 mg tablet 1,000 mg every six (6) hours as needed.  simethicone (GAS-X) 125 mg capsule Take 125 mg by mouth four (4) times daily as needed for Flatulence.  calcium citrate/vitamin D3 (CALCIUM CITRATE + D PO) Take 1 Tab by mouth as needed.       Cetirizine (ZYRTEC) 10 mg cap Take  by mouth every evening.  cholecalciferol (VITAMIN D3) 1,000 unit tablet Take  by mouth daily. ALLERGIES    Allergies   Allergen Reactions    Amlodipine Unknown (comments)    Codeine Nausea Only    Epinephrine Palpitations    Erythromycin Other (comments)     GI upset    Losartan Swelling     Lip swelling    Mobic [Meloxicam] Swelling     Lip swelling    Pcn [Penicillins] Hives     Fainted       PHYSICAL EXAMINATION    Visit Vitals  /77   Pulse 82   Temp 98.2 °F (36.8 °C)   Resp 18   Wt 183 lb (83 kg)   BMI 32.42 kg/m²     Body mass index is 32.42 kg/m². General: Patient is alert, oriented x 3, not in acute distress    HEENT:   Sclerae are not injected and appear moist.  There is no alopecia. Neck is supple     HEENT:   Conjunctiva are not injected and appear moist, there is no alopecia. Cardiovascular:  Heart is regular rate and rhythm, no murmurs. Chest:  Lungs are clear to auscultation bilaterally. Extremities:  Free of clubbing, cyanosis, edema, extremities well perfused. Neurological exam:  Muscle strength is full in upper and lower extremities. Skin exam:  There are no rashes, no tophi, no psoriasis, no active Raynaud's, no livedo reticularis, no periungual erythema. Musculoskeletal exam:  A comprehensive musculoskeletal exam was performed for all joints of each upper and lower extremity and assessed for swelling, tenderness and range of motion. Positive results are documented as below:    Bilateral extensor tenosynovitis (IMPROVED)  Bilateral Giuliano and [place]ber[place] nodes. Right knee crepitus without effusion.   Left knee replacement    Z-Deformities:   no  [place]ck Deformities:  no  Boutonierre's Deformities:  no  Ulnar Deviation:   no  MCP Subluxation:  no     Joint Count 10/28/2020 8/25/2020   Patient pain (0-100) 10 70   MHAQ 0.125 0   Left wrist- Tender 0 1   Left wrist- Swollen 1 1   Left 2nd MCP - Tender - 0   Left 2nd MCP - Swollen - 1   Left 3rd PIP - Tender 0 0   Left 3rd PIP - Swollen 1 1   Right wrist- Tender 1 0   Right wrist- Swollen 1 1   Right 1st MCP - Tender 0 -   Right 1st MCP - Swollen 1 -   Right 2nd MCP - Tender 0 0   Right 2nd MCP - Swollen 1 1   Right 3rd MCP - Tender 0 -   Right 3rd MCP - Swollen 1 -   Right 5th MCP - Tender 0 0   Right 5th MCP - Swollen 1 1   Right 3rd PIP - Tender 0 0   Right 3rd PIP - Swollen 1 1   Right 4th PIP - Tender 0 -   Right 4th PIP - Swollen 1 -   Right 5th PIP - Tender 0 0   Right 5th PIP - Swollen 1 1   Tender Joint Count (Total) 1 1   Swollen Joint Count (Total) 10 8   Physician Assessment (0-10) 2 3   Patient Assessment (0-10) 2.5 7   CDAI Total (calculated) 15.5 19       DATA REVIEW    Laboratory     Recent laboratory results were reviewed, summarized, and discussed with the patient. Imaging    Musculoskeletal Ultrasound    [facility] Ultrasound of Left Hand 6/16/2020: the palpable left wrist mass corresponds with fluid and inflammation of the fourth dorsal wrist compartment. There is complex fluid within the tendon sheath which demonstrates mild increase blood flow. No tendon tear. Survey views of the right hand for comparison demonstrates mild tenosynovitis and synovial thickening in the right fourth dorsal wrist compartment. Radiographs    Cervical Spine 6/16/2020: The bones appear mildly osteopenic. Vertebral body heights are normal. There is mild reversal of cervical lordosis at C5-6 with perhaps 1 mm retrolisthesis. Alignment is otherwise anatomic. There is moderate C5-6 and mild C6-7 degenerative disc space narrowing with endplate osteophytes including in the uncovertebral regions bilaterally. The posterior processes are intact. There is moderate left facet osteoarthrosis at C3-4. There is mild left foraminal narrowing at C3-4 and mild right foraminal narrowing at C5-6.  The odontoid process is intact and the C1-C2 relationship is normal. No prevertebral soft tissue swelling is shown.    Right Hand 6/16/2020: mild-moderate diffuse osteopenia accentuated in the periarticular regions. There is mild soft tissue swelling at the dorsum and medial aspect of the carpus as well as along the metacarpophalangeal and proximal digital joints. Mild uniform narrowing of all the metacarpophalangeal joints is shown though without demonstration of erosion. Moderate osteoarthrosis is noted at the thumb carpometacarpal joint and mild osteoarthrosis of the thumb MCP joint and at several of the digital interphalangeal joints. Right Wrist 6/16/2020: mild to moderate diffuse osteopenia accentuated in the periventricular regions. There is soft tissue swelling dorsally and medially at the carpus as well as equivocally along the metacarpophalangeal joints. Aside mild scaphomultangular and moderate thumb carpometacarpal osteoarthrosis, no substantial arthrosis is shown. No erosions are demonstrated. No fracture or dislocation is shown. No pathologic soft tissuecalcification is demonstrated. CT Imaging    None    MR Imaging    None    DXA     None    ASSESSMENT AND PLAN    This is a follow-up visit for Ms. John De Jesus. 1) Seronegative Rheumatoid Arthritis. She appears to have developed symmetric synovitis with wrist swelling causing median nerve entrapment. She had a great response to prednisone. She is maintained on methotrexate 15 mg every Wednesday with good tolerance and improvement. Her CDAI was 15.5 (previously 19) with 1 tender and 10 swollen joints, consistent with moderate disease activity. I will increase her methotrexate to 20 mg weekly. Lab slip given. 2) Long Term Use of Immunosuppressants. The patient remains on immunomodulatory medications (methotrexate) and requires frequent toxicity monitoring by blood work. Respective labs were ordered (CBC and CMP).    3) Left Carpal Tunnel Syndrome.  This is due to #1 and improved with prednisone and will resolve when her wrist synovitis resolves.    4) Bilateral Extensor Tenosynovitis. This is due to #1 and will resolve with DMARDs as it has improved with prednisone.     The patient voiced understanding of the aforementioned assessment and plan. Summary of plan was provided in the After Visit Summary patient instructions. I also provided education about MyChart setup and utility.     TODAY'S ORDERS    Orders Placed This Encounter    C REACTIVE PROTEIN, QT    CBC WITH AUTOMATED DIFF    METABOLIC PANEL, COMPREHENSIVE    SED RATE (ESR)    methotrexate (RHEUMATREX) 2.5 mg tablet    folic acid (FOLVITE) 1 mg tablet       Future Appointments   Date Time Provider Christie Houser   11/24/2020 10:30 AM MD KEITH Olivo BS AMB   1/29/2021 10:40 AM Jerri Kelly MD AOCR BS AMB       Regla Morales MD, [facility]    Adult Rheumatology   Rheumatology Ultrasound Certified  [facility]  A Part of [address]   Phone [phone]  Fax [phone]

## 2020-11-16 ENCOUNTER — HOSPITAL ENCOUNTER (OUTPATIENT)
Dept: LAB | Age: 80
Discharge: HOME | End: 2020-11-16
Payer: MEDICARE

## 2020-11-16 PROCEDURE — 85651 RBC SED RATE NONAUTOMATED: CPT

## 2020-11-16 PROCEDURE — 86140 C-REACTIVE PROTEIN: CPT

## 2020-11-16 PROCEDURE — 36415 COLL VENOUS BLD VENIPUNCTURE: CPT

## 2020-11-16 PROCEDURE — 80053 COMPREHEN METABOLIC PANEL: CPT

## 2020-11-16 PROCEDURE — 85025 COMPLETE CBC W/AUTO DIFF WBC: CPT

## 2020-11-17 LAB
ALBUMIN SERPL-MCNC: 4.4 G/DL (ref 3.7–4.7)
ALBUMIN/GLOB SERPL: 1.8 {RATIO} (ref 1.2–2.2)
ALP SERPL-CCNC: 78 IU/L (ref 39–117)
ALT SERPL-CCNC: 20 IU/L (ref 0–32)
AST SERPL-CCNC: 23 IU/L (ref 0–40)
BASOPHILS # BLD AUTO: 0.1 X10E3/UL (ref 0–0.2)
BASOPHILS NFR BLD AUTO: 1 %
BILIRUB SERPL-MCNC: 0.5 MG/DL (ref 0–1.2)
BUN SERPL-MCNC: 15 MG/DL (ref 8–27)
BUN/CREAT SERPL: 20 (ref 12–28)
CALCIUM SERPL-MCNC: 9.8 MG/DL (ref 8.7–10.3)
CHLORIDE SERPL-SCNC: 99 MMOL/L (ref 96–106)
CO2 SERPL-SCNC: 25 MMOL/L (ref 20–29)
CREAT SERPL-MCNC: 0.76 MG/DL (ref 0.57–1)
CRP SERPL-MCNC: 1 MG/L (ref 0–10)
EOSINOPHIL # BLD AUTO: 0.1 X10E3/UL (ref 0–0.4)
EOSINOPHIL NFR BLD AUTO: 2 %
ERYTHROCYTE [DISTWIDTH] IN BLOOD BY AUTOMATED COUNT: 14 % (ref 11.7–15.4)
ERYTHROCYTE [SEDIMENTATION RATE] IN BLOOD BY WESTERGREN METHOD: 44 MM/HR (ref 0–40)
GLOBULIN SER CALC-MCNC: 2.4 G/DL (ref 1.5–4.5)
GLUCOSE SERPL-MCNC: 108 MG/DL (ref 65–99)
HCT VFR BLD AUTO: 36.2 % (ref 34–46.6)
HGB BLD-MCNC: 12.6 G/DL (ref 11.1–15.9)
IMM GRANULOCYTES # BLD AUTO: 0 X10E3/UL (ref 0–0.1)
IMM GRANULOCYTES NFR BLD AUTO: 1 %
LYMPHOCYTES # BLD AUTO: 1.3 X10E3/UL (ref 0.7–3.1)
LYMPHOCYTES NFR BLD AUTO: 24 %
MCH RBC QN AUTO: 31.3 PG (ref 26.6–33)
MCHC RBC AUTO-ENTMCNC: 34.8 G/DL (ref 31.5–35.7)
MCV RBC AUTO: 90 FL (ref 79–97)
MONOCYTES # BLD AUTO: 0.4 X10E3/UL (ref 0.1–0.9)
MONOCYTES NFR BLD AUTO: 6 %
NEUTROPHILS # BLD AUTO: 3.8 X10E3/UL (ref 1.4–7)
NEUTROPHILS NFR BLD AUTO: 66 %
PLATELET # BLD AUTO: 191 X10E3/UL (ref 150–450)
POTASSIUM SERPL-SCNC: 3.6 MMOL/L (ref 3.5–5.2)
PROT SERPL-MCNC: 6.8 G/DL (ref 6–8.5)
RBC # BLD AUTO: 4.03 X10E6/UL (ref 3.77–5.28)
SODIUM SERPL-SCNC: 139 MMOL/L (ref 134–144)
WBC # BLD AUTO: 5.6 X10E3/UL (ref 3.4–10.8)

## 2020-11-24 ENCOUNTER — OFFICE VISIT (OUTPATIENT)
Dept: INTERNAL MEDICINE CLINIC | Age: 80
End: 2020-11-24
Payer: MEDICARE

## 2020-11-24 VITALS
HEART RATE: 73 BPM | HEIGHT: 64 IN | OXYGEN SATURATION: 98 % | BODY MASS INDEX: 31.76 KG/M2 | SYSTOLIC BLOOD PRESSURE: 116 MMHG | WEIGHT: 186 LBS | RESPIRATION RATE: 16 BRPM | DIASTOLIC BLOOD PRESSURE: 74 MMHG | TEMPERATURE: 97.5 F

## 2020-11-24 DIAGNOSIS — E78.00 PURE HYPERCHOLESTEROLEMIA: ICD-10-CM

## 2020-11-24 DIAGNOSIS — F51.01 PRIMARY INSOMNIA: ICD-10-CM

## 2020-11-24 DIAGNOSIS — Z00.00 MEDICARE ANNUAL WELLNESS VISIT, SUBSEQUENT: Primary | ICD-10-CM

## 2020-11-24 DIAGNOSIS — Z71.89 EDUCATED ABOUT COVID-19 VIRUS INFECTION: ICD-10-CM

## 2020-11-24 DIAGNOSIS — E03.9 ACQUIRED HYPOTHYROIDISM: ICD-10-CM

## 2020-11-24 DIAGNOSIS — F33.42 RECURRENT MAJOR DEPRESSIVE DISORDER, IN FULL REMISSION (HCC): ICD-10-CM

## 2020-11-24 DIAGNOSIS — I10 HYPERTENSION, ESSENTIAL: ICD-10-CM

## 2020-11-24 DIAGNOSIS — Z71.89 ADVANCED CARE PLANNING/COUNSELING DISCUSSION: ICD-10-CM

## 2020-11-24 LAB — TSH SERPL DL<=0.05 MIU/L-ACNC: 0.17 UIU/ML (ref 0.36–3.74)

## 2020-11-24 PROCEDURE — G0463 HOSPITAL OUTPT CLINIC VISIT: HCPCS | Performed by: INTERNAL MEDICINE

## 2020-11-24 PROCEDURE — G8399 PT W/DXA RESULTS DOCUMENT: HCPCS | Performed by: INTERNAL MEDICINE

## 2020-11-24 PROCEDURE — 1101F PT FALLS ASSESS-DOCD LE1/YR: CPT | Performed by: INTERNAL MEDICINE

## 2020-11-24 PROCEDURE — 99214 OFFICE O/P EST MOD 30 MIN: CPT | Performed by: INTERNAL MEDICINE

## 2020-11-24 PROCEDURE — G8427 DOCREV CUR MEDS BY ELIG CLIN: HCPCS | Performed by: INTERNAL MEDICINE

## 2020-11-24 PROCEDURE — G8536 NO DOC ELDER MAL SCRN: HCPCS | Performed by: INTERNAL MEDICINE

## 2020-11-24 PROCEDURE — G0439 PPPS, SUBSEQ VISIT: HCPCS | Performed by: INTERNAL MEDICINE

## 2020-11-24 PROCEDURE — 1090F PRES/ABSN URINE INCON ASSESS: CPT | Performed by: INTERNAL MEDICINE

## 2020-11-24 PROCEDURE — G8752 SYS BP LESS 140: HCPCS | Performed by: INTERNAL MEDICINE

## 2020-11-24 PROCEDURE — G9717 DOC PT DX DEP/BP F/U NT REQ: HCPCS | Performed by: INTERNAL MEDICINE

## 2020-11-24 PROCEDURE — G8417 CALC BMI ABV UP PARAM F/U: HCPCS | Performed by: INTERNAL MEDICINE

## 2020-11-24 PROCEDURE — G8754 DIAS BP LESS 90: HCPCS | Performed by: INTERNAL MEDICINE

## 2020-11-24 NOTE — PROGRESS NOTES
Kenan Stern is a 68 y.o. female who was seen in clinic today (11/24/2020) for a full physical.             Assessment & Plan:     Diagnoses and all orders for this visit:    1. Medicare annual wellness visit, subsequent  -     REFERRAL TO OPHTHALMOLOGY    2. Advanced care planning/counseling discussion    3. Educated about COVID-19 virus infection    4. Recurrent major depressive disorder, in full remission ([facility]- well controlled, continue current treatment     5. Primary insomnia- stable, continue current treatment     6. Hypertension, essential- at goal, continue current treatment     7. Pure hypercholesterolemia- stable, continue current treatment     8. Acquired hypothyroidism- overdue for labs, previously well controlled, continue current treatment pending review of labs   -     TSH 3RD GENERATION; Future      Follow-up and Dispositions    · Return in about 6 months (around 5/24/2021), or if symptoms worsen or fail to improve. Subjective: Michelle Gurrola is here today for a full physical.      Annual Wellness Visit- Subsequent Visit    Since last visit: weight has increased, attributed to prednisone      End of Life Planning: This was discussed with her today and she has an advanced directive - a copy HAS NOT been provided. Reviewed DNR/DNI and patient is interested in remaining a DNR, no changes made.       Depression Screen:  3 most recent PHQ Screens 11/24/2020   Little interest or pleasure in doing things Not at all   Feeling down, depressed, irritable, or hopeless Not at all   Total Score PHQ 2 0   Trouble falling or staying asleep, or sleeping too much -   Feeling tired or having little energy -   Poor appetite, weight loss, or overeating -   Feeling bad about yourself - or that you are a failure or have let yourself or your family down -   Trouble concentrating on things such as school, work, reading, or watching TV -   Moving or speaking so slowly that other people could have noticed; or the opposite being so fidgety that others notice -   Thoughts of being better off dead, or hurting yourself in some way -   PHQ 9 Score -   How difficult have these problems made it for you to do your work, take care of your home and get along with others -         Fall Risk:   Fall Risk Assessment, last 12 mths 11/24/2020   Able to walk? Yes   Fall in past 12 months? No   Fall with injury? -   Number of falls in past 12 months -   Fall Risk Score -       Abuse Screen:  Abuse Screening Questionnaire 11/24/2020   Do you ever feel afraid of your partner? N   Are you in a relationship with someone who physically or mentally threatens you? N   Is it safe for you to go home? Y         Do you average more than 1 drink per night or more than 7 drinks a week:  No    On any one occasion in the past three months have you have had more than 3 drinks containing alcohol:  No    Functional Ability and Level of Safety:   Hearing Screen: Hearing is good. Cognition Screen:  Has your family/caregiver stated any concerns about your memory: no.  Only having issues w/ names. Ambulation: with no difficulty    Activities of Daily Living:    Exercise: moderately active  The home contains: no safety equipment. Patient does total self care    Adult Nutrition Screen:  No risk factors noted. Health Maintenance:   Daily Aspirin: no  Bone Density: done on 6/13/17 - osteopenia  Glaucoma Screening: No: order placed    Immunizations:   Influenza: up to date  Tetanus: not up to date - wants to defer due to COVID  Shingles: not up to date - want to defer due to COVID  Pneumonia: up to date  Cancer screening:   Cervical: reviewed guidelines, n/a  Breast: reviewed guidelines, up to date  Colon: reviewed guidelines, up to date       In addition to the above issues we also reviewed the following acute and/or chronic problems:    Cardiovascular Review  The patient has hypertension and hyperlipidemia.   She reports taking medications as instructed, no medication side effects noted, patient does not perform home BP monitoring. Diet and Lifestyle: generally follows a low fat low cholesterol diet, generally follows a low sodium diet. Labs: reviewed and discussed with patient. Endocrine Review  Patient is seen for followup of hypothyroidism. She reports medication compliance: all the time and is taking separate from all other meds. She reports the following concerns/problems/med side effects: none. Lab review: labs reviewed and discussed with patient. Mental Health Review  Patient is seen for insomnia and depression. PHQ2/9 reviewed. She reports still walking up at night, not a regular issue. She does not always wake up well rested. Reports experiences the following side effects from the treatment: none. 3 most recent PHQ Screens 11/24/2020   Little interest or pleasure in doing things Not at all   Feeling down, depressed, irritable, or hopeless Not at all   Total Score PHQ 2 0          Patient Care Team:  Geno Hines MD as PCP - General (Internal Medicine)  Geno Hines MD as PCP - [facility]  Debora Vargas MD (Gastroenterology)  Marcella Hoskins MD (Radiology)  Hayden Rahman MD (Obstetrics & Gynecology)  Andrews Patel MD as Physician (Ophthalmology)  Gene Cook MD as Physician (Otolaryngology)  Celine Restrepo MD as Physician (Obstetrics & Gynecology)         The following sections were reviewed & updated as appropriate: Allergies, PMH, PSH, FH, and SH.       Patient Active Problem List   Diagnosis Code    Acquired hypothyroidism E03.9    Impaired fasting glucose R73.01    Hypertension, essential I10    Pure hypercholesterolemia E78.00    Allergic rhinitis J30.9    Recurrent major depressive disorder ([facility] F33.9    Osteopenia M85.80    Uterine prolapse N81.4    Primary insomnia F51.01    Abnormal mammogram of left breast R92.8    LBBB (left bundle branch block) I44.7  Primary osteoarthritis of left knee M17.12    Seronegative rheumatoid arthritis of multiple sites (HCC) M06.09    Long-term use of immunosuppressant medication Z79.899    Rheumatoid extensor tenosynovitis of wrist M65.839    Carpal tunnel syndrome of left wrist G56.02          Prior to Admission medications    Medication Sig Start Date End Date Taking? Authorizing Provider   guaifenesin (MUCINEX PO) Take  by mouth two (2) times a day. Yes Provider, Historical   methotrexate (RHEUMATREX) 2.5 mg tablet Take 8 Tabs by mouth every Wednesday. 10/28/20  Yes Alex Tracey MD   folic acid (FOLVITE) 1 mg tablet Take 1 Tab by mouth daily. 10/28/20  Yes Alex Tracey MD   hydroCHLOROthiazide (HYDRODIURIL) 25 mg tablet TAKE 1 TABLET BY MOUTH EVERY DAY 10/9/20  Yes Dominick Kaplan MD   atorvastatin (LIPITOR) 20 mg tablet TAKE 1 TABLET BY MOUTH EVERY DAY IN THE EVENING 10/9/20  Yes Dominick Kaplan MD   Synthroid 88 mcg tablet TAKE 1 TABLET BY MOUTH DAILY BEFORE BREAKFAST 10/9/20  Yes Dominick Kaplan MD   mirtazapine (REMERON) 15 mg tablet TAKE 1 TABLET BY MOUTH EVERY NIGHT  Patient taking differently: 7.5 mg. 7/30/20  Yes Dominick Kaplan MD   b complex-vitamin c-folic acid (NEPHROCAPS) 1 mg capsule DAILY AM for SUPPLEMENT   Yes Provider, Historical   acetaminophen (TYLENOL) 500 mg tablet 1,000 mg every six (6) hours as needed. Yes Provider, Historical   calcium citrate/vitamin D3 (CALCIUM CITRATE + D PO) Take 1 Tab by mouth as needed. Yes Other, MD Carlo   Cetirizine (ZYRTEC) 10 mg cap Take  by mouth every evening. Yes Provider, Historical   cholecalciferol (VITAMIN D3) 1,000 unit tablet Take  by mouth daily. Yes Provider, Historical   simethicone (GAS-X) 125 mg capsule Take 125 mg by mouth four (4) times daily as needed for Flatulence.     Provider, Historical          Allergies   Allergen Reactions    Amlodipine Unknown (comments)    Codeine Nausea Only    Epinephrine Palpitations  Erythromycin Other (comments)     GI upset    Losartan Swelling     Lip swelling    Mobic [Meloxicam] Swelling     Lip swelling    Pcn [Penicillins] Hives     Fainted              Review of Systems   Constitutional: Negative for malaise/fatigue and weight loss. HENT: Positive for congestion. Negative for sinus pain and sore throat. Respiratory: Negative for cough and shortness of breath. Cardiovascular: Negative for chest pain, palpitations and leg swelling. Gastrointestinal: Negative for abdominal pain, constipation, diarrhea, heartburn, nausea and vomiting. Musculoskeletal: Negative for joint pain and myalgias. Skin: Negative for rash. Neurological: Negative for dizziness and headaches. Psychiatric/Behavioral: Negative for depression. The patient is not nervous/anxious and does not have insomnia. Objective:   Physical Exam  Constitutional:       General: She is not in acute distress. Appearance: Normal appearance. Eyes:      Conjunctiva/sclera: Conjunctivae normal.   Neck:      Thyroid: No thyromegaly. Cardiovascular:      Rate and Rhythm: Regular rhythm. Heart sounds: No murmur. Pulmonary:      Effort: Pulmonary effort is normal.      Breath sounds: Normal breath sounds. No decreased breath sounds or wheezing. Abdominal:      General: Bowel sounds are normal.      Palpations: Abdomen is soft. Tenderness: There is no abdominal tenderness. Musculoskeletal:      Right lower leg: No edema. Left lower leg: No edema.    Psychiatric:         Mood and Affect: Mood and affect normal.         Behavior: Behavior normal.            Visit Vitals  /74   Pulse 73   Temp 97.5 °F (36.4 °C) (Temporal)   Resp 16   Ht 5' 3.6\" (1.615 m)   Wt 186 lb (84.4 kg)   SpO2 98%   BMI 32.33 kg/m²          Advised her to call back or return to office if symptoms worsen/change/persist.  Discussed expected course/resolution/complications of diagnosis in detail with patient. Medication risks/benefits/costs/interactions/alternatives discussed with patient. She was given an after visit summary which includes diagnoses, current medications, & vitals. She expressed understanding with the diagnosis and plan. Aspects of this note may have been generated using voice recognition software. Despite editing, there may be some syntax errors.        Ru Haq MD

## 2020-11-24 NOTE — PATIENT INSTRUCTIONS
Medicare Wellness Visit, Female The best way to live healthy is to have a lifestyle where you eat a well-balanced diet, exercise regularly, limit alcohol use, and quit all forms of tobacco/nicotine, if applicable. Regular preventive services are another way to keep healthy. Preventive services (vaccines, screening tests, monitoring & exams) can help personalize your care plan, which helps you manage your own care. Screening tests can find health problems at the earliest stages, when they are easiest to treat. [facility] follows the current, evidence-based guidelines published by the [facility] Zak Cortez ([facility]) when recommending preventive services for our patients. Because we follow these guidelines, sometimes recommendations change over time as research supports it. (For example, mammograms used to be recommended annually. Even though Medicare will still pay for an annual mammogram, the newer guidelines recommend a mammogram every two years for women of average risk). Of course, you and your doctor may decide to screen more often for some diseases, based on your risk and your co-morbidities (chronic disease you are already diagnosed with). Preventive services for you include: - Medicare offers their members a free annual wellness visit, which is time for you and your primary care provider to discuss and plan for your preventive service needs. Take advantage of this benefit every year! 
-All adults over the age of 72 should receive the recommended pneumonia vaccines. Current USPSTF guidelines recommend a series of two vaccines for the best pneumonia protection.  
-All adults should have a flu vaccine yearly and a tetanus vaccine every 10 years.  
-All adults age 48 and older should receive the shingles vaccines (series of two vaccines). -All adults age 38-68 who are overweight should have a diabetes screening test once every three years. -All adults born between [birth year] and 1963 should be screened once for Hepatitis C. 
-Other screening tests and preventive services for persons with diabetes include: an eye exam to screen for diabetic retinopathy, a kidney function test, a foot exam, and stricter control over your cholesterol.  
-Cardiovascular screening for adults with routine risk involves an electrocardiogram (ECG) at intervals determined by your doctor.  
-Colorectal cancer screenings should be done for adults age 54-65 with no increased risk factors for colorectal cancer. There are a number of acceptable methods of screening for this type of cancer. Each test has its own benefits and drawbacks. Discuss with your doctor what is most appropriate for you during your annual wellness visit. The different tests include: colonoscopy (considered the best screening method), a fecal occult blood test, a fecal DNA test, and sigmoidoscopy. 
 
-A bone mass density test is recommended when a woman turns 65 to screen for osteoporosis. This test is only recommended one time, as a screening. Some providers will use this same test as a disease monitoring tool if you already have osteoporosis. -Breast cancer screenings are recommended every other year for women of normal risk, age 54-69. 
-Cervical cancer screenings for women over age 72 are only recommended with certain risk factors. Here is a list of your current Health Maintenance items (your personalized list of preventive services) with a due date: 
Health Maintenance Due Topic Date Due  
 DTaP/Tdap/Td  (1 - Tdap) 04/13/1963  Shingles Vaccine (1 of 2) 04/13/1992  Glaucoma Screening   01/02/2020  Cholesterol Test   08/28/2020 Sariah Tobias Annual Well Visit  10/16/2020 [address] What is a living will? A living will is a legal form you use to write down the kind of care you want at the end of your life. It is used by the health professionals who will treat you if you aren't able to decide for yourself. If you put your wishes in writing, your loved ones and others will know what kind of care you want. They won't need to guess. This can ease your mind and be helpful to others. A living will is not the same as an estate or property will. An estate will explains what you want to happen with your money and property after you die. Is a living will a legal document? A living will is a legal document. Each state has its own laws about living love. If you move to another state, make sure that your living will is legal in the state where you now live. Or you might use a universal form that has been approved by many states. This kind of form can sometimes be completed and stored online. Your electronic copy will then be available wherever you have a connection to the Internet. In most cases, doctors will respect your wishes even if you have a form from a different state. · You don't need an  to complete a living will. But legal advice can be helpful if your state's laws are unclear, your health history is complicated, or your family can't agree on what should be in your living will. · You can change your living will at any time. Some people find that their wishes about end-of-life care change as their health changes. · In addition to making a living will, think about completing a medical power of  form. This form lets you name the person you want to make end-of-life treatment decisions for you (your \"health care agent\") if you're not able to. Many hospitals and nursing homes will give you the forms you need to complete a living will and a medical power of . · Your living will is used only if you can't make or communicate decisions for yourself anymore. If you become able to make decisions again, you can accept or refuse any treatment, no matter what you wrote in your living will. · Your state may offer an online registry. This is a place where you can store your living will online so the doctors and nurses who need to treat you can find it right away. What should you think about when creating a living will? Talk about your end-of-life wishes with your family members and your doctor. Let them know what you want. That way the people making decisions for you won't be surprised by your choices. Think about these questions as you make your living will: · Do you know enough about life support methods that might be used? If not, talk to your doctor so you know what might be done if you can't breathe on your own, your heart stops, or you're unable to swallow. · What things would you still want to be able to do after you receive life-support methods? Would you want to be able to walk? To speak? To eat on your own? To live without the help of machines? · If you have a choice, where do you want to be cared for? In your home? At a hospital or nursing home? · Do you want certain Sabianist practices performed if you become very ill? · If you have a choice at the end of your life, where would you prefer to die? At home? In a hospital or nursing home? Somewhere else? · Would you prefer to be buried or cremated? · Do you want your organs to be donated after you die? What should you do with your living will? · Make sure that your family members and your health care agent have copies of your living will. · Give your doctor a copy of your living will to keep in your medical record. If you have more than one doctor, make sure that each one has a copy. · You may want to put a copy of your living will where it can be easily found. Where can you learn more? Go to http://www.gray.com/. Enter U909 in the search box to learn more about \"Learning About Living Perroy. \" Current as of: August 8, 2016 Content Version: 11.3 © 0594-9842 [employer]. Care instructions adapted under license by [employer] (which disclaims liability or warranty for this information). If you have questions about a medical condition or this instruction, always ask your healthcare professional. [address] any warranty or liability for your use of this information.

## 2020-11-24 NOTE — ACP (ADVANCE CARE PLANNING)
Advance Care Planning     Advance Care Planning (ACP) Physician/NP/PA (Provider) Conversation    Date of ACP Conversation: 11/24/20  Persons included in Conversation:  patient  Length of ACP Conversation in minutes: <16 minutes (Non-Billable)    Authorized Decision Maker (if patient is incapable of making informed decisions):   Named in Advance Directive or Healthcare Power of       Primary Decision Maker: [address] - [phone]    She has an advanced directive - a copy HAS NOT been provided. Reviewed DNR/DNI and patient is interested in remaining a DNR, no changes made.          Darlene Gonzalez MD

## 2020-11-25 NOTE — PROGRESS NOTES
Spoke with patient and let her know that Dr. Berenice Perez wants her to only take her Levothyroxine 6 days a week. Patient understood.

## 2020-11-25 NOTE — PROGRESS NOTES
Results released to patient via [employer]. TSH low. Has been low normal last few checks. Will decrease to 6 tabs/wk. Please call pt make sure she sees comments and new recommendations: Levothyroxine 88mcg, 1 tab daily EXCEPT on Sunday.

## 2021-01-07 DIAGNOSIS — E78.5 HYPERLIPIDEMIA WITH TARGET LDL LESS THAN 100: ICD-10-CM

## 2021-01-07 DIAGNOSIS — I10 HYPERTENSION, ESSENTIAL: ICD-10-CM

## 2021-01-07 RX ORDER — LEVOTHYROXINE SODIUM 88 UG/1
TABLET ORAL
Qty: 90 TAB | Refills: 0 | Status: SHIPPED | OUTPATIENT
Start: 2021-01-07 | End: 2021-04-07

## 2021-01-07 RX ORDER — ATORVASTATIN CALCIUM 20 MG/1
TABLET, FILM COATED ORAL
Qty: 90 TAB | Refills: 0 | Status: SHIPPED | OUTPATIENT
Start: 2021-01-07 | End: 2021-04-07

## 2021-01-07 RX ORDER — HYDROCHLOROTHIAZIDE 25 MG/1
TABLET ORAL
Qty: 90 TAB | Refills: 0 | Status: SHIPPED | OUTPATIENT
Start: 2021-01-07 | End: 2021-04-07

## 2021-01-18 DIAGNOSIS — R73.01 IMPAIRED FASTING GLUCOSE: Primary | ICD-10-CM

## 2021-02-02 DIAGNOSIS — R73.01 IMPAIRED FASTING GLUCOSE: Primary | ICD-10-CM

## 2021-02-02 RX ORDER — IBUPROFEN 200 MG
CAPSULE ORAL
Qty: 100 STRIP | Refills: 0 | Status: SHIPPED | OUTPATIENT
Start: 2021-02-02

## 2021-02-02 RX ORDER — INSULIN PUMP SYRINGE, 3 ML
EACH MISCELLANEOUS
Qty: 1 KIT | Refills: 0 | Status: SHIPPED | OUTPATIENT
Start: 2021-02-02

## 2021-02-02 RX ORDER — LANCETS 33 GAUGE
EACH MISCELLANEOUS
Qty: 100 LANCET | Refills: 0 | Status: SHIPPED | OUTPATIENT
Start: 2021-02-02

## 2021-04-07 DIAGNOSIS — I10 HYPERTENSION, ESSENTIAL: ICD-10-CM

## 2021-04-07 DIAGNOSIS — E78.5 HYPERLIPIDEMIA WITH TARGET LDL LESS THAN 100: ICD-10-CM

## 2021-04-07 RX ORDER — ATORVASTATIN CALCIUM 20 MG/1
TABLET, FILM COATED ORAL
Qty: 90 TAB | Refills: 1 | Status: SHIPPED | OUTPATIENT
Start: 2021-04-07 | End: 2021-10-05

## 2021-04-07 RX ORDER — LEVOTHYROXINE SODIUM 88 UG/1
TABLET ORAL
Qty: 90 TAB | Refills: 1 | Status: SHIPPED | OUTPATIENT
Start: 2021-04-07 | End: 2021-06-02

## 2021-04-07 RX ORDER — HYDROCHLOROTHIAZIDE 25 MG/1
TABLET ORAL
Qty: 90 TAB | Refills: 1 | Status: SHIPPED | OUTPATIENT
Start: 2021-04-07 | End: 2021-10-05

## 2021-05-24 ENCOUNTER — OFFICE VISIT (OUTPATIENT)
Dept: INTERNAL MEDICINE CLINIC | Age: 81
End: 2021-05-24
Payer: MEDICARE

## 2021-05-24 VITALS
WEIGHT: 186 LBS | HEIGHT: 64 IN | DIASTOLIC BLOOD PRESSURE: 69 MMHG | SYSTOLIC BLOOD PRESSURE: 106 MMHG | TEMPERATURE: 97.5 F | OXYGEN SATURATION: 98 % | RESPIRATION RATE: 16 BRPM | BODY MASS INDEX: 31.76 KG/M2 | HEART RATE: 69 BPM

## 2021-05-24 DIAGNOSIS — F33.42 RECURRENT MAJOR DEPRESSIVE DISORDER, IN FULL REMISSION (HCC): ICD-10-CM

## 2021-05-24 DIAGNOSIS — M06.09 SERONEGATIVE RHEUMATOID ARTHRITIS OF MULTIPLE SITES (HCC): ICD-10-CM

## 2021-05-24 DIAGNOSIS — E78.00 PURE HYPERCHOLESTEROLEMIA: ICD-10-CM

## 2021-05-24 DIAGNOSIS — I10 HYPERTENSION, ESSENTIAL: ICD-10-CM

## 2021-05-24 DIAGNOSIS — R73.01 IMPAIRED FASTING GLUCOSE: Primary | ICD-10-CM

## 2021-05-24 DIAGNOSIS — E03.9 ACQUIRED HYPOTHYROIDISM: ICD-10-CM

## 2021-05-24 PROCEDURE — 1090F PRES/ABSN URINE INCON ASSESS: CPT | Performed by: INTERNAL MEDICINE

## 2021-05-24 PROCEDURE — 1101F PT FALLS ASSESS-DOCD LE1/YR: CPT | Performed by: INTERNAL MEDICINE

## 2021-05-24 PROCEDURE — G8754 DIAS BP LESS 90: HCPCS | Performed by: INTERNAL MEDICINE

## 2021-05-24 PROCEDURE — 99214 OFFICE O/P EST MOD 30 MIN: CPT | Performed by: INTERNAL MEDICINE

## 2021-05-24 PROCEDURE — G8752 SYS BP LESS 140: HCPCS | Performed by: INTERNAL MEDICINE

## 2021-05-24 PROCEDURE — G8399 PT W/DXA RESULTS DOCUMENT: HCPCS | Performed by: INTERNAL MEDICINE

## 2021-05-24 PROCEDURE — G8417 CALC BMI ABV UP PARAM F/U: HCPCS | Performed by: INTERNAL MEDICINE

## 2021-05-24 PROCEDURE — G0463 HOSPITAL OUTPT CLINIC VISIT: HCPCS | Performed by: INTERNAL MEDICINE

## 2021-05-24 PROCEDURE — G8427 DOCREV CUR MEDS BY ELIG CLIN: HCPCS | Performed by: INTERNAL MEDICINE

## 2021-05-24 PROCEDURE — G8536 NO DOC ELDER MAL SCRN: HCPCS | Performed by: INTERNAL MEDICINE

## 2021-05-24 PROCEDURE — G9717 DOC PT DX DEP/BP F/U NT REQ: HCPCS | Performed by: INTERNAL MEDICINE

## 2021-05-24 NOTE — PROGRESS NOTES
Kalpana Hernandez is a 80 y.o. female who was seen in clinic today (5/24/2021). Assessment & Plan:   Below is the assessment and plan developed based on review of pertinent history, physical exam, labs, studies, and medications. 1. Impaired fasting glucose  Assessment & Plan:  Exact control is unclear, lab work and A1c look good, but home HBS are high. Curious how accurate home meter is as it is running ~20 pts higher then venous labs. Will check labs, will add on fructosamine, reviewed when to consider medications, continue w/ diet changes   Orders:  -     FRUCTOSAMINE; Future  -     HEMOGLOBIN A1C WITH EAG; Future  -     METABOLIC PANEL, BASIC; Future  2. Acquired hypothyroidism  Assessment & Plan:  Asymptomatic, overdue for labs since dose adjustment, no medication changes pending review of labs   Orders:  -     TSH 3RD GENERATION; Future  3. Hypertension, essential  Assessment & Plan:  well controlled, continue current treatment pending review of labs    Orders:  -     METABOLIC PANEL, BASIC; Future  4. Pure hypercholesterolemia  Assessment & Plan:  well controlled, continue current treatment pending review of labs    Orders:  -     METABOLIC PANEL, BASIC; Future  -     LIPID PANEL; Future  5. Recurrent major depressive disorder, in full remission [facility])  Assessment & Plan:  well controlled, off medications, continue to monitor, reviewed triggers to monitor for    6. Seronegative rheumatoid arthritis of multiple sites [facility])  Assessment & Plan:   monitored by specialist. No acute findings meriting change in the plan, fluctuating control    Follow-up and Dispositions    · Return in about 6 months (around 11/24/2021) for FULL PHYSICAL - 30 minutes. Subjective/Objective: Sheri Howard was seen today for Depression, Hypothyroidism, Hypertension, and Cholesterol Problem    Since last visit: no changes    Cardiovascular Review  The patient has hypertension and hyperlipidemia.   She reports taking medications as instructed, no medication side effects noted, patient does not perform home BP monitoring. She generally follows a low fat low cholesterol diet, generally follows a low sodium diet, exercises sporadically. Endocrine Review  Patient is seen for followup of hypothyroidism. She reports medication compliance: all the time and is taking separate from all other meds. She reports the following concerns/problems/med side effects: none. She is taking it every day EXCEPT on Sunday. Endocrine Review  She is seen for pre-diabetes. Diabetic diet compliance: compliant most of the time. Lab review: labs reviewed and discussed with patient. She brought in FBS diary (109-139). Most of her sugars are 120-130's. Prior to Admission medications    Medication Sig Start Date End Date Taking? Authorizing Provider   Synthroid 88 mcg tablet TAKE 1 TABLET BY MOUTH DAILY BEFORE BREAKFAST 4/7/21  Yes Hellen Kirkland MD   hydroCHLOROthiazide (HYDRODIURIL) 25 mg tablet TAKE 1 TABLET BY MOUTH EVERY DAY 4/7/21  Yes Hellen Kirkland MD   atorvastatin (LIPITOR) 20 mg tablet TAKE 1 TABLET BY MOUTH EVERY DAY IN THE EVENING 4/7/21  Yes Hellen Kirkland MD   Blood-Glucose Meter monitoring kit Pharmacist to choose preferred meter and strips. Dx: R73.01 2/2/21  Yes Hellen Kirkland MD   lancets (One Touch Delica) 33 gauge misc Test your sugar three times a week fasting. Dx: R73.01 2/2/21  Yes Hellen Kirkland MD   glucose blood VI test strips (blood glucose test) strip Pharmacist to choose preferred meter and strips. Dx: R73.01 2/2/21  Yes Hellen Kirkland MD   guaifenesin (MUCINEX PO) Take  by mouth two (2) times a day. Yes Provider, Historical   methotrexate (RHEUMATREX) 2.5 mg tablet Take 8 Tabs by mouth Every Saturday. 11/28/20  Yes Lai Lei MD   folic acid (FOLVITE) 1 mg tablet Take 1 Tab by mouth daily.  10/28/20  Yes Lai Lei MD   mirtazapine (REMERON) 15 mg tablet TAKE 1 TABLET BY MOUTH EVERY NIGHT  Patient taking differently: 7.5 mg. 7/30/20  Yes Jason Kemp MD   b complex-vitamin c-folic acid (NEPHROCAPS) 1 mg capsule DAILY AM for SUPPLEMENT   Yes Provider, Historical   acetaminophen (TYLENOL) 500 mg tablet 1,000 mg every six (6) hours as needed. Yes Provider, Historical   simethicone (GAS-X) 125 mg capsule Take 125 mg by mouth four (4) times daily as needed for Flatulence. Yes Provider, Historical   calcium citrate/vitamin D3 (CALCIUM CITRATE + D PO) Take 1 Tab by mouth as needed. Yes Other, MD Carlo   Cetirizine (ZYRTEC) 10 mg cap Take  by mouth every evening. Yes Provider, Historical   cholecalciferol (VITAMIN D3) 1,000 unit tablet Take  by mouth daily. Yes Provider, Historical        Review of Systems   Constitutional: Positive for malaise/fatigue. Negative for weight loss. Respiratory: Negative for cough and shortness of breath. Cardiovascular: Negative for chest pain, palpitations and leg swelling. Gastrointestinal: Negative for abdominal pain, constipation, diarrhea, heartburn, nausea and vomiting. Musculoskeletal: Positive for joint pain. Negative for myalgias. Skin: Negative for rash. Neurological: Negative for dizziness and headaches. Psychiatric/Behavioral: Negative for depression. The patient is not nervous/anxious and does not have insomnia. Physical Exam  Constitutional:       General: She is not in acute distress. Appearance: Normal appearance. Eyes:      Conjunctiva/sclera: Conjunctivae normal.   Neck:      Thyroid: No thyromegaly. Cardiovascular:      Rate and Rhythm: Regular rhythm. Heart sounds: No murmur heard. Pulmonary:      Effort: Pulmonary effort is normal.      Breath sounds: Normal breath sounds. No decreased breath sounds or wheezing. Abdominal:      General: Bowel sounds are normal.      Palpations: Abdomen is soft. Tenderness: There is no abdominal tenderness.    Musculoskeletal:      Right lower leg: No edema. Left lower leg: No edema.    Psychiatric:         Mood and Affect: Mood and affect normal.         Visit Vitals  /69   Pulse 69   Temp 97.5 °F (36.4 °C) (Temporal)   Resp 16   Ht 5' 3.6\" (1.615 m)   Wt 186 lb (84.4 kg)   SpO2 98%   BMI 32.33 kg/m²         Patricia Bourgeois MD

## 2021-05-25 NOTE — ASSESSMENT & PLAN NOTE
Exact control is unclear, lab work and A1c look good, but home HBS are high. Curious how accurate home meter is as it is running ~20 pts higher then venous labs.   Will check labs, will add on fructosamine, reviewed when to consider medications, continue w/ diet changes

## 2021-06-02 RX ORDER — LEVOTHYROXINE SODIUM 75 UG/1
TABLET ORAL
Qty: 90 TABLET | Refills: 0 | Status: SHIPPED | OUTPATIENT
Start: 2021-06-02 | End: 2021-08-22

## 2021-06-18 ENCOUNTER — HOSPITAL ENCOUNTER (EMERGENCY)
Age: 81
Discharge: HOME | End: 2021-06-18
Attending: EMERGENCY MEDICINE
Payer: MEDICARE

## 2021-06-18 ENCOUNTER — HOSPITAL ENCOUNTER (EMERGENCY)
Dept: MRI IMAGING | Age: 81
Discharge: HOME | End: 2021-06-18
Attending: EMERGENCY MEDICINE
Payer: MEDICARE

## 2021-06-18 ENCOUNTER — APPOINTMENT (OUTPATIENT)
Dept: CT IMAGING | Age: 81
End: 2021-06-18
Attending: EMERGENCY MEDICINE
Payer: MEDICARE

## 2021-06-18 VITALS
RESPIRATION RATE: 16 BRPM | BODY MASS INDEX: 31.54 KG/M2 | WEIGHT: 181.44 LBS | TEMPERATURE: 98.2 F | HEART RATE: 77 BPM | OXYGEN SATURATION: 99 % | DIASTOLIC BLOOD PRESSURE: 79 MMHG | SYSTOLIC BLOOD PRESSURE: 123 MMHG

## 2021-06-18 DIAGNOSIS — R42 DIZZINESS: Primary | ICD-10-CM

## 2021-06-18 DIAGNOSIS — R42 VERTIGO: ICD-10-CM

## 2021-06-18 LAB
ALBUMIN SERPL-MCNC: 4 G/DL (ref 3.5–5)
ALBUMIN/GLOB SERPL: 1.4 {RATIO} (ref 1.1–2.2)
ALP SERPL-CCNC: 61 U/L (ref 45–117)
ALT SERPL-CCNC: 33 U/L (ref 12–78)
ANION GAP SERPL CALC-SCNC: 9 MMOL/L (ref 5–15)
AST SERPL-CCNC: 23 U/L (ref 15–37)
ATRIAL RATE: 71 BPM
BASOPHILS # BLD: 0 K/UL (ref 0–0.1)
BASOPHILS NFR BLD: 1 % (ref 0–1)
BILIRUB SERPL-MCNC: 0.5 MG/DL (ref 0.2–1)
BUN SERPL-MCNC: 20 MG/DL (ref 6–20)
BUN/CREAT SERPL: 19 (ref 12–20)
CALCIUM SERPL-MCNC: 9.8 MG/DL (ref 8.5–10.1)
CALCULATED P AXIS, ECG09: 67 DEGREES
CALCULATED R AXIS, ECG10: -37 DEGREES
CALCULATED T AXIS, ECG11: 84 DEGREES
CHLORIDE SERPL-SCNC: 101 MMOL/L (ref 97–108)
CO2 SERPL-SCNC: 28 MMOL/L (ref 21–32)
CREAT SERPL-MCNC: 1.03 MG/DL (ref 0.55–1.02)
DIAGNOSIS, 93000: NORMAL
DIFFERENTIAL METHOD BLD: ABNORMAL
EOSINOPHIL # BLD: 0.1 K/UL (ref 0–0.4)
EOSINOPHIL NFR BLD: 1 % (ref 0–7)
ERYTHROCYTE [DISTWIDTH] IN BLOOD BY AUTOMATED COUNT: 14.6 % (ref 11.5–14.5)
GLOBULIN SER CALC-MCNC: 2.9 G/DL (ref 2–4)
GLUCOSE SERPL-MCNC: 119 MG/DL (ref 65–100)
HCT VFR BLD AUTO: 39.8 % (ref 35–47)
HGB BLD-MCNC: 12.7 G/DL (ref 11.5–16)
IMM GRANULOCYTES # BLD AUTO: 0 K/UL (ref 0–0.04)
IMM GRANULOCYTES NFR BLD AUTO: 1 % (ref 0–0.5)
LYMPHOCYTES # BLD: 1.1 K/UL (ref 0.8–3.5)
LYMPHOCYTES NFR BLD: 16 % (ref 12–49)
MCH RBC QN AUTO: 31.5 PG (ref 26–34)
MCHC RBC AUTO-ENTMCNC: 31.9 G/DL (ref 30–36.5)
MCV RBC AUTO: 98.8 FL (ref 80–99)
MONOCYTES # BLD: 0.5 K/UL (ref 0–1)
MONOCYTES NFR BLD: 8 % (ref 5–13)
NEUTS SEG # BLD: 4.8 K/UL (ref 1.8–8)
NEUTS SEG NFR BLD: 73 % (ref 32–75)
NRBC # BLD: 0 K/UL (ref 0–0.01)
NRBC BLD-RTO: 0 PER 100 WBC
P-R INTERVAL, ECG05: 140 MS
PLATELET # BLD AUTO: 206 K/UL (ref 150–400)
PMV BLD AUTO: 10.6 FL (ref 8.9–12.9)
POTASSIUM SERPL-SCNC: 3.2 MMOL/L (ref 3.5–5.1)
PROT SERPL-MCNC: 6.9 G/DL (ref 6.4–8.2)
Q-T INTERVAL, ECG07: 442 MS
QRS DURATION, ECG06: 154 MS
QTC CALCULATION (BEZET), ECG08: 480 MS
RBC # BLD AUTO: 4.03 M/UL (ref 3.8–5.2)
SODIUM SERPL-SCNC: 138 MMOL/L (ref 136–145)
TROPONIN I SERPL-MCNC: 0.06 NG/ML
VENTRICULAR RATE, ECG03: 71 BPM
WBC # BLD AUTO: 6.5 K/UL (ref 3.6–11)

## 2021-06-18 PROCEDURE — 70551 MRI BRAIN STEM W/O DYE: CPT

## 2021-06-18 PROCEDURE — 74011000636 HC RX REV CODE- 636: Performed by: EMERGENCY MEDICINE

## 2021-06-18 PROCEDURE — 74011250636 HC RX REV CODE- 250/636: Performed by: EMERGENCY MEDICINE

## 2021-06-18 PROCEDURE — 70496 CT ANGIOGRAPHY HEAD: CPT

## 2021-06-18 PROCEDURE — 85025 COMPLETE CBC W/AUTO DIFF WBC: CPT

## 2021-06-18 PROCEDURE — 80053 COMPREHEN METABOLIC PANEL: CPT

## 2021-06-18 PROCEDURE — 84484 ASSAY OF TROPONIN QUANT: CPT

## 2021-06-18 PROCEDURE — 70450 CT HEAD/BRAIN W/O DYE: CPT

## 2021-06-18 PROCEDURE — 36415 COLL VENOUS BLD VENIPUNCTURE: CPT

## 2021-06-18 PROCEDURE — 93005 ELECTROCARDIOGRAM TRACING: CPT

## 2021-06-18 PROCEDURE — 99285 EMERGENCY DEPT VISIT HI MDM: CPT

## 2021-06-18 RX ORDER — MECLIZINE HYDROCHLORIDE 25 MG/1
25 TABLET ORAL
Qty: 60 TABLET | Refills: 0 | Status: SHIPPED | OUTPATIENT
Start: 2021-06-18 | End: 2021-11-29

## 2021-06-18 RX ORDER — MECLIZINE HYDROCHLORIDE 25 MG/1
25 TABLET ORAL
Qty: 60 TABLET | Refills: 0 | Status: SHIPPED | OUTPATIENT
Start: 2021-06-18 | End: 2021-06-18 | Stop reason: SDUPTHER

## 2021-06-18 RX ORDER — MECLIZINE HCL 12.5 MG 12.5 MG/1
25 TABLET ORAL
Status: COMPLETED | OUTPATIENT
Start: 2021-06-18 | End: 2021-06-18

## 2021-06-18 RX ADMIN — IOPAMIDOL 100 ML: 755 INJECTION, SOLUTION INTRAVENOUS at 19:18

## 2021-06-18 RX ADMIN — SODIUM CHLORIDE 1000 ML: 9 INJECTION, SOLUTION INTRAVENOUS at 18:02

## 2021-06-18 RX ADMIN — MECLIZINE 25 MG: 12.5 TABLET ORAL at 18:02

## 2021-06-18 NOTE — ED TRIAGE NOTES
Triage Note: Patient reports waking up yesterday feeling lightheaded and dizzy. Patient reports symptoms are intermittent. + increased belching. Patient concerned because she has a bundle branch block. Denies chest pain or SOB. Steady gait noted from waiting room.

## 2021-06-18 NOTE — ED PROVIDER NOTES
72-year-old female presents from home via private vehicle complaining of dizziness and belching in the morning. She first woke up with dizziness yesterday morning. She described it as a vertigo sensation with room offkilter some balance problems. The symptoms would come and go with no clear exacerbating or alleviating factors. She is taken no medications for the symptoms she denies any focal weakness or numbness. No nausea or vomiting. She is had no headaches, slurred speech, visual changes. She never had symptoms like this before. No history of stroke. She also notes that for the past several days she has woken up with increased belching. She denies any chest pain or shortness of breath. She not take any medications for the symptoms. No history of coronary artery disease. She does have history of a left bundle branch block and she is concerned so she wanted to make sure her heart was okay.            Past Medical History:   Diagnosis Date    Allergic rhinitis     Anesthesia complication     Pt reports dizziness after anesthesia     Arrhythmia     Left Bundle Branch Block    Arthritis     BBB (bundle branch block)     Depression     History of not currently    GERD (gastroesophageal reflux disease)     Hyperlipidemia     Impaired fasting glucose     Unspecified essential hypertension     Unspecified hypothyroidism     Uterine prolapse     Vertigo        Past Surgical History:   Procedure Laterality Date    HX APPENDECTOMY  1987    HX BREAST BIOPSY Bilateral     >5, all benign    HX BREAST BIOPSY Left 11/29/2017    benign    HX COLONOSCOPY  8/15/12    diverticulosis    HX ENDOSCOPY  8/15/12    hiatal hernia    HX HAMMER TOE REPAIR Left     w/ bunion surgery (per previous PCP)    HX KNEE REPLACEMENT Left 10/22/2019    [place]    HX PARTIAL HYSTERECTOMY      HX TONSILLECTOMY           Family History:   Problem Relation Age of Onset    Thyroid Disease Father         hypo    Coronary Artery Disease Father         s/p CABG    Heart Disease Father         s/p valve replacement    Diabetes Brother     Thyroid Disease Brother     Cancer Brother         kidney    Coronary Artery Disease Brother         s/p CABG    Dementia Mother         vascular    Elevated Lipids Mother     Coronary Artery Disease Mother     Arthritis-osteo Mother     Diabetes Mother    Zahira Annieulich Bladder Disease Mother     Heart Disease Mother     Hypertension Mother     Stroke Mother     Obesity Son     Hypertension Son     No Known Problems Son     Diabetes Maternal Grandmother     Cancer Maternal Grandmother         pancreatic    Diabetes Paternal Grandmother     Diabetes Maternal Aunt     Gall Bladder Disease Maternal Aunt     Hypertension Maternal Aunt     Diabetes Paternal Aunt     Hypertension Maternal Aunt        Social History     Socioeconomic History    Marital status:      Spouse name: Not on file    Number of children: Not on file    Years of education: Not on file    Highest education level: Not on file   Occupational History    Not on file   Tobacco Use    Smoking status: Never Smoker    Smokeless tobacco: Never Used   Vaping Use    Vaping Use: Never used   Substance and Sexual Activity    Alcohol use: No    Drug use: No    Sexual activity: Not Currently     Partners: Male   Other Topics Concern     Service Not Asked    Blood Transfusions Not Asked    Caffeine Concern Not Asked    Occupational Exposure Not Asked    Hobby Hazards Not Asked    Sleep Concern Not Asked    Stress Concern Not Asked    Weight Concern Not Asked    Special Diet Not Asked    Back Care Not Asked    Exercise Not Asked    Bike Helmet Not Asked    Seat Belt Not Asked    Self-Exams Not Asked   Social History Narrative    Lives in [facility] with  of 48 years. Has 2 sons. Retired. Used to work as a  for [employer] mostly at the middle school level. Likes to read. Social Determinants of Health     Financial Resource Strain:     Difficulty of Paying Living Expenses:    Food Insecurity:     Worried About Running Out of Food in the Last Year:     [address] in the Last Year:    Transportation Needs:     Lack of Transportation (Medical):  Lack of Transportation (Non-Medical):    Physical Activity:     Days of Exercise per Week:     Minutes of Exercise per Session:    Stress:     Feeling of Stress :    Social Connections:     Frequency of Communication with Friends and Family:     Frequency of Social Gatherings with Friends and Family:     Attends Nondenominational Services:     Active Member of Clubs or Organizations:     Attends Club or Organization Meetings:     Marital Status:    Intimate Partner Violence:     Fear of Current or Ex-Partner:     Emotionally Abused:     Physically Abused:     Sexually Abused: ALLERGIES: Amlodipine, Codeine, Epinephrine, Erythromycin, Losartan, Mobic [meloxicam], and Pcn [penicillins]    Review of Systems   Constitutional: Negative for fever. HENT: Negative for facial swelling. Eyes: Negative for visual disturbance. Respiratory: Negative for chest tightness. Cardiovascular: Negative for chest pain. Gastrointestinal: Negative for abdominal pain. Genitourinary: Negative for difficulty urinating and dysuria. Musculoskeletal: Negative for arthralgias. Skin: Negative for rash. Neurological: Negative for headaches. Hematological: Negative for adenopathy. Psychiatric/Behavioral: Negative for suicidal ideas. Vitals:    06/18/21 1712   BP: (!) 138/91   Pulse: 71   Resp: 16   Temp: 98.1 °F (36.7 °C)   SpO2: 97%   Weight: 82.3 kg (181 lb 7 oz)            Physical Exam  Vitals and nursing note reviewed. Constitutional:       General: She is not in acute distress. Appearance: She is well-developed. HENT:      Head: Normocephalic and atraumatic.    Eyes:      General: No scleral icterus. Conjunctiva/sclera: Conjunctivae normal.      Pupils: Pupils are equal, round, and reactive to light. Cardiovascular:      Rate and Rhythm: Normal rate. Heart sounds: No murmur heard. Pulmonary:      Effort: Pulmonary effort is normal. No respiratory distress. Abdominal:      General: There is no distension. Musculoskeletal:         General: Normal range of motion. Cervical back: Normal range of motion and neck supple. Skin:     General: Skin is warm and dry. Findings: No rash. Neurological:      Mental Status: She is alert and oriented to person, place, and time. Comments:   Mental Status:  Oriented to time, place and person. Cranial Nerves: Intact visual fields. Fundi are benign. PERRL, EOM's full and intact, no nystagmus, no ptosis. Facial sensation is normal. Facial movement is symmetric. Palate is midline with normal sternocleidomastoid and trapezius muscle strength. Tongue is midline. Motor:  5/5 strength in upper and lower proximal and distal muscles. Normal bulk and tone. Reflexes:  Biceps and quadriceps tendon reflexes 2+ and symmetrical.     Sensory:  Normal to light touch    Gait:   deferred. Cerebellar:  Normal finger-to-nose and heal to shin. Negative Romberg. MDM  Number of Diagnoses or Management Options  Diagnosis management comments: Assessment: Her symptoms of dizziness are most consistent with vertigo. She is resting comfortably at this time with normal vital signs. Her neurologic exam is otherwise unremarkable with no focal findings. I do not think that she has had a stroke at this point it is possible she could have some pressure dependent lesion or stenosis. I think is reasonable to get a CAT scan and a CTA of her head and neck to rule out any vascular etiology. Ultimately, I think is most likely peripheral vertigo and will start her treatment with meclizine.   EKG was unremarkable except for the known left bundle branch block. Her belching could be due to to reflux disease or gas. If her cardiac enzymes are normal, I think is reasonable to try her on an antacid medication to be taken at bedtime to see if this helps with her morning belching. Amount and/or Complexity of Data Reviewed  Clinical lab tests: reviewed  Tests in the radiology section of CPT®: reviewed  Tests in the medicine section of CPT®: reviewed      ED Course as of Jun 18 1748 Fri Jun 18, 2021   1722 ED EKG interpretation:  Rhythm: normal sinus rhythm. Rate (approx.): 71. Axis: left. ST segment:  No concerning ST elevations or depressions. LBBB. This EKG was interpreted by Cynthia Nava MD,ED Provider. [JM]      ED Course User Index  [JM] Zulema Jackson MD     7:07 PM  Change of shift. Care of patient signed over to Dr. Tyler Canchola. Bedside handoff complete. Awaiting CTA head/neck and repeat assessment.      Procedures

## 2021-06-19 NOTE — ED NOTES
Dr. Linette Alcazar reviewed discharge instructions with the patient. The patient verbalized understanding. Patient ambulated out of the emergency department without assistance. Patient is free of dizziness. Patient is in no apparent distress.

## 2021-06-19 NOTE — ED NOTES
07:14 PM  Change of shift. Care of patient taken over from Dr. Pau Wall; H&P reviewed, bedside handoff complete. Awaiting CT and CT result and reevaluation including MRI result. Progress Note:   Pt has been reexamined by Jaspreet Heath MD. Pt is feeling much better. Symptoms have improved. All available results have been reviewed with pt and any available family. Pt understands sx, dx, and tx in ED. Care plan has been outlined and questions have been answered. Pt is ready to go home. Will send home on dizziness/vertigo instruction. Prescription of meclizine. . Outpatient referral with PCP/neurology for reevaluation and further treatment as needed. Written by Jaspreet Heath MD,9:14 PM    .   .

## 2021-06-30 ENCOUNTER — OFFICE VISIT (OUTPATIENT)
Dept: INTERNAL MEDICINE CLINIC | Age: 81
End: 2021-06-30
Payer: MEDICARE

## 2021-06-30 VITALS
HEIGHT: 64 IN | BODY MASS INDEX: 30.73 KG/M2 | TEMPERATURE: 98.4 F | WEIGHT: 180 LBS | RESPIRATION RATE: 18 BRPM | OXYGEN SATURATION: 97 % | HEART RATE: 74 BPM | SYSTOLIC BLOOD PRESSURE: 103 MMHG | DIASTOLIC BLOOD PRESSURE: 62 MMHG

## 2021-06-30 DIAGNOSIS — R42 DIZZINESS: Primary | ICD-10-CM

## 2021-06-30 DIAGNOSIS — E87.6 HYPOKALEMIA: ICD-10-CM

## 2021-06-30 DIAGNOSIS — R14.2 BELCHING: ICD-10-CM

## 2021-06-30 DIAGNOSIS — Z09 HOSPITAL DISCHARGE FOLLOW-UP: ICD-10-CM

## 2021-06-30 PROCEDURE — G8417 CALC BMI ABV UP PARAM F/U: HCPCS | Performed by: INTERNAL MEDICINE

## 2021-06-30 PROCEDURE — G8427 DOCREV CUR MEDS BY ELIG CLIN: HCPCS | Performed by: INTERNAL MEDICINE

## 2021-06-30 PROCEDURE — 1111F DSCHRG MED/CURRENT MED MERGE: CPT | Performed by: INTERNAL MEDICINE

## 2021-06-30 PROCEDURE — 1101F PT FALLS ASSESS-DOCD LE1/YR: CPT | Performed by: INTERNAL MEDICINE

## 2021-06-30 PROCEDURE — G8536 NO DOC ELDER MAL SCRN: HCPCS | Performed by: INTERNAL MEDICINE

## 2021-06-30 PROCEDURE — G8754 DIAS BP LESS 90: HCPCS | Performed by: INTERNAL MEDICINE

## 2021-06-30 PROCEDURE — G8399 PT W/DXA RESULTS DOCUMENT: HCPCS | Performed by: INTERNAL MEDICINE

## 2021-06-30 PROCEDURE — G9717 DOC PT DX DEP/BP F/U NT REQ: HCPCS | Performed by: INTERNAL MEDICINE

## 2021-06-30 PROCEDURE — 1090F PRES/ABSN URINE INCON ASSESS: CPT | Performed by: INTERNAL MEDICINE

## 2021-06-30 PROCEDURE — 99213 OFFICE O/P EST LOW 20 MIN: CPT | Performed by: INTERNAL MEDICINE

## 2021-06-30 PROCEDURE — G0463 HOSPITAL OUTPT CLINIC VISIT: HCPCS | Performed by: INTERNAL MEDICINE

## 2021-06-30 PROCEDURE — G8752 SYS BP LESS 140: HCPCS | Performed by: INTERNAL MEDICINE

## 2021-06-30 RX ORDER — CHLORHEXIDINE GLUCONATE 1.2 MG/ML
RINSE ORAL
COMMUNITY
Start: 2021-06-01 | End: 2021-11-29

## 2021-06-30 NOTE — PROGRESS NOTES
Irena Hughes is a 80 y.o. female who was seen in clinic today (6/30/2021). Assessment & Plan:   Below is the assessment and plan developed based on review of pertinent history, physical exam, labs, studies, and medications. 1. Dizziness  Comments:  new dx, resolved, agree likely peripheral, reviewed w/u with her, can stop Meclizine and monitor. Epley exercises provided in case it returns  2. Belching  Comments:  new dx, differential reviewed, unclear, continue with diet changes, continue w/ current PPI, will monitor, red flags reviewed  3. Hypokalemia  Comments:  new in the ER, not sure related, has been on HCTZ for years, will defer repeat labs at this time  4. Hospital discharge follow-up  -     UT DISCHARGE MEDS RECONCILED W/ CURRENT OUTPATIENT MED LIST    Follow-up and Dispositions    · Return if symptoms worsen or fail to improve. Subjective/Objective: Raysa Funez was seen today for Hospital Follow Up    Follow Up  Irena Hughes is seen for follow up from recent ED visit to [facility] ER on 6/18. We reviewed the records. She presented with dizziness, started on 6/17 when she woke up. She reports on retrospect was under a lot of stress. Her brother passed on the 2nd and buried on the 12th. She took the meclizine a few times as directed & without any side effects. She reports symptoms are resolved. She also complained about increase in belching. Notes mentioned starting her on an antacid but nothing was prescribed. She is still having this but it is improved. She has started a gluten free diet. Also noticed a possible correlation with having a BM. She has stopped Metamucil and fiber. She denies reflux, abd pain, nausea, vomiting, diarrhea, or constipation. Prior to Admission medications    Medication Sig Start Date End Date Taking?  Authorizing Provider   chlorhexidine (PERIDEX) 0.12 % solution  6/1/21  Yes Provider, Historical   meclizine (ANTIVERT) 25 mg tablet Take 1 Tablet by mouth three (3) times daily as needed for Dizziness or Nausea. 6/18/21  Yes Tessy Tompkins MD   Synthroid 75 mcg tablet 1 tab daily EXCEPT on Sunday 6/2/21  Yes Jose Alejandro Calvin MD   hydroCHLOROthiazide (HYDRODIURIL) 25 mg tablet TAKE 1 TABLET BY MOUTH EVERY DAY 4/7/21  Yes Jose Alejandro Calvin MD   atorvastatin (LIPITOR) 20 mg tablet TAKE 1 TABLET BY MOUTH EVERY DAY IN THE EVENING 4/7/21  Yes Jose Alejandro Calvin MD   Blood-Glucose Meter monitoring kit Pharmacist to choose preferred meter and strips. Dx: R73.01 2/2/21  Yes Jose Alejandro Calvin MD   lancets (One Touch Delica) 33 gauge misc Test your sugar three times a week fasting. Dx: R73.01 2/2/21  Yes Jose Alejandro Calvin MD   glucose blood VI test strips (blood glucose test) strip Pharmacist to choose preferred meter and strips. Dx: R73.01 2/2/21  Yes Jose Alejandro Calvin MD   guaifenesin (MUCINEX PO) Take  by mouth two (2) times a day. Yes Provider, Historical   methotrexate (RHEUMATREX) 2.5 mg tablet Take 8 Tabs by mouth Every Saturday. 11/28/20  Yes Dajuan Mathew MD   folic acid (FOLVITE) 1 mg tablet Take 1 Tab by mouth daily. 10/28/20  Yes Dajuan Mathew MD   mirtazapine (REMERON) 15 mg tablet TAKE 1 TABLET BY MOUTH EVERY NIGHT  Patient taking differently: 7.5 mg. 7/30/20  Yes Jose Alejandro Calvin MD   b complex-vitamin c-folic acid (NEPHROCAPS) 1 mg capsule DAILY AM for SUPPLEMENT   Yes Provider, Historical   acetaminophen (TYLENOL) 500 mg tablet 1,000 mg every six (6) hours as needed. Yes Provider, Historical   simethicone (GAS-X) 125 mg capsule Take 125 mg by mouth four (4) times daily as needed for Flatulence. Yes Provider, Historical   calcium citrate/vitamin D3 (CALCIUM CITRATE + D PO) Take 1 Tab by mouth as needed. Yes Other, MD Carlo   Cetirizine (ZYRTEC) 10 mg cap Take  by mouth every evening. Yes Provider, Historical   cholecalciferol (VITAMIN D3) 1,000 unit tablet Take  by mouth daily.    Yes Provider, Historical Physical Exam  Constitutional:       Appearance: Normal appearance. Cardiovascular:      Rate and Rhythm: Regular rhythm. Heart sounds: Normal heart sounds. No murmur heard. Pulmonary:      Effort: Pulmonary effort is normal.      Breath sounds: Normal breath sounds. No wheezing or rales. Abdominal:      General: Bowel sounds are normal.      Palpations: There is no mass. Tenderness: There is no abdominal tenderness. Neurological:      Cranial Nerves: Cranial nerves are intact.       Gait: Gait normal.         Visit Vitals  /62   Pulse 74   Temp 98.4 °F (36.9 °C) (Temporal)   Resp 18   Ht 5' 3.5\" (1.613 m)   Wt 180 lb (81.6 kg)   SpO2 97%   BMI 31.39 kg/m²         Iram Ramesh MD

## 2021-06-30 NOTE — PATIENT INSTRUCTIONS
Epley Maneuver for Vertigo: Exercises  Introduction  The Epley maneuver is a series of movements your doctor may use to treat your vertigo. Here are the steps for the exercises. Your doctor or physical therapist will guide you through the movements. A single 10- to 15-minute session often is all that's needed. Crystal debris (canaliths) cause the vertigo. When your head is moved into different positions, the debris moves freely. This may cause your symptoms to stop. How to do the exercises  Step 1   1. You will sit on the doctor's exam table. Your legs will be out in front of you. The doctor or physical therapist will turn your head so that it is prison between looking straight ahead and looking to the side that causes the worst vertigo. 2. Without changing your head position, he or she will guide you back quickly. Your shoulders will be on the table. Your head will hang over the edge of the table. At this point, the side of your head that is causing the worst vertigo will face the floor. You'll stay in this position for 30 seconds or until your symptoms stop. Step 2   1. Then, the doctor or physical therapist will turn your head to the other side. You don't need to lift your head. The other side of your head will face the floor. You will stay in this position for 30 seconds or until your symptoms stop. Step 3   1. The doctor or physical therapist will help you roll your body in the same direction that your head is facing. You will lie on your side. (For example, if you are looking to your right, you will roll onto your right side.) The side that causes the worst symptoms should be facing up. You'll stay in this position for another 30 seconds or until your symptoms stop. Step 4   1. The doctor or physical therapist will then help you to sit back up. Your legs will hang off the table on the same side that you were facing. Follow-up care is a key part of your treatment and safety.  Be sure to make and go to all appointments, and call your doctor if you are having problems. It's also a good idea to know your test results and keep a list of the medicines you take. Where can you learn more? Go to http://www.gray.com/  Enter A855 in the search box to learn more about \"Epley Maneuver for Vertigo: Exercises. \"  Current as of: December 2, 2020               Content Version: 12.8  © 2006-2021 [employer]. Care instructions adapted under license by [employer] (which disclaims liability or warranty for this information). If you have questions about a medical condition or this instruction, always ask your healthcare professional. [address] any warranty or liability for your use of this information.

## 2021-07-06 DIAGNOSIS — F51.01 PRIMARY INSOMNIA: ICD-10-CM

## 2021-07-08 RX ORDER — MIRTAZAPINE 15 MG/1
TABLET, FILM COATED ORAL
Qty: 30 TABLET | Refills: 0 | Status: SHIPPED | OUTPATIENT
Start: 2021-07-08 | End: 2021-11-29 | Stop reason: SDUPTHER

## 2021-08-09 LAB — CREATININE, EXTERNAL: 0.8

## 2021-08-21 DIAGNOSIS — E03.9 ACQUIRED HYPOTHYROIDISM: Primary | ICD-10-CM

## 2021-08-22 RX ORDER — LEVOTHYROXINE SODIUM 75 UG/1
TABLET ORAL
Qty: 90 TABLET | Refills: 0 | Status: SHIPPED | OUTPATIENT
Start: 2021-08-22 | End: 2021-11-19

## 2021-08-22 NOTE — TELEPHONE ENCOUNTER
Please call patient. Due for repeat labs since dose adjustment. TSH ordered. Does not need to be fasting. Ordered thru [facility].

## 2021-10-04 DIAGNOSIS — I10 HYPERTENSION, ESSENTIAL: ICD-10-CM

## 2021-10-04 DIAGNOSIS — E78.5 HYPERLIPIDEMIA WITH TARGET LDL LESS THAN 100: ICD-10-CM

## 2021-10-05 RX ORDER — ATORVASTATIN CALCIUM 20 MG/1
TABLET, FILM COATED ORAL
Qty: 90 TABLET | Refills: 1 | Status: SHIPPED | OUTPATIENT
Start: 2021-10-05 | End: 2022-04-04

## 2021-10-05 RX ORDER — HYDROCHLOROTHIAZIDE 25 MG/1
TABLET ORAL
Qty: 90 TABLET | Refills: 1 | Status: SHIPPED | OUTPATIENT
Start: 2021-10-05 | End: 2022-04-04

## 2021-11-19 DIAGNOSIS — E03.9 ACQUIRED HYPOTHYROIDISM: ICD-10-CM

## 2021-11-19 RX ORDER — LEVOTHYROXINE SODIUM 75 UG/1
TABLET ORAL
Qty: 90 TABLET | Refills: 1 | Status: SHIPPED | OUTPATIENT
Start: 2021-11-19 | End: 2022-05-19

## 2021-11-22 ENCOUNTER — TELEPHONE (OUTPATIENT)
Dept: INTERNAL MEDICINE CLINIC | Age: 81
End: 2021-11-22

## 2021-11-22 NOTE — TELEPHONE ENCOUNTER
----- Message from Jena Turner sent at 11/22/2021 11:25 AM EST -----  Subject: Referral Request    QUESTIONS   Reason for referral request? opthamologist   Has the physician seen you for this condition before? No   Preferred Specialist (if applicable)? Jing Hunt  Do you already have an appointment scheduled? No  Additional Information for Provider?   ---------------------------------------------------------------------------  --------------  CALL BACK INFO  What is the best way for the office to contact you? OK to leave message on   voicemail  Preferred Call Back Phone Number?  [phone]

## 2021-11-22 NOTE — TELEPHONE ENCOUNTER
----- Message from Peter Lanza sent at 11/22/2021 11:25 AM EST -----  Subject: Referral Request    QUESTIONS   Reason for referral request? opthamologist   Has the physician seen you for this condition before? No   Preferred Specialist (if applicable)? Blas Davis  Do you already have an appointment scheduled? No  Additional Information for Provider?   ---------------------------------------------------------------------------  --------------  CALL BACK INFO  What is the best way for the office to contact you? OK to leave message on   voicemail  Preferred Call Back Phone Number?  [phone]

## 2021-11-23 DIAGNOSIS — Z01.00 EYE EXAM, ROUTINE: Primary | ICD-10-CM

## 2021-11-23 NOTE — TELEPHONE ENCOUNTER
CT patient, advised need diagnosis to place referral.  Placed to Dr. Layla Patton, routine eye exam.  She will  tomorrow. Requesting one for her  also.

## 2021-11-29 ENCOUNTER — OFFICE VISIT (OUTPATIENT)
Dept: INTERNAL MEDICINE CLINIC | Age: 81
End: 2021-11-29
Payer: MEDICARE

## 2021-11-29 VITALS
HEIGHT: 64 IN | OXYGEN SATURATION: 98 % | TEMPERATURE: 98 F | BODY MASS INDEX: 31.92 KG/M2 | SYSTOLIC BLOOD PRESSURE: 126 MMHG | HEART RATE: 62 BPM | RESPIRATION RATE: 20 BRPM | DIASTOLIC BLOOD PRESSURE: 75 MMHG | WEIGHT: 187 LBS

## 2021-11-29 DIAGNOSIS — E78.00 PURE HYPERCHOLESTEROLEMIA: ICD-10-CM

## 2021-11-29 DIAGNOSIS — R73.01 IMPAIRED FASTING GLUCOSE: ICD-10-CM

## 2021-11-29 DIAGNOSIS — E03.9 ACQUIRED HYPOTHYROIDISM: ICD-10-CM

## 2021-11-29 DIAGNOSIS — Z71.89 ADVANCED CARE PLANNING/COUNSELING DISCUSSION: ICD-10-CM

## 2021-11-29 DIAGNOSIS — F51.01 PRIMARY INSOMNIA: ICD-10-CM

## 2021-11-29 DIAGNOSIS — I10 HYPERTENSION, ESSENTIAL: ICD-10-CM

## 2021-11-29 DIAGNOSIS — M85.89 OSTEOPENIA OF MULTIPLE SITES: ICD-10-CM

## 2021-11-29 DIAGNOSIS — Z00.00 MEDICARE ANNUAL WELLNESS VISIT, SUBSEQUENT: Primary | ICD-10-CM

## 2021-11-29 LAB
ANION GAP SERPL CALC-SCNC: 5 MMOL/L (ref 5–15)
BUN SERPL-MCNC: 17 MG/DL (ref 6–20)
BUN/CREAT SERPL: 21 (ref 12–20)
CALCIUM SERPL-MCNC: 10.2 MG/DL (ref 8.5–10.1)
CHLORIDE SERPL-SCNC: 104 MMOL/L (ref 97–108)
CO2 SERPL-SCNC: 29 MMOL/L (ref 21–32)
CREAT SERPL-MCNC: 0.8 MG/DL (ref 0.55–1.02)
GLUCOSE SERPL-MCNC: 101 MG/DL (ref 65–100)
POTASSIUM SERPL-SCNC: 3.7 MMOL/L (ref 3.5–5.1)
SODIUM SERPL-SCNC: 138 MMOL/L (ref 136–145)
TSH SERPL DL<=0.05 MIU/L-ACNC: 3.4 UIU/ML (ref 0.36–3.74)

## 2021-11-29 PROCEDURE — 1101F PT FALLS ASSESS-DOCD LE1/YR: CPT | Performed by: INTERNAL MEDICINE

## 2021-11-29 PROCEDURE — G9717 DOC PT DX DEP/BP F/U NT REQ: HCPCS | Performed by: INTERNAL MEDICINE

## 2021-11-29 PROCEDURE — G8427 DOCREV CUR MEDS BY ELIG CLIN: HCPCS | Performed by: INTERNAL MEDICINE

## 2021-11-29 PROCEDURE — G8754 DIAS BP LESS 90: HCPCS | Performed by: INTERNAL MEDICINE

## 2021-11-29 PROCEDURE — G0463 HOSPITAL OUTPT CLINIC VISIT: HCPCS | Performed by: INTERNAL MEDICINE

## 2021-11-29 PROCEDURE — G0439 PPPS, SUBSEQ VISIT: HCPCS | Performed by: INTERNAL MEDICINE

## 2021-11-29 PROCEDURE — G8417 CALC BMI ABV UP PARAM F/U: HCPCS | Performed by: INTERNAL MEDICINE

## 2021-11-29 PROCEDURE — 1090F PRES/ABSN URINE INCON ASSESS: CPT | Performed by: INTERNAL MEDICINE

## 2021-11-29 PROCEDURE — G8536 NO DOC ELDER MAL SCRN: HCPCS | Performed by: INTERNAL MEDICINE

## 2021-11-29 PROCEDURE — G8399 PT W/DXA RESULTS DOCUMENT: HCPCS | Performed by: INTERNAL MEDICINE

## 2021-11-29 PROCEDURE — G8752 SYS BP LESS 140: HCPCS | Performed by: INTERNAL MEDICINE

## 2021-11-29 PROCEDURE — 99214 OFFICE O/P EST MOD 30 MIN: CPT | Performed by: INTERNAL MEDICINE

## 2021-11-29 RX ORDER — MIRTAZAPINE 7.5 MG/1
7.5 TABLET, FILM COATED ORAL
Qty: 90 TABLET | Refills: 1 | Status: SHIPPED | OUTPATIENT
Start: 2021-11-29 | End: 2022-06-07

## 2021-11-29 NOTE — ACP (ADVANCE CARE PLANNING)
Advance Care Planning   Advance Care Planning (ACP) Physician/NP/PA (Provider) Conversation    Date of ACP Conversation: 11/29/21  Persons included in Conversation:  patient  Length of ACP Conversation in minutes: <16 minutes (Non-Billable)    Authorized Decision Maker (if patient is incapable of making informed decisions):   Named in Advance Directive or Healthcare Power of       Primary Decision Maker: [address] - [phone]    She has an advanced directive - a copy HAS NOT been provided. Reviewed DNR/DNI and patient is interested in remaining a DNR, no changes made.        José Miguel Shultz MD

## 2021-11-29 NOTE — PATIENT INSTRUCTIONS
Preventing Falls: Care Instructions  Your Care Instructions     Getting around your home safely can be a challenge if you have injuries or health problems that make it easy for you to fall. Loose rugs and furniture in walkways are among the dangers for many older people who have problems walking or who have poor eyesight. People who have conditions such as arthritis, osteoporosis, or dementia also have to be careful not to fall. You can make your home safer with a few simple measures. Follow-up care is a key part of your treatment and safety. Be sure to make and go to all appointments, and call your doctor if you are having problems. It's also a good idea to know your test results and keep a list of the medicines you take. How can you care for yourself at home? Taking care of yourself  · You may get dizzy if you do not drink enough water. To prevent dehydration, drink plenty of fluids. Choose water and other clear liquids. If you have kidney, heart, or liver disease and have to limit fluids, talk with your doctor before you increase the amount of fluids you drink. · Exercise regularly to improve your strength, muscle tone, and balance. Walk if you can. Swimming may be a good choice if you cannot walk easily. · Have your vision and hearing checked each year or any time you notice a change. If you have trouble seeing and hearing, you might not be able to avoid objects and could lose your balance. · Know the side effects of the medicines you take. Ask your doctor or pharmacist whether the medicines you take can affect your balance. Sleeping pills or sedatives can affect your balance. · Limit the amount of alcohol you drink. Alcohol can impair your balance and other senses. · Ask your doctor whether calluses or corns on your feet need to be removed. If you wear loose-fitting shoes because of calluses or corns, you can lose your balance and fall.   · Talk to your doctor if you have numbness in your feet.  Preventing falls at home  · Remove raised doorway thresholds, throw rugs, and clutter. Repair loose carpet or raised areas in the floor. · Move furniture and electrical cords to keep them out of walking paths. · Use nonskid floor wax, and wipe up spills right away, especially on ceramic tile floors. · If you use a walker or cane, put rubber tips on it. If you use crutches, clean the bottoms of them regularly with an abrasive pad, such as steel wool. · Keep your house well lit, especially [facility], and outside walkways. Use night-lights in areas such as hallways and bathrooms. Add extra light switches or use remote switches (such as switches that go on or off when you clap your hands) to make it easier to turn lights on if you have to get up during the night. · Install sturdy handrails on stairways. · Move items in your cabinets so that the things you use a lot are on the lower shelves (about waist level). · Keep a cordless phone and a flashlight with new batteries by your bed. If possible, put a phone in each of the main rooms of your house, or carry a cell phone in case you fall and cannot reach a phone. Or, you can wear a device around your neck or wrist. You push a button that sends a signal for help. · Wear low-heeled shoes that fit well and give your feet good support. Use footwear with nonskid soles. Check the heels and soles of your shoes for wear. Repair or replace worn heels or soles. · Do not wear socks without shoes on wood floors. · Walk on the grass when the sidewalks are slippery. If you live in an area that gets snow and ice in the winter, sprinkle salt on slippery steps and sidewalks. Preventing falls in the bath  · Install grab bars and nonskid mats inside and outside your shower or tub and near the toilet and sinks. · Use shower chairs and bath benches. · Use a hand-held shower head that will allow you to sit while showering.   · Get into a tub or shower by putting the weaker leg in first. Get out of a tub or shower with your strong side first.  · Repair loose toilet seats and consider installing a raised toilet seat to make getting on and off the toilet easier. · Keep your bathroom door unlocked while you are in the shower. Where can you learn more? Go to http://www.sandoval.com/  Enter G117 in the search box to learn more about \"Preventing Falls: Care Instructions. \"  Current as of: December 7, 2020               Content Version: 13.0  © 9070-8346 [employer]. Care instructions adapted under license by [employer] (which disclaims liability or warranty for this information). If you have questions about a medical condition or this instruction, always ask your healthcare professional. [address] any warranty or liability for your use of this information. Preventing Falls: Care Instructions  Your Care Instructions     Getting around your home safely can be a challenge if you have injuries or health problems that make it easy for you to fall. Loose rugs and furniture in walkways are among the dangers for many older people who have problems walking or who have poor eyesight. People who have conditions such as arthritis, osteoporosis, or dementia also have to be careful not to fall. You can make your home safer with a few simple measures. Follow-up care is a key part of your treatment and safety. Be sure to make and go to all appointments, and call your doctor if you are having problems. It's also a good idea to know your test results and keep a list of the medicines you take. How can you care for yourself at home? Taking care of yourself  · You may get dizzy if you do not drink enough water. To prevent dehydration, drink plenty of fluids. Choose water and other clear liquids.  If you have kidney, heart, or liver disease and have to limit fluids, talk with your doctor before you increase the amount of fluids you drink. · Exercise regularly to improve your strength, muscle tone, and balance. Walk if you can. Swimming may be a good choice if you cannot walk easily. · Have your vision and hearing checked each year or any time you notice a change. If you have trouble seeing and hearing, you might not be able to avoid objects and could lose your balance. · Know the side effects of the medicines you take. Ask your doctor or pharmacist whether the medicines you take can affect your balance. Sleeping pills or sedatives can affect your balance. · Limit the amount of alcohol you drink. Alcohol can impair your balance and other senses. · Ask your doctor whether calluses or corns on your feet need to be removed. If you wear loose-fitting shoes because of calluses or corns, you can lose your balance and fall. · Talk to your doctor if you have numbness in your feet. Preventing falls at home  · Remove raised doorway thresholds, throw rugs, and clutter. Repair loose carpet or raised areas in the floor. · Move furniture and electrical cords to keep them out of walking paths. · Use nonskid floor wax, and wipe up spills right away, especially on ceramic tile floors. · If you use a walker or cane, put rubber tips on it. If you use crutches, clean the bottoms of them regularly with an abrasive pad, such as steel wool. · Keep your house well lit, especially [place], and outside walkways. Use night-lights in areas such as hallways and bathrooms. Add extra light switches or use remote switches (such as switches that go on or off when you clap your hands) to make it easier to turn lights on if you have to get up during the night. · Install sturdy handrails on stairways. · Move items in your cabinets so that the things you use a lot are on the lower shelves (about waist level). · Keep a cordless phone and a flashlight with new batteries by your bed.  If possible, put a phone in each of the main rooms of your house, or carry a cell phone in case you fall and cannot reach a phone. Or, you can wear a device around your neck or wrist. You push a button that sends a signal for help. · Wear low-heeled shoes that fit well and give your feet good support. Use footwear with nonskid soles. Check the heels and soles of your shoes for wear. Repair or replace worn heels or soles. · Do not wear socks without shoes on wood floors. · Walk on the grass when the sidewalks are slippery. If you live in an area that gets snow and ice in the winter, sprinkle salt on slippery steps and sidewalks. Preventing falls in the bath  · Install grab bars and nonskid mats inside and outside your shower or tub and near the toilet and sinks. · Use shower chairs and bath benches. · Use a hand-held shower head that will allow you to sit while showering. · Get into a tub or shower by putting the weaker leg in first. Get out of a tub or shower with your strong side first.  · Repair loose toilet seats and consider installing a raised toilet seat to make getting on and off the toilet easier. · Keep your bathroom door unlocked while you are in the shower. Where can you learn more? Go to http://www.sandoval.com/  Enter G117 in the search box to learn more about \"Preventing Falls: Care Instructions. \"  Current as of: December 7, 2020               Content Version: 13.0  © 2006-2021 Healthwise, [facility]. Care instructions adapted under license by [employer] (which disclaims liability or warranty for this information). If you have questions about a medical condition or this instruction, always ask your healthcare professional. [place] [address] any warranty or liability for your use of this information.         Medicare Wellness Visit, Female     The best way to live healthy is to have a lifestyle where you eat a well-balanced diet, exercise regularly, limit alcohol use, and quit all forms of tobacco/nicotine, if applicable. Regular preventive services are another way to keep healthy. Preventive services (vaccines, screening tests, monitoring & exams) can help personalize your care plan, which helps you manage your own care. Screening tests can find health problems at the earliest stages, when they are easiest to treat. [facility] follows the current, evidence-based guidelines published by the [facility] Zak Cortez ([facility]) when recommending preventive services for our patients. Because we follow these guidelines, sometimes recommendations change over time as research supports it. (For example, mammograms used to be recommended annually. Even though Medicare will still pay for an annual mammogram, the newer guidelines recommend a mammogram every two years for women of average risk). Of course, you and your doctor may decide to screen more often for some diseases, based on your risk and your co-morbidities (chronic disease you are already diagnosed with). Preventive services for you include:  - Medicare offers their members a free annual wellness visit, which is time for you and your primary care provider to discuss and plan for your preventive service needs. Take advantage of this benefit every year!  -All adults over the age of 72 should receive the recommended pneumonia vaccines. Current USPSTF guidelines recommend a series of two vaccines for the best pneumonia protection.   -All adults should have a flu vaccine yearly and a tetanus vaccine every 10 years.   -All adults age 48 and older should receive the shingles vaccines (series of two vaccines).       -All adults age 38-68 who are overweight should have a diabetes screening test once every three years.   -All adults born between [birth year] and 1963 should be screened once for Hepatitis C.  -Other screening tests and preventive services for persons with diabetes include: an eye exam to screen for diabetic retinopathy, a kidney function test, a foot exam, and stricter control over your cholesterol.   -Cardiovascular screening for adults with routine risk involves an electrocardiogram (ECG) at intervals determined by your doctor.   -Colorectal cancer screenings should be done for adults age 54-65 with no increased risk factors for colorectal cancer. There are a number of acceptable methods of screening for this type of cancer. Each test has its own benefits and drawbacks. Discuss with your doctor what is most appropriate for you during your annual wellness visit. The different tests include: colonoscopy (considered the best screening method), a fecal occult blood test, a fecal DNA test, and sigmoidoscopy.    -A bone mass density test is recommended when a woman turns 65 to screen for osteoporosis. This test is only recommended one time, as a screening. Some providers will use this same test as a disease monitoring tool if you already have osteoporosis. -Breast cancer screenings are recommended every other year for women of normal risk, age 54-69.  -Cervical cancer screenings for women over age 72 are only recommended with certain risk factors.      Here is a list of your current Health Maintenance items (your personalized list of preventive services) with a due date:  Health Maintenance Due   Topic Date Due    DTaP/Tdap/Td  (1 - Tdap) 06/03/2015

## 2021-11-29 NOTE — PROGRESS NOTES
Lois Campbell is a 80 y.o. female who was seen in clinic today (11/29/2021) for a full physical.           Assessment & Plan:   Below is the assessment and plan developed based on review of pertinent history, physical exam, labs, studies, and medications. 1. Medicare annual wellness visit, subsequent  2. Advanced care planning/counseling discussion  3. Osteopenia of multiple sites  Assessment & Plan:  asymptomatic , will repeat DEXA   Orders:  -     DEXA BONE DENSITY STUDY AXIAL; Future  4. Hypertension, essential  Assessment & Plan:  at goal, continue current treatment pending review of labs (K was low on last check)  Orders:  -     METABOLIC PANEL, BASIC; Future  5. Pure hypercholesterolemia  Assessment & Plan:  at goal, continue current treatment pending review of labs    6. Impaired fasting glucose  Assessment & Plan:  Asymptomatic, encouraged weight loss & exercise   7. Primary insomnia  Assessment & Plan:  stable, continue current treatment    Orders:  -     mirtazapine (REMERON) 7.5 mg tablet; Take 1 Tablet by mouth nightly., Normal, Disp-90 Tablet, R-1  8. Acquired hypothyroidism  Assessment & Plan:  Had been fluctuating, asymptomatic, no changes pending review of labs   Orders:  -     TSH 3RD GENERATION; Future    Follow-up and Dispositions    · Return in about 6 months (around 5/29/2022), or if symptoms worsen or fail to improve. Subjective: Almaz Emerson is here today for a full physical.      Annual Wellness Visit- Subsequent Visit    Since last visit: no changes    The following acute and/or chronic problems were addressed today:    Endocrine Review  Patient is seen for followup of hypothyroidism. She reports medication compliance: all the time and is taking separate from all other meds. She reports the following concerns/problems/med side effects: none. Cardiovascular Review  The patient has hypertension and hyperlipidemia.   She reports taking medications as instructed, medication side effects noted - cramping in her feet, mostly at night. Patient does not perform home BP monitoring. She generally follows a low fat low cholesterol diet, generally follows a low sodium diet. Endocrine Review  She is seen for pre-diabetes. She could not get a meter or [employer] approved. Diabetic diet compliance: noncompliant much of the time. Lab review: labs reviewed and discussed with patient. Mental Health Review  Patient is seen for insomnia. Ongoing sleep issues include: trouble falling asleep. She denies: trouble staying asleep. .  Reports experiences the following side effects from the treatment: none.  was reviewed. End of Life Planning: This was discussed with her today and she has an advanced directive - a copy HAS NOT been provided. Reviewed DNR/DNI and patient is interested in remaining a DNR, no changes made. Depression Screen:  3 most recent PHQ Screens 11/29/2021   Little interest or pleasure in doing things Not at all   Feeling down, depressed, irritable, or hopeless Not at all   Total Score PHQ 2 0   Trouble falling or staying asleep, or sleeping too much Several days   Feeling tired or having little energy Nearly every day   Poor appetite, weight loss, or overeating Several days   Feeling bad about yourself - or that you are a failure or have let yourself or your family down Not at all   Trouble concentrating on things such as school, work, reading, or watching TV Not at all   Moving or speaking so slowly that other people could have noticed; or the opposite being so fidgety that others notice Not at all   Thoughts of being better off dead, or hurting yourself in some way Not at all   PHQ 9 Score 5   How difficult have these problems made it for you to do your work, take care of your home and get along with others Somewhat difficult         Fall Risk:   Fall Risk Assessment, last 12 mths 11/29/2021   Able to walk? Yes   Fall in past 12 months? 0   Do you feel unsteady? 1   Are you worried about falling 1   Is TUG test greater than 12 seconds? 0   Is the gait abnormal? 0   Number of falls in past 12 months -   Fall with injury? -       Abuse Screen:  Abuse Screening Questionnaire 11/29/2021   Do you ever feel afraid of your partner? N   Are you in a relationship with someone who physically or mentally threatens you? N   Is it safe for you to go home? Y       Do you average more than 1 drink per night or more than 7 drinks a week:  No    On any one occasion in the past three months have you have had more than 3 drinks containing alcohol:  No    Health Maintenance:   Daily Aspirin: no  Bone Density: done 6/13/17 - osteopenia     Immunizations:   Covid: up to date  Influenza: up to date  Tetanus: up to date  Shingles: due for Shingrix - script provided  Pneumonia: up to date  Cancer screening:   Cervical: reviewed guidelines, n/a  Breast: reviewed guidelines, she will call to schedule  Colon: reviewed guidelines, up to date       Functional Ability and Level of Safety:    Hearing: Hearing is good. Cognition Screen:   Has your family/caregiver stated any concerns about your memory: yes - she reports having issues w/ names  Cognitive Screening: Normal - Clock Drawing Test     Ambulation: with no difficulty     Activities of Daily Living: The home contains: no safety equipment. Patient does total self care  Exercise: moderately active    Adult Nutrition Screen:  No risk factors noted.        Patient Care Team:  Canelo Garcia MD as PCP - General (Internal Medicine)  Canelo Garcia MD as PCP - [address] [place] [address] Provider  Corey Rousseau MD (Gastroenterology)  Neo Crowell MD (Radiology)  Sunil Herrera MD (Obstetrics & Gynecology)  Belia Heard MD as Physician (Ophthalmology)  Sharmin Tijerina MD as Physician (Otolaryngology)  Shari Freeman MD as Physician (Obstetrics & Gynecology)       The following sections were reviewed & updated as appropriate: Problem List, Allergies, PMH, PSH, FH, and SH. Prior to Admission medications    Medication Sig Start Date End Date Taking? Authorizing Provider   Synthroid 75 mcg tablet TAKE 1 TABLET BY MOUTH EVERY DAY EXCEPT ON SUNDAYS 11/19/21  Yes Rohan El MD   hydroCHLOROthiazide (HYDRODIURIL) 25 mg tablet TAKE 1 TABLET BY MOUTH EVERY DAY 10/5/21  Yes Rohan El MD   atorvastatin (LIPITOR) 20 mg tablet TAKE 1 TABLET BY MOUTH EVERY DAY IN THE EVENING 10/5/21  Yes Rohan El MD   mirtazapine (REMERON) 15 mg tablet Take 1/2 to 1 tab nightly as needed for sleep. 7/8/21  Yes Rohan El MD   guaifenesin (MUCINEX PO) Take  by mouth two (2) times a day. Yes Provider, Historical   b complex-vitamin c-folic acid (NEPHROCAPS) 1 mg capsule DAILY AM for SUPPLEMENT   Yes Provider, Historical   acetaminophen (TYLENOL) 500 mg tablet 1,000 mg every six (6) hours as needed. Yes Provider, Historical   simethicone (GAS-X) 125 mg capsule Take 125 mg by mouth four (4) times daily as needed for Flatulence. Yes Provider, Historical   calcium citrate/vitamin D3 (CALCIUM CITRATE + D PO) Take 1 Tab by mouth as needed. Yes Other, MD Carlo   Cetirizine (ZYRTEC) 10 mg cap Take  by mouth every evening. Yes Provider, Historical   cholecalciferol (VITAMIN D3) 1,000 unit tablet Take  by mouth daily. Yes Provider, Historical   chlorhexidine (PERIDEX) 0.12 % solution  6/1/21 11/29/21  Provider, Historical   meclizine (ANTIVERT) 25 mg tablet Take 1 Tablet by mouth three (3) times daily as needed for Dizziness or Nausea. Patient not taking: Reported on 11/29/2021 6/18/21 11/29/21  Eda Hoyos MD   Blood-Glucose Meter monitoring kit Pharmacist to choose preferred meter and strips. Dx: R73.01  Patient not taking: Reported on 11/29/2021 2/2/21   Rohan El MD   lancets ([facility]) 33 gauge misc Test your sugar three times a week fasting.   Dx: R73.01  Patient not taking: Reported on 11/29/2021 2/2/21   Linsey Castillo MD   glucose blood VI test strips (blood glucose test) strip Pharmacist to choose preferred meter and strips. Dx: R73.01  Patient not taking: Reported on 11/29/2021 2/2/21   Linsey Castillo MD   methotrexate [facility]) 2.5 mg tablet Take 8 Tabs by mouth Every Saturday. Patient not taking: Reported on 11/29/2021 11/28/20 11/29/21  Remy Patton MD   folic acid (FOLVITE) 1 mg tablet Take 1 Tab by mouth daily. Patient not taking: Reported on 11/29/2021 10/28/20 11/29/21  Remy Patton MD          Review of Systems   Constitutional: Negative for chills and fever. Respiratory: Negative for cough and shortness of breath. Cardiovascular: Negative for chest pain and palpitations. Gastrointestinal: Negative for abdominal pain, blood in stool, constipation, diarrhea, heartburn, nausea and vomiting. Musculoskeletal: Positive for joint pain. Negative for myalgias. Neurological: Negative for tingling, focal weakness and headaches. Endo/Heme/Allergies: Does not bruise/bleed easily. Psychiatric/Behavioral: Negative for depression. The patient is not nervous/anxious and does not have insomnia. Objective:   Physical Exam  Constitutional:       General: She is not in acute distress. Appearance: Normal appearance. She is obese. Eyes:      Conjunctiva/sclera: Conjunctivae normal.   Neck:      Thyroid: No thyromegaly. Cardiovascular:      Rate and Rhythm: Regular rhythm. Heart sounds: No murmur heard. Pulmonary:      Effort: Pulmonary effort is normal.      Breath sounds: Normal breath sounds. No decreased breath sounds or wheezing. Abdominal:      General: Bowel sounds are normal.      Palpations: Abdomen is soft. Tenderness: There is no abdominal tenderness. Musculoskeletal:      Right lower leg: No edema. Left lower leg: No edema.    Psychiatric:         Mood and Affect: Mood and affect normal.          Visit Vitals  /75   Pulse 62   Temp 98 °F (36.7 °C) (Temporal)   Resp 20   Ht 5' 3.8\" (1.621 m)   Wt 187 lb (84.8 kg)   SpO2 98%   BMI 32.30 kg/m²          Venkat Baker MD

## 2021-11-30 NOTE — PROGRESS NOTES
Results released to patient via [employer] (ML4D)t. All labs are stable or at goal for her, except for elevated Ca (just barely, 1st time, will monitor)  BS non-fasting. Low K resolved. TSH acceptable but higher then previously.

## 2022-02-10 NOTE — PROGRESS NOTES
Pre-op note & normal stress test results faxed to Dr. Iliana Savage, fax# [phone] per Dr. Alfonso Reed, confirmation received. No

## 2022-03-18 PROBLEM — G56.02 CARPAL TUNNEL SYNDROME OF LEFT WRIST: Status: ACTIVE | Noted: 2020-10-28

## 2022-03-18 PROBLEM — F51.01 PRIMARY INSOMNIA: Status: ACTIVE | Noted: 2017-10-13

## 2022-03-19 PROBLEM — M17.12 PRIMARY OSTEOARTHRITIS OF LEFT KNEE: Status: ACTIVE | Noted: 2019-10-16

## 2022-03-19 PROBLEM — Z79.60 LONG-TERM USE OF IMMUNOSUPPRESSANT MEDICATION: Status: ACTIVE | Noted: 2020-10-28

## 2022-03-19 PROBLEM — I44.7 LBBB (LEFT BUNDLE BRANCH BLOCK): Status: ACTIVE | Noted: 2019-05-01

## 2022-03-19 PROBLEM — R92.8 ABNORMAL MAMMOGRAM OF LEFT BREAST: Status: ACTIVE | Noted: 2017-11-15

## 2022-03-19 PROBLEM — M65.839: Status: ACTIVE | Noted: 2020-10-28

## 2022-03-19 PROBLEM — M06.09 SERONEGATIVE RHEUMATOID ARTHRITIS OF MULTIPLE SITES (HCC): Status: ACTIVE | Noted: 2020-10-28

## 2022-04-02 DIAGNOSIS — I10 HYPERTENSION, ESSENTIAL: ICD-10-CM

## 2022-04-02 DIAGNOSIS — E78.5 HYPERLIPIDEMIA WITH TARGET LDL LESS THAN 100: ICD-10-CM

## 2022-04-04 RX ORDER — HYDROCHLOROTHIAZIDE 25 MG/1
TABLET ORAL
Qty: 90 TABLET | Refills: 1 | Status: SHIPPED | OUTPATIENT
Start: 2022-04-04 | End: 2022-09-29

## 2022-04-04 RX ORDER — ATORVASTATIN CALCIUM 20 MG/1
TABLET, FILM COATED ORAL
Qty: 90 TABLET | Refills: 1 | Status: SHIPPED | OUTPATIENT
Start: 2022-04-04

## 2022-05-18 DIAGNOSIS — E03.9 ACQUIRED HYPOTHYROIDISM: ICD-10-CM

## 2022-05-19 RX ORDER — LEVOTHYROXINE SODIUM 75 UG/1
TABLET ORAL
Qty: 90 TABLET | Refills: 1 | Status: SHIPPED | OUTPATIENT
Start: 2022-05-19 | End: 2022-07-26

## 2022-05-19 NOTE — TELEPHONE ENCOUNTER
Future Appointments   Date Time Provider Christie Houser   5/31/2022  1:00 PM Callum Byrd MD [facility]

## 2022-05-31 ENCOUNTER — OFFICE VISIT (OUTPATIENT)
Dept: INTERNAL MEDICINE CLINIC | Age: 82
End: 2022-05-31
Payer: MEDICARE

## 2022-05-31 VITALS
HEART RATE: 55 BPM | DIASTOLIC BLOOD PRESSURE: 77 MMHG | OXYGEN SATURATION: 98 % | RESPIRATION RATE: 18 BRPM | SYSTOLIC BLOOD PRESSURE: 118 MMHG | HEIGHT: 64 IN | BODY MASS INDEX: 32.47 KG/M2 | TEMPERATURE: 98.1 F | WEIGHT: 190.2 LBS

## 2022-05-31 DIAGNOSIS — M06.09 SERONEGATIVE RHEUMATOID ARTHRITIS OF MULTIPLE SITES (HCC): ICD-10-CM

## 2022-05-31 DIAGNOSIS — R73.01 IMPAIRED FASTING GLUCOSE: ICD-10-CM

## 2022-05-31 DIAGNOSIS — E66.9 OBESITY (BMI 30.0-34.9): ICD-10-CM

## 2022-05-31 DIAGNOSIS — I10 HYPERTENSION, ESSENTIAL: Primary | ICD-10-CM

## 2022-05-31 DIAGNOSIS — E78.00 PURE HYPERCHOLESTEROLEMIA: ICD-10-CM

## 2022-05-31 DIAGNOSIS — H11.31 CONJUNCTIVAL HEMORRHAGE OF RIGHT EYE: ICD-10-CM

## 2022-05-31 DIAGNOSIS — F51.01 PRIMARY INSOMNIA: ICD-10-CM

## 2022-05-31 DIAGNOSIS — F33.42 RECURRENT MAJOR DEPRESSIVE DISORDER, IN FULL REMISSION (HCC): ICD-10-CM

## 2022-05-31 DIAGNOSIS — E03.9 ACQUIRED HYPOTHYROIDISM: ICD-10-CM

## 2022-05-31 PROBLEM — M65.839: Status: RESOLVED | Noted: 2020-10-28 | Resolved: 2022-05-31

## 2022-05-31 PROBLEM — Z79.60 LONG-TERM USE OF IMMUNOSUPPRESSANT MEDICATION: Status: RESOLVED | Noted: 2020-10-28 | Resolved: 2022-05-31

## 2022-05-31 PROCEDURE — G8399 PT W/DXA RESULTS DOCUMENT: HCPCS | Performed by: INTERNAL MEDICINE

## 2022-05-31 PROCEDURE — G8536 NO DOC ELDER MAL SCRN: HCPCS | Performed by: INTERNAL MEDICINE

## 2022-05-31 PROCEDURE — G8427 DOCREV CUR MEDS BY ELIG CLIN: HCPCS | Performed by: INTERNAL MEDICINE

## 2022-05-31 PROCEDURE — 1090F PRES/ABSN URINE INCON ASSESS: CPT | Performed by: INTERNAL MEDICINE

## 2022-05-31 PROCEDURE — G9717 DOC PT DX DEP/BP F/U NT REQ: HCPCS | Performed by: INTERNAL MEDICINE

## 2022-05-31 PROCEDURE — G8417 CALC BMI ABV UP PARAM F/U: HCPCS | Performed by: INTERNAL MEDICINE

## 2022-05-31 PROCEDURE — G8752 SYS BP LESS 140: HCPCS | Performed by: INTERNAL MEDICINE

## 2022-05-31 PROCEDURE — 1101F PT FALLS ASSESS-DOCD LE1/YR: CPT | Performed by: INTERNAL MEDICINE

## 2022-05-31 PROCEDURE — G0463 HOSPITAL OUTPT CLINIC VISIT: HCPCS | Performed by: INTERNAL MEDICINE

## 2022-05-31 PROCEDURE — 99214 OFFICE O/P EST MOD 30 MIN: CPT | Performed by: INTERNAL MEDICINE

## 2022-05-31 PROCEDURE — G8754 DIAS BP LESS 90: HCPCS | Performed by: INTERNAL MEDICINE

## 2022-05-31 RX ORDER — ESTRADIOL 0.1 MG/G
CREAM VAGINAL
COMMUNITY
Start: 2022-02-28

## 2022-05-31 NOTE — ASSESSMENT & PLAN NOTE
Fluctuating symptoms, off medications, she reports rheumatologist is reconsidering dx, will defer to specialist

## 2022-05-31 NOTE — PROGRESS NOTES
Uvaldo Wilson is a [de-identified] y.o. female who was seen in clinic today (5/31/2022). Assessment & Plan:   Below is the assessment and plan developed based on review of pertinent history, physical exam, labs, studies, and medications. 1. Hypertension, essential  Assessment & Plan:  at goal, continue current treatment pending review of labs    Orders:  -     METABOLIC PANEL, COMPREHENSIVE; Future  -     CBC W/O DIFF; Future  2. Pure hypercholesterolemia  Assessment & Plan:  at goal, continue current treatment pending review of labs    Orders:  -     METABOLIC PANEL, COMPREHENSIVE; Future  -     LIPID PANEL; Future  3. Impaired fasting glucose  Assessment & Plan:  Asymptomatic, no changes pending review of labs   Orders:  -     METABOLIC PANEL, COMPREHENSIVE; Future  -     HEMOGLOBIN A1C WITH EAG; Future  4. Acquired hypothyroidism  Assessment & Plan:  At goal, slightly higher recently, no changes pending review of labs   Orders:  -     TSH 3RD GENERATION; Future  5. Recurrent major depressive disorder, in full remission ([facility] [address]  Assessment & Plan:  Well controlled off medications  6. Primary insomnia  Assessment & Plan:  stable, continue current treatment    7. Conjunctival hemorrhage of right eye  Comments:  new dx, differential reviewed, unclear etiology, will monitor, if happens a 3rd time to eye specialist  8. Seronegative rheumatoid arthritis of multiple sites [facility])  Assessment & Plan:  Fluctuating symptoms, off medications, she reports rheumatologist is reconsidering dx, will defer to specialist   9. Obesity (BMI 30.0-34. 9)  Assessment & Plan:  poorly controlled, worsening, I have recommended the following interventions: encourage exercise and lifestyle education regarding diet. Needs to get back into good habits, has been out of synch due to 's medical problems    Follow-up and Dispositions    · Return in about 6 months (around 11/30/2022) for FULL PHYSICAL - 30 minutes.        Subjective/Objective: Wesly Hernandez was seen today for Follow-up      At last visit DEXA was ordered but not completed. weight has increased    Cardiovascular Review  The patient has hypertension and hyperlipidemia. She reports taking medications as instructed, no medication side effects noted, patient does not perform home BP monitoring. She generally follows a low fat low cholesterol diet, generally follows a low sodium diet, no formal exercise but active during the day. Endocrine Review  She is seen for pre-diabetes. Diabetic diet compliance: compliant most of the time. Lab review: labs reviewed and discussed with patient. Patient is seen for followup of hypothyroidism. She reports medication compliance: all the time and is taking separate from all other meds. She reports the following concerns/problems/med side effects: none. Mental Health Review  Patient is seen for depression and insomnia. Reports experiences the following side effects from the treatment: none.  was reviewed. Prior to Admission medications    Medication Sig Start Date End Date Taking? Authorizing Provider   estradioL (ESTRACE) 0.01 % (0.1 mg/gram) vaginal cream  2/28/22  Yes Provider, Historical   Synthroid 75 mcg tablet TAKE 1 TABLET BY MOUTH EVERY DAY. EXCEPT ON SUNDAYS 5/19/22  Yes Bossman Morgan MD   hydroCHLOROthiazide (HYDRODIURIL) 25 mg tablet TAKE 1 TABLET BY MOUTH EVERY DAY 4/4/22  Yes Bossman Morgan MD   atorvastatin (LIPITOR) 20 mg tablet TAKE 1 TABLET BY MOUTH EVERY DAY IN THE EVENING 4/4/22  Yes Bossman Morgan MD   mirtazapine (REMERON) 7.5 mg tablet Take 1 Tablet by mouth nightly. 11/29/21  Yes Bossman Morgan MD   b complex-vitamin c-folic acid (NEPHROCAPS) 1 mg capsule DAILY AM for SUPPLEMENT   Yes Provider, Historical   acetaminophen (TYLENOL) 500 mg tablet 1,000 mg every six (6) hours as needed.    Yes Provider, Historical   simethicone (GAS-X) 125 mg capsule Take 125 mg by mouth four (4) times daily as needed for Flatulence. Yes Provider, Historical   calcium citrate/vitamin D3 (CALCIUM CITRATE + D PO) Take 1 Tab by mouth as needed. Yes Other, MD Carlo   Cetirizine (ZYRTEC) 10 mg cap Take  by mouth every evening. Yes Provider, Historical   cholecalciferol (VITAMIN D3) 1,000 unit tablet Take  by mouth daily. Yes Provider, Historical   Blood-Glucose Meter monitoring kit Pharmacist to choose preferred meter and strips. Dx: R73.01  Patient not taking: Reported on 5/31/2022 2/2/21   Bossman Morgan MD   lancets (One Touch Michelle Duke) 33 gauge misc Test your sugar three times a week fasting. Dx: R73.01  Patient not taking: Reported on 11/29/2021 2/2/21   Bossman Morgan MD   glucose blood VI test strips (blood glucose test) strip Pharmacist to choose preferred meter and strips. Dx: R73.01  Patient not taking: Reported on 11/29/2021 2/2/21   Bossman Morgan MD   guaifenesin (MUCINEX PO) Take  by mouth two (2) times a day. Provider, Historical        Review of Systems   Constitutional: Negative for malaise/fatigue and weight loss. Eyes: Positive for redness (R eye - 6 wks ago had some redness, no obvious trigger, resolved. 2 days ago, happened again, no trauma, no trigger, otherwise asymtomatic). Respiratory: Negative for cough and shortness of breath. Cardiovascular: Negative for chest pain, palpitations and leg swelling. Gastrointestinal: Negative for abdominal pain, constipation, diarrhea, heartburn, nausea and vomiting. Musculoskeletal: Negative for joint pain and myalgias. Skin: Negative for rash. Neurological: Negative for dizziness and headaches. Psychiatric/Behavioral: Negative for depression. The patient is not nervous/anxious and does not have insomnia. Physical Exam  Constitutional:       General: She is not in acute distress. Appearance: Normal appearance. She is obese.    Eyes:      General: Lids are normal.      Conjunctiva/sclera: Right eye: Hemorrhage present. No chemosis or exudate. Cardiovascular:      Rate and Rhythm: Regular rhythm. Bradycardia present. Heart sounds: No murmur heard. Pulmonary:      Effort: Pulmonary effort is normal.      Breath sounds: Normal breath sounds. No decreased breath sounds or wheezing. Abdominal:      General: Bowel sounds are normal.      Palpations: Abdomen is soft. Tenderness: There is no abdominal tenderness. Musculoskeletal:      Right lower leg: No edema. Left lower leg: No edema.    Psychiatric:         Mood and Affect: Mood and affect normal.       Visit Vitals  /77 (BP 1 Location: Left arm, BP Patient Position: Sitting, BP Cuff Size: Adult)   Pulse (!) 55   Temp 98.1 °F (36.7 °C)   Resp 18   Ht 5' 4\" (1.626 m)   Wt 190 lb 3.2 oz (86.3 kg)   SpO2 98%   BMI 32.65 kg/m²       Peter Rivera MD

## 2022-05-31 NOTE — ASSESSMENT & PLAN NOTE
poorly controlled, worsening, I have recommended the following interventions: encourage exercise and lifestyle education regarding diet.   Needs to get back into good habits, has been out of synch due to 's medical problems

## 2022-06-06 DIAGNOSIS — F51.01 PRIMARY INSOMNIA: ICD-10-CM

## 2022-06-07 RX ORDER — MIRTAZAPINE 7.5 MG/1
TABLET, FILM COATED ORAL
Qty: 90 TABLET | Refills: 1 | Status: SHIPPED | OUTPATIENT
Start: 2022-06-07

## 2022-07-26 DIAGNOSIS — E03.9 ACQUIRED HYPOTHYROIDISM: ICD-10-CM

## 2022-07-26 LAB
ALBUMIN SERPL-MCNC: 4.5 G/DL (ref 3.7–4.7)
ALBUMIN/GLOB SERPL: 2.3 {RATIO} (ref 1.2–2.2)
ALP SERPL-CCNC: 77 IU/L (ref 44–121)
ALT SERPL-CCNC: 17 IU/L (ref 0–32)
AST SERPL-CCNC: 21 IU/L (ref 0–40)
BILIRUB SERPL-MCNC: 0.5 MG/DL (ref 0–1.2)
BUN SERPL-MCNC: 16 MG/DL (ref 8–27)
BUN/CREAT SERPL: 21 (ref 12–28)
CALCIUM SERPL-MCNC: 10.1 MG/DL (ref 8.7–10.3)
CHLORIDE SERPL-SCNC: 99 MMOL/L (ref 96–106)
CHOLEST SERPL-MCNC: 206 MG/DL (ref 100–199)
CO2 SERPL-SCNC: 25 MMOL/L (ref 20–29)
CREAT SERPL-MCNC: 0.78 MG/DL (ref 0.57–1)
EGFR: 77 ML/MIN/1.73
ERYTHROCYTE [DISTWIDTH] IN BLOOD BY AUTOMATED COUNT: 12.6 % (ref 11.7–15.4)
EST. AVERAGE GLUCOSE BLD GHB EST-MCNC: 123 MG/DL
GLOBULIN SER CALC-MCNC: 2 G/DL (ref 1.5–4.5)
GLUCOSE SERPL-MCNC: 109 MG/DL (ref 65–99)
HBA1C MFR BLD: 5.9 % (ref 4.8–5.6)
HCT VFR BLD AUTO: 41.5 % (ref 34–46.6)
HDLC SERPL-MCNC: 75 MG/DL
HGB BLD-MCNC: 13.7 G/DL (ref 11.1–15.9)
LDLC SERPL CALC-MCNC: 110 MG/DL (ref 0–99)
MCH RBC QN AUTO: 29 PG (ref 26.6–33)
MCHC RBC AUTO-ENTMCNC: 33 G/DL (ref 31.5–35.7)
MCV RBC AUTO: 88 FL (ref 79–97)
PLATELET # BLD AUTO: 207 X10E3/UL (ref 150–450)
POTASSIUM SERPL-SCNC: 3.6 MMOL/L (ref 3.5–5.2)
PROT SERPL-MCNC: 6.5 G/DL (ref 6–8.5)
RBC # BLD AUTO: 4.72 X10E6/UL (ref 3.77–5.28)
SODIUM SERPL-SCNC: 137 MMOL/L (ref 134–144)
TRIGL SERPL-MCNC: 122 MG/DL (ref 0–149)
TSH SERPL DL<=0.005 MIU/L-ACNC: 6.63 UIU/ML (ref 0.45–4.5)
VLDLC SERPL CALC-MCNC: 21 MG/DL (ref 5–40)
WBC # BLD AUTO: 6.4 X10E3/UL (ref 3.4–10.8)

## 2022-07-26 RX ORDER — LEVOTHYROXINE SODIUM 75 UG/1
TABLET ORAL
Qty: 90 TABLET | Refills: 1
Start: 2022-07-26

## 2022-07-26 NOTE — PROGRESS NOTES
Results released to patient via [facility]. Ordered on 5/31, completed on 7/25. All labs are stable or at goal for her, except for worsening TSH. Will add in 1/2 tab on Sunday, currently skipping a tab.

## 2022-09-29 DIAGNOSIS — I10 HYPERTENSION, ESSENTIAL: ICD-10-CM

## 2022-09-29 RX ORDER — HYDROCHLOROTHIAZIDE 25 MG/1
TABLET ORAL
Qty: 90 TABLET | Refills: 1 | Status: SHIPPED | OUTPATIENT
Start: 2022-09-29

## 2022-09-29 NOTE — TELEPHONE ENCOUNTER
Future Appointments   Date Time Provider Christie Houser   11/29/2022  1:00 PM Murray Patricia MD [facility]

## 2022-10-08 ENCOUNTER — OFFICE VISIT (OUTPATIENT)
Dept: URGENT CARE | Age: 82
End: 2022-10-08
Payer: MEDICARE

## 2022-10-08 VITALS
SYSTOLIC BLOOD PRESSURE: 137 MMHG | HEART RATE: 75 BPM | HEIGHT: 64 IN | TEMPERATURE: 97.5 F | RESPIRATION RATE: 16 BRPM | WEIGHT: 187 LBS | BODY MASS INDEX: 31.92 KG/M2 | OXYGEN SATURATION: 96 % | DIASTOLIC BLOOD PRESSURE: 63 MMHG

## 2022-10-08 DIAGNOSIS — S92.352A CLOSED FRACTURE OF BASE OF FIFTH METATARSAL BONE OF LEFT FOOT, INITIAL ENCOUNTER: Primary | ICD-10-CM

## 2022-10-08 DIAGNOSIS — M79.672 LEFT FOOT PAIN: ICD-10-CM

## 2022-10-08 PROCEDURE — G8536 NO DOC ELDER MAL SCRN: HCPCS | Performed by: NURSE PRACTITIONER

## 2022-10-08 PROCEDURE — 1123F ACP DISCUSS/DSCN MKR DOCD: CPT | Performed by: NURSE PRACTITIONER

## 2022-10-08 PROCEDURE — G8399 PT W/DXA RESULTS DOCUMENT: HCPCS | Performed by: NURSE PRACTITIONER

## 2022-10-08 PROCEDURE — 99203 OFFICE O/P NEW LOW 30 MIN: CPT | Performed by: NURSE PRACTITIONER

## 2022-10-08 PROCEDURE — G8417 CALC BMI ABV UP PARAM F/U: HCPCS | Performed by: NURSE PRACTITIONER

## 2022-10-08 PROCEDURE — G8752 SYS BP LESS 140: HCPCS | Performed by: NURSE PRACTITIONER

## 2022-10-08 PROCEDURE — G9717 DOC PT DX DEP/BP F/U NT REQ: HCPCS | Performed by: NURSE PRACTITIONER

## 2022-10-08 PROCEDURE — 1101F PT FALLS ASSESS-DOCD LE1/YR: CPT | Performed by: NURSE PRACTITIONER

## 2022-10-08 PROCEDURE — G8754 DIAS BP LESS 90: HCPCS | Performed by: NURSE PRACTITIONER

## 2022-10-08 PROCEDURE — 1090F PRES/ABSN URINE INCON ASSESS: CPT | Performed by: NURSE PRACTITIONER

## 2022-10-08 PROCEDURE — G8427 DOCREV CUR MEDS BY ELIG CLIN: HCPCS | Performed by: NURSE PRACTITIONER

## 2022-10-08 NOTE — PROGRESS NOTES
Pt presents with complaints of left ankle pain and swelling for 3 days. She rolled her ankle at home 3 days ago. Did not fall. Taking ibuprofen and elevating with moderate relief.       Ankle Injury       Past Medical History:   Diagnosis Date    Allergic rhinitis     Anesthesia complication     Pt reports dizziness after anesthesia     Arrhythmia     Left Bundle Branch Block    Arthritis     BBB (bundle branch block)     Depression     History of not currently    GERD (gastroesophageal reflux disease)     Hyperlipidemia     Impaired fasting glucose     Unspecified essential hypertension     Unspecified hypothyroidism     Uterine prolapse     Vertigo         Past Surgical History:   Procedure Laterality Date    HX APPENDECTOMY  1987    HX BREAST BIOPSY Bilateral     >5, all benign    HX BREAST BIOPSY Left 11/29/2017    benign    HX COLONOSCOPY  8/15/12    diverticulosis    HX ENDOSCOPY  8/15/12    hiatal hernia    HX HAMMER TOE REPAIR Left     w/ bunion surgery (per previous PCP)    HX KNEE REPLACEMENT Left 10/22/2019    [place]    HX PARTIAL HYSTERECTOMY      HX TONSILLECTOMY           Family History   Problem Relation Age of Onset    Thyroid Disease Father         hypo    Coronary Art Dis Father         s/p CABG    Heart Disease Father         s/p valve replacement    Diabetes Brother     Thyroid Disease Brother     Cancer Brother         kidney    Coronary Art Dis Brother         s/p CABG    Dementia Mother         vascular    Elevated Lipids Mother     Coronary Art Dis Mother     OSTEOARTHRITIS Mother     Diabetes Mother     Gall Bladder Disease Mother     Heart Disease Mother     Hypertension Mother     Stroke Mother     Obesity Son     Hypertension Son     No Known Problems Son     Diabetes Maternal Grandmother     Cancer Maternal Grandmother         pancreatic    Diabetes Paternal Grandmother     Diabetes Maternal Aunt     Gall Bladder Disease Maternal Aunt     Hypertension Maternal Aunt     Diabetes Paternal Aunt     Hypertension Maternal Aunt         Social History     Socioeconomic History    Marital status:      Spouse name: Not on file    Number of children: Not on file    Years of education: Not on file    Highest education level: Not on file   Occupational History    Not on file   Tobacco Use    Smoking status: Never    Smokeless tobacco: Never   Vaping Use    Vaping Use: Never used   Substance and Sexual Activity    Alcohol use: No    Drug use: No    Sexual activity: Not Currently     Partners: Male   Other Topics Concern     Service Not Asked    Blood Transfusions Not Asked    Caffeine Concern Not Asked    Occupational Exposure Not Asked    Hobby Hazards Not Asked    Sleep Concern Not Asked    Stress Concern Not Asked    Weight Concern Not Asked    Special Diet Not Asked    Back Care Not Asked    Exercise Not Asked    Bike Helmet Not Asked    Seat Belt Not Asked    Self-Exams Not Asked   Social History Narrative    Lives in [facility] [place] [facility] with  of 48 years. Has 2 sons. Retired. Used to work as a  for [employer] mostly at the middle school level. Likes to read. Social Determinants of Health     Financial Resource Strain: Not on file   Food Insecurity: Not on file   Transportation Needs: Not on file   Physical Activity: Not on file   Stress: Not on file   Social Connections: Not on file   Intimate Partner Violence: Not on file   Housing Stability: Not on file                ALLERGIES: Amlodipine, Codeine, Epinephrine, Erythromycin, Losartan, Mobic [meloxicam], and Pcn [penicillins]    Review of Systems   Musculoskeletal:  Positive for arthralgias and joint swelling. All other systems reviewed and are negative.     Vitals:    10/08/22 1543 10/08/22 1653   BP: (!) 148/84 137/63   Pulse: 75    Resp: 16    Temp: 97.5 °F (36.4 °C)    SpO2: 96%    Weight: 187 lb (84.8 kg)    Height: 5' 4\" (1.626 m)        Physical Exam  Constitutional:       General: She is not in acute distress. Appearance: Normal appearance. She is not ill-appearing or toxic-appearing. Musculoskeletal:      Right ankle: Normal.      Right Achilles Tendon: Normal.      Left ankle: Swelling present. No deformity or ecchymosis. No tenderness. Normal range of motion. Normal pulse. Left Achilles Tendon: Normal.      Right foot: Normal.      Left foot: Normal range of motion and normal capillary refill. Swelling present. No deformity, tenderness, bony tenderness or crepitus. Normal pulse. Comments: Left foot with ecchymosis. Walking with a cane, antalgic gait. Neurological:      Mental Status: She is alert. XR Results (most recent):  Results from Appointment encounter on 10/08/22    XR ANKLE LT MIN 3 V    Narrative  EXAM: XR ANKLE LT MIN 3 V    INDICATION: Acute ankle pain. .    COMPARISON: None. FINDINGS: Three views of the left ankle. The bones are osteopenic. The ankle  mortise is intact. The talar dome is intact. There are Achilles and plantar  calcaneal enthesophytes. Mild osteoarthritis is seen in the midfoot. There is a  nondisplaced fracture of the base of fifth metatarsal.    Impression  Acute nondisplaced fracture of the base of the fifth metatarsal.  Recommend dedicated foot radiographs. Trula Blew EXAM: XR FOOT LT MIN 3 V     INDICATION: Fifth metatarsal fracture. COMPARISON: Ankle radiographs same day     FINDINGS: Three views of the left foot. There is an acute nondisplaced fracture  of the fifth metatarsal base. This is in the region of a Herron fracture. No  additional fracture. No dislocation. There are plantar and Achilles calcaneal  enthesophytes. Mild osteophytosis is seen in the midfoot. IMPRESSION  Acute nondisplaced fracture of the fifth metatarsal base. .        ICD-10-CM ICD-9-CM   1. Displaced fracture of fifth metatarsal bone, left foot, initial encounter for closed fracture  S92.352A 825.25   2.  Sprain of left ankle, unspecified ligament, initial encounter P35.872G 845.00       Orders Placed This Encounter    XR ANKLE LT MIN 3 V     Standing Status:   Future     Number of Occurrences:   1     Standing Expiration Date:   11/8/2023    XR FOOT LT MIN 3 V     Standing Status:   Future     Standing Expiration Date:   11/8/2023      Rest, ice, elevate, orthopedic shoe, tylenol* preferred or ibuprofen with food as needed. The patient is to follow up with Ortho or Podiatry. If signs and symptoms become worse the pt is to go to the ER.      Shar York NP       MDM    Procedures

## 2022-12-02 ENCOUNTER — TELEPHONE (OUTPATIENT)
Dept: INTERNAL MEDICINE CLINIC | Age: 82
End: 2022-12-02

## 2022-12-02 ENCOUNTER — OFFICE VISIT (OUTPATIENT)
Dept: INTERNAL MEDICINE CLINIC | Age: 82
End: 2022-12-02
Payer: MEDICARE

## 2022-12-02 VITALS
HEART RATE: 74 BPM | HEIGHT: 64 IN | WEIGHT: 183 LBS | TEMPERATURE: 98.3 F | RESPIRATION RATE: 18 BRPM | OXYGEN SATURATION: 98 % | DIASTOLIC BLOOD PRESSURE: 83 MMHG | SYSTOLIC BLOOD PRESSURE: 137 MMHG | BODY MASS INDEX: 31.24 KG/M2

## 2022-12-02 DIAGNOSIS — Z00.00 MEDICARE ANNUAL WELLNESS VISIT, SUBSEQUENT: Primary | ICD-10-CM

## 2022-12-02 DIAGNOSIS — Z71.89 ADVANCED CARE PLANNING/COUNSELING DISCUSSION: ICD-10-CM

## 2022-12-02 DIAGNOSIS — F51.01 PRIMARY INSOMNIA: ICD-10-CM

## 2022-12-02 DIAGNOSIS — F33.42 RECURRENT MAJOR DEPRESSIVE DISORDER, IN FULL REMISSION (HCC): ICD-10-CM

## 2022-12-02 DIAGNOSIS — M85.89 OSTEOPENIA OF MULTIPLE SITES: ICD-10-CM

## 2022-12-02 DIAGNOSIS — E03.9 ACQUIRED HYPOTHYROIDISM: ICD-10-CM

## 2022-12-02 DIAGNOSIS — I10 HYPERTENSION, ESSENTIAL: ICD-10-CM

## 2022-12-02 DIAGNOSIS — E78.00 PURE HYPERCHOLESTEROLEMIA: ICD-10-CM

## 2022-12-02 RX ORDER — LEVOTHYROXINE SODIUM 75 UG/1
TABLET ORAL
Qty: 90 TABLET | Refills: 1 | Status: SHIPPED | OUTPATIENT
Start: 2022-12-02

## 2022-12-02 RX ORDER — MIRTAZAPINE 15 MG/1
15 TABLET, FILM COATED ORAL
Qty: 90 TABLET | Refills: 0 | Status: SHIPPED | OUTPATIENT
Start: 2022-12-02

## 2022-12-02 NOTE — ASSESSMENT & PLAN NOTE
Normally well controlled, slightly worse due to her & 's health, will increase Remeron x 3 months and then re-evaluate. We did review expectations and side effects.

## 2022-12-02 NOTE — ACP (ADVANCE CARE PLANNING)
Advance Care Planning   Advance Care Planning (ACP) Physician/NP/PA (Provider) Conversation    Date of ACP Conversation: 12/02/22  Persons included in Conversation:  patient  Length of ACP Conversation in minutes: <16 minutes (Non-Billable)    Authorized Decision Maker (if patient is incapable of making informed decisions):   Named in Advance Directive or Healthcare Power of       Primary Decision Maker: [address] - [phone]    She has an advanced directive - a copy HAS NOT been provided. Reviewed DNR/DNI and patient is interested in remaining a DNR, no changes made.        Kristopher Aviles MD

## 2022-12-02 NOTE — PROGRESS NOTES
Irena Cramer is a [de-identified] y.o. female who was seen in clinic today (12/2/2022) for a full physical.           Assessment & Plan:   Below is the assessment and plan developed based on review of pertinent history, physical exam, labs, studies, and medications. 1. Medicare annual wellness visit, subsequent  2. Advanced care planning/counseling discussion  3. Hypertension, essential  Assessment & Plan:  at goal, continue current treatment pending review of labs    Orders:  -     METABOLIC PANEL, COMPREHENSIVE; Future  4. Pure hypercholesterolemia  Assessment & Plan:  previously at goal, continue current treatment pending review of labs  Orders:  -     METABOLIC PANEL, COMPREHENSIVE; Future  5. Acquired hypothyroidism  Assessment & Plan:  Asymptomatic, due for labs since dose increase  Orders:  -     Synthroid 75 mcg tablet; TAKE 1 TABLET BY MOUTH EVERY DAY. EXCEPT 1/2 TAB ON SUNDAY, Normal, Disp-90 Tablet, R-1, ERIC  -     TSH 3RD GENERATION; Future  -     T4, FREE; Future  6. Recurrent major depressive disorder, in full remission ([facility] [address]  Assessment & Plan:  Normally well controlled, slightly worse due to her & 's health, will increase Remeron x 3 months and then re-evaluate. We did review expectations and side effects. 7. Primary insomnia  Assessment & Plan:  Normally well controlled, slightly worse due to her & 's health, will increase Remeron x 3 months and then re-evaluate. We did review expectations and side effects. Did have weight gain w/ medication in the past.  She needs to work on sleep hygiene   Orders:  -     mirtazapine (REMERON) 15 mg tablet; Take 1 Tablet by mouth nightly., Normal, Disp-90 Tablet, R-0  8. Osteopenia of multiple sites  Assessment & Plan:  Unknown control, overdue for imaging, reviewed rational for imaging, she will get done this year   Orders:  -     DEXA BONE DENSITY STUDY AXIAL;  Future      Follow-up and Dispositions    Return in about 6 months (around 6/2/2023) for Regular follow up. Subjective: Esperanza Najera is here today for a full physical.      Annual Wellness Visit- Subsequent Visit    Since last visit: no changes    The following acute and/or chronic problems were addressed today:    Cardiovascular Review  The patient has hypertension and hyperlipidemia. She reports taking medications as instructed, no medication side effects noted, patient does not perform home BP monitoring. She generally follows a low fat low cholesterol diet, generally follows a low sodium diet, no formal exercise but active during the day. Endocrine Review  Patient is seen for followup of hypothyroidism. At last visit restarted 1/2 tab on Sunday, previously skipping that day. She reports medication compliance: all the time and is taking separate from all other meds. She reports the following concerns/problems/med side effects: none. Mental Health Review  Patient is seen for depression and insomnia. Reports experiences the following side effects from the treatment: none. She admits her sleep hygiene is not good.  was reviewed. End of Life Planning: This was discussed with her today and she has an advanced directive - a copy HAS NOT been provided. Reviewed DNR/DNI and patient is interested in remaining a DNR, no changes made.     Depression Screen:  3 most recent PHQ Screens 12/2/2022   Little interest or pleasure in doing things Several days   Feeling down, depressed, irritable, or hopeless Nearly every day   Total Score PHQ 2 4   Trouble falling or staying asleep, or sleeping too much More than half the days   Feeling tired or having little energy Several days   Poor appetite, weight loss, or overeating Several days   Feeling bad about yourself - or that you are a failure or have let yourself or your family down Not at all   Trouble concentrating on things such as school, work, reading, or watching TV Not at all   Moving or speaking so slowly that other people could have noticed; or the opposite being so fidgety that others notice Not at all   Thoughts of being better off dead, or hurting yourself in some way Not at all   PHQ 9 Score 8   How difficult have these problems made it for you to do your work, take care of your home and get along with others Not difficult at all         Fall Risk:   Fall Risk Assessment, last 12 mths 12/2/2022   Able to walk? Yes   Fall in past 12 months? 1   Do you feel unsteady? 0   Are you worried about falling 0   Is TUG test greater than 12 seconds? 0   Is the gait abnormal? 1   Number of falls in past 12 months 1   Fall with injury? 1       Abuse Screen:  Abuse Screening Questionnaire 12/2/2022   Do you ever feel afraid of your partner? N   Are you in a relationship with someone who physically or mentally threatens you? N   Is it safe for you to go home? Y       Do you average more than 1 drink per night or more than 7 drinks a week:  No    On any one occasion in the past three months have you have had more than 3 drinks containing alcohol:  No    Health Maintenance:   Daily Aspirin: no  Bone Density: done 6/13/17 - osteopenia. Ordered last year, never competed. Order placed, she will go with her mammogram.    Immunizations:   Covid: up to date  Influenza: up to date  Tetanus: up to date  Shingles: she has had 1st, due for 2nd one  Pneumonia: up to date  Cancer screening:   Cervical: reviewed guidelines, n/a  Breast: reviewed guidelines, overdue, she will schedule, declined order   Colon: reviewed guidelines, n/a       Functional Ability and Level of Safety:    Hearing: She has noticed it has gotten worse. She did get evaluated. She is considering hearing aides. Cognition Screen:   Has your family/caregiver stated any concerns about your memory: no  Cognitive Screening: Normal - Clock Drawing Test      Ambulation: with no difficulty     Activities of Daily Living: The home contains: no safety equipment.   Patient does total self care  Exercise: moderately active    Adult Nutrition Screen:  No risk factors noted. Patient Care Team:  Billy Wright MD as PCP - General (Internal Medicine Physician)  Billy Wright MD as PCP - [facility] Empaneled Provider  Floresita Cardenas MD (Gastroenterology)  Cheyanne Ayon MD (Radiology)  Jarrett Boston MD (Obstetrics & Gynecology)  Lucia Morrison MD as Physician (Ophthalmology)  Frida Melvin MD as Physician (Otolaryngology)  Melissa Terry MD as Physician (Obstetrics & Gynecology)       The following sections were reviewed & updated as appropriate: Problem List, Allergies, PMH, PSH, FH, and SH. Prior to Admission medications    Medication Sig Start Date End Date Taking? Authorizing Provider   hydroCHLOROthiazide (HYDRODIURIL) 25 mg tablet TAKE 1 TABLET BY MOUTH EVERY DAY 9/29/22  Yes Billy Wright MD   Synthroid 75 mcg tablet TAKE 1 TABLET BY MOUTH EVERY DAY. EXCEPT 1/2 TAB ON SUNDAY 7/26/22  Yes Billy Wright MD   mirtazapine (REMERON) 7.5 mg tablet TAKE 1 TABLET BY MOUTH EVERY NIGHT 6/7/22  Yes Billy Wright MD   estradioL (ESTRACE) 0.01 % (0.1 mg/gram) vaginal cream  2/28/22  Yes Provider, Historical   atorvastatin (LIPITOR) 20 mg tablet TAKE 1 TABLET BY MOUTH EVERY DAY IN THE EVENING 4/4/22  Yes Billy Wright MD   Blood-Glucose Meter monitoring kit Pharmacist to choose preferred meter and strips. Dx: R73.01  Patient taking differently: Pharmacist to choose preferred meter and strips. Dx: R73.01 2/2/21  Yes Billy Wright MD   lancets (One Touch Delica) 33 gauge misc Test your sugar three times a week fasting. Dx: R73.01  Patient taking differently: Test your sugar three times a week fasting. Dx: R73.01 2/2/21  Yes Billy Wright MD   glucose blood VI test strips (blood glucose test) strip Pharmacist to choose preferred meter and strips.   Dx: R73.01  Patient taking differently: Pharmacist to choose preferred meter and strips. Dx: R73.01 2/2/21  Yes Sherman Manning MD   guaifenesin (MUCINEX PO) Take  by mouth two (2) times a day. Yes Provider, Historical   b complex-vitamin c-folic acid (NEPHROCAPS) 1 mg capsule DAILY AM for SUPPLEMENT   Yes Provider, Historical   acetaminophen (TYLENOL) 500 mg tablet 1,000 mg every six (6) hours as needed. Yes Provider, Historical   simethicone (GAS-X) 125 mg capsule Take 125 mg by mouth four (4) times daily as needed for Flatulence. Yes Provider, Historical   calcium citrate/vitamin D3 (CALCIUM CITRATE + D PO) Take 1 Tab by mouth as needed. Yes Other, MD Carlo   Cetirizine 10 mg cap Take  by mouth every evening. Yes Provider, Historical   cholecalciferol (VITAMIN D3) (1000 Units /25 mcg) tablet Take  by mouth daily. Yes Provider, Historical          Review of Systems   Constitutional:  Negative for chills and fever. Respiratory:  Negative for cough and shortness of breath. Cardiovascular:  Negative for chest pain and palpitations. Gastrointestinal:  Negative for abdominal pain, blood in stool, constipation, diarrhea, heartburn, nausea and vomiting. Musculoskeletal:  Positive for joint pain. Negative for myalgias. Neurological:  Negative for tingling, focal weakness and headaches. Endo/Heme/Allergies:  Does not bruise/bleed easily. Psychiatric/Behavioral:  Negative for depression. The patient has insomnia (she reports recently only getting 3-4 hrs of sleep. She thinks it is all her depression but she doesn't feel depressed during the day). The patient is not nervous/anxious. Objective:   Physical Exam  Constitutional:       General: She is not in acute distress. Appearance: Normal appearance. She is obese. Eyes:      Conjunctiva/sclera: Conjunctivae normal.   Neck:      Thyroid: No thyromegaly. Cardiovascular:      Rate and Rhythm: Regular rhythm. Heart sounds: No murmur heard.   Pulmonary:      Effort: Pulmonary effort is normal. Breath sounds: Normal breath sounds. No decreased breath sounds or wheezing. Abdominal:      General: Bowel sounds are normal.      Palpations: Abdomen is soft. Tenderness: There is no abdominal tenderness. Musculoskeletal:      Right lower leg: No edema. Left lower leg: No edema.       Comments: L foot in walking boot   Psychiatric:         Mood and Affect: Mood and affect normal.        Visit Vitals  /83   Pulse 74   Temp 98.3 °F (36.8 °C) (Temporal)   Resp 18   Ht 5' 4\" (1.626 m)   Wt 183 lb (83 kg)   SpO2 98%   BMI 31.41 kg/m²          Yenny Gauthier MD

## 2022-12-02 NOTE — ASSESSMENT & PLAN NOTE
Normally well controlled, slightly worse due to her & 's health, will increase Remeron x 3 months and then re-evaluate. We did review expectations and side effects.   Did have weight gain w/ medication in the past.  She needs to work on sleep hygiene

## 2022-12-02 NOTE — PATIENT INSTRUCTIONS

## 2022-12-09 DIAGNOSIS — F51.01 PRIMARY INSOMNIA: Primary | ICD-10-CM

## 2022-12-09 RX ORDER — ZOLPIDEM TARTRATE 5 MG/1
5 TABLET ORAL
Qty: 7 TABLET | Refills: 0 | Status: SHIPPED | OUTPATIENT
Start: 2022-12-09

## 2022-12-12 ENCOUNTER — TELEPHONE (OUTPATIENT)
Dept: INTERNAL MEDICINE CLINIC | Age: 82
End: 2022-12-12

## 2022-12-12 NOTE — TELEPHONE ENCOUNTER
Reason for call:   Pt would like to discuss her meds she is feeling anxious and not sleeping     Is this a new problem: yes     Date of last appointment:  12/2/2022     Can we respond via [employer]: no    Best call back number:  [phone]

## 2022-12-12 NOTE — TELEPHONE ENCOUNTER
Need more information. Stopped Remeron last week in favor of Ambien 5mg. Inquire if she has looked into a counselor. I think she really needs to talk to someone.

## 2022-12-12 NOTE — TELEPHONE ENCOUNTER
Patient states she is still taking the Remeron, She is afraid to stop this cold turkey as she has been on it so long. A pharmacist mentioned tapering it. She is afraid to fall while taking the Ambien. Will send to Dr. Steve Mercer to make plan for the new meds.     She is only getting 2 hours sleep  Appt made with Rell Elaine LCSW    Future Appointments   Date Time Provider Christie Houser   12/15/2022 10:00 AM Jeana Quiñonez LCSW [facility] AMB   1/3/2023 10:00 AM SPT DEXA 1 SPTMAMMO SPT

## 2022-12-13 ENCOUNTER — HOSPITAL ENCOUNTER (INPATIENT)
Age: 82
LOS: 2 days | Discharge: HOME | DRG: 309 | End: 2022-12-15
Attending: STUDENT IN AN ORGANIZED HEALTH CARE EDUCATION/TRAINING PROGRAM | Admitting: INTERNAL MEDICINE
Payer: MEDICARE

## 2022-12-13 DIAGNOSIS — R07.9 CHEST PAIN, UNSPECIFIED TYPE: ICD-10-CM

## 2022-12-13 DIAGNOSIS — R77.8 ELEVATED TROPONIN: ICD-10-CM

## 2022-12-13 DIAGNOSIS — R00.2 PALPITATIONS: Primary | ICD-10-CM

## 2022-12-13 PROBLEM — I21.4 NSTEMI (NON-ST ELEVATED MYOCARDIAL INFARCTION) (HCC): Status: ACTIVE | Noted: 2022-12-13

## 2022-12-13 PROBLEM — R79.89 ELEVATED TROPONIN: Status: ACTIVE | Noted: 2022-12-13

## 2022-12-13 LAB
ALBUMIN SERPL-MCNC: 4.4 G/DL (ref 3.5–5)
ALBUMIN/GLOB SERPL: 1.3 {RATIO} (ref 1.1–2.2)
ALP SERPL-CCNC: 64 U/L (ref 45–117)
ALT SERPL-CCNC: 31 U/L (ref 12–78)
ANION GAP SERPL CALC-SCNC: 12 MMOL/L (ref 5–15)
AST SERPL-CCNC: 28 U/L (ref 15–37)
ATRIAL RATE: 78 BPM
BASOPHILS # BLD: 0.1 K/UL (ref 0–0.1)
BASOPHILS NFR BLD: 1 % (ref 0–1)
BILIRUB SERPL-MCNC: 0.7 MG/DL (ref 0.2–1)
BUN SERPL-MCNC: 19 MG/DL (ref 6–20)
BUN/CREAT SERPL: 17 (ref 12–20)
CALCIUM SERPL-MCNC: 9.9 MG/DL (ref 8.5–10.1)
CALCULATED P AXIS, ECG09: 43 DEGREES
CALCULATED R AXIS, ECG10: -36 DEGREES
CALCULATED T AXIS, ECG11: 96 DEGREES
CHLORIDE SERPL-SCNC: 94 MMOL/L (ref 97–108)
CO2 SERPL-SCNC: 30 MMOL/L (ref 21–32)
CREAT SERPL-MCNC: 1.14 MG/DL (ref 0.55–1.02)
DIAGNOSIS, 93000: NORMAL
DIFFERENTIAL METHOD BLD: ABNORMAL
EOSINOPHIL # BLD: 0 K/UL (ref 0–0.4)
EOSINOPHIL NFR BLD: 0 % (ref 0–7)
ERYTHROCYTE [DISTWIDTH] IN BLOOD BY AUTOMATED COUNT: 14.1 % (ref 11.5–14.5)
GLOBULIN SER CALC-MCNC: 3.3 G/DL (ref 2–4)
GLUCOSE SERPL-MCNC: 120 MG/DL (ref 65–100)
HCT VFR BLD AUTO: 42.9 % (ref 35–47)
HGB BLD-MCNC: 14.2 G/DL (ref 11.5–16)
IMM GRANULOCYTES # BLD AUTO: 0.1 K/UL (ref 0–0.04)
IMM GRANULOCYTES NFR BLD AUTO: 1 % (ref 0–0.5)
LYMPHOCYTES # BLD: 1.1 K/UL (ref 0.8–3.5)
LYMPHOCYTES NFR BLD: 11 % (ref 12–49)
MCH RBC QN AUTO: 29.8 PG (ref 26–34)
MCHC RBC AUTO-ENTMCNC: 33.1 G/DL (ref 30–36.5)
MCV RBC AUTO: 90.1 FL (ref 80–99)
MONOCYTES # BLD: 0.7 K/UL (ref 0–1)
MONOCYTES NFR BLD: 7 % (ref 5–13)
NEUTS SEG # BLD: 7.9 K/UL (ref 1.8–8)
NEUTS SEG NFR BLD: 80 % (ref 32–75)
NRBC # BLD: 0 K/UL (ref 0–0.01)
NRBC BLD-RTO: 0 PER 100 WBC
P-R INTERVAL, ECG05: 132 MS
PLATELET # BLD AUTO: 222 K/UL (ref 150–400)
PMV BLD AUTO: 10.1 FL (ref 8.9–12.9)
POTASSIUM SERPL-SCNC: 3.2 MMOL/L (ref 3.5–5.1)
PROT SERPL-MCNC: 7.7 G/DL (ref 6.4–8.2)
Q-T INTERVAL, ECG07: 430 MS
QRS DURATION, ECG06: 148 MS
QTC CALCULATION (BEZET), ECG08: 490 MS
RBC # BLD AUTO: 4.76 M/UL (ref 3.8–5.2)
SODIUM SERPL-SCNC: 136 MMOL/L (ref 136–145)
T4 FREE SERPL-MCNC: 1.6 NG/DL (ref 0.8–1.5)
TROPONIN-HIGH SENSITIVITY: 88 NG/L (ref 0–51)
TROPONIN-HIGH SENSITIVITY: 91 NG/L (ref 0–51)
TSH SERPL DL<=0.05 MIU/L-ACNC: 5.99 UIU/ML (ref 0.36–3.74)
VENTRICULAR RATE, ECG03: 78 BPM
WBC # BLD AUTO: 9.8 K/UL (ref 3.6–11)

## 2022-12-13 PROCEDURE — 74011000250 HC RX REV CODE- 250: Performed by: STUDENT IN AN ORGANIZED HEALTH CARE EDUCATION/TRAINING PROGRAM

## 2022-12-13 PROCEDURE — 74011250636 HC RX REV CODE- 250/636: Performed by: INTERNAL MEDICINE

## 2022-12-13 PROCEDURE — 74011250637 HC RX REV CODE- 250/637: Performed by: STUDENT IN AN ORGANIZED HEALTH CARE EDUCATION/TRAINING PROGRAM

## 2022-12-13 PROCEDURE — 36415 COLL VENOUS BLD VENIPUNCTURE: CPT

## 2022-12-13 PROCEDURE — 84439 ASSAY OF FREE THYROXINE: CPT

## 2022-12-13 PROCEDURE — 80053 COMPREHEN METABOLIC PANEL: CPT

## 2022-12-13 PROCEDURE — 74011250636 HC RX REV CODE- 250/636: Performed by: STUDENT IN AN ORGANIZED HEALTH CARE EDUCATION/TRAINING PROGRAM

## 2022-12-13 PROCEDURE — 84443 ASSAY THYROID STIM HORMONE: CPT

## 2022-12-13 PROCEDURE — 99285 EMERGENCY DEPT VISIT HI MDM: CPT

## 2022-12-13 PROCEDURE — 84484 ASSAY OF TROPONIN QUANT: CPT

## 2022-12-13 PROCEDURE — 65270000046 HC RM TELEMETRY

## 2022-12-13 PROCEDURE — 74011250637 HC RX REV CODE- 250/637: Performed by: INTERNAL MEDICINE

## 2022-12-13 PROCEDURE — 93005 ELECTROCARDIOGRAM TRACING: CPT

## 2022-12-13 PROCEDURE — 85025 COMPLETE CBC W/AUTO DIFF WBC: CPT

## 2022-12-13 PROCEDURE — 96374 THER/PROPH/DIAG INJ IV PUSH: CPT

## 2022-12-13 RX ORDER — POLYETHYLENE GLYCOL 3350 17 G/17G
17 POWDER, FOR SOLUTION ORAL DAILY PRN
Status: DISCONTINUED | OUTPATIENT
Start: 2022-12-13 | End: 2022-12-15 | Stop reason: HOSPADM

## 2022-12-13 RX ORDER — GUAIFENESIN 100 MG/5ML
325 LIQUID (ML) ORAL
Status: COMPLETED | OUTPATIENT
Start: 2022-12-13 | End: 2022-12-13

## 2022-12-13 RX ORDER — ONDANSETRON 2 MG/ML
4 INJECTION INTRAMUSCULAR; INTRAVENOUS
Status: COMPLETED | OUTPATIENT
Start: 2022-12-13 | End: 2022-12-13

## 2022-12-13 RX ORDER — GUAIFENESIN 100 MG/5ML
325 LIQUID (ML) ORAL DAILY
Status: DISCONTINUED | OUTPATIENT
Start: 2022-12-14 | End: 2022-12-13

## 2022-12-13 RX ORDER — SODIUM CHLORIDE 0.9 % (FLUSH) 0.9 %
5-40 SYRINGE (ML) INJECTION AS NEEDED
Status: DISCONTINUED | OUTPATIENT
Start: 2022-12-13 | End: 2022-12-15 | Stop reason: HOSPADM

## 2022-12-13 RX ORDER — ENOXAPARIN SODIUM 100 MG/ML
1 INJECTION SUBCUTANEOUS EVERY 12 HOURS
Status: DISCONTINUED | OUTPATIENT
Start: 2022-12-14 | End: 2022-12-14

## 2022-12-13 RX ORDER — POTASSIUM CHLORIDE 750 MG/1
40 TABLET, FILM COATED, EXTENDED RELEASE ORAL
Status: COMPLETED | OUTPATIENT
Start: 2022-12-14 | End: 2022-12-13

## 2022-12-13 RX ORDER — SODIUM CHLORIDE 0.9 % (FLUSH) 0.9 %
5-40 SYRINGE (ML) INJECTION EVERY 8 HOURS
Status: DISCONTINUED | OUTPATIENT
Start: 2022-12-14 | End: 2022-12-15 | Stop reason: HOSPADM

## 2022-12-13 RX ORDER — INSULIN LISPRO 100 [IU]/ML
INJECTION, SOLUTION INTRAVENOUS; SUBCUTANEOUS
Status: DISCONTINUED | OUTPATIENT
Start: 2022-12-14 | End: 2022-12-14

## 2022-12-13 RX ORDER — MIRTAZAPINE 15 MG/1
15 TABLET, FILM COATED ORAL
COMMUNITY

## 2022-12-13 RX ORDER — LORAZEPAM 0.5 MG/1
0.5 TABLET ORAL ONCE
Status: DISCONTINUED | OUTPATIENT
Start: 2022-12-13 | End: 2022-12-13

## 2022-12-13 RX ORDER — ATORVASTATIN CALCIUM 20 MG/1
20 TABLET, FILM COATED ORAL
Status: DISCONTINUED | OUTPATIENT
Start: 2022-12-13 | End: 2022-12-13

## 2022-12-13 RX ORDER — METOPROLOL TARTRATE 25 MG/1
25 TABLET, FILM COATED ORAL EVERY 12 HOURS
Status: DISCONTINUED | OUTPATIENT
Start: 2022-12-14 | End: 2022-12-15 | Stop reason: HOSPADM

## 2022-12-13 RX ORDER — MAG HYDROX/ALUMINUM HYD/SIMETH 200-200-20
30 SUSPENSION, ORAL (FINAL DOSE FORM) ORAL
Status: COMPLETED | OUTPATIENT
Start: 2022-12-13 | End: 2022-12-13

## 2022-12-13 RX ORDER — ACETAMINOPHEN 650 MG/1
650 SUPPOSITORY RECTAL
Status: DISCONTINUED | OUTPATIENT
Start: 2022-12-13 | End: 2022-12-15 | Stop reason: HOSPADM

## 2022-12-13 RX ORDER — ACETAMINOPHEN 325 MG/1
650 TABLET ORAL
Status: DISCONTINUED | OUTPATIENT
Start: 2022-12-13 | End: 2022-12-15 | Stop reason: HOSPADM

## 2022-12-13 RX ORDER — POTASSIUM CHLORIDE 750 MG/1
20 TABLET, FILM COATED, EXTENDED RELEASE ORAL
Status: COMPLETED | OUTPATIENT
Start: 2022-12-13 | End: 2022-12-13

## 2022-12-13 RX ORDER — MIRTAZAPINE 15 MG/1
15 TABLET, FILM COATED ORAL
Status: DISCONTINUED | OUTPATIENT
Start: 2022-12-14 | End: 2022-12-15 | Stop reason: HOSPADM

## 2022-12-13 RX ORDER — SODIUM CHLORIDE, SODIUM LACTATE, POTASSIUM CHLORIDE, CALCIUM CHLORIDE 600; 310; 30; 20 MG/100ML; MG/100ML; MG/100ML; MG/100ML
100 INJECTION, SOLUTION INTRAVENOUS CONTINUOUS
Status: DISCONTINUED | OUTPATIENT
Start: 2022-12-14 | End: 2022-12-15 | Stop reason: HOSPADM

## 2022-12-13 RX ORDER — ONDANSETRON 2 MG/ML
4 INJECTION INTRAMUSCULAR; INTRAVENOUS
Status: DISCONTINUED | OUTPATIENT
Start: 2022-12-13 | End: 2022-12-15 | Stop reason: HOSPADM

## 2022-12-13 RX ORDER — MORPHINE SULFATE 2 MG/ML
2 INJECTION, SOLUTION INTRAMUSCULAR; INTRAVENOUS
Status: DISCONTINUED | OUTPATIENT
Start: 2022-12-13 | End: 2022-12-15 | Stop reason: HOSPADM

## 2022-12-13 RX ORDER — ONDANSETRON 4 MG/1
4 TABLET, ORALLY DISINTEGRATING ORAL
Status: DISCONTINUED | OUTPATIENT
Start: 2022-12-13 | End: 2022-12-15 | Stop reason: HOSPADM

## 2022-12-13 RX ORDER — MAGNESIUM SULFATE 100 %
4 CRYSTALS MISCELLANEOUS AS NEEDED
Status: DISCONTINUED | OUTPATIENT
Start: 2022-12-13 | End: 2022-12-14

## 2022-12-13 RX ORDER — NITROGLYCERIN 0.4 MG/1
0.4 TABLET SUBLINGUAL
Status: DISCONTINUED | OUTPATIENT
Start: 2022-12-13 | End: 2022-12-15 | Stop reason: HOSPADM

## 2022-12-13 RX ORDER — ATORVASTATIN CALCIUM 20 MG/1
20 TABLET, FILM COATED ORAL EVERY EVENING
Status: DISCONTINUED | OUTPATIENT
Start: 2022-12-14 | End: 2022-12-15 | Stop reason: HOSPADM

## 2022-12-13 RX ORDER — LEVOTHYROXINE SODIUM 75 UG/1
75 TABLET ORAL
Status: DISCONTINUED | OUTPATIENT
Start: 2022-12-14 | End: 2022-12-15 | Stop reason: HOSPADM

## 2022-12-13 RX ADMIN — ENOXAPARIN SODIUM 80 MG: 100 INJECTION SUBCUTANEOUS at 23:58

## 2022-12-13 RX ADMIN — ASPIRIN 324 MG: 81 TABLET, CHEWABLE ORAL at 18:40

## 2022-12-13 RX ADMIN — POTASSIUM CHLORIDE 40 MEQ: 750 TABLET, FILM COATED, EXTENDED RELEASE ORAL at 23:58

## 2022-12-13 RX ADMIN — ONDANSETRON HYDROCHLORIDE 4 MG: 2 INJECTION, SOLUTION INTRAMUSCULAR; INTRAVENOUS at 15:00

## 2022-12-13 RX ADMIN — SODIUM CHLORIDE, POTASSIUM CHLORIDE, SODIUM LACTATE AND CALCIUM CHLORIDE 100 ML/HR: 600; 310; 30; 20 INJECTION, SOLUTION INTRAVENOUS at 23:59

## 2022-12-13 RX ADMIN — FAMOTIDINE 20 MG: 10 INJECTION, SOLUTION INTRAVENOUS at 14:13

## 2022-12-13 RX ADMIN — METOPROLOL TARTRATE 25 MG: 25 TABLET, FILM COATED ORAL at 23:58

## 2022-12-13 RX ADMIN — ALUMINUM HYDROXIDE, MAGNESIUM HYDROXIDE, AND SIMETHICONE 30 ML: 200; 200; 20 SUSPENSION ORAL at 14:13

## 2022-12-13 RX ADMIN — MIRTAZAPINE 15 MG: 15 TABLET, FILM COATED ORAL at 23:58

## 2022-12-13 RX ADMIN — POTASSIUM CHLORIDE 20 MEQ: 750 TABLET, FILM COATED, EXTENDED RELEASE ORAL at 13:52

## 2022-12-13 RX ADMIN — SODIUM CHLORIDE 1000 ML: 9 INJECTION, SOLUTION INTRAVENOUS at 14:21

## 2022-12-13 NOTE — PROGRESS NOTES
TRANSFER - OUT REPORT:    Verbal report given to VU Reed(name) on Marcell Jenkins  being transferred to (unit) for routine progression of care       Report consisted of patients Situation, Background, Assessment and   Recommendations(SBAR). Information from the following report(s) SBAR, Kardex, ED Summary, and MAR was reviewed with the receiving nurse. Lines:   Peripheral IV 12/13/22 Right Forearm (Active)   Site Assessment Clean, dry, & intact 12/13/22 1245   Phlebitis Assessment 0 12/13/22 1245   Infiltration Assessment 0 12/13/22 1245   Dressing Status Clean, dry, & intact 12/13/22 1245        Opportunity for questions and clarification was provided.       Patient transported with:   Monitor  [facility]

## 2022-12-13 NOTE — PROGRESS NOTES
Pt has burping and coughing episode after taking potassium tablet. O2 saturation is 100 percent. MD notified and meds ordered.

## 2022-12-13 NOTE — PROGRESS NOTES
[facility] arrived. Report was given and all responsible paper work was given to crew including original EMTALA.

## 2022-12-13 NOTE — ED PROVIDER NOTES
Patient states that for the past several weeks she has had palpitations and racing heartbeat at night after taking her mirtazapine dose. She has had scattered brief migratory sharp chest discomfort as well. She is very nervous and anxious. She is concerned about discontinuing this medication which her primary care physician recommended and starting a different sleep aid. She was concerned that she would have symptoms withdrawal symptoms but if she did not take a new agent she was concerned that she would not sleep. She is a caregiver for her . She has not seen a psychiatrist or sleep specialist recently. No fevers or chills. No new changes in her medications. No chest pain, abdominal pain at present. No focal neurodeficits.        Past Medical History:   Diagnosis Date    Allergic rhinitis     Anesthesia complication     Pt reports dizziness after anesthesia     Arrhythmia     Left Bundle Branch Block    Arthritis     BBB (bundle branch block)     Depression     History of not currently    GERD (gastroesophageal reflux disease)     Hyperlipidemia     Impaired fasting glucose     Unspecified essential hypertension     Unspecified hypothyroidism     Uterine prolapse     Vertigo        Past Surgical History:   Procedure Laterality Date    HX APPENDECTOMY  1987    HX BREAST BIOPSY Bilateral     >5, all benign    HX BREAST BIOPSY Left 11/29/2017    benign    HX COLONOSCOPY  8/15/12    diverticulosis    HX ENDOSCOPY  8/15/12    hiatal hernia    HX HAMMER TOE REPAIR Left     w/ bunion surgery (per previous PCP)    HX KNEE REPLACEMENT Left 10/22/2019    [place]    HX PARTIAL HYSTERECTOMY      HX TONSILLECTOMY           Family History:   Problem Relation Age of Onset    Thyroid Disease Father         hypo    Coronary Art Dis Father         s/p CABG    Heart Disease Father         s/p valve replacement    Diabetes Brother     Thyroid Disease Brother     Cancer Brother         kidney    Coronary Art Dis Brother         s/p CABG    Dementia Mother         vascular    Elevated Lipids Mother     Coronary Art Dis Mother     OSTEOARTHRITIS Mother     Diabetes Mother     Gall Bladder Disease Mother     Heart Disease Mother     Hypertension Mother     Stroke Mother     Obesity Son     Hypertension Son     No Known Problems Son     Diabetes Maternal Grandmother     Cancer Maternal Grandmother         pancreatic    Diabetes Paternal Grandmother     Diabetes Maternal Aunt     Gall Bladder Disease Maternal Aunt     Hypertension Maternal Aunt     Diabetes Paternal Aunt     Hypertension Maternal Aunt        Social History     Socioeconomic History    Marital status:      Spouse name: Not on file    Number of children: Not on file    Years of education: Not on file    Highest education level: Not on file   Occupational History    Not on file   Tobacco Use    Smoking status: Never    Smokeless tobacco: Never   Vaping Use    Vaping Use: Never used   Substance and Sexual Activity    Alcohol use: No    Drug use: No    Sexual activity: Not Currently     Partners: Male   Other Topics Concern     Service Not Asked    Blood Transfusions Not Asked    Caffeine Concern Not Asked    Occupational Exposure Not Asked    Hobby Hazards Not Asked    Sleep Concern Not Asked    Stress Concern Not Asked    Weight Concern Not Asked    Special Diet Not Asked    Back Care Not Asked    Exercise Not Asked    Bike Helmet Not Asked    Seat Belt Not Asked    Self-Exams Not Asked   Social History Narrative    Lives in [place] with  of 48 years. Has 2 sons. Retired. Used to work as a  for [employer] mostly at the middle school level. Likes to read.      Social Determinants of Health     Financial Resource Strain: Low Risk     Difficulty of Paying Living Expenses: Not hard at all   Food Insecurity: No Food Insecurity    Worried About [address] in the Last Year: Never true    [address] in the Last Year: Never true   Transportation Needs: Not on file   Physical Activity: Not on file   Stress: Not on file   Social Connections: Not on file   Intimate Partner Violence: Not on file   Housing Stability: Not on file         ALLERGIES: Amlodipine, Codeine, Epinephrine, Erythromycin, Losartan, Mobic [meloxicam], and Pcn [penicillins]    Review of Systems   Constitutional:  Positive for fatigue. Negative for chills and fever. Eyes:  Negative for photophobia. Respiratory:  Negative for shortness of breath. Cardiovascular:  Positive for chest pain and palpitations. Gastrointestinal:  Negative for abdominal pain and nausea. Genitourinary:  Negative for dysuria. Musculoskeletal:  Negative for back pain. Neurological:  Negative for light-headedness and headaches. Psychiatric/Behavioral:  Negative for confusion. The patient is nervous/anxious. All other systems reviewed and are negative. Vitals:    12/13/22 1242 12/13/22 1600 12/13/22 1817 12/13/22 1917   BP: (!) 155/81 118/64 128/77 114/73   Pulse: 74 76 71 75   Resp: 23 23 20 18   Temp: 97.8 °F (36.6 °C)  98.2 °F (36.8 °C) 98.5 °F (36.9 °C)   SpO2: 99% 97% 96% 96%   Weight: 79.7 kg (175 lb 11.3 oz)      Height: 5' 4\" (1.626 m)               Physical Exam  Vitals reviewed. Constitutional:       General: She is not in acute distress. Appearance: She is not toxic-appearing. HENT:      Head: Normocephalic and atraumatic. Mouth/Throat:      Mouth: Mucous membranes are moist.   Eyes:      Extraocular Movements: Extraocular movements intact. Pupils: Pupils are equal, round, and reactive to light. Cardiovascular:      Rate and Rhythm: Normal rate and regular rhythm. Heart sounds: Normal heart sounds. Pulmonary:      Effort: Pulmonary effort is normal. No respiratory distress. Breath sounds: Normal breath sounds. Abdominal:      General: There is no distension. Palpations: Abdomen is soft. Tenderness:  There is no abdominal tenderness. Musculoskeletal:      Cervical back: Normal range of motion. Right lower leg: No edema. Left lower leg: No edema. Skin:     General: Skin is warm. Capillary Refill: Capillary refill takes less than 2 seconds. Neurological:      General: No focal deficit present. Mental Status: She is alert and oriented to person, place, and time. Cranial Nerves: No cranial nerve deficit. Sensory: No sensory deficit. Motor: No weakness or pronator drift. Coordination: Coordination is intact. Coordination normal.      Gait: Gait normal.   Psychiatric:         Mood and Affect: Mood is anxious. Behavior: Behavior normal.        [facility]    ED Course as of 12/13/22 2008   Tue Dec 13, 2022   1300 EK normal sinus rhythm  ventricular rate 78  Left axis deviation, left bundle branch block. Compared to previous EKG from 2021. No significant change in morphology. [NS]      ED Course User Index  [NS] Allan Kim MD       Procedures    Patient presenting with chest pain, by description gestalt low risk for ACS, she has had a fairly recent neuro work-up with a negative CT angiogram of her neck and head. She also had a normal MRI last year. She has no focal neurodeficits. She appears generally well but anxious. I advised her that the recommended taper of mirtazapine is over 4 weeks. She can consider going even slower to avoid any side effects. She will follow-up with her primary care, attempt to follow-up with psychiatry and sleep medicine. 2:20 PM was reevaluated, found to have an elevated troponin. She has had intermittent chest pain. SHe has not had a specific cardiac work-up. This may be type II, will repeat. Perfect Serve Consult for Admission  2:22 PM    ED Room Number: 210/01  Patient Name and age: Marco Sanders [de-identified] y.o.  female  Working Diagnosis:   1. Palpitations    2. Elevated troponin    3.  Chest pain, unspecified type COVID-19 Suspicion:  no  Sepsis present:  no  Reassessment needed: no  Code Status:  Full Code  Readmission: no  Isolation Requirements:  no  Recommended Level of Care:  med/surg  Department:[facility] - [phone]  Other: Patient with palpitations, elevated troponin for age and gender, may be related to slight MARYAN but may have subtle ischemia  . We will start aspirin and trend troponins. Abby Oakes MD      Please note that this dictation was completed with [employer], the computer voice recognition software. Quite often unanticipated grammatical, syntax, homophones, and other interpretive errors are inadvertently transcribed by the computer software. Please disregard these errors. Please excuse any errors that have escaped final proofreading.

## 2022-12-13 NOTE — ED TRIAGE NOTES
[de-identified]year old female pt comes to the ED via POV for a CC Palpitations. Pt states she has had this for 3 weeks related to her having a hard time sleeping and taking Mirtazapine 15 mg. Pt says she has no pain and is A&Ox4.

## 2022-12-14 ENCOUNTER — APPOINTMENT (OUTPATIENT)
Dept: VASCULAR SURGERY | Age: 82
DRG: 309 | End: 2022-12-14
Attending: INTERNAL MEDICINE
Payer: MEDICARE

## 2022-12-14 ENCOUNTER — APPOINTMENT (OUTPATIENT)
Dept: GENERAL RADIOLOGY | Age: 82
DRG: 309 | End: 2022-12-14
Attending: INTERNAL MEDICINE
Payer: MEDICARE

## 2022-12-14 LAB
ALBUMIN SERPL-MCNC: 3.8 G/DL (ref 3.5–5)
ALBUMIN/GLOB SERPL: 1.5 {RATIO} (ref 1.1–2.2)
ALP SERPL-CCNC: 62 U/L (ref 45–117)
ALT SERPL-CCNC: 29 U/L (ref 12–78)
AMPHET UR QL SCN: NEGATIVE
ANION GAP SERPL CALC-SCNC: 3 MMOL/L (ref 5–15)
APPEARANCE UR: CLEAR
AST SERPL-CCNC: 20 U/L (ref 15–37)
BACTERIA URNS QL MICRO: ABNORMAL /HPF
BARBITURATES UR QL SCN: NEGATIVE
BASOPHILS # BLD: 0.1 K/UL (ref 0–0.1)
BASOPHILS NFR BLD: 1 % (ref 0–1)
BENZODIAZ UR QL: NEGATIVE
BILIRUB SERPL-MCNC: 0.6 MG/DL (ref 0.2–1)
BILIRUB UR QL: NEGATIVE
BNP SERPL-MCNC: 148 PG/ML
BUN SERPL-MCNC: 14 MG/DL (ref 6–20)
BUN/CREAT SERPL: 17 (ref 12–20)
CALCIUM SERPL-MCNC: 9.2 MG/DL (ref 8.5–10.1)
CANNABINOIDS UR QL SCN: NEGATIVE
CHLORIDE SERPL-SCNC: 99 MMOL/L (ref 97–108)
CO2 SERPL-SCNC: 32 MMOL/L (ref 21–32)
COCAINE UR QL SCN: NEGATIVE
COLOR UR: ABNORMAL
CREAT SERPL-MCNC: 0.81 MG/DL (ref 0.55–1.02)
D DIMER PPP FEU-MCNC: 0.36 MG/L FEU (ref 0–0.65)
DIFFERENTIAL METHOD BLD: ABNORMAL
DRUG SCRN COMMENT,DRGCM: NORMAL
EOSINOPHIL # BLD: 0.1 K/UL (ref 0–0.4)
EOSINOPHIL NFR BLD: 1 % (ref 0–7)
EPITH CASTS URNS QL MICRO: ABNORMAL /LPF
ERYTHROCYTE [DISTWIDTH] IN BLOOD BY AUTOMATED COUNT: 14.1 % (ref 11.5–14.5)
EST. AVERAGE GLUCOSE BLD GHB EST-MCNC: 120 MG/DL
GLOBULIN SER CALC-MCNC: 2.5 G/DL (ref 2–4)
GLUCOSE SERPL-MCNC: 100 MG/DL (ref 65–100)
GLUCOSE UR STRIP.AUTO-MCNC: NEGATIVE MG/DL
HBA1C MFR BLD: 5.8 % (ref 4–5.6)
HCT VFR BLD AUTO: 39.6 % (ref 35–47)
HGB BLD-MCNC: 13.1 G/DL (ref 11.5–16)
HGB UR QL STRIP: NEGATIVE
IMM GRANULOCYTES # BLD AUTO: 0.1 K/UL (ref 0–0.04)
IMM GRANULOCYTES NFR BLD AUTO: 1 % (ref 0–0.5)
KETONES UR QL STRIP.AUTO: NEGATIVE MG/DL
LEUKOCYTE ESTERASE UR QL STRIP.AUTO: ABNORMAL
LIPASE SERPL-CCNC: 220 U/L (ref 73–393)
LYMPHOCYTES # BLD: 2.6 K/UL (ref 0.8–3.5)
LYMPHOCYTES NFR BLD: 27 % (ref 12–49)
MAGNESIUM SERPL-MCNC: 2.2 MG/DL (ref 1.6–2.4)
MCH RBC QN AUTO: 29.6 PG (ref 26–34)
MCHC RBC AUTO-ENTMCNC: 33.1 G/DL (ref 30–36.5)
MCV RBC AUTO: 89.4 FL (ref 80–99)
METHADONE UR QL: NEGATIVE
MONOCYTES # BLD: 0.7 K/UL (ref 0–1)
MONOCYTES NFR BLD: 7 % (ref 5–13)
NEUTS SEG # BLD: 6.3 K/UL (ref 1.8–8)
NEUTS SEG NFR BLD: 63 % (ref 32–75)
NITRITE UR QL STRIP.AUTO: NEGATIVE
NRBC # BLD: 0 K/UL (ref 0–0.01)
NRBC BLD-RTO: 0 PER 100 WBC
OPIATES UR QL: NEGATIVE
PCP UR QL: NEGATIVE
PH UR STRIP: 7.5 [PH] (ref 5–8)
PHOSPHATE SERPL-MCNC: 2.5 MG/DL (ref 2.6–4.7)
PLATELET # BLD AUTO: 220 K/UL (ref 150–400)
PMV BLD AUTO: 10 FL (ref 8.9–12.9)
POTASSIUM SERPL-SCNC: 4 MMOL/L (ref 3.5–5.1)
PROT SERPL-MCNC: 6.3 G/DL (ref 6.4–8.2)
PROT UR STRIP-MCNC: NEGATIVE MG/DL
RBC # BLD AUTO: 4.43 M/UL (ref 3.8–5.2)
RBC #/AREA URNS HPF: ABNORMAL /HPF (ref 0–5)
SODIUM SERPL-SCNC: 134 MMOL/L (ref 136–145)
SP GR UR REFRACTOMETRY: 1 (ref 1–1.03)
TROPONIN-HIGH SENSITIVITY: 101 NG/L (ref 0–51)
TROPONIN-HIGH SENSITIVITY: 103 NG/L (ref 0–51)
UROBILINOGEN UR QL STRIP.AUTO: 0.2 EU/DL (ref 0.2–1)
WBC # BLD AUTO: 9.9 K/UL (ref 3.6–11)
WBC URNS QL MICRO: ABNORMAL /HPF (ref 0–4)

## 2022-12-14 PROCEDURE — 85025 COMPLETE CBC W/AUTO DIFF WBC: CPT

## 2022-12-14 PROCEDURE — 83880 ASSAY OF NATRIURETIC PEPTIDE: CPT

## 2022-12-14 PROCEDURE — 74011250637 HC RX REV CODE- 250/637: Performed by: NURSE PRACTITIONER

## 2022-12-14 PROCEDURE — 85379 FIBRIN DEGRADATION QUANT: CPT

## 2022-12-14 PROCEDURE — 80053 COMPREHEN METABOLIC PANEL: CPT

## 2022-12-14 PROCEDURE — 74011250637 HC RX REV CODE- 250/637: Performed by: INTERNAL MEDICINE

## 2022-12-14 PROCEDURE — 36415 COLL VENOUS BLD VENIPUNCTURE: CPT

## 2022-12-14 PROCEDURE — 74011250636 HC RX REV CODE- 250/636: Performed by: INTERNAL MEDICINE

## 2022-12-14 PROCEDURE — 71045 X-RAY EXAM CHEST 1 VIEW: CPT

## 2022-12-14 PROCEDURE — 80307 DRUG TEST PRSMV CHEM ANLYZR: CPT

## 2022-12-14 PROCEDURE — 83690 ASSAY OF LIPASE: CPT

## 2022-12-14 PROCEDURE — 83735 ASSAY OF MAGNESIUM: CPT

## 2022-12-14 PROCEDURE — 83036 HEMOGLOBIN GLYCOSYLATED A1C: CPT

## 2022-12-14 PROCEDURE — 93880 EXTRACRANIAL BILAT STUDY: CPT

## 2022-12-14 PROCEDURE — 74011000250 HC RX REV CODE- 250: Performed by: INTERNAL MEDICINE

## 2022-12-14 PROCEDURE — 65270000029 HC RM PRIVATE

## 2022-12-14 PROCEDURE — 81001 URINALYSIS AUTO W/SCOPE: CPT

## 2022-12-14 PROCEDURE — 84484 ASSAY OF TROPONIN QUANT: CPT

## 2022-12-14 PROCEDURE — 84100 ASSAY OF PHOSPHORUS: CPT

## 2022-12-14 RX ORDER — BUSPIRONE HYDROCHLORIDE 5 MG/1
5 TABLET ORAL 2 TIMES DAILY
Qty: 60 TABLET | Refills: 0 | Status: SHIPPED | OUTPATIENT
Start: 2022-12-14 | End: 2022-12-15

## 2022-12-14 RX ORDER — MIRTAZAPINE 15 MG/1
7.5 TABLET, FILM COATED ORAL ONCE
Status: ACTIVE | OUTPATIENT
Start: 2022-12-14 | End: 2022-12-14

## 2022-12-14 RX ORDER — BUSPIRONE HYDROCHLORIDE 5 MG/1
5 TABLET ORAL 2 TIMES DAILY
Status: DISCONTINUED | OUTPATIENT
Start: 2022-12-14 | End: 2022-12-15 | Stop reason: HOSPADM

## 2022-12-14 RX ORDER — LEVOFLOXACIN 5 MG/ML
750 INJECTION, SOLUTION INTRAVENOUS EVERY 24 HOURS
Status: DISCONTINUED | OUTPATIENT
Start: 2022-12-14 | End: 2022-12-14

## 2022-12-14 RX ORDER — ASPIRIN 81 MG/1
81 TABLET ORAL DAILY
Status: DISCONTINUED | OUTPATIENT
Start: 2022-12-14 | End: 2022-12-15 | Stop reason: HOSPADM

## 2022-12-14 RX ADMIN — ATORVASTATIN CALCIUM 20 MG: 20 TABLET, FILM COATED ORAL at 17:13

## 2022-12-14 RX ADMIN — SODIUM CHLORIDE, PRESERVATIVE FREE 10 ML: 5 INJECTION INTRAVENOUS at 21:37

## 2022-12-14 RX ADMIN — METOPROLOL TARTRATE 25 MG: 25 TABLET, FILM COATED ORAL at 09:05

## 2022-12-14 RX ADMIN — METOPROLOL TARTRATE 25 MG: 25 TABLET, FILM COATED ORAL at 21:37

## 2022-12-14 RX ADMIN — ASPIRIN 81 MG: 81 TABLET, COATED ORAL at 17:13

## 2022-12-14 RX ADMIN — SODIUM CHLORIDE, PRESERVATIVE FREE 10 ML: 5 INJECTION INTRAVENOUS at 00:00

## 2022-12-14 RX ADMIN — LEVOTHYROXINE SODIUM 75 MCG: 0.07 TABLET ORAL at 06:17

## 2022-12-14 RX ADMIN — SODIUM CHLORIDE, PRESERVATIVE FREE 10 ML: 5 INJECTION INTRAVENOUS at 06:17

## 2022-12-14 RX ADMIN — MIRTAZAPINE 15 MG: 15 TABLET, FILM COATED ORAL at 21:37

## 2022-12-14 RX ADMIN — LEVOFLOXACIN 750 MG: 5 INJECTION, SOLUTION INTRAVENOUS at 06:17

## 2022-12-14 NOTE — PROGRESS NOTES
[address] [facility] Adult  Hospitalist Group                                                                                          Hospitalist Progress Note  Natan Patton MD  Answering service: [phone] from in house phone        Date of Service:  2022  NAME:  Ashley Dave  :  1940  MRN:  [id number]      Admission Summary: This 42-year-old woman with past medical history significant for dyslipidemia, hypertension, type 2 diabetes, hypothyroidism, insomnia presented at the [facility] emergency room at [place] with palpitation. This has been going on for several weeks and the patient attributed the palpitation to Remeron because she has read about the side effect of this medication. The patient is on Remeron 15 mg daily at bedtime for her insomnia. The patient was seen by her primary care physician. Primary care physician offered Ambien instead of the Remeron for sleep, but the patient is afraid of coming off the Remeron cold turkey because she has read about the withdrawal symptoms from Remeron and she has talked to family member who is a pharmacist.  The patient has not been taking the Ambien and instead of taking 15 mg of Remeron, the patient has been taking 7.5 mg twice at bedtime. No associated chest pain or shortness of breath. When the patient arrived at the emergency room, the patient was found to have elevated troponin level. Because of this, she was referred to the hospitalist service for admission. No record of prior admission to this hospital.  The patient has no prior history of coronary artery disease. Interval history / Subjective:    Follow-up for NSTEMI patient more anxious than anything else  Patient is more anxious about mirtazapine and side effects which she read over the Internet causing heart disease etc. does not think patient has NSTEMI DC Lovenox also does not have any urinary symptoms DC antibiotics     Assessment & Plan: 1.  Suspected non-ST elevation myocardial infarction   -- I think patient is more anxious troponin is flat there is no EKG changes there is no complaint of chest pain Will discontinue Lovenox    2. Dyslipidemia. We will continue with Lipitor. 3.  Hypertension. Resume home medication when able   4. Hyperglycemia. We will check hemoglobin A1c level. The patient has history of prediabetes. 5.  Hypothyroidism. We will continue with Synthroid. 6.  Hypokalemia. Resolved   7. Acute kidney injury. This is most likely due to volume depletion. Continue IV fluid  8. Acute cystitis without hematuria. Patient does not have any symptoms patient is on MetroGel pain so we will avoid Levaquin    9. Insomnia. Patient is on Remeron and wants to be taken off from Neurontin but she does not like the side effect reading on the Internet so she is very anxious will ask psych to evaluate her    Code status: Full code  DVT prophylaxis: Lovenox    Care Plan discussed with: Patient/Family and Nurse  Anticipated Disposition: Home w/Family  Anticipated Discharge: Less than 24 hours     Hospital Problems  Date Reviewed: 12/13/2022            Codes Class Noted POA    Chest pain ICD-10-CM: R07.9  ICD-9-CM: 786.50  12/13/2022 Unknown        Elevated troponin ICD-10-CM: R77.8  ICD-9-CM: 790.6  12/13/2022 Unknown        * (Principal) NSTEMI (non-ST elevated myocardial infarction) [facility]) ICD-10-CM: I21.4  ICD-9-CM: 410.70  12/13/2022 Yes             Review of Systems:   A comprehensive review of systems was negative. Vital Signs:    Last 24hrs VS reviewed since prior progress note.  Most recent are:  Visit Vitals  /76 (BP 1 Location: Left upper arm, BP Patient Position: At rest;Lying)   Pulse 60   Temp 97.9 °F (36.6 °C)   Resp 18   Ht 5' 4\" (1.626 m)   Wt 79.7 kg (175 lb 11.3 oz)   SpO2 93%   BMI 30.16 kg/m²       No intake or output data in the 24 hours ending 12/14/22 1117     Physical Examination:     I had a face to face encounter with this patient and independently examined them on 12/14/2022 as outlined below:          General : alert x 3, awake, no acute distress, resting in bed, pleasant   HEENT: PEERL, EOMI, moist mucus membrane, TM clear  Neck: supple, no JVD, no meningeal signs  Chest: Clear to auscultation bilaterally   CVS: S1 S2 heard, Capillary refill less than 2 seconds  Abd: soft/ Non tender, non distended, BS physiological,   Ext: no clubbing, no cyanosis, no edema, brisk 2+ DP pulses  Neuro/Psych: pleasant mood and affect, CN 2-12 grossly intact  Skin: warm     Data Review:    I personally reviewed  Image and labs      Labs:     Recent Labs     12/14/22  0251 12/13/22  1242   WBC 9.9 9.8   HGB 13.1 14.2   HCT 39.6 42.9    222     Recent Labs     12/14/22  0251 12/13/22  1242   * 136   K 4.0 3.2*   CL 99 94*   CO2 32 30   BUN 14 19   CREA 0.81 1.14*    120*   CA 9.2 9.9   MG 2.2  --    PHOS 2.5*  --      Recent Labs     12/14/22  0251 12/13/22  1242   ALT 29 31   AP 62 64   TBILI 0.6 0.7   TP 6.3* 7.7   ALB 3.8 4.4   GLOB 2.5 3.3   LPSE 220  --      No results for input(s): INR, PTP, APTT, INREXT in the last 72 hours. No results for input(s): FE, TIBC, PSAT, FERR in the last 72 hours. No results found for: FOL, RBCF   No results for input(s): PH, PCO2, PO2 in the last 72 hours. No results for input(s): CPK, CKNDX, TROIQ in the last 72 hours.     No lab exists for component: CPKMB  Lab Results   Component Value Date/Time    Cholesterol, total 206 (H) 07/25/2022 09:33 AM    HDL Cholesterol 75 07/25/2022 09:33 AM    LDL, calculated 110 (H) 07/25/2022 09:33 AM    LDL, calculated 95.2 06/01/2021 10:36 AM    Triglyceride 122 07/25/2022 09:33 AM    CHOL/HDL Ratio 2.6 06/01/2021 10:36 AM     No results found for: [facility]  Lab Results   Component Value Date/Time    Color YELLOW/STRAW 12/14/2022 02:51 AM    Appearance CLEAR 12/14/2022 02:51 AM    Specific gravity 1.005 12/14/2022 02:51 AM pH (UA) 7.5 12/14/2022 02:51 AM    Protein Negative 12/14/2022 02:51 AM    Glucose Negative 12/14/2022 02:51 AM    Ketone Negative 12/14/2022 02:51 AM    Bilirubin Negative 12/14/2022 02:51 AM    Urobilinogen 0.2 12/14/2022 02:51 AM    Nitrites Negative 12/14/2022 02:51 AM    Leukocyte Esterase MODERATE (A) 12/14/2022 02:51 AM    Epithelial cells FEW 12/14/2022 02:51 AM    Bacteria 1+ (A) 12/14/2022 02:51 AM    WBC 10-20 12/14/2022 02:51 AM    RBC 0-5 12/14/2022 02:51 AM         Medications Reviewed:     Current Facility-Administered Medications   Medication Dose Route Frequency    mirtazapine (REMERON) tablet 7.5 mg  7.5 mg Oral ONCE    atorvastatin (LIPITOR) tablet 20 mg  20 mg Oral QPM    mirtazapine (REMERON) tablet 15 mg  15 mg Oral QHS    levothyroxine (SYNTHROID) tablet 75 mcg  75 mcg Oral 6am    sodium chloride (NS) flush 5-40 mL  5-40 mL IntraVENous Q8H    sodium chloride (NS) flush 5-40 mL  5-40 mL IntraVENous PRN    acetaminophen (TYLENOL) tablet 650 mg  650 mg Oral Q6H PRN    Or    acetaminophen (TYLENOL) suppository 650 mg  650 mg Rectal Q6H PRN    polyethylene glycol (MIRALAX) packet 17 g  17 g Oral DAILY PRN    ondansetron (ZOFRAN ODT) tablet 4 mg  4 mg Oral Q8H PRN    Or    ondansetron (ZOFRAN) injection 4 mg  4 mg IntraVENous Q6H PRN    metoprolol tartrate (LOPRESSOR) tablet 25 mg  25 mg Oral Q12H    lactated Ringers infusion  100 mL/hr IntraVENous CONTINUOUS    nitroglycerin (NITROSTAT) tablet 0.4 mg  0.4 mg SubLINGual Q5MIN PRN    morphine injection 2 mg  2 mg IntraVENous Q4H PRN     ______________________________________________________________________  EXPECTED LENGTH OF STAY: 2d 9h  ACTUAL LENGTH OF STAY:          1      Please note that this dictation was completed with [employer], the computer voice recognition software. Quite often unanticipated grammatical, syntax, homophones, and other interpretive errors are inadvertently transcribed by the computer software. Please disregard these errors. Please excuse any errors that have escaped final proofreading.                 Torito Frost MD

## 2022-12-14 NOTE — PROGRESS NOTES
Reason for Admission:  Palpitation, history of dyslipidemia, type 2 diabetes, hypertension, and hypothyroidism. RUR Score:      6% Low               Plan for utilizing home health:      No needs, no history    PCP: First and Last name:  Aditya Parisi MD     Name of Practice: [facility]   Are you a current patient: Yes/No: Yes   Approximate date of last visit: 12/2/22   Can you participate in a virtual visit with your PCP: NA                    Current Advanced Directive/Advance Care Plan: Full Code      Healthcare Decision Maker:   Click here to complete [address] including selection of the Healthcare Decision Maker Relationship (ie \"Primary\")             Primary Decision Maker: Mic Reynolds - Spouse - [phone]                  Transition of Care Plan:       CM met with patient to inform of CM role and to assess needs. Patient lives at home with her  in a one story home with one step to enter. Patient is normally independent with self-care and ADLs. She reports that she broke her L foot in October. She is now wearing a boot and uses a cane. Patient has no home health or rehab history. CM updated in rounds patient will likely DC today following psych consult. Patient's pharmacy is [facility] at [address] [place] [address]. There are no identified CM needs at this time. Cinthya Lara MS        12:10 Psych consult completed.  Patient is now planned for stress test.    Jo WintersMS

## 2022-12-14 NOTE — H&P
[facility] [place]  HISTORY AND PHYSICAL    Name:  Jaun Marquez  MR#:  [id number]  :  1940  ACCOUNT #:  [de-identified]  ADMIT DATE:  2022      The patient was seen, evaluated, and admitted by me on 2022. PRIMARY CARE PHYSICIAN:  Rere Sandoval MD    SOURCE OF INFORMATION:  Patient and review of ED electronic medical record. CHIEF COMPLAINT:  Palpitation. HISTORY OF PRESENT ILLNESS:  This 80-year-old woman with past medical history significant for dyslipidemia, hypertension, type 2 diabetes, hypothyroidism, insomnia presented at the [facility] emergency room at [place] with palpitation. This has been going on for several weeks and the patient attributed the palpitation to Remeron because she has read about the side effect of this medication. The patient is on Remeron 15 mg daily at bedtime for her insomnia. The patient was seen by her primary care physician. Primary care physician offered Ambien instead of the Remeron for sleep, but the patient is afraid of coming off the Remeron cold turkey because she has read about the withdrawal symptoms from Remeron and she has talked to family member who is a pharmacist.  The patient has not been taking the Ambien and instead of taking 15 mg of Remeron, the patient has been taking 7.5 mg twice at bedtime. No associated chest pain or shortness of breath. When the patient arrived at the emergency room, the patient was found to have elevated troponin level. Because of this, she was referred to the hospitalist service for admission. No record of prior admission to this hospital.  The patient has no prior history of coronary artery disease. PAST MEDICAL HISTORY:  Hypertension, dyslipidemia, type 2 diabetes, and hypothyroidism. ALLERGIES:  THE PATIENT IS ALLERGIC TO AMLODIPINE, CODEINE, EPINEPHRINE, ERYTHROMYCIN, LOSARTAN, MOBIC, AND PENICILLIN. MEDICATIONS:  1.   Tylenol 1000 mg every 6 hours as needed. 2.  Lipitor 20 mg daily. 3.  Hydrochlorothiazide 25 mg daily. 4.  Remeron 15 mg daily at bedtime. 5.  Simethicone 125 mg four times daily as needed. 6.  Synthroid 75 mcg daily. FAMILY HISTORY:  This was reviewed. Father had heart disease and hypothyroidism. Mother had vascular dementia, coronary artery disease, diabetes, and stroke. PAST SURGICAL HISTORY:  This is significant for appendectomy, left knee replacement, partial hysterectomy, and tonsillectomy. SOCIAL HISTORY:  No history of alcohol or tobacco abuse. REVIEW OF SYSTEMS:  HEAD, EYES, EARS, NOSE AND THROAT:  No headache, no dizziness, no blurring of vision, and no photophobia. RESPIRATORY SYSTEM:  No cough, no shortness of breath, and no hemoptysis. CARDIOVASCULAR SYSTEM:  This is positive for palpitation. No chest pain and no orthopnea. GASTROINTESTINAL SYSTEM:  No nausea or vomiting. No diarrhea and no constipation. GENITOURINARY SYSTEM:  No dysuria, no urgency and no frequency. All other systems are reviewed and they are negative. PHYSICAL EXAMINATION:  GENERAL APPEARANCE:  The patient appeared ill and in moderate distress. VITAL SIGNS:  On arrival at the emergency room; temperature 97.8, pulse 74, respiratory rate 23, blood pressure 155/81, and oxygen saturation 99%. HEAD:  Normocephalic, atraumatic. EYES:  Normal eye movement. No redness, no drainage, and no discharge. EARS:  Normal external ears with no obvious drainage. NOSE:  No deformity and no drainage. MOUTH AND THROAT:  No visible oral lesion. NECK:  Neck is supple. No JVD and no thyromegaly. CHEST:  Clear breath sounds. No wheezing and no crackles. HEART:  Normal S1 and S2, regular. No clinically appreciable murmur. ABDOMEN:  Soft and nontender. Normal bowel sounds. CNS:  Alert and oriented x3. No gross focal neurological deficit. EXTREMITIES:  No edema. Pulses 2+ bilaterally.   MUSCULOSKELETAL SYSTEM:  No obvious joint deformity and swelling. SKIN:  No active skin lesions seen in the exposed part of the body. PSYCHIATRY:  Normal mood and affect. LYMPHATIC SYSTEM:  No cervical lymphadenopathy. DIAGNOSTIC DATA:  The EKG shows normal sinus rhythm, left bundle-branch block. Nonspecific ST and T-waves abnormalities concerning for ischemia. LABORATORY DATA:  Hematology; WBC 9.8, hemoglobin 14.2, hematocrit 42.9, and platelets 620. Chemistry; sodium 136, potassium 3.2, chloride 94, CO2 is 30, glucose 120, BUN 19, creatinine 1.14, calcium 9.9, total bilirubin 0.7, ALT 31, AST 28, alkaline phosphatase 64, total protein 7.7, albumin level 4.4, and globulin 3.3. TSH 5.99. Cardiac profile, troponin high-sensitivity 91. Urinalysis is significant for negative nitrite, moderate leukocyte esterase, 1+ bacteria, and negative blood. ASSESSMENT:  1. Suspected non-ST elevation myocardial infarction. 2.  Dyslipidemia. 3.  Hypertension. 4.  Hyperglycemia. 5.  Hypothyroidism. 6.  Hypokalemia. 7.  Acute kidney injury. 8.  Acute cystitis without hematuria. 9.  Insomnia. PLAN:  1. Suspected non-ST elevation myocardial infarction. The patient's presentation could be an anginal equivalent. The patient has EKG concerning for ischemia. Because of that we will start the patient on full-dose Lovenox as well as beta blocker. The patient is allergic to NSAID and because of that we will hold off on starting aspirin. We will continue treatment for suspected non-ST elevation myocardial infarction until when the patient is seen by cardiologist to determine whether the patient has non-ST elevation myocardial infarction or not. The patient will also be n.p.o. in anticipation of intervention by the cardiologist.  We will check D-dimer to evaluate the patient for thromboembolism as well. We will also check urine drug screen. 2.  Dyslipidemia. We will continue with Lipitor. 3.  Hypertension.   The patient has been started on Lopressor for treatment of non-ST elevation myocardial infarction and that will also help with the patient's hypertension. We will hold hydrochlorothiazide because of the acute kidney injury. 4.  Hyperglycemia. We will check hemoglobin A1c level. The patient has history of prediabetes. 5.  Hypothyroidism. We will continue with Synthroid. The patient may need adjustment to her Synthroid dose because of the elevated TSH level. 6.  Hypokalemia. We will replete and repeat level. We will also check magnesium level. 7.  Acute kidney injury. This is most likely due to volume depletion. We will carry out fluid therapy and monitor the patient's renal function. 8.  Acute cystitis without hematuria. We will start the patient on Levaquin and await urine culture. 9.  Insomnia. The patient is attributing her symptoms to Remeron and at the same time the patient does not want to be taken off the Remeron because she is afraid of withdrawal symptoms from the Remeron. The patient will be given 7.5 of Remeron tonight as the patient requested. The patient eventually may require psychiatric consult for evaluation for anxiety/depression. 10.  Other issues. Code status, the patient is a full code. The patient is on full-dose Lovenox. Because of that there is no need for DVT prophylaxis. FUNCTIONAL STATUS PRIOR TO ADMISSION:  The patient came from home. The patient is ambulatory with no assistive device. COVID PRECAUTION:  The patient was wearing a face mask. I was wearing a face mask and mask for this patient encounter. Critical care was performed for this encounter. CRITICAL CARE ATTESTATION:   I had a face to face encounter with the patient, reviewed and interpreted patient data including clinical events, labs, images, vital signs, I/O's, and examined patient. I have discussed the case and the plan and management of the patient's care with the consulting services, the bedside nurses and necessary ancillary providers. NOTE OF PERSONAL INVOLVEMENT IN CARE   This patient has a high probability of imminent, clinically significant deterioration, which requires the highest level of preparedness to intervene urgently. I participated in the decision-making and personally managed or directed the management of the following life and organ supporting interventions that required my frequent assessment to treat or prevent imminent deterioration. I personally spent 45 minutes of critical care time. This is time spent at this critically ill patient's bedside actively involved in patient care as well as the coordination of care and discussions with the patient's family. This does not include any procedural time which has been billed separately.    Tello Patricio MD      [id number]  D:  12/14/2022 4:53  T:  12/14/2022 6:21  JOB #:  [id number]  CC:  Agnieszka Pardo MD

## 2022-12-14 NOTE — PROGRESS NOTES
Bedside shift change report given to VU Glez (oncoming nurse) by Ro Yu RN (offgoing nurse). Report included the following information SBAR, Kardex, MAR, and Recent Results.

## 2022-12-14 NOTE — PROGRESS NOTES
Patient/family seen: YES       Informed patient/family of BPCI-A Bundle Program. Also advised of potential outreach by Care Transitions Team.    Bundle Payment Care Improvement Beneficiary Letter Delivered to Beneficiary or Representative:YES.  Date BCPI -A was given:12/14/22

## 2022-12-14 NOTE — CONSULTS
[facility] Cardiology Consultation    Date of Consult:  12/14/22  Date of Admission: 12/13/2022  Primary Cardiologist: Dr. Galarza [facility]  Physician Requesting consult: Dr. Garett Rene    Chief Complaint / Reason for Consult:   Palpitations, elevated troponin    History of Present Illness: Elsie Faustin is a [de-identified] y.o. female with the below listed medical history who was admitted with palpitations. From available notes: \"This [de-identified] woman with past medical history significant for dyslipidemia, hypertension, type 2 diabetes, hypothyroidism, insomnia presented at the [facility] emergency room at [facility] with palpitation. This has been going on for several weeks and the patient attributed the palpitation to Remeron because she has read about the side effect of this medication. The patient is on Remeron 15 mg daily at bedtime for her insomnia. The patient was seen by her primary care physician. Primary care physician offered Ambien instead of the Remeron for sleep, but the patient is afraid of coming off the Remeron cold turkey because she has read about the withdrawal symptoms from Remeron and she has talked to family member who is a pharmacist.  The patient has not been taking the Ambien and instead of taking 15 mg of Remeron, the patient has been taking 7.5 mg twice at bedtime. No associated chest pain or shortness of breath. When the patient arrived at the emergency room, the patient was found to have elevated troponin level. Because of this, she was referred to the hospitalist service for admission. No record of prior admission to this hospital.  The patient has no prior history of coronary artery disease. \"    On my evaluation she denies dyspnea, orthopnea or change in mild intermittent ankle edema. Reports insomnia. Says she came in because of her heart \"rushing\" and feeling irregular.      Says she has occasional fleeting sharp pains in the center of her chest that are associated with belching. Not exertional.    48 hour Holter in 9/2019 showed SR with occasional PACs, frequent PVCs (5% burden) and short self-limited, pSVT, likely pAT. Nuclear stress test 7/11/12 showed normal perfusion. EF 72% on that study. Echo 9/25/19 was interpreted as LVEF 50% with mild LVH, mild HK of the anteroseptal wall, mild MR, moderate TR with estimated RVSP of 46 mmHg. Past Medical History:   Diagnosis Date    Allergic rhinitis     Anesthesia complication     Pt reports dizziness after anesthesia     Arrhythmia     Left Bundle Branch Block    Arthritis     BBB (bundle branch block)     Depression     History of not currently    GERD (gastroesophageal reflux disease)     Hyperlipidemia     Impaired fasting glucose     Unspecified essential hypertension     Unspecified hypothyroidism     Uterine prolapse     Vertigo        Prior to Admission medications    Medication Sig Start Date End Date Taking? Authorizing Provider   mirtazapine (REMERON) 15 mg tablet Take 15 mg by mouth nightly. Yes Other, MD Carlo   Synthroid 75 mcg tablet TAKE 1 TABLET BY MOUTH EVERY DAY. EXCEPT 1/2 TAB ON SUNDAY 12/2/22  Yes Hyun Florence MD   hydroCHLOROthiazide (HYDRODIURIL) 25 mg tablet TAKE 1 TABLET BY MOUTH EVERY DAY 9/29/22  Yes Hyun Florence MD   atorvastatin (LIPITOR) 20 mg tablet TAKE 1 TABLET BY MOUTH EVERY DAY IN THE EVENING 4/4/22  Yes Hyun Florence MD   guaifenesin (MUCINEX PO) Take  by mouth two (2) times a day. Yes Provider, Historical   b complex-vitamin c-folic acid (NEPHROCAPS) 1 mg capsule DAILY AM for SUPPLEMENT   Yes Provider, Historical   acetaminophen (TYLENOL) 500 mg tablet 1,000 mg every six (6) hours as needed. Yes Provider, Historical   calcium citrate/vitamin D3 (CALCIUM CITRATE + D PO) Take 1 Tab by mouth as needed. Yes Other, MD Carlo   Cetirizine 10 mg cap Take  by mouth every evening.    Yes Provider, Historical   cholecalciferol (VITAMIN D3) (1000 Units /25 mcg) tablet Take  by mouth daily. Yes Provider, Historical   zolpidem (AMBIEN) 5 mg tablet Take 1 Tablet by mouth nightly as needed for Sleep. Max Daily Amount: 5 mg. 12/9/22   Dontrell Diamond MD   estradioL (ESTRACE) 0.01 % (0.1 mg/gram) vaginal cream  2/28/22   Provider, Historical   Blood-Glucose Meter monitoring kit Pharmacist to choose preferred meter and strips. Dx: R73.01  Patient taking differently: Pharmacist to choose preferred meter and strips. Dx: R73.01 2/2/21   Dontrell Diamond MD   lancets (One Touch Tennille Maemilagro) 33 gauge misc Test your sugar three times a week fasting. Dx: R73.01  Patient taking differently: Test your sugar three times a week fasting. Dx: R73.01 2/2/21   Dontrell Diamond MD   glucose blood VI test strips (blood glucose test) strip Pharmacist to choose preferred meter and strips. Dx: R73.01  Patient taking differently: Pharmacist to choose preferred meter and strips. Dx: R73.01 2/2/21   Dontrell Diamond MD   simethicone (GAS-X) 125 mg capsule Take 125 mg by mouth four (4) times daily as needed for Flatulence.     Provider, Historical       Current Facility-Administered Medications   Medication Dose Route Frequency    busPIRone (BUSPAR) tablet 5 mg  5 mg Oral BID    atorvastatin (LIPITOR) tablet 20 mg  20 mg Oral QPM    mirtazapine (REMERON) tablet 15 mg  15 mg Oral QHS    levothyroxine (SYNTHROID) tablet 75 mcg  75 mcg Oral 6am    sodium chloride (NS) flush 5-40 mL  5-40 mL IntraVENous Q8H    sodium chloride (NS) flush 5-40 mL  5-40 mL IntraVENous PRN    acetaminophen (TYLENOL) tablet 650 mg  650 mg Oral Q6H PRN    Or    acetaminophen (TYLENOL) suppository 650 mg  650 mg Rectal Q6H PRN    polyethylene glycol (MIRALAX) packet 17 g  17 g Oral DAILY PRN    ondansetron (ZOFRAN ODT) tablet 4 mg  4 mg Oral Q8H PRN    Or    ondansetron (ZOFRAN) injection 4 mg  4 mg IntraVENous Q6H PRN    metoprolol tartrate (LOPRESSOR) tablet 25 mg  25 mg Oral Q12H    lactated Ringers infusion  100 mL/hr IntraVENous CONTINUOUS    nitroglycerin (NITROSTAT) tablet 0.4 mg  0.4 mg SubLINGual Q5MIN PRN    morphine injection 2 mg  2 mg IntraVENous Q4H PRN       Family History   Problem Relation Age of Onset    Thyroid Disease Father         hypo    Coronary Art Dis Father         s/p CABG    Heart Disease Father         s/p valve replacement    Diabetes Brother     Thyroid Disease Brother     Cancer Brother         kidney    Coronary Art Dis Brother         s/p CABG    Dementia Mother         vascular    Elevated Lipids Mother     Coronary Art Dis Mother     OSTEOARTHRITIS Mother     Diabetes Mother     Gall Bladder Disease Mother     Heart Disease Mother     Hypertension Mother     Stroke Mother     Obesity Son     Hypertension Son     No Known Problems Son     Diabetes Maternal Grandmother     Cancer Maternal Grandmother         pancreatic    Diabetes Paternal Grandmother     Diabetes Maternal Aunt     Gall Bladder Disease Maternal Aunt     Hypertension Maternal Aunt     Diabetes Paternal Aunt     Hypertension Maternal Aunt        Social History     Socioeconomic History    Marital status:      Spouse name: Not on file    Number of children: Not on file    Years of education: Not on file    Highest education level: Not on file   Occupational History    Not on file   Tobacco Use    Smoking status: Never    Smokeless tobacco: Never   Vaping Use    Vaping Use: Never used   Substance and Sexual Activity    Alcohol use: No    Drug use: No    Sexual activity: Not Currently     Partners: Male   Other Topics Concern     Service Not Asked    Blood Transfusions Not Asked    Caffeine Concern Not Asked    Occupational Exposure Not Asked    Hobby Hazards Not Asked    Sleep Concern Not Asked    Stress Concern Not Asked    Weight Concern Not Asked    Special Diet Not Asked    Back Care Not Asked    Exercise Not Asked    Bike Helmet Not Asked    Seat Belt Not Asked    Self-Exams Not Asked   Social History Narrative    Lives in [facility] with  of 48 years. Has 2 sons. Retired. Used to work as a  for [employer] mostly at the middle school level. Likes to read. Social Determinants of Health     Financial Resource Strain: Low Risk     Difficulty of Paying Living Expenses: Not hard at all   Food Insecurity: No Food Insecurity    Worried About Running Out of Food in the Last Year: Never true    Ran Out of Food in the Last Year: Never true   Transportation Needs: Not on file   Physical Activity: Not on file   Stress: Not on file   Social Connections: Not on file   Intimate Partner Violence: Not on file   Housing Stability: Not on file       Review of Systems   All other systems reviewed and are negative.     Visit Vitals  /72 (BP 1 Location: Right upper arm, BP Patient Position: At rest;Lying)   Pulse (!) 58   Temp 97.5 °F (36.4 °C)   Resp 18   Ht 5' 4\" (1.626 m)   Wt 79.7 kg (175 lb 11.3 oz)   SpO2 95%   BMI 30.16 kg/m²       No intake or output data in the 24 hours ending 12/14/22 1302     Physical Exam  GEN: NAD, appears stated age  [de-identified]: EOMI  NECK: Normal JVP, soft left carotid bruit  CV: RRR, normal S1 and S2, II/VI systolic murmur at LLSB  LUNGS: CTAB, no W/R/R  ABD: Soft, NT/ND  EXT: No edema, 2+ and symmetrical radial pulses b/l  PSYCH: Mood and affect normal  NEURO: Alert, MAEW, face symmetrical, speech intact    Lab Review:  BMP:   Lab Results   Component Value Date/Time     (L) 12/14/2022 02:51 AM    K 4.0 12/14/2022 02:51 AM    CL 99 12/14/2022 02:51 AM    CO2 32 12/14/2022 02:51 AM    AGAP 3 (L) 12/14/2022 02:51 AM     12/14/2022 02:51 AM    BUN 14 12/14/2022 02:51 AM    CREA 0.81 12/14/2022 02:51 AM        CBC:  Lab Results   Component Value Date/Time    WBC 9.9 12/14/2022 02:51 AM    HGB 13.1 12/14/2022 02:51 AM    HCT 39.6 12/14/2022 02:51 AM    PLATELET 071 77/39/6688 02:51 AM    MCV 89.4 12/14/2022 02:51 AM       All Cardiac Markers in the last 24 hours:  No results found for: CPK, CK, CKMMB, CKMB, RCK3, CKMBT, CKMBPOC, CKNDX, CKND1, TAZ, TROPT, TROIQ, MELISSA, TROPT, TNIPOC, BNP, BNPP, BNPNT    Lab Results   Component Value Date/Time    Cholesterol, total 206 (H) 07/25/2022 09:33 AM    HDL Cholesterol 75 07/25/2022 09:33 AM    LDL, calculated 110 (H) 07/25/2022 09:33 AM    LDL, calculated 95.2 06/01/2021 10:36 AM    VLDL, calculated 21 07/25/2022 09:33 AM    VLDL, calculated 17.8 06/01/2021 10:36 AM    Triglyceride 122 07/25/2022 09:33 AM    CHOL/HDL Ratio 2.6 06/01/2021 10:36 AM        Data Review:  ECG tracing personally reviewed:   NSR, old LBBB    Echocardiogram: As above    Other cardiac testing: As above    Other imaging:  CXR: \"No acute process\"  Assessment:    Palpitations  Known to have PACs, PVCs and pSVT on prior Holter  Elevated troponin  Likely non-MI troponin elevation or type 2 MI  Clinical presentation and troponin trend are not consistent with ACS / NSTEMI  DM2  Anxiety  HTN  Dyslipidemia  Hypothyroidism  Left carotid bruit  Tricuspid regurgitation    Recommendations / Plan:  - Nuclear stress test tomorrow  - Please keep NPO after MN  - TTE  - CUS to evaluate left carotid bruit  - Ok to stop therapeutic enoxaparin and change to DVT prophylaxis dosing  - Telemetry    If nuclear stress test is normal, then may discharge tomorrow from a CV standpoint. Thank you for the opportunity to participate in the care of Divya Sarkar and please do not hesitate to contact us should you have any questions. Petros Flash  [place], [address] [facility]  12/14/22

## 2022-12-14 NOTE — CONSULTS
PSYCHIATRY CONSULT NOTE    REASON FOR CONSULT:Anxiety      HISTORY OF PRESENTING COMPLAINT:  Ashley Dave is a [de-identified] y.o. / female who is currently admitted to the medical floor at [facility]. Patient presented to the ED due to palpitations. According to her chart, she reports palpitations which has been ongoing for several weeks and attributes it to Remeron she takes at bedtime after reading the side effects on the Internet. During my assessment today, patient is calm and cooperative, alert and oriented X4. She reports she has been more anxious and worried about everything recently. And shares that she is her 's primary care giver. She also report difficulty sleeping for the past 3 weeks due to her anxiety. She shares that she has been on Remeron for over 20 years and her PCP asked her to stop the Remeron and start Ambien but she is concerned about the withdrawal symptoms. She denies depression, suicidal/homicidal thoughts and AV hallucinations. She denies appetite concerns. No paranoia or delusions reported. PAST PSYCHIATRIC HISTORY:  Denies hx of inpatient psychiatric hospitalizations. Reports she has been taking Remeron for 20 years for depression managed by her PCP. Does not have current outpatient mental health services  Denies hx of suicide attempt. SUBSTANCE ABUSE HISTORY:  Denies hx of substance use    PAST MEDICAL HISTORY:    Please see H&P for details.      Past Medical History:   Diagnosis Date    Allergic rhinitis     Anesthesia complication     Pt reports dizziness after anesthesia     Arrhythmia     Left Bundle Branch Block    Arthritis     BBB (bundle branch block)     Depression     History of not currently    GERD (gastroesophageal reflux disease)     Hyperlipidemia     Impaired fasting glucose     Unspecified essential hypertension     Unspecified hypothyroidism     Uterine prolapse     Vertigo      Prior to Admission medications    Medication Sig Start Date End Date Taking? Authorizing Provider   mirtazapine (REMERON) 15 mg tablet Take 15 mg by mouth nightly. Yes Other, MD Carlo   Synthroid 75 mcg tablet TAKE 1 TABLET BY MOUTH EVERY DAY. EXCEPT 1/2 TAB ON SUNDAY 12/2/22  Yes Yennifer Smith MD   hydroCHLOROthiazide (HYDRODIURIL) 25 mg tablet TAKE 1 TABLET BY MOUTH EVERY DAY 9/29/22  Yes Yennifer Smith MD   atorvastatin (LIPITOR) 20 mg tablet TAKE 1 TABLET BY MOUTH EVERY DAY IN THE EVENING 4/4/22  Yes Yennifer Smith MD   guaifenesin (MUCINEX PO) Take  by mouth two (2) times a day. Yes Provider, Historical   b complex-vitamin c-folic acid (NEPHROCAPS) 1 mg capsule DAILY AM for SUPPLEMENT   Yes Provider, Historical   acetaminophen (TYLENOL) 500 mg tablet 1,000 mg every six (6) hours as needed. Yes Provider, Historical   calcium citrate/vitamin D3 (CALCIUM CITRATE + D PO) Take 1 Tab by mouth as needed. Yes Other, MD Carlo   Cetirizine 10 mg cap Take  by mouth every evening. Yes Provider, Historical   cholecalciferol (VITAMIN D3) (1000 Units /25 mcg) tablet Take  by mouth daily. Yes Provider, Historical   zolpidem (AMBIEN) 5 mg tablet Take 1 Tablet by mouth nightly as needed for Sleep. Max Daily Amount: 5 mg. 12/9/22   Yennifer Smith MD   estradioL (ESTRACE) 0.01 % (0.1 mg/gram) vaginal cream  2/28/22   Provider, Historical   Blood-Glucose Meter monitoring kit Pharmacist to choose preferred meter and strips. Dx: R73.01  Patient taking differently: Pharmacist to choose preferred meter and strips. Dx: R73.01 2/2/21   Yennifer Smith MD   lancets (One Touch Armando Bold) 33 gauge misc Test your sugar three times a week fasting. Dx: R73.01  Patient taking differently: Test your sugar three times a week fasting. Dx: R73.01 2/2/21   Yennifer Smith MD   glucose blood VI test strips (blood glucose test) strip Pharmacist to choose preferred meter and strips.   Dx: R73.01  Patient taking differently: Pharmacist to choose preferred meter and strips. Dx: R73.01 2/2/21   Evan Neil MD   simethicone (GAS-X) 125 mg capsule Take 125 mg by mouth four (4) times daily as needed for Flatulence. Provider, Historical     Vitals:    12/14/22 0436 12/14/22 0548 12/14/22 0840 12/14/22 1000   BP: 132/77  130/76    Pulse: 67 60 64 60   Resp: 18  18    Temp: 98.6 °F (37 °C)  97.9 °F (36.6 °C)    SpO2: 94%  93%    Weight:       Height:         Lab Results   Component Value Date/Time    WBC 9.9 12/14/2022 02:51 AM    HGB 13.1 12/14/2022 02:51 AM    HCT 39.6 12/14/2022 02:51 AM    PLATELET 434 74/87/5681 02:51 AM    MCV 89.4 12/14/2022 02:51 AM     Lab Results   Component Value Date/Time    Sodium 134 (L) 12/14/2022 02:51 AM    Potassium 4.0 12/14/2022 02:51 AM    Chloride 99 12/14/2022 02:51 AM    CO2 32 12/14/2022 02:51 AM    Anion gap 3 (L) 12/14/2022 02:51 AM    Glucose 100 12/14/2022 02:51 AM    BUN 14 12/14/2022 02:51 AM    Creatinine 0.81 12/14/2022 02:51 AM    BUN/Creatinine ratio 17 12/14/2022 02:51 AM    GFR est AA >60 11/29/2021 11:13 AM    GFR est non-AA >60 11/29/2021 11:13 AM    Calcium 9.2 12/14/2022 02:51 AM    Bilirubin, total 0.6 12/14/2022 02:51 AM    Alk. phosphatase 62 12/14/2022 02:51 AM    Protein, total 6.3 (L) 12/14/2022 02:51 AM    Albumin 3.8 12/14/2022 02:51 AM    Globulin 2.5 12/14/2022 02:51 AM    A-G Ratio 1.5 12/14/2022 02:51 AM    ALT (SGPT) 29 12/14/2022 02:51 AM    AST (SGOT) 20 12/14/2022 02:51 AM     No results found for: VALF2, VALAC, VALP, VALPR, DS6, CRBAM, CRBAMP, CARB2, XCRBAM  No results found for: LITHM  RADIOLOGY REPORTS:(reviewed/updated 12/14/2022)  XR ANKLE LT MIN 3 V    Result Date: 10/8/2022  EXAM: XR ANKLE LT MIN 3 V INDICATION: Acute ankle pain. . COMPARISON: None. FINDINGS: Three views of the left ankle. The bones are osteopenic. The ankle mortise is intact. The talar dome is intact. There are Achilles and plantar calcaneal enthesophytes. Mild osteoarthritis is seen in the midfoot.  There is a nondisplaced fracture of the base of fifth metatarsal.     Acute nondisplaced fracture of the base of the fifth metatarsal. Recommend dedicated foot radiographs. .    XR FOOT LT MIN 3 V    Result Date: 10/8/2022  EXAM: XR FOOT LT MIN 3 V INDICATION: Fifth metatarsal fracture. COMPARISON: Ankle radiographs same day FINDINGS: Three views of the left foot. There is an acute nondisplaced fracture of the fifth metatarsal base. This is in the region of a Herron fracture. No additional fracture. No dislocation. There are plantar and Achilles calcaneal enthesophytes. Mild osteophytosis is seen in the midfoot. Acute nondisplaced fracture of the fifth metatarsal base. .    XR CHEST PORT    Result Date: 12/14/2022  EXAM:  XR CHEST PORT INDICATION: Chest pain COMPARISON: none TECHNIQUE: Portable chest AP view FINDINGS: The thoracic aorta is mildly tortuous. The cardiac silhouette is within normal limits. The lungs and pleural spaces are clear. The visualized bones and upper abdomen are unremarkable. No acute process. No results found for: Malissa Osborne [id number], [id number], [id number], PREGU, POCHCG, MHCGN, HCGQR, THCGA1, SHCG, HCGN, HCGSERUM, HCGURQLPOC    PSYCHOSOCIAL HISTORY:  with 2 sons. States she used to be a . MENTAL STATUS EXAM:  General appearance: moderately   groomed, psychomotor activity is wnl  Eye contact:good eye contact  Speech: Spontaneous, soft, decreased output. Affect : euthymic  Mood: \"good \"  Thought Process: Logical, goal directed  Perception: Denies AH or VH. Thought Content: denies SI/HI or Plan  Insight: Partial  Judgement: Fair  Cognition: Intact grossly. ASSESSMENT AND PLAN:  Pardeep Lee meets criteria for a diagnosis of anxiety disorder, unspecified. Hx of depression. Would recommend Buspar 5mg BID to help with anxiety. Patient is agreeable. Continue with Remeron as prescribed.   Recommend connecting patient to an outpatient psychiatrist to help manage medications. Psych admission is not recommended at this time. Thank your your consult. Please feel free to consult us again as needed.

## 2022-12-15 ENCOUNTER — APPOINTMENT (OUTPATIENT)
Dept: NON INVASIVE DIAGNOSTICS | Age: 82
DRG: 309 | End: 2022-12-15
Attending: INTERNAL MEDICINE
Payer: MEDICARE

## 2022-12-15 ENCOUNTER — APPOINTMENT (OUTPATIENT)
Dept: NUCLEAR MEDICINE | Age: 82
DRG: 309 | End: 2022-12-15
Attending: INTERNAL MEDICINE
Payer: MEDICARE

## 2022-12-15 VITALS
SYSTOLIC BLOOD PRESSURE: 129 MMHG | OXYGEN SATURATION: 97 % | RESPIRATION RATE: 16 BRPM | HEART RATE: 60 BPM | WEIGHT: 175.71 LBS | TEMPERATURE: 98.1 F | BODY MASS INDEX: 30 KG/M2 | DIASTOLIC BLOOD PRESSURE: 65 MMHG | HEIGHT: 64 IN

## 2022-12-15 LAB
LEFT CCA DIST DIAS: 15.6 CM/S
LEFT CCA DIST SYS: 55.7 CM/S
LEFT CCA PROX DIAS: 17 CM/S
LEFT CCA PROX SYS: 49 CM/S
LEFT ECA DIAS: 10.3 CM/S
LEFT ECA SYS: 60.4 CM/S
LEFT ICA DIST DIAS: 24.4 CM/S
LEFT ICA DIST SYS: 58.8 CM/S
LEFT ICA MID DIAS: 24.4 CM/S
LEFT ICA MID SYS: 78.5 CM/S
LEFT ICA PROX DIAS: 22.6 CM/S
LEFT ICA PROX SYS: 64.1 CM/S
LEFT ICA/CCA SYS: 1.4 NO UNITS
LEFT VERTEBRAL DIAS: 13.1 CM/S
LEFT VERTEBRAL SYS: 35.1 CM/S
RIGHT CCA DIST DIAS: 14.2 CM/S
RIGHT CCA DIST SYS: 45.5 CM/S
RIGHT CCA PROX DIAS: 14.2 CM/S
RIGHT CCA PROX SYS: 39 CM/S
RIGHT ECA DIAS: 6.5 CM/S
RIGHT ECA SYS: 71.3 CM/S
RIGHT ICA DIST DIAS: 24.9 CM/S
RIGHT ICA DIST SYS: 80.5 CM/S
RIGHT ICA MID DIAS: 21.2 CM/S
RIGHT ICA MID SYS: 57.8 CM/S
RIGHT ICA PROX DIAS: 20.8 CM/S
RIGHT ICA PROX SYS: 66.9 CM/S
RIGHT ICA/CCA SYS: 1.8 NO UNITS
RIGHT VERTEBRAL DIAS: 7.8 CM/S
RIGHT VERTEBRAL SYS: 33.4 CM/S
STRESS BASELINE DIAS BP: 81 MMHG
STRESS BASELINE HR: 59 BPM
STRESS BASELINE ST DEPRESSION: 0 MM
STRESS BASELINE SYS BP: 151 MMHG
STRESS ESTIMATED WORKLOAD: 1 METS
STRESS EXERCISE DUR MIN: 3 MIN
STRESS EXERCISE DUR SEC: 0 SEC
STRESS PEAK DIAS BP: 73 MMHG
STRESS PEAK SYS BP: 124 MMHG
STRESS PERCENT HR ACHIEVED: 61 %
STRESS POST PEAK HR: 85 BPM
STRESS RATE PRESSURE PRODUCT: NORMAL BPM*MMHG
STRESS ST DEPRESSION: 0 MM
STRESS STAGE 1 BP: NORMAL MMHG
STRESS STAGE 1 HR: 64 BPM
STRESS STAGE 2 BP: NORMAL MMHG
STRESS STAGE 2 HR: 81 BPM
STRESS STAGE 3 BP: NORMAL MMHG
STRESS STAGE 3 HR: 85 BPM
STRESS STAGE RECOVERY 1 BP: NORMAL MMHG
STRESS STAGE RECOVERY 1 HR: 84 BPM
STRESS STAGE RECOVERY 2 BP: NORMAL MMHG
STRESS STAGE RECOVERY 2 HR: 81 BPM
STRESS TARGET HR: 140 BPM

## 2022-12-15 PROCEDURE — 74011250637 HC RX REV CODE- 250/637: Performed by: INTERNAL MEDICINE

## 2022-12-15 PROCEDURE — 74011250636 HC RX REV CODE- 250/636: Performed by: INTERNAL MEDICINE

## 2022-12-15 PROCEDURE — 74011000250 HC RX REV CODE- 250: Performed by: INTERNAL MEDICINE

## 2022-12-15 PROCEDURE — 78452 HT MUSCLE IMAGE SPECT MULT: CPT

## 2022-12-15 PROCEDURE — 94760 N-INVAS EAR/PLS OXIMETRY 1: CPT

## 2022-12-15 PROCEDURE — A9500 TC99M SESTAMIBI: HCPCS

## 2022-12-15 RX ORDER — SODIUM CHLORIDE 0.9 % (FLUSH) 0.9 %
20 SYRINGE (ML) INJECTION AS NEEDED
Status: DISCONTINUED | OUTPATIENT
Start: 2022-12-15 | End: 2022-12-15 | Stop reason: HOSPADM

## 2022-12-15 RX ORDER — TETRAKIS(2-METHOXYISOBUTYLISOCYANIDE)COPPER(I) TETRAFLUOROBORATE 1 MG/ML
10.3 INJECTION, POWDER, LYOPHILIZED, FOR SOLUTION INTRAVENOUS
Status: COMPLETED | OUTPATIENT
Start: 2022-12-15 | End: 2022-12-15

## 2022-12-15 RX ORDER — TETRAKIS(2-METHOXYISOBUTYLISOCYANIDE)COPPER(I) TETRAFLUOROBORATE 1 MG/ML
33 INJECTION, POWDER, LYOPHILIZED, FOR SOLUTION INTRAVENOUS
Status: COMPLETED | OUTPATIENT
Start: 2022-12-15 | End: 2022-12-15

## 2022-12-15 RX ADMIN — ASPIRIN 81 MG: 81 TABLET, COATED ORAL at 09:35

## 2022-12-15 RX ADMIN — SODIUM CHLORIDE, PRESERVATIVE FREE 10 ML: 5 INJECTION INTRAVENOUS at 05:56

## 2022-12-15 RX ADMIN — SODIUM CHLORIDE, PRESERVATIVE FREE 20 ML: 5 INJECTION INTRAVENOUS at 11:05

## 2022-12-15 RX ADMIN — TETRAKIS(2-METHOXYISOBUTYLISOCYANIDE)COPPER(I) TETRAFLUOROBORATE 10.3 MILLICURIE: 1 INJECTION, POWDER, LYOPHILIZED, FOR SOLUTION INTRAVENOUS at 09:00

## 2022-12-15 RX ADMIN — REGADENOSON 0.4 MG: 0.08 INJECTION, SOLUTION INTRAVENOUS at 11:05

## 2022-12-15 RX ADMIN — SODIUM CHLORIDE, POTASSIUM CHLORIDE, SODIUM LACTATE AND CALCIUM CHLORIDE 100 ML/HR: 600; 310; 30; 20 INJECTION, SOLUTION INTRAVENOUS at 04:15

## 2022-12-15 RX ADMIN — LEVOTHYROXINE SODIUM 75 MCG: 0.07 TABLET ORAL at 05:57

## 2022-12-15 RX ADMIN — TETRAKIS(2-METHOXYISOBUTYLISOCYANIDE)COPPER(I) TETRAFLUOROBORATE 33 MILLICURIE: 1 INJECTION, POWDER, LYOPHILIZED, FOR SOLUTION INTRAVENOUS at 12:00

## 2022-12-15 NOTE — PROGRESS NOTES
CARDIOLOGY NOTE    Nuclear stress test was normal.  CUS showed no carotid plaque. OK to discharge from a CV standpoint. No further CV follow-up is necessary at this time. ECG: Resting ECG demonstrates normal sinus rhythm and left bundle branch block. ECG: Stress ECG was negative for ischemia. Stress Test: A pharmacological stress test was performed using lexiscan. Blood pressure demonstrated a normal response and heart rate demonstrated a normal response to stress. The patient's heart rate recovery was normal. The patient reported dyspnea, fatigue and no chest pain during the stress test.    MPI: No ischemia or infarct demonstrated. LVEF 56%. Rest and Lexiscan myocardial perfusion SPECT imaging is performed with IV injections of 10.3 and 33.0 mCi technetium 99m Sestamibi respectively. SPECT quantitative perfusion analysis and images displayed in three planes demonstrate relatively uniform radiotracer uptake in the myocardium on both sequences. There is no canal ischemic reversibility or apparent infarct fixed defect. Gated SPECT quantitative analysis and images reviewed in 3 planes exhibit mild inferior wall hypokinesis and diminished LV systolic wall thickening. The calculated LV ejection fraction is 56%. Pulmonary uptake and left ventricular cavity size appear normal.     IMPRESSION: No ischemia or infarct demonstrated. LVEF 56%. Thank you for the opportunity to participate in the care of Jovany Pond and please do not hesitate to contact us should you have any questions. Guilford Richters Garner Spurling, [address] [facility]  12/15/22

## 2022-12-15 NOTE — PROGRESS NOTES
Discussed with the patient and all questioned fully answered. She will call me if any problems arise. Discharge medications reviewed with patient and appropriate educational materials and side effects teaching were provided.

## 2022-12-15 NOTE — DISCHARGE SUMMARY
Discharge Summary       PATIENT ID: Helder Walters  MRN: [id number]   YOB: 1940    DATE OF ADMISSION: 12/13/2022 12:31 PM    DATE OF DISCHARGE: 12/15/22   PRIMARY CARE PROVIDER: Cameron Fowler MD     ATTENDING PHYSICIAN: Loly Alfred  DISCHARGING PROVIDER: Catrachita Haji MD    To contact this individual call [phone] and ask the  to page. If unavailable ask to be transferred the Adult Hospitalist Department. CONSULTATIONS: IP CONSULT TO CARDIOLOGY  IP CONSULT TO PSYCHIATRY    PROCEDURES/SURGERIES: * No surgery found *    DISCHARGE DIAGNOSES: Chest pain    This [de-identified]year-old woman with past medical history significant for dyslipidemia, hypertension, type 2 diabetes, hypothyroidism, insomnia presented at the [facility] emergency room at [place] with palpitation. This has been going on for several weeks and the patient attributed the palpitation to Remeron because she has read about the side effect of this medication. The patient is on Remeron 15 mg daily at bedtime for her insomnia. The patient was seen by her primary care physician. Primary care physician offered Ambien instead of the Remeron for sleep, but the patient is afraid of coming off the Remeron cold turkey because she has read about the withdrawal symptoms from Remeron and she has talked to family member who is a pharmacist.  The patient has not been taking the Ambien and instead of taking 15 mg of Remeron, the patient has been taking 7.5 mg twice at bedtime. No associated chest pain or shortness of breath. When the patient arrived at the emergency room, the patient was found to have elevated troponin level. Because of this, she was referred to the hospitalist service for admission. No record of prior admission to this hospital.  The patient has no prior history of coronary artery disease.     [address] [facility],[address] COURSE:   Patient was admitted for above and underwent stress test which is normal patient was also seen by psych for intense anxiety and issue with tapering off Remeron which she thinks causing her cardiac problems. Patient was seen by psychiatry advised to start BuSpar patient does not want it at this time and she wants to follow-up as an outpatient with psychiatrist patient discharged home with primary care physician follow-up    DISCHARGE DIAGNOSES / PLAN:      D/c home    BMI: Body mass index is 30.16 kg/m². . This patient: Meets criteria for obesity given BMI >/= 30 and < 40 due to excess calories/nutritional. Weight loss and lifestyle modifications should be encouraged as an outpatient. PENDING TEST RESULTS:   At the time of discharge the following test results are still pending:      ADDITIONAL CARE RECOMMENDATIONS:        NOTIFY YOUR PHYSICIAN FOR ANY OF THE FOLLOWING:   Fever over 101 degrees for 24 hours. Chest pain, shortness of breath, fever, chills, nausea, vomiting, diarrhea, change in mentation, falling, weakness, bleeding. Severe pain or pain not relieved by medications, as well as any other signs or symptoms that you may have questions about. FOLLOW UP APPOINTMENTS:    Follow-up Information       Follow up With Specialties Details Why Contact Info    Phil Tejada MD Internal Medicine Physician Schedule an appointment as soon as possible for a visit in 1 week(s) Call for a follow up appointment. [address]  [phone]                 DIET: Cardiac Diet    ACTIVITY: Activity as tolerated    EQUIPMENT needed:     DISCHARGE MEDICATIONS:  Current Discharge Medication List        CONTINUE these medications which have NOT CHANGED    Details   mirtazapine (REMERON) 15 mg tablet Take 15 mg by mouth nightly. Synthroid 75 mcg tablet TAKE 1 TABLET BY MOUTH EVERY DAY.  EXCEPT 1/2 TAB ON SUNDAY  Qty: 90 Tablet, Refills: 1    Associated Diagnoses: Acquired hypothyroidism      hydroCHLOROthiazide (HYDRODIURIL) 25 mg tablet TAKE 1 TABLET BY MOUTH EVERY DAY  Qty: 90 Tablet, Refills: 1    Associated Diagnoses: Hypertension, essential      atorvastatin (LIPITOR) 20 mg tablet TAKE 1 TABLET BY MOUTH EVERY DAY IN THE EVENING  Qty: 90 Tablet, Refills: 1    Associated Diagnoses: Hyperlipidemia with target LDL less than 100      guaifenesin (MUCINEX PO) Take  by mouth two (2) times a day. b complex-vitamin c-folic acid (NEPHROCAPS) 1 mg capsule DAILY AM for SUPPLEMENT      acetaminophen (TYLENOL) 500 mg tablet 1,000 mg every six (6) hours as needed. calcium citrate/vitamin D3 (CALCIUM CITRATE + D PO) Take 1 Tab by mouth as needed. Cetirizine 10 mg cap Take  by mouth every evening. cholecalciferol (VITAMIN D3) (1000 Units /25 mcg) tablet Take  by mouth daily. zolpidem (AMBIEN) 5 mg tablet Take 1 Tablet by mouth nightly as needed for Sleep. Max Daily Amount: 5 mg. Qty: 7 Tablet, Refills: 0    Associated Diagnoses: Primary insomnia      estradioL (ESTRACE) 0.01 % (0.1 mg/gram) vaginal cream       Blood-Glucose Meter monitoring kit Pharmacist to choose preferred meter and strips. Dx: R73.01  Qty: 1 Kit, Refills: 0    Associated Diagnoses: Impaired fasting glucose      lancets (One Touch Delica) 33 gauge misc Test your sugar three times a week fasting. Dx: R73.01  Qty: 100 Lancet, Refills: 0    Associated Diagnoses: Impaired fasting glucose      glucose blood VI test strips (blood glucose test) strip Pharmacist to choose preferred meter and strips. Dx: R73.01  Qty: 100 Strip, Refills: 0    Associated Diagnoses: Impaired fasting glucose      simethicone (GAS-X) 125 mg capsule Take 125 mg by mouth four (4) times daily as needed for Flatulence.              DISPOSITION:  x  Home With:   OT  PT  HH  RN       Long term SNF/Inpatient Rehab    Independent/assisted living    Hospice    Other:       PATIENT CONDITION AT DISCHARGE:     Functional status    Poor     Deconditioned    x Independent      Cognition   x  Lucid Forgetful     Dementia      Catheters/lines (plus indication)    Kemp     PICC     PEG    x None      Code status    x Full code     DNR      PHYSICAL EXAMINATION AT DISCHARGE:  General:          Alert, cooperative, no distress, appears stated age. HEENT:           Atraumatic, anicteric sclerae, pink conjunctivae                          No oral ulcers, mucosa moist, throat clear, dentition fair  Neck:               Supple, symmetrical  Lungs:             Clear to auscultation bilaterally. No Wheezing or Rhonchi. No rales. Chest wall:      No tenderness  No Accessory muscle use. Heart:              Regular  rhythm,  No  murmur   No edema  Abdomen:        Soft, non-tender. Not distended. Bowel sounds normal  Extremities:     No cyanosis. No clubbing,                            Skin turgor normal, Capillary refill normal  Skin:                Not pale. Not Jaundiced  No rashes   Psych:             Not anxious or agitated.   Neurologic:      Alert, moves all extremities, answers questions appropriately and responds to commands       [address]:  Problem List as of 12/15/2022 Date Reviewed: 12/13/2022            Codes Class Noted - Resolved    Chest pain ICD-10-CM: R07.9  ICD-9-CM: 786.50  12/13/2022 - Present        Elevated troponin ICD-10-CM: R77.8  ICD-9-CM: 790.6  12/13/2022 - Present        * (Principal) NSTEMI (non-ST elevated myocardial infarction) ([facility] ICD-10-CM: I21.4  ICD-9-CM: 410.70  12/13/2022 - Present        Obesity (BMI 30.0-34.9) ICD-10-CM: E66.9  ICD-9-CM: 278.00  5/31/2022 - Present        Seronegative rheumatoid arthritis of multiple sites [facility]) ICD-10-CM: M06.09  ICD-9-CM: 714.0  10/28/2020 - Present        Carpal tunnel syndrome of left wrist ICD-10-CM: G56.02  ICD-9-CM: 354.0  10/28/2020 - Present        Primary osteoarthritis of left knee ICD-10-CM: M17.12  ICD-9-CM: 715.16  10/16/2019 - Present        LBBB (left bundle branch block) ICD-10-CM: I44.7  ICD-9-CM: 426.3 5/1/2019 - Present        Abnormal mammogram of left breast ICD-10-CM: R92.8  ICD-9-CM: 793.80  11/15/2017 - Present    Overview Signed 12/11/2017  7:33 AM by Virginia Hughes MD     11/27/17- benign breast bx             Primary insomnia ICD-10-CM: F51.01  ICD-9-CM: 307.42  10/13/2017 - Present    Overview Signed 10/13/2017 10:37 AM by Virginia Hughes MD     2017- stopped Remeron, weight decreased by 10 lbs             Uterine prolapse ICD-10-CM: N81.4  ICD-9-CM: 618.1  Unknown - Present        Osteopenia ICD-10-CM: M85.80  ICD-9-CM: 733.90  10/14/2015 - Present        Allergic rhinitis ICD-10-CM: J30.9  ICD-9-CM: 477.9  Unknown - Present        Recurrent major depressive disorder ([facility] ICD-10-CM: F33.9  ICD-9-CM: 296.30  Unknown - Present    Overview Addendum 9/1/2017  5:50 AM by Virginia Hughes MD     2016- stopped Remeron, then restarted  She reports not tolerating any medications except Remeron. Elavil & Prozac & Wellbutrin did not help.              Acquired hypothyroidism ICD-10-CM: E03.9  ICD-9-CM: 244.9  Unknown - Present        Impaired fasting glucose ICD-10-CM: R73.01  ICD-9-CM: 790.21  Unknown - Present        Hypertension, essential ICD-10-CM: I10  ICD-9-CM: 401.9  Unknown - Present        Pure hypercholesterolemia ICD-10-CM: E78.00  ICD-9-CM: 272.0  Unknown - Present        RESOLVED: Long-term use of immunosuppressant medication ICD-10-CM: Z79.60  ICD-9-CM: V58.69  10/28/2020 - 5/31/2022        RESOLVED: Rheumatoid extensor tenosynovitis of wrist ICD-10-CM: M65.839  ICD-9-CM: 727.05  10/28/2020 - 5/31/2022        RESOLVED: Overweight ICD-10-CM: E66.3  ICD-9-CM: 278.02  12/6/2016 - 5/23/2018        RESOLVED: Essential hypertension with goal blood pressure less than 140/90 ICD-10-CM: I10  ICD-9-CM: 401.9  8/24/2016 - 5/3/2017        RESOLVED: ACP (advance care planning) ICD-10-CM: Z71.89  ICD-9-CM: V65.49  6/6/2016 - 5/3/2017    Overview Signed 6/6/2016 11:59 AM by Virginia Hughes, MD     Follow up ACP discussion held on 06/06/2016, has an advanced directive - a copy HAS NOT been provided. Reviewed DNR/DNI and patient is interested- forms filled out & order placed.                  Greater than 30  minutes were spent with the patient on counseling and coordination of care    Signed:   Gulshan Da Silva MD  12/15/2022  1:51 PM

## 2022-12-15 NOTE — PROGRESS NOTES
Transition of Care Plan  RUR- 6% Low Risk  DISPOSITION: The disposition plan is home with family assistance. F/U with PCP/Specialist    Transport: Family    At 9:48am - CM completed chart review, patient admitted from home and lives with spouse. CM to assist with ROGELIO needs as they arise.     ALAINA Marie, CRM, LMHP-e  Available in [employer]

## 2022-12-16 ENCOUNTER — PATIENT OUTREACH (OUTPATIENT)
Dept: CASE MANAGEMENT | Age: 82
End: 2022-12-16

## 2022-12-16 DIAGNOSIS — E78.5 HYPERLIPIDEMIA WITH TARGET LDL LESS THAN 100: ICD-10-CM

## 2022-12-16 RX ORDER — ATORVASTATIN CALCIUM 20 MG/1
TABLET, FILM COATED ORAL
Qty: 90 TABLET | Refills: 1 | Status: SHIPPED | OUTPATIENT
Start: 2022-12-16

## 2022-12-16 NOTE — PROGRESS NOTES
Care Transitions Outreach Attempt    Call within 2 business days of discharge: Yes   Attempted to reach patient for transitions of care follow up. Unable to reach patient. Patient: Marco Sanders Patient : 1940 MRN: [id number]    Last Discharge  Street       Date Complaint Diagnosis Description Type Department Provider    22 Palpitations Palpitations . .. ED to Hosp-Admission (Discharged) (ADMIT) Lamar Barragan MD; Laurita Shepherd. .. Was this an external facility discharge?  No Discharge Facility: na      Noted following upcoming appointments from discharge chart review:   [facility] follow up appointment(s):   Future Appointments   Date Time Provider Christie Houser   1/3/2023 10:00 AM SPT DEXA 1 SPTMAMMO SPT     Non-[facility] follow up appointment(s): ajay

## 2022-12-17 NOTE — TELEPHONE ENCOUNTER
Future Appointments   Date Time Provider Christie Houser   1/3/2023 10:00 AM SPT DEXA 1 SPTMAMMO SPT

## 2022-12-19 ENCOUNTER — HOSPITAL ENCOUNTER (EMERGENCY)
Age: 82
Discharge: HOME | End: 2022-12-19
Attending: EMERGENCY MEDICINE
Payer: MEDICARE

## 2022-12-19 VITALS
TEMPERATURE: 97.9 F | WEIGHT: 175 LBS | DIASTOLIC BLOOD PRESSURE: 80 MMHG | RESPIRATION RATE: 18 BRPM | OXYGEN SATURATION: 99 % | HEIGHT: 64 IN | BODY MASS INDEX: 29.88 KG/M2 | HEART RATE: 78 BPM | SYSTOLIC BLOOD PRESSURE: 136 MMHG

## 2022-12-19 DIAGNOSIS — G47.00 INSOMNIA, UNSPECIFIED TYPE: Primary | ICD-10-CM

## 2022-12-19 PROCEDURE — 99282 EMERGENCY DEPT VISIT SF MDM: CPT

## 2022-12-19 NOTE — ED PROVIDER NOTES
79-year-old female with PMHx of insomnia, LBBB, depression, hypertension presents the emergency department complaining of worsened insomnia over the last 6 weeks, with associated loss of focus and depressed mood. Patient reports that her PCP has advised her to transition from mirtazapine to zolpidem, however she is afraid that stopping the mirtazapine will cause withdrawal.  She has no additional complaints at this time    The history is provided by the patient and a relative. Insomnia   This is a chronic problem. The current episode started more than 1 week ago. The problem has been gradually worsening. Mental status baseline is normal.  Functional status baseline:  [EPIC#1537^NOTE} Her past medical history is significant for depression.       Past Medical History:   Diagnosis Date    Allergic rhinitis     Anesthesia complication     Pt reports dizziness after anesthesia     Arrhythmia     Left Bundle Branch Block    Arthritis     BBB (bundle branch block)     Depression     History of not currently    GERD (gastroesophageal reflux disease)     Hyperlipidemia     Impaired fasting glucose     Unspecified essential hypertension     Unspecified hypothyroidism     Uterine prolapse     Vertigo        Past Surgical History:   Procedure Laterality Date    HX APPENDECTOMY  1987    HX BREAST BIOPSY Bilateral     >5, all benign    HX BREAST BIOPSY Left 11/29/2017    benign    HX COLONOSCOPY  8/15/12    diverticulosis    HX ENDOSCOPY  8/15/12    hiatal hernia    HX HAMMER TOE REPAIR Left     w/ bunion surgery (per previous PCP)    HX KNEE REPLACEMENT Left 10/22/2019    [place]    HX PARTIAL HYSTERECTOMY      HX TONSILLECTOMY           Family History:   Problem Relation Age of Onset    Thyroid Disease Father         hypo    Coronary Art Dis Father         s/p CABG    Heart Disease Father         s/p valve replacement    Diabetes Brother     Thyroid Disease Brother     Cancer Brother         kidney    Coronary Art Dis Brother         s/p CABG    Dementia Mother         vascular    Elevated Lipids Mother     Coronary Art Dis Mother     OSTEOARTHRITIS Mother     Diabetes Mother     Gall Bladder Disease Mother     Heart Disease Mother     Hypertension Mother     Stroke Mother     Obesity Son     Hypertension Son     No Known Problems Son     Diabetes Maternal Grandmother     Cancer Maternal Grandmother         pancreatic    Diabetes Paternal Grandmother     Diabetes Maternal Aunt     Gall Bladder Disease Maternal Aunt     Hypertension Maternal Aunt     Diabetes Paternal Aunt     Hypertension Maternal Aunt        Social History     Socioeconomic History    Marital status:      Spouse name: Not on file    Number of children: Not on file    Years of education: Not on file    Highest education level: Not on file   Occupational History    Not on file   Tobacco Use    Smoking status: Never    Smokeless tobacco: Never   Vaping Use    Vaping Use: Never used   Substance and Sexual Activity    Alcohol use: No    Drug use: No    Sexual activity: Not Currently     Partners: Male   Other Topics Concern     Service Not Asked    Blood Transfusions Not Asked    Caffeine Concern Not Asked    Occupational Exposure Not Asked    Hobby Hazards Not Asked    Sleep Concern Not Asked    Stress Concern Not Asked    Weight Concern Not Asked    Special Diet Not Asked    Back Care Not Asked    Exercise Not Asked    Bike Helmet Not Asked    Seat Belt Not Asked    Self-Exams Not Asked   Social History Narrative    Lives in [place] [place] with  of 48 years. Has 2 sons. Retired. Used to work as a  for [employer] mostly at the middle school level. Likes to read.      Social Determinants of Health     Financial Resource Strain: Low Risk     Difficulty of Paying Living Expenses: Not hard at all   Food Insecurity: No Food Insecurity    Worried About [address] [facility] in the Last Year: Never true    [address] in the Last Year: Never true   Transportation Needs: Not on file   Physical Activity: Not on file   Stress: Not on file   Social Connections: Not on file   Intimate Partner Violence: Not on file   Housing Stability: Not on file         ALLERGIES: Amlodipine, Codeine, Epinephrine, Erythromycin, Losartan, Mobic [meloxicam], and Pcn [penicillins]    Review of Systems   Constitutional: Negative. HENT: Negative. Eyes: Negative. Respiratory: Negative. Cardiovascular: Negative. Gastrointestinal: Negative. Endocrine: Negative. Genitourinary: Negative. Musculoskeletal: Negative. Skin: Negative. Neurological: Negative. Hematological: Negative. Psychiatric/Behavioral:  Positive for dysphoric mood and sleep disturbance. Negative for suicidal ideas. The patient has insomnia. Vitals:    12/19/22 1351   BP: 109/69   Pulse: 81   Resp: 16   Temp: 97.6 °F (36.4 °C)   SpO2: 99%   Weight: 79.4 kg (175 lb)   Height: 5' 4\" (1.626 m)            Physical Exam  Vitals and nursing note reviewed. Constitutional:       General: She is not in acute distress. Appearance: Normal appearance. She is not ill-appearing, toxic-appearing or diaphoretic. HENT:      Head: Normocephalic and atraumatic. Nose: Nose normal.      Mouth/Throat:      Mouth: Mucous membranes are moist.   Cardiovascular:      Rate and Rhythm: Normal rate. Pulmonary:      Effort: Pulmonary effort is normal. No respiratory distress. Musculoskeletal:      Cervical back: Normal range of motion. Skin:     General: Skin is warm and dry. Neurological:      General: No focal deficit present. Mental Status: She is alert and oriented to person, place, and time.    Psychiatric:         Mood and Affect: Mood normal.        MDM  Number of Diagnoses or Management Options  Insomnia, unspecified type  Diagnosis management comments: DDx: Insomnia, depression    Plan:  - Consults: Clinical pharmacist    Reassessment: Discussed the case with the clinical pharmacist, who advises a gradual decrease in her mirtazapine with concurrent use of zolpidem. Advised patient to start taking half of her dose of mirtazapine over the course of the next 3 days and if no significant side effects are experienced, to I can have her dose over the course of the following 3 days, after which she can stop. Advised her to begin taking her zolpidem in its full dosage tonight. Also explained to her the importance of following up with her PCP so that he may adjust her medications as needed.   She may safely be discharged at this time       Amount and/or Complexity of Data Reviewed  Obtain history from someone other than the patient: yes  Discuss the patient with other providers: yes    Risk of Complications, Morbidity, and/or Mortality  Presenting problems: low  Diagnostic procedures: low  Management options: low    Patient Progress  Patient progress: improved         Procedures

## 2022-12-19 NOTE — DISCHARGE INSTRUCTIONS
You were seen in the emergency department for insomnia. Your physician has advised you to stop taking mirtazapine and start taking zolpidem, which is safe to do at the same time. Please begin tapering off of the mirtazapine as discussed. Please also follow-up with Dr. Ragini Wells in 1 week or return to the emergency department if you experience a worsening of symptoms or any new symptoms that are concerning to you.

## 2022-12-19 NOTE — ED TRIAGE NOTES
Pt ambulatory to ED with c/o difficulty sleeping onset 6-8 weeks ago. Pt reports \"I've been taking Remeron for years. Recently, I wake up after about 2 hours after falling asleep. I'm nervous about it and it is scary\".  Denies HI/SI

## 2022-12-20 ENCOUNTER — TELEPHONE (OUTPATIENT)
Dept: INTERNAL MEDICINE CLINIC | Age: 82
End: 2022-12-20

## 2022-12-20 ENCOUNTER — PATIENT OUTREACH (OUTPATIENT)
Dept: CASE MANAGEMENT | Age: 82
End: 2022-12-20

## 2022-12-20 DIAGNOSIS — F51.01 PRIMARY INSOMNIA: ICD-10-CM

## 2022-12-20 RX ORDER — ZOLPIDEM TARTRATE 5 MG/1
5 TABLET ORAL
Qty: 7 TABLET | Refills: 0 | Status: CANCELLED | OUTPATIENT
Start: 2022-12-20

## 2022-12-20 NOTE — PROGRESS NOTES
Care Transitions Initial Call    Call within 2 business days of discharge: Yes     Patient: Daniella Contreras Patient : 1940 MRN: [id number]    Last Discharge [address]       Date Complaint Diagnosis Description Type Department Provider    22 Insomnia; Medication Evaluation Insomnia, unspecified type ED (DISCHARGE) Jaron Nolan MD            Was this an external facility discharge? No Discharge Facility: na    Challenges to be reviewed by the provider   Additional needs identified to be addressed with provider: yes  medications- pt is asking how to take Remeron and Ambien, pt reports that she has a 7 day supply of Ambien. Pt is reporting anxiety and depression. Method of communication with provider : chart routing    Inpatient Readmission Risk score: Unplanned Readmit Risk Score: 5.7    Was this a readmission? no   Patient stated reason for the admission: anxiety and depression and not sleeping    Patients top risk factors for readmission: medical condition-anxiety and depression    Interventions to address risk factors: Obtained and reviewed discharge summary and/or continuity of care documents    Care Transition Nurse (CTN) contacted the patient by telephone to perform post hospital discharge assessment. Verified name and  with patient as identifiers. Provided introduction to self, and explanation of the CTN role. CTN reviewed discharge instructions, medical action plan and red flags with patient who verbalized understanding. Were discharge instructions available to patient? yes. Reviewed appropriate site of care based on symptoms and resources available to patient including: PCP. Patient given an opportunity to ask questions and does not have any further questions or concerns at this time. The patient agrees to contact the PCP office for questions related to their healthcare.      Medication reconciliation was performed with patient, who verbalizes understanding of administration of home medications. Advised obtaining a 90-day supply of all daily and as-needed medications. Referral to Pharm D needed: no       Discussed follow-up appointments. If no appointment was previously scheduled, appointment scheduling offered:  offered to send chart to PCP . Is follow up appointment scheduled within 7 days of discharge? no.   [facility] follow up appointment(s):   Future Appointments   Date Time Provider Christie Houser   1/3/2023 10:00 AM SPT DEXA 1 SPTMAMMO SPT     Non-[facility] follow up appointment(s): unk    Plan for follow-up call in 1-2 days based on severity of symptoms and risk factors. Plan for next call:  will follow up on medications    CTN provided contact information for future needs. Goals Addressed                   This Visit's Progress     Prevent complications post hospitalization. 12/20/2022  Performed medication reconciliation with pt. Unsure about dosing of remeron with ambien. Pt reports that she is taking on full tab of remeron with ambien, will cut in half for 3 days, cut in half for next 3 days, and cut in half for a total of 9 days to ween off remeron while taking ambien. Pt reports that she has a 7 day supply of ambien and is requesting for Dr. Maksim Billingsley to advise. Provided pt with telephone numbers for [facility]. Provided emergency number as well as 2 general numbers. Pt reports that she will not harm herself or others. Pt reports she is very anxious and depressed and is not sleeping. Will call pt tomorrow on home telephone number to follow up. Chart sent to PCP for review. Provided pt with my name and telephone number.   Harley Corona RN [address] [facility], Care Transitions Nurse, [phone]

## 2022-12-20 NOTE — TELEPHONE ENCOUNTER
She needs to see me on Friday. She has been in the ER twice in the last 2 weeks, most recently last night. Needs appointment to talk about her anxiety.

## 2022-12-20 NOTE — TELEPHONE ENCOUNTER
Reason for call:  Pt called - states PCP sent her a message, thru [facility] [employer], recommending that she stop taking her Mirtazapine and start taking Zolpidem for sleep. Pt states she did not start taking Zolpidem when first given rx; stated she started med last night and slept about 5 hours. Pt states she only got 7 pills for Zolpidem and wants to know if she needs another refill and if so, can it be called into her pharmacy before the holidays. Pt asked about scheduling an appt before holidays but I could not find anything available. Pt seems very worried about not having enough of med to get her thru the holdiays. Please call pt to advise.         [id number] - [address] [facility] [facility] [address] [phone]    Is this a new problem: no     Date of last appointment:  12/2/2022     Can we respond via [facility] [employer]: no    Best call back number:  #: [phone]

## 2022-12-20 NOTE — Clinical Note
Good afternoon, Pt needs advice on medication. Remeron and Ambien. Please advise. Pt can be contacted on landline.  Thank you, Cristobal Mancilla  Care Transitions Nurse

## 2022-12-21 ENCOUNTER — PATIENT OUTREACH (OUTPATIENT)
Dept: CASE MANAGEMENT | Age: 82
End: 2022-12-21

## 2022-12-22 ENCOUNTER — PATIENT OUTREACH (OUTPATIENT)
Dept: CASE MANAGEMENT | Age: 82
End: 2022-12-22

## 2022-12-22 NOTE — PROGRESS NOTES
Initial Contact Social Work Note - Ambulatory  2022    Date of referral: 22  Referral received from: Maribell Mcnamara RN/CTN   Reason for referral: \"Pt reports inability to sleep and high anxiety. Pt is agreeable to SW referral to explore assistance for caregiver and any support that may help her. Pt reports inability to sleep and high anxiety. Pt is agreeable to SW referral to explore assistance for caregiver and any support that may help her. \"    Previous SW Referral: No    If yes, brief summary and outcome:  N/A    Two Identifiers Verified: Yes    [id number] S.W: Spouse/Partner and Adult Child/Children     [place] Status: Spouse of [employer] Providers:  None      ADL Assistance: N/A    Housing/Living Concerns or Home Modification Needs: Patient lives in a one story home with her  of 61 years. She explained that she does not use any DME to ambulate, and does not have any challenges with mobility. She serves as caregiver to her  who has health challenges, but is mobile, per patient. Transportation Concern: Patient and her  do not drive at this time. Her sons, daughter-in-law, and niece have been assisting with transportation. Medication Cost Concern: No concerns identified today. Medication Education or Adherence Concern: Patient states that she organizes pill boxes for she and her , and uses a check box system on paper as well. Financial Concern(s): No concerns identified today. Income (only if applicable): The patient and her  are both retired. She worked as a  for [employer]. Her  was a . Their income exceeds $2,000/month, and they would not be eligible for Medicaid benefits. Ability to Read/Write: Yes    Advance Care Plan:  N/A Patient has an AMD, but not on file.   Asked that she provide a copy during her upcoming office visit tomorrow, 12/23/2022, with Dr. Xiang Oviedo. She is agreeable to this plan. Other: Patient described caregiver stress, reporting that her  has had multiple health issues (heart attacks, COPD, cataract surgery), hospitalizations, surgeries, and a fall over the past year. She reports anxiety and trouble sleeping. She shared that her children have hired a personal care aid to assist she and her  with light housework and cooking, for 6 hours a day, starting next week. She is hopeful that this will help reduce her stress and anxiety levels. She explained that she sometimes has memory challenges, when she gets anxious. She recently had 2 ER visits, related to medication side effects. She currently takes Remeron and Ambien to assist with sleep. She explained that she would not have visited the ER a second time, but called a [employer], and was instructed to present to the ER. Identified Needs: Additional support at home - children hired personal care aids for 6 hours/day, starting next week  Psychiatrist for medication management and support - information for [facility] program was provided today     Social Work Plan:    Ongoing psychosocial support as desired by the patient. Method of Communication with Provider (if appropriate): chart routing        Goals Addressed                      This Visit's Progress      Connect with [employer] (pt-stated)   On track      12/22/22  Connected with the patient via phone today. Introduced self and role, and offered support. Patient described caregiver burden, as her  has had multiple health issues, hospitalizations, surgeries, and a fall over the past year. She reports anxiety and trouble sleeping.   Discussed good sleep hygiene habits including going to sleep and waking up at the same times each day, limiting exposure to electric devices prior to sleep, and use of aromatherapy such as lavender. Patient identified securing an appointment with a psychiatrist for medication management as her primary concern today. She saw a psychiatrist in the 1990s for medication management, but that person is no longer in practice. Provided information for [address] [place] [address] [employer] Access program.  Explained that they do not offer appointments for screenings, but there are walk-in hours ( Monday through Wednesday: 7:30 to 3:00PM, Thursdays: 7:30 to 5:30 PM. Friday: 7:30 to 11:00 AM). Explained that she will need to provide a photo ID, copy of insurance card, and list of medications. Explained that the screening process can take 2-2.5 hours, and she will be asked to complete paperwork about herself, her concerns, and any health challenges. Explained that she will then meet with a clinician to discuss her treatment needs, history, and plan. Aaron 65 with the patient via conference call today, and they explained that they will be closed on Monday, December 26, 2022. Provided the contact information below, via phone today. Also sent this information via MyChart messaging, per her request.    [address], [address] [place] [address], I#[phone]  [address] [place] [address] Phone, I#[phone]     She shared that her children have hired a personal care aid to assist she and her  with light housework and cooking, for 6 hours a day, starting next week. She is hopeful that this will help reduce her stress and anxiety levels. Plan:  Ongoing psychosocial support and resource referral, as desired by the patient and family. This  will follow up with the patient in two weeks, to check on her status, and progress with resources provided today.     JOSE Patel, ALAINA/GOVIND, [facility]   K#[phone]

## 2022-12-22 NOTE — ACP (ADVANCE CARE PLANNING)
Advance Care Planning     General Advance Care Planning (ACP) Conversation    Date of Conversation: 12/22/2022  Conducted with: Patient with Decision Making Capacity    Healthcare Decision Maker:     Primary Decision Maker: Sabina Conde - Spouse - [phone]  Click here to complete [address] including selection of the Healthcare Decision Maker Relationship (ie \"Primary\")    Today we documented Decision Maker(s). The patient will provide ACP documents. Asked the patient to provide a copy of her AMD during her upcoming PCP visit tomorrow, 12/23/2022, with Dr. Armandina Barthel. Patient has a copy at home, and is agreeable to providing a copy tomorrow during her office visit. Content/Action Overview:    Has ACP document(s) NOT on file - requested patient to provide    Length of Voluntary ACP Conversation in minutes:  <16 minutes (Non-Billable)    Amy Samano, MINGW

## 2022-12-23 ENCOUNTER — OFFICE VISIT (OUTPATIENT)
Dept: INTERNAL MEDICINE CLINIC | Age: 82
End: 2022-12-23
Payer: MEDICARE

## 2022-12-23 VITALS
RESPIRATION RATE: 18 BRPM | OXYGEN SATURATION: 97 % | TEMPERATURE: 98.2 F | DIASTOLIC BLOOD PRESSURE: 80 MMHG | WEIGHT: 171 LBS | SYSTOLIC BLOOD PRESSURE: 122 MMHG | HEIGHT: 64 IN | BODY MASS INDEX: 29.19 KG/M2 | HEART RATE: 82 BPM

## 2022-12-23 DIAGNOSIS — E87.6 HYPOKALEMIA: ICD-10-CM

## 2022-12-23 DIAGNOSIS — R14.2 BELCHING: ICD-10-CM

## 2022-12-23 DIAGNOSIS — F43.23 SITUATIONAL MIXED ANXIETY AND DEPRESSIVE DISORDER: Primary | ICD-10-CM

## 2022-12-23 PROBLEM — R77.8 ELEVATED TROPONIN: Status: RESOLVED | Noted: 2022-12-13 | Resolved: 2022-12-23

## 2022-12-23 PROBLEM — R79.89 ELEVATED TROPONIN: Status: RESOLVED | Noted: 2022-12-13 | Resolved: 2022-12-23

## 2022-12-23 PROBLEM — R07.9 CHEST PAIN: Status: RESOLVED | Noted: 2022-12-13 | Resolved: 2022-12-23

## 2022-12-23 RX ORDER — BUSPIRONE HYDROCHLORIDE 5 MG/1
5 TABLET ORAL 2 TIMES DAILY
Qty: 60 TABLET | Refills: 0 | Status: SHIPPED | OUTPATIENT
Start: 2022-12-23

## 2022-12-23 NOTE — PROGRESS NOTES
Candido Benites is a [de-identified] y.o. female who was seen in clinic today (12/23/2022) for an acute visit. Assessment & Plan:   Below is the assessment and plan developed based on review of pertinent history, physical exam, labs, studies, and medications. 1. Situational mixed anxiety and depressive disorder  Assessment & Plan:  Uncontrolled, worsening. Initially though to be due to 's health and only impacting sleep. Now it is impacting multiple facets of daily life. I reviewed treatment options with her, I recommended to see a counselor, I reviewed life style changes to help improve mood, following changes were made today: will start on Buspar and stop Ambien. Did review in 5 days if no side effects can increase Buspar to 2 tabs twice a day. She will update if she can't tolerate medication or if it is not working. She has an appointment w/ behavior health on Wed and encouraged her to keep it. Orders:  -     busPIRone (BUSPAR) 5 mg tablet; Take 1 Tablet by mouth two (2) times a day., Normal, Disp-60 Tablet, R-0  2. Belching  Comments:  differential dx reviewed, unclear etiology, favor hyperventilating from anxiety. Continue Gas-X. Did review when to consider imaging. Red flags reviewed. 3. Hypokalemia  Comments:  low on admission to the hospital, normally well controlled, this is contributing to her anxiety so will repeat, reassured  Orders:  -     METABOLIC PANEL, BASIC; Future    Follow-up and Dispositions    Return in about 1 month (around 1/23/2023) for Regular follow up. Subjective/Objective: Lucia Davila was seen today for Anxiety    Last seen on 12/2 for AWV. At that time Remeron dose increased from 7.5mg to 15mg due to concerns about worsening insomnia. She was admitted to [facility] on 12/13 thru 12/15 for elevated troponins, stress test normal.  She was seen by psychiatry, recommended buspar but declined. She then went back to the ER on 12/19 for worsening anxiety. She reports memory is worse. She can't remember anything. She is scared. She feels over whelmed and like she has lost control. She RTC with her daughter-in-law Miki Osgood. She brought a list of questions. There is a family history of dementia. She is asking about an MRI. She also reports trouble w/ focusing and memory. They do feel she is anxious and depressed. Increase in belching is new. She has a lot of guilt regarding things she has done, most of them are little things. She is second guessing any decisions. Had been on Remeron 15mg. Lowered to 7.5mg in Nov '21. Dose increased back to 15mg in December '22. For the last 4 nights she is taking Remeron 15mg and Ambien 5mg. She thought she slept good the 1st night but no improvement the other nights. Last night was only slept for 3 hours. Prior to Admission medications    Medication Sig Start Date End Date Taking? Authorizing Provider   atorvastatin (LIPITOR) 20 mg tablet TAKE 1 TABLET BY MOUTH EVERY DAY IN THE EVENING 12/16/22  Yes Emilia Shaw MD   mirtazapine (REMERON) 15 mg tablet Take 15 mg by mouth nightly. Yes Other, MD Carlo   zolpidem (AMBIEN) 5 mg tablet Take 1 Tablet by mouth nightly as needed for Sleep. Max Daily Amount: 5 mg. 12/9/22  Yes Emilia Shaw MD   Synthroid 75 mcg tablet TAKE 1 TABLET BY MOUTH EVERY DAY. EXCEPT 1/2 TAB ON SUNDAY 12/2/22  Yes Emilia Shaw MD   hydroCHLOROthiazide (HYDRODIURIL) 25 mg tablet TAKE 1 TABLET BY MOUTH EVERY DAY 9/29/22  Yes Emilia Shaw MD   estradioL (ESTRACE) 0.01 % (0.1 mg/gram) vaginal cream  2/28/22  Yes Provider, Historical   lancets (One Touch Delica) 33 gauge misc Test your sugar three times a week fasting. Dx: R73.01  Patient taking differently: Test your sugar three times a week fasting. Dx: R73.01 2/2/21  Yes Emilia Shaw MD   glucose blood VI test strips (blood glucose test) strip Pharmacist to choose preferred meter and strips.   Dx: R73.01  Patient taking differently: Pharmacist to choose preferred meter and strips. Dx: R73.01 2/2/21  Yes Chuy Patton MD   guaifenesin (MUCINEX PO) Take  by mouth two (2) times a day. Yes Provider, Historical   b complex-vitamin c-folic acid (NEPHROCAPS) 1 mg capsule DAILY AM for SUPPLEMENT   Yes Provider, Historical   acetaminophen (TYLENOL) 500 mg tablet 1,000 mg every six (6) hours as needed. Yes Provider, Historical   simethicone (GAS-X) 125 mg capsule Take 125 mg by mouth four (4) times daily as needed for Flatulence. Yes Provider, Historical   Cetirizine 10 mg cap Take  by mouth every evening. Yes Provider, Historical   cholecalciferol (VITAMIN D3) (1000 Units /25 mcg) tablet Take  by mouth daily. Yes Provider, Historical   Blood-Glucose Meter monitoring kit Pharmacist to choose preferred meter and strips. Dx: R73.01  Patient taking differently: Pharmacist to choose preferred meter and strips. Dx: R73.01 2/2/21   Chuy Patton MD   calcium citrate/vitamin D3 (CALCIUM CITRATE + D PO) Take 1 Tab by mouth as needed. Patient not taking: No sig reported    Other, MD Carlo         Physical Exam  Psychiatric:         Mood and Affect: Mood is anxious. Mood is not depressed. Visit Vitals  /80   Pulse 82   Temp 98.2 °F (36.8 °C) (Temporal)   Resp 18   Ht 5' 4\" (1.626 m)   Wt 171 lb (77.6 kg)   SpO2 97%   BMI 29.35 kg/m²         Advised her to call back or return to office if symptoms worsen/change/persist.  Discussed expected course/resolution/complications of diagnosis in detail with patient. Medication risks/benefits/costs/interactions/alternatives discussed with patient.     Josh Grullon MD

## 2022-12-23 NOTE — PATIENT INSTRUCTIONS
Learning About Mindfulness for Stress  What are mindfulness and stress? Stress is what you feel when you have to handle more than you are used to. A lot of things can cause stress. You may feel stress when you go on a job interview, take a test, or run a race. This kind of short-term stress is normal and even useful. It can help you if you need to work hard or react quickly. Stress also can last a long time. Long-term stress is caused by stressful situations or events. Examples of long-term stress include long-term health problems, ongoing problems at work, and conflicts in your family. Long-term stress can harm your health. Mindfulness is a focus only on things happening in the present moment. It's a process of purposefully paying attention to and being aware of your surroundings, your emotions, your thoughts, and how your body feels. You are aware of these things, but you aren't judging these experiences as \"good\" or \"bad. \" Mindfulness can help you learn to calm your mind and body to help you cope with illness, pain, and stress. How does mindfulness help to relieve stress? Mindfulness can help quiet your mind and relax your body. Studies show that it can help some people sleep better, feel less anxious, and bring their blood pressure down. And it's been shown to help some people live and cope better with certain health problems like heart disease, depression, chronic pain, and cancer. How do you practice mindfulness? To be mindful is to pay attention, to be present, and to be accepting. When you're mindful, you do just one thing and you pay close attention to that one thing. For example, you may sit quietly and notice your emotions or how your food tastes and smells. When you're present, you focus on the things that are happening right now. You let go of your thoughts about the past and the future. When you dwell on the past or the future, you miss moments that can heal and strengthen you.  You may miss moments like hearing a child laugh or seeing a friendly face when you think you're all alone. When you're accepting, you don't  the present moment. Instead you accept your thoughts and feelings as they come. You can practice anytime, anywhere, and in any way you choose. You can practice in many ways. Here are a few ideas:  While doing your chores, like washing the dishes, let your mind focus on what's in your hand. What does the dish feel like? Is the water warm or cold? Go outside and take a few deep breaths. What is the air like? Is it warm or cold? When you can, take some time at the start of your day to sit alone and think. Take a slow walk by yourself. Count your steps while you breathe in and out. Try yoga breathing exercises, stretches, and poses to strengthen and relax your muscles. At work, if you can, try to stop for a few moments each hour. Note how your body feels. Let yourself regroup and let your mind settle before you return to what you were doing. If you struggle with anxiety or \"worry thoughts,\" imagine your mind as a blue abraham and your worry thoughts as clouds. Now imagine those worry thoughts floating across your mind's abraham. Just let them pass by as you watch. Follow-up care is a key part of your treatment and safety. Be sure to make and go to all appointments, and call your doctor if you are having problems. It's also a good idea to know your test results and keep a list of the medicines you take. Where can you learn more? Go to http://www.gray.com/  Enter M676 in the search box to learn more about \"Learning About Mindfulness for Stress. \"  Current as of: February 9, 2022               Content Version: 13.4  © 3174-1899 [employer], [employer]. Care instructions adapted under license by [employer] (which disclaims liability or warranty for this information).  If you have questions about a medical condition or this instruction, always ask your healthcare professional. [place] [address] any warranty or liability for your use of this information.

## 2022-12-23 NOTE — ASSESSMENT & PLAN NOTE
Uncontrolled, worsening. Initially though to be due to 's health and only impacting sleep. Now it is impacting multiple facets of daily life. I reviewed treatment options with her, I recommended to see a counselor, I reviewed life style changes to help improve mood, following changes were made today: will start on Buspar and stop Ambien. Did review in 5 days if no side effects can increase Buspar to 2 tabs twice a day. She will update if she can't tolerate medication or if it is not working. She has an appointment w/ behavior health on Wed and encouraged her to keep it.

## 2022-12-24 LAB
ANION GAP SERPL CALC-SCNC: 8 MMOL/L (ref 5–15)
BUN SERPL-MCNC: 31 MG/DL (ref 6–20)
BUN/CREAT SERPL: 27 (ref 12–20)
CALCIUM SERPL-MCNC: 10.5 MG/DL (ref 8.5–10.1)
CHLORIDE SERPL-SCNC: 96 MMOL/L (ref 97–108)
CO2 SERPL-SCNC: 30 MMOL/L (ref 21–32)
CREAT SERPL-MCNC: 1.16 MG/DL (ref 0.55–1.02)
GLUCOSE SERPL-MCNC: 114 MG/DL (ref 65–100)
POTASSIUM SERPL-SCNC: 3.8 MMOL/L (ref 3.5–5.1)
SODIUM SERPL-SCNC: 134 MMOL/L (ref 136–145)

## 2022-12-26 NOTE — PROGRESS NOTES
Results released to patient via [employer]. All labs are stable or at goal for her. Na barely low, stable. BS is non-fasting. Renal fxn up slightly. Ca high. All minimal.  Will monitor. No changes.

## 2022-12-29 ENCOUNTER — PATIENT OUTREACH (OUTPATIENT)
Dept: CASE MANAGEMENT | Age: 82
End: 2022-12-29

## 2022-12-29 NOTE — PROGRESS NOTES
Care Transitions Follow Up Call    Challenges to be reviewed by the provider   Additional needs identified to be addressed with provider: yes  Remains anxious and sleeping 3 hours a night. Started Buspar 10 mg BID instead of 5 mg as you directed if she did not have improvement in 5 days. No success in contacting counselor from list from [facility]. Method of communication with provider : staff message    Care Transition Nurse (CTN) contacted the patient by telephone to follow up. Verified name and  with patient as identifiers. Addressed changes since last contact: none  Follow up appointment completed? yes. Was follow up appointment scheduled within 7 days of discharge? no.         CTN reviewed discharge instructions, medical action plan and red flags with patient and discussed any barriers to care and/or understanding of plan of care after discharge. Discussed appropriate site of care based on symptoms and resources available to patient including: PCP and Specialist. The patient agrees to contact the PCP office for questions related to their healthcare. [facility] follow up appointment(s):   Future Appointments   Date Time Provider Christie Houser   1/3/2023 10:00 AM SPT DEXA 1 SPTMAMMO SPT   2023 12:20 PM Master Diallo MD WEIM BS AMB     Non-[facility] follow up appointment(s): none    CTN provided contact information for future needs. Plan for follow-up call in 5-7 days based on severity of symptoms and risk factors. Goals Addressed                   This Visit's Progress     Prevent complications post hospitalization. 2022  Performed medication reconciliation with pt. Unsure about dosing of remeron with ambien. Pt reports that she is taking on full tab of remeron with ambien, will cut in half for 3 days, cut in half for next 3 days, and cut in half for a total of 9 days to ween off remeron while taking ambien.   Pt reports that she has a 7 day supply of Natalia Dodd and is requesting for Dr. Ragini Wells to advise. Provided pt with telephone numbers for [facility]. Provided emergency number as well as 2 general numbers. Pt reports that she will not harm herself or others. Pt reports she is very anxious and depressed and is not sleeping. Will call pt tomorrow on home telephone number to follow up. Chart sent to PCP for review. Provided pt with my name and telephone number. Bita West RN [address] [facility], Care Transitions Nurse, [phone]     12/21/2022  Called pt to follow up on medications and pt reports that she is taking Remeron and Ambien. Pt reports that she got 5.5 hours of sleep and then 4.5 hours of sleep. Offered pt SW support to explore caregiving options and options to assist pt. Pt confirms appt with PCP and reports hearing appt at 330 on 12/23/2022. Pt reports that DIL and niece can drive her to appts. Will call pt on 12.23.2022 to follow up on medications. Bita West RN [address] [facility], Care Transitions Nurse, [phone]     Pt is agreeable to SW referral,      12/29/22   Called and spoke to patient and her DIL, patient states she is doing \"terrible\" only slept 3 hours last night. Patient is taking Remeron 15 mg and not Ambien. Started Buspar 5 mg BID and was told if no improvement to increase to 10 mg BID. Will start 10 mg BID today. Patient has a sitter today from 10-6 pm.  Asked if patient sleeps/naps during the day and they said no, her thoughts race too much. When I asked patient about C/P, SOB or palpitation she \"didn't know\". Patient states she is very anxious and has poor memory, DIL also states poor focus and over whelmed with care of spouse. Family also concerned that she might be starting dementia. Patient and family did go to [facility] yesterday and was told that there was not openings for an appt. Patient was given a list of providers to call and had to LM.   Patient does not want to call everybody on the list and LM at the same time, LM with first person on list.    Monitor patient sleep, patient anxiety, any C/P, SOB, palpitations, how Buspar is helping, get appt with counselor?     Dontrell Acosta MSN, RN, CCM / Care Transition Nurse / [phone]

## 2023-01-03 ENCOUNTER — HOSPITAL ENCOUNTER (OUTPATIENT)
Dept: MAMMOGRAPHY | Age: 83
Discharge: HOME | End: 2023-01-03
Attending: INTERNAL MEDICINE
Payer: MEDICARE

## 2023-01-03 DIAGNOSIS — M85.89 OSTEOPENIA OF MULTIPLE SITES: ICD-10-CM

## 2023-01-03 PROCEDURE — 77080 DXA BONE DENSITY AXIAL: CPT

## 2023-01-04 ENCOUNTER — TELEPHONE (OUTPATIENT)
Dept: INTERNAL MEDICINE CLINIC | Age: 83
End: 2023-01-04

## 2023-01-04 ENCOUNTER — PATIENT MESSAGE (OUTPATIENT)
Dept: INTERNAL MEDICINE CLINIC | Age: 83
End: 2023-01-04

## 2023-01-04 DIAGNOSIS — F43.23 SITUATIONAL MIXED ANXIETY AND DEPRESSIVE DISORDER: ICD-10-CM

## 2023-01-04 RX ORDER — BUSPIRONE HYDROCHLORIDE 10 MG/1
10 TABLET ORAL 2 TIMES DAILY
Qty: 60 TABLET | Refills: 1 | Status: SHIPPED | OUTPATIENT
Start: 2023-01-04

## 2023-01-04 NOTE — PROGRESS NOTES
Results reviewed. Osteopenia w/ abnormal FRAX, overall the same from last check except worsening in the forearm. Will differ medication discussion until follow up.

## 2023-01-04 NOTE — TELEPHONE ENCOUNTER
Med has been refilled. I did increase the tabs from 5mg to 10mg, so only needs to take 1 tab BID now.

## 2023-01-04 NOTE — TELEPHONE ENCOUNTER
Patient wanted to update you and make sure that it was ok to continue on the [phone] [facility] and [address] [place] [address]. She still is feeling the same and not sleeping well. Advised that I would get with you and see if there was anything else you suggest before her month follow up on 01/23/23.  Advised patient I would call back with what you decide Please advise :)

## 2023-01-05 ENCOUNTER — PATIENT OUTREACH (OUTPATIENT)
Dept: CASE MANAGEMENT | Age: 83
End: 2023-01-05

## 2023-01-05 NOTE — PROGRESS NOTES
Care Transitions Outreach Attempt    Attempted to reach patient for transitions of care follow up. Unable to reach patient. Left voicemail stating my name, CTN with [address], date and time of call, and return telephone number. Patient:  Marilee Walker Patient : 1940 MRN: [id number]    Last Discharge [address]       Date Complaint Diagnosis Description Type Department Provider    22 Insomnia; Medication Evaluation Insomnia, unspecified type ED (DISCHARGE) Ayanna Escobedo MD                Noted following upcoming appointments from discharge chart review:   [facility] follow up appointment(s):   Future Appointments   Date Time Provider Christie Houser   2023 12:20 PM Leslie Hand MD [facility]

## 2023-01-06 NOTE — PROGRESS NOTES
Ambulatory Care Social Work   Follow Up Note  1/6/2023     Goals Addressed                      This Visit's Progress      Connect with Keith Durham (pt-stated)   On track      01/06/23  Connected with the patient via phone today. She shared that the  is there 5 days a week now, which has relieved some of her stress. As noted yesterday, she visited [facility] on 12/28/2022, but was turned away, and given a list of community providers. Patient reports that she misplaced the list provided to her by [facility]. Offered the names/information of three providers that accept Medicare (Dr. Lauren Soto, Dr. Cinthya Mccurdy, and Cat Vasquez [facility]), two in [place], one in [address] [place] [address]. The patient explained that the distance would not work, as she relies on family to drive her to appointments, and would prefer a provider closer to her home. Aaron Villafuerte again today, to request a copy of the list of providers, given to the patient during her visit on 12/28/22. Spoke with Katarzyna Robertson, clinical supervisor for the Same Day Access Program.  She confirmed that they are unable to see any new patients at this time, unless they have had a recent inpatient hospitalization. She e-mailed a list of [facility] 72. Providers to this . Plan:  Ongoing psychosocial support and resource referral, as desired by the patient and family. This  will follow up with the patient again next week, to check on her status, and assist her in connecting with a psychiatrist.    [facility]     01/05/23  Called to follow up with the patient, but was unable to connect with her today. Left a voicemail message. Per chart review, she visited [facility] Same Day Access on 12/28/2022, but was turned away, and given a list of community providers.   Aaron Villafuerte and spoke with a representative, Radha Carlos, who advised that they are short staffed, and recommended that the patient contact the providers on the list they gave her, rather than make another visit/attempt with their same day access program.  Left a voicemail for [address] [place] [address] Pkwy , Pavan Lawrence, to request a copy of that list, in order to be able to assist the patient in calling agencies. Awaiting a call back. Plan:  Ongoing psychosocial support and resource referral, as desired by the patient and family. This  will attempt to reach the patient tomorrow. [facility]     12/22/22  Connected with the patient via phone today. Introduced self and role, and offered support. Patient described caregiver burden, as her  has had multiple health issues, hospitalizations, surgeries, and a fall over the past year. She reports anxiety and trouble sleeping. Discussed good sleep hygiene habits including going to sleep and waking up at the same times each day, limiting exposure to electric devices prior to sleep, and use of aromatherapy such as lavender. Patient identified securing an appointment with a psychiatrist for medication management as her primary concern today. She saw a psychiatrist in the 1990s for medication management, but that person is no longer in practice. Provided information for [address] [place] [address] Day Access program.  Explained that they do not offer appointments for screenings, but there are walk-in hours ( Monday through Wednesday: 7:30 to 3:00PM, Thursdays: 7:30 to 5:30 PM. Friday: 7:30 to 11:00 AM). Explained that she will need to provide a photo ID, copy of insurance card, and list of medications. Explained that the screening process can take 2-2.5 hours, and she will be asked to complete paperwork about herself, her concerns, and any health challenges. Explained that she will then meet with a clinician to discuss her treatment needs, history, and plan.   [address] with the patient via conference call today, and they explained that they will be closed on Monday, December 26, 2022. Provided the contact information below, via phone today. Also sent this information via [employer] messaging, per her request.    [facility]   [facility], [address] [place] [address], Y#[phone]  [address] [place] [address] Road Phone, Q#[phone]     She shared that her children have hired a personal care aid to assist she and her  with light housework and cooking, for 6 hours a day, starting next week. She is hopeful that this will help reduce her stress and anxiety levels. Plan:  Ongoing psychosocial support and resource referral, as desired by the patient and family. This  will follow up with the patient in two weeks, to check on her status, and progress with resources provided today.     ALAINA Wright/GOVIND, [facility]   B#[phone]

## 2023-01-12 ENCOUNTER — PATIENT OUTREACH (OUTPATIENT)
Dept: CASE MANAGEMENT | Age: 83
End: 2023-01-12

## 2023-01-12 NOTE — PROGRESS NOTES
Care Transitions Outreach Attempt    Attempted to reach patient for transitions of care follow up. Unable to reach patient. Left voicemail stating my name, CTN with [address], date and time of call, and return telephone number. Patient:  Marco Sanders Patient : 1940 MRN: [id number]    Last Discharge 30 [facility]       Date Complaint Diagnosis Description Type Department Provider    22 Insomnia; Medication Evaluation Insomnia, unspecified type ED (DISCHARGE) Leslie Zhao MD            Noted following upcoming appointments from discharge chart review:   [facility] follow up appointment(s):   Future Appointments   Date Time Provider Christie Houser   2023 12:20 PM [facility]     Non-[facility] follow up appointment(s): sundayk

## 2023-01-13 ENCOUNTER — PATIENT OUTREACH (OUTPATIENT)
Dept: CASE MANAGEMENT | Age: 83
End: 2023-01-13

## 2023-01-20 ENCOUNTER — PATIENT OUTREACH (OUTPATIENT)
Dept: CASE MANAGEMENT | Age: 83
End: 2023-01-20

## 2023-01-20 NOTE — PROGRESS NOTES
Ambulatory Care Social Work   Follow Up Note  1/20/2023     Goals Addressed                      This Visit's Progress      Connect with Chasqui Bus (pt-stated)   On track      01/20/23  Connected with the patient via phone. She has not yet heard back from Dr. Albina Zacarias ([facility], [facility], [facility] [address] - [phone]) regarding the waiting list, but hopes to receive a call within the next few weeks. Offered to assist the patient in calling other agencies in town, to see if they can accommodate her sooner, but she prefers to wait to hear back from [employer]. Patient shared that the  is working out well. She shared that she also has family visiting today, so she was unable to speak further. Plan:  Ongoing psychosocial support and resource referral, as desired by the patient and family. This  will follow up with the patient again in two weeks. E[facility]     01/13/23  Reviewed the list of community behavioral health providers, compiled by Agnes Booth, supervisor at [facility]. Called [facility], and spoke with Amelia. She explained that [facility] has two providers in [place] that accept Medicare patients - [facility], in [place], and [facility], in [place]. Both providers can provide virtual services. Called [facility], but learned from their voicemail that they do not see patients over the age of 61. Next, called [facility], and spoke with Radha. She explained that they have a provider, Dr. Cat Em, who is not currently accepting new clients, but offered a waiting list.  Leigh Akers explained that it could take up to a month to get called about an appointment. Called the patient, who declined the virtual psychiatry options through [address] [place] [address].   Patient was agreeable to getting on Dr. Adrianna Vazquez waiting list.  She explained that she prefers a female psychiatrist.  Rebecca Moise [facility] back, via conference call with the patient. She is now on the waiting list with Dr. Lawyer Braden. The patient plans to research the location of [facility] ([address], [address] [facility], [address] [place] [address] Avenue - [phone]) and confirm with family/friends that someone would be able to drive her to that location for a future appointment. Patient thanked this  for her assistance. Plan:  Ongoing psychosocial support and resource referral, as desired by the patient and family. This  will follow up with the patient again next week, to check on her status. [facility]     01/06/23  Connected with the patient via phone today. She shared that the  is there 5 days a week now, which has relieved some of her stress. As noted yesterday, she visited [facility] on 12/28/2022, but was turned away, and given a list of community providers. Patient reports that she misplaced the list provided to her by [facility]. Offered the names/information of three providers that accept Medicare (Dr. Sushil Miller, Dr. Jesus Davis, and Sharon Bailon, [facility]), two in [place], one in [place], [place]. The patient explained that the distance would not work, as she relies on family to drive her to appointments, and would prefer a provider closer to her home. [address] again today, to request a copy of the list of providers, given to the patient during her visit on 12/28/22. Spoke with Milton Tirado, clinical supervisor for the Same Day Access Program.  She confirmed that they are unable to see any new patients at this time, unless they have had a recent inpatient hospitalization. She e-mailed a list of [facility] 72. Providers to this . Plan:  Ongoing psychosocial support and resource referral, as desired by the patient and family.   This  will follow up with the patient again next week, to check on her status, and assist her in connecting with a psychiatrist.    [facility]     01/05/23  Called to follow up with the patient, but was unable to connect with her today. Left a voicemail message. Per chart review, she visited [address] [place] [address] Same Day Access on 12/28/2022, but was turned away, and given a list of community providers. [address] and spoke with a representative, Radha French, who advised that they are short staffed, and recommended that the patient contact the providers on the list they gave her, rather than make another visit/attempt with their same day access program.  Left a voicemail for [address] [place] [address] , Malachi Rubi, to request a copy of that list, in order to be able to assist the patient in calling agencies. Awaiting a call back. Plan:  Ongoing psychosocial support and resource referral, as desired by the patient and family. This  will attempt to reach the patient tomorrow. [facility]     12/22/22  Connected with the patient via phone today. Introduced self and role, and offered support. Patient described caregiver burden, as her  has had multiple health issues, hospitalizations, surgeries, and a fall over the past year. She reports anxiety and trouble sleeping. Discussed good sleep hygiene habits including going to sleep and waking up at the same times each day, limiting exposure to electric devices prior to sleep, and use of aromatherapy such as lavender. Patient identified securing an appointment with a psychiatrist for medication management as her primary concern today. She saw a psychiatrist in the 1990s for medication management, but that person is no longer in practice.   Provided information for [address] [place] [address] Day Access program.  Explained that they do not offer appointments for screenings, but there are walk-in hours ( Monday through Wednesday: 7:30 to 3:00PM, Thursdays: 7:30 to 5:30 PM. Friday: 7:30 to 11:00 AM). Explained that she will need to provide a photo ID, copy of insurance card, and list of medications. Explained that the screening process can take 2-2.5 hours, and she will be asked to complete paperwork about herself, her concerns, and any health challenges. Explained that she will then meet with a clinician to discuss her treatment needs, history, and plan. [address] with the patient via conference call today, and they explained that they will be closed on Monday, December 26, 2022. Provided the contact information below, via phone today. Also sent this information via [employer] messaging, per her request.    [address], #[phone]  [address] [place] [address] Phone, C#[phone]     She shared that her children have hired a personal care aid to assist she and her  with light housework and cooking, for 6 hours a day, starting next week. She is hopeful that this will help reduce her stress and anxiety levels. Plan:  Ongoing psychosocial support and resource referral, as desired by the patient and family. This  will follow up with the patient in two weeks, to check on her status, and progress with resources provided today.     ALAINA Murray/GOVIND, [facility]   E#[phone]

## 2023-01-23 ENCOUNTER — PATIENT OUTREACH (OUTPATIENT)
Dept: CASE MANAGEMENT | Age: 83
End: 2023-01-23

## 2023-01-23 NOTE — PROGRESS NOTES
Care Transitions Outreach Attempt    Attempted to reach patient for transitions of care follow up. Unable to reach patient. Left voicemail stating my name, CTN with [facility], date and time of call, and return telephone number. Patient: Ulysses Cleaves Patient : 1940 MRN: [id number]    Last Discharge [address]       Date Complaint Diagnosis Description Type Department Provider    22 Insomnia; Medication Evaluation Insomnia, unspecified type ED (DISCHARGE) Dalton Hayden MD                Noted following upcoming appointments from discharge chart review:   [facility] follow up appointment(s):   Future Appointments   Date Time Provider Christie Houser   2023 12:40 PM MD KEITH Donahue AMB     Non-[facility] follow up appointment(s): unk. Care Transitions Follow Up Call    Challenges to be reviewed by the provider   Additional needs identified to be addressed with provider: no  none           Method of communication with provider : none    Care Transition Nurse (CTN) contacted the patient by telephone to follow up after admission on 2022. Verified name and  with patient as identifiers. Interventions to address risk factors:  pt reports that she is doing well and is agreeable to someone checking on her. [facility] follow up appointment(s):   Future Appointments   Date Time Provider Christie Houser   2023 12:40 PM MD KEITH Donahue AMB     Non-[facility] follow up appointment(s): unk    CTN provided contact information for future needs. Plan for follow-up call in 10-14 days based on severity of symptoms and risk factors. Plan for next call:  will call after PCP appt       Goals Addressed                   This Visit's Progress     Prevent complications post hospitalization. 2022  Performed medication reconciliation with pt. Unsure about dosing of remeron with ambien.   Pt reports that she is taking on full tab of remeron with hortencia Pittman cut in half for 3 days, cut in half for next 3 days, and cut in half for a total of 9 days to ween off remeron while taking ambien. Pt reports that she has a 7 day supply of ambien and is requesting for Dr. Holley Núñez to advise. Provided pt with telephone numbers for [facility]. Provided emergency number as well as 2 general numbers. Pt reports that she will not harm herself or others. Pt reports she is very anxious and depressed and is not sleeping. Will call pt tomorrow on home telephone number to follow up. Chart sent to PCP for review. Provided pt with my name and telephone number. Nai Genao RN [facility] [place], Care Transitions Nurse, [phone]     12/21/2022  Called pt to follow up on medications and pt reports that she is taking Remeron and Ambien. Pt reports that she got 5.5 hours of sleep and then 4.5 hours of sleep. Offered pt SW support to explore caregiving options and options to assist pt. Pt confirms appt with PCP and reports hearing appt at 330 on 12/23/2022. Pt reports that DIL and niece can drive her to appts. Will call pt on 12.23.2022 to follow up on medications. Nai Genao RN [facility] [place], Care Transitions Nurse, [phone]     Pt is agreeable to SW referral,      12/29/22   Called and spoke to patient and her DIL, patient states she is doing \"terrible\" only slept 3 hours last night. Patient is taking Remeron 15 mg and not Ambien. Started Buspar 5 mg BID and was told if no improvement to increase to 10 mg BID. Will start 10 mg BID today. Patient has a sitter today from 10-6 pm.  Asked if patient sleeps/naps during the day and they said no, her thoughts race too much. When I asked patient about C/P, SOB or palpitation she \"didn't know\". Patient states she is very anxious and has poor memory, DIL also states poor focus and over whelmed with care of spouse. Family also concerned that she might be starting dementia.   Patient and family did go to [address] [place] [address] yesterday and was told that there was not openings for an appt. Patient was given a list of providers to call and had to LM. Patient does not want to call everybody on the list and LM at the same time, LM with first person on list.    Monitor patient sleep, patient anxiety, any C/P, SOB, palpitations, how Buspar is helping, get appt with counselor? Kyra PIERCE, RN, [facility] / Care Transition Nurse / [phone]     1/5/2023  Left voicemail stating my name, CTN with [employer], date and time of call, and return telephone number. Leopoldo Curiel RN [facility] [facility], Care Transitions Nurse, [phone]     1/12/2023  Left voicemail stating my name, CTN with [employer], date and time of call, and return telephone number. Leopoldo Curiel RN [address] [facility], Care Transitions Nurse, [phone]     1/23/2023  Left voicemail stating my name, CTN with [employer], date and time of call, and return telephone number.  Leopoldo Curiel RN [address] [facility], Care Transitions Nurse, [phone]

## 2023-01-30 ENCOUNTER — TRANSCRIBE ORDER (OUTPATIENT)
Dept: SCHEDULING | Age: 83
End: 2023-01-30

## 2023-01-30 DIAGNOSIS — R41.3 MEMORY CHANGE: Primary | ICD-10-CM

## 2023-02-03 ENCOUNTER — PATIENT OUTREACH (OUTPATIENT)
Dept: CASE MANAGEMENT | Age: 83
End: 2023-02-03

## 2023-02-03 NOTE — PROGRESS NOTES
Ambulatory Care Social Work   Follow Up Note  2/3/2023     Goals Addressed                      This Visit's Progress      Connect with Keith Durham (pt-stated)   On track      02/03/23  Connected with the patient via phone. Explained that this  called Dr. Marcos Scales office at [facility] today, and spoke with Oliver Moore. Oliver Moore shared that the patient is the next one on their waiting list for an appointment, and should hear back within the next few weeks regarding an appointment. Patient shared that her niece made an [facility] with [facility]. Explained that an LCSW is able to provide counseling, but cannot prescribe or manage medications. Offered that she can see both providers for services, but the patient is concerned about possible copays for these visits. Patient asked if this  could speak with her niece regarding these matters. Advised that the patient would need to give permission for this  to speak with her niece, but once permission is provided,  yes. Plan:  Ongoing psychosocial support and resource referral, as desired by the patient and family. This  will follow up with the patient again in two weeks. EPC     01/20/23  Connected with the patient via phone. She has not yet heard back from Dr. Lance Pritchett ([facility], [address] [facility], [address] [place] [address] - [phone]) regarding the waiting list, but hopes to receive a call within the next few weeks. Offered to assist the patient in calling other agencies in town, to see if they can accommodate her sooner, but she prefers to wait to hear back from [facility]. Patient shared that the  is working out well. She shared that she also has family visiting today, so she was unable to speak further. Plan:  Ongoing psychosocial support and resource referral, as desired by the patient and family.   This  will follow up with the patient again in two weeks. EPC     01/13/23  Reviewed the list of community behavioral health providers, compiled by Mark Chicas, supervisor at [facility]. Called [facility], and spoke with Amelia. She explained that [facility] has two providers in [place] that accept Medicare patients - [facility], in [place], and [facility], in [place]. Both providers can provide virtual services. Called [facility], but learned from their voicemail that they do not see patients over the age of 61. Next, called [facility], and spoke with Radha. She explained that they have a provider, Dr. Mj Ronquillo, who is not currently accepting new clients, but offered a waiting list.  Earline Dorsey explained that it could take up to a month to get called about an appointment. Called the patient, who declined the virtual psychiatry options through [address] [place] [address]. Patient was agreeable to getting on Dr. Josey Pedroza waiting list.  She explained that she prefers a female psychiatrist.  Tuyet chapa, via conference call with the patient. She is now on the waiting list with Dr. Mj Ronquillo. The patient plans to research the location of [facility] ([facility], [address] - [phone]) and confirm with family/friends that someone would be able to drive her to that location for a future appointment. Patient thanked this  for her assistance. Plan:  Ongoing psychosocial support and resource referral, as desired by the patient and family. This  will follow up with the patient again next week, to check on her status. EPC     01/06/23  Connected with the patient via phone today. She shared that the  is there 5 days a week now, which has relieved some of her stress.   As noted yesterday, she visited [facility] on 12/28/2022, but was turned away, and given a list of community providers. Patient reports that she misplaced the list provided to her by [facility]. Offered the names/information of three providers that accept Medicare (Dr. Pedro Adrian, Dr. Vinnie Lyles, and Dary Gifford CNP), two in [place], one in [place], [place]. The patient explained that the distance would not work, as she relies on family to drive her to appointments, and would prefer a provider closer to her home. Aaron Villafuerte again today, to request a copy of the list of providers, given to the patient during her visit on 12/28/22. Spoke with Kathy Rubin, clinical supervisor for the Same Day Access Program.  She confirmed that they are unable to see any new patients at this time, unless they have had a recent inpatient hospitalization. She e-mailed a list of [facility]. Providers to this . Plan:  Ongoing psychosocial support and resource referral, as desired by the patient and family. This  will follow up with the patient again next week, to check on her status, and assist her in connecting with a psychiatrist.    [facility]     01/05/23  Called to follow up with the patient, but was unable to connect with her today. Left a voicemail message. Per chart review, she visited [facility] Same Day Access on 12/28/2022, but was turned away, and given a list of community providers. Aaron Villafuerte and spoke with a representative, Jojo Quintana, who advised that they are short staffed, and recommended that the patient contact the providers on the list they gave her, rather than make another visit/attempt with their same day access program.  Left a voicemail for [facility] , Alannah Lima, to request a copy of that list, in order to be able to assist the patient in calling agencies. Awaiting a call back.       Plan:  Ongoing psychosocial support and resource referral, as desired by the patient and family. This  will attempt to reach the patient tomorrow. EPC     12/22/22  Connected with the patient via phone today. Introduced self and role, and offered support. Patient described caregiver burden, as her  has had multiple health issues, hospitalizations, surgeries, and a fall over the past year. She reports anxiety and trouble sleeping. Discussed good sleep hygiene habits including going to sleep and waking up at the same times each day, limiting exposure to electric devices prior to sleep, and use of aromatherapy such as lavender. Patient identified securing an appointment with a psychiatrist for medication management as her primary concern today. She saw a psychiatrist in the 1990s for medication management, but that person is no longer in practice. Provided information for [address] [place], [place] [address].  Explained that they do not offer appointments for screenings, but there are walk-in hours ( Monday through Wednesday: 7:30 to 3:00PM, Thursdays: 7:30 to 5:30 PM. Friday: 7:30 to 11:00 AM). Explained that she will need to provide a photo ID, copy of insurance card, and list of medications. Explained that the screening process can take 2-2.5 hours, and she will be asked to complete paperwork about herself, her concerns, and any health challenges. Explained that she will then meet with a clinician to discuss her treatment needs, history, and plan. Aaron 65 with the patient via conference call today, and they explained that they will be closed on Monday, December 26, 2022. Provided the contact information below, via phone today.   Also sent this information via [employer] messaging, per her request.    [address], [address] [place] [address], S#[phone]  [address] [place] [address] Road Phone, C#[phone]     She shared that her children have hired a personal care aid to assist she and her  with light housework and cooking, for 6 hours a day, starting next week. She is hopeful that this will help reduce her stress and anxiety levels. Plan:  Ongoing psychosocial support and resource referral, as desired by the patient and family. This  will follow up with the patient in two weeks, to check on her status, and progress with resources provided today.     JOSE Tidwell, ALAINA/GOVIND, [facility]   R#[phone]

## 2023-02-13 ENCOUNTER — HOSPITAL ENCOUNTER (OUTPATIENT)
Dept: MRI IMAGING | Age: 83
Discharge: HOME | End: 2023-02-13
Attending: INTERNAL MEDICINE
Payer: MEDICARE

## 2023-02-13 DIAGNOSIS — R41.3 MEMORY CHANGE: ICD-10-CM

## 2023-02-13 PROCEDURE — 70551 MRI BRAIN STEM W/O DYE: CPT

## 2023-02-17 ENCOUNTER — PATIENT OUTREACH (OUTPATIENT)
Dept: CASE MANAGEMENT | Age: 83
End: 2023-02-17

## 2023-02-17 NOTE — PROGRESS NOTES
Ambulatory Care Social Work   Follow Up Note  2/17/2023     Goals Addressed                      This Visit's Progress      Connect with Keith Durham (pt-stated)   On track      02/17/23  Unable to connect with the patient via phone. Left a voicemail message to check on her status; awaiting a call back. Later received a call from her niece, Dania Escobar (X#[phone]). Conference called with the patient, who confirmed that she wants this  to speak with her niece. Dania Escobar explained that she schedules all of the appointments for her aunt. She scheduled an [facility]. She explained that she has been unable to find a psychiatrist for her aunt. Explained that the patient and this  already found a potential option - Dr. Jamari Rubi, Psychiatrist with [facility] ([address], [address], [address] [phone]). Explained that she has been on the waiting list with that provider. The patient heard from Samantha at that practice this week, but had not yet called her back. Dania Escobar called Samantha at [facility] and left a voicemail in order to request an appointment/check on the status of the waiting list.    Plan:  Ongoing psychosocial support and resource referral, as desired by the patient and family. This  will follow up with the patient next week. EPC     02/03/23  Connected with the patient via phone. Explained that this  called Dr. Idris Llamas office at [facility] today, and spoke with Samantha. Samantha shared that the patient is the next one on their waiting list for an appointment, and should hear back within the next few weeks regarding an appointment. Patient shared that her niece made an appointment with [facility], [facility]. Explained that an LCSW is able to provide counseling, but cannot prescribe or manage medications.   Offered that she can see both providers for services, but the patient is concerned about possible copays for these visits. Patient asked if this  could speak with her niece regarding these matters. Advised that the patient would need to give permission for this  to speak with her niece, but once permission is provided,  yes. Plan:  Ongoing psychosocial support and resource referral, as desired by the patient and family. This  will follow up with the patient again in two weeks. EPC     01/20/23  Connected with the patient via phone. She has not yet heard back from Dr. Isela Bernard ([facility] 1394, [address] [facility], [address] - [phone]) regarding the waiting list, but hopes to receive a call within the next few weeks. Offered to assist the patient in calling other agencies in town, to see if they can accommodate her sooner, but she prefers to wait to hear back from [employer]. Patient shared that the  is working out well. She shared that she also has family visiting today, so she was unable to speak further. Plan:  Ongoing psychosocial support and resource referral, as desired by the patient and family. This  will follow up with the patient again in two weeks. EPC     01/13/23  Reviewed the list of community behavioral health providers, compiled by Ganesh Stephenson, supervisor at [facility]. Called [facility], and spoke with Amelia. She explained that [facility] has two providers in [place] that accept Medicare patients - [facility], in [place], and [facility], in [place]. Both providers can provide virtual services. Called [facility], but learned from their voicemail that they do not see patients over the age of 61. Next, called [facility], and spoke with Radha.   She explained that they have a provider, Dr. Zak Mcmillan, who is not currently accepting new clients, but offered a waiting list.  Tarun Olson explained that it could take up to a month to get called about an appointment. Called the patient, who declined the virtual psychiatry options through [facility]. Patient was agreeable to getting on Dr. Astrid Norwood waiting list.  She explained that she prefers a female psychiatrist.  Peter Moreno back, via conference call with the patient. She is now on the waiting list with Dr. Obey Pedersen. The patient plans to research the location of [facility] ([facility] [address], [address] [facility] [place], [address] - [phone]) and confirm with family/friends that someone would be able to drive her to that location for a future appointment. Patient thanked this  for her assistance. Plan:  Ongoing psychosocial support and resource referral, as desired by the patient and family. This  will follow up with the patient again next week, to check on her status. EPC     01/06/23  Connected with the patient via phone today. She shared that the  is there 5 days a week now, which has relieved some of her stress. As noted yesterday, she visited [facility] on 12/28/2022, but was turned away, and given a list of community providers. Patient reports that she misplaced the list provided to her by [facility]. Offered the names/information of three providers that accept Medicare (Dr. Kevan Dominguez, Dr. Michelle Rivera, and Mayra Crawley CNP), two in [place], one in [address]. The patient explained that the distance would not work, as she relies on family to drive her to appointments, and would prefer a provider closer to her home. Aaron 65 again today, to request a copy of the list of providers, given to the patient during her visit on 12/28/22.   Spoke with Marianela Brothers, clinical supervisor for the Same Day Access Program.  She confirmed that they are unable to see any new patients at this time, unless they have had a recent inpatient hospitalization. She e-mailed a list of [facility] 72. Providers to this . Plan:  Ongoing psychosocial support and resource referral, as desired by the patient and family. This  will follow up with the patient again next week, to check on her status, and assist her in connecting with a psychiatrist.    [facility] in [place]     01/05/23  Called to follow up with the patient, but was unable to connect with her today. Left a voicemail message. Per chart review, she visited [address] [place] [address] Same Day Access on 12/28/2022, but was turned away, and given a list of community providers. [address] and spoke with a representative, Ben Dykes, who advised that they are short staffed, and recommended that the patient contact the providers on the list they gave her, rather than make another visit/attempt with their same day access program.  Left a voicemail for [address] [place] [address] , Yulissa Sun, to request a copy of that list, in order to be able to assist the patient in calling agencies. Awaiting a call back. Plan:  Ongoing psychosocial support and resource referral, as desired by the patient and family. This  will attempt to reach the patient tomorrow. [facility]     12/22/22  Connected with the patient via phone today. Introduced self and role, and offered support. Patient described caregiver burden, as her  has had multiple health issues, hospitalizations, surgeries, and a fall over the past year. She reports anxiety and trouble sleeping. Discussed good sleep hygiene habits including going to sleep and waking up at the same times each day, limiting exposure to electric devices prior to sleep, and use of aromatherapy such as lavender.      Patient identified securing an appointment with a psychiatrist for medication management as her primary concern today. She saw a psychiatrist in the 1990s for medication management, but that person is no longer in practice. Provided information for [address] [place], [place] [address].  Explained that they do not offer appointments for screenings, but there are walk-in hours ( Monday through Wednesday: 7:30 to 3:00PM, Thursdays: 7:30 to 5:30 PM. Friday: 7:30 to 11:00 AM). Explained that she will need to provide a photo ID, copy of insurance card, and list of medications. Explained that the screening process can take 2-2.5 hours, and she will be asked to complete paperwork about herself, her concerns, and any health challenges. Explained that she will then meet with a clinician to discuss her treatment needs, history, and plan. Aaron 65 with the patient via conference call today, and they explained that they will be closed on Monday, December 26, 2022. Provided the contact information below, via phone today. Also sent this information via [employer] messaging, per her request.    [address], [address] [place] [address], #[phone]  [address] [place] [address] Phone, Y#[phone]     She shared that her children have hired a personal care aid to assist she and her  with light housework and cooking, for 6 hours a day, starting next week. She is hopeful that this will help reduce her stress and anxiety levels. Plan:  Ongoing psychosocial support and resource referral, as desired by the patient and family. This  will follow up with the patient in two weeks, to check on her status, and progress with resources provided today.     ALAINA Marquez/GOVIND, [facility]   F#[phone]

## 2023-02-20 ENCOUNTER — PATIENT OUTREACH (OUTPATIENT)
Dept: CASE MANAGEMENT | Age: 83
End: 2023-02-20

## 2023-02-20 ENCOUNTER — TELEPHONE (OUTPATIENT)
Dept: NEUROLOGY | Age: 83
End: 2023-02-20

## 2023-02-20 NOTE — PROGRESS NOTES
Ambulatory Care Social Work   Follow Up Note  2/20/2023     Goals Addressed                      This Visit's Progress      Connect with Keith Durham (pt-stated)   On track      02/20/23  Patient's niece, Eber Dsouza called today. Alis Scot explained that she has been unable to reach anyone at [facility].  She explained that her aunt's confusion and short term memory has consistently worsened since December. The patient has a neuro psychology appointment scheduled with Dr. Telma Keen III with [facility] in April 2023. Alis Ellison asked if this  was aware of any providers that could see her prior to April. This  called Dr. Roman Tubbs's office, Neuropsychologist, and left a voicemail to ask about the first available appointment; awaiting a call back. This  also followed up with [facility]. Spoke with an , Kendra Zarate, who explained that the former  gave incorrect information. She explained that Dulce De Los Santos is actually an LCSW, and unable to provide medication management. This  called the following providers/practices, listed on the Medicare. gov website:  -Dr. Keisha Umana - Does not accept Medicare. Can charge the private pay rate, but is not accepting patients at this time.   -Dr. Boaz Montez - Can accept Medicare, but only provide Transcranial Therapy, not medication management  -Dr. Justice Hernández - Does accept Medicare, but does not see patients over the age of 61.    -Dr. Tatyana Caballero accepting new patients right now, but two Behavioral Health Nurse Practitioners accept Medicare, accept patients over the age of 72, and are able to see patients in person. Left a message with the scheduling department to determine availability; awaiting a call back. Updated the patient's niece, Eber Dsouza.   Alis Ellison shared that she may cancel the appointment with [facility], scheduled for later this week, as her aunt has been unable to participate in meaningful conversation, due to challenges with short term memory. Explained that this  would call back when any updates are available. Plan:  Ongoing psychosocial support and resource referral, as desired by the patient and family. This  will follow up with the patient next week. [facility]     02/17/23  Unable to connect with the patient via phone. Left a voicemail message to check on her status; awaiting a call back. Later received a call from her niece, Radha Oleary (L#[phone]). Conference called with the patient, who confirmed that she wants this  to speak with her niece. Radha Oleary explained that she schedules all of the appointments for her aunt. She scheduled an appointment with [facility], for counseling. She explained that she has been unable to find a psychiatrist for her aunt. Explained that the patient and this  already found a potential option - Dr. Rachna Lozano, Psychiatrist with [facility] ([address], [address] [facility], [facility], [address] [phone]). Explained that she has been on the waiting list with that provider. The patient heard from Samantha at that practice this week, but had not yet called her back. Radha Oleary called Samantha at [facility] and left a voicemail in order to request an appointment/check on the status of the waiting list.    Plan:  Ongoing psychosocial support and resource referral, as desired by the patient and family. This  will follow up with the patient next week. EPC     02/03/23  Connected with the patient via phone. Explained that this  called Dr. Francesco Landau office at [facility] today, and spoke with Samantha.   Samantha shared that the patient is the next one on their waiting list for an appointment, and should hear back within the next few weeks regarding an appointment. Patient shared that her niece made an appointment with [facility], LCSW. Explained that an LCSW is able to provide counseling, but cannot prescribe or manage medications. Offered that she can see both providers for services, but the patient is concerned about possible copays for these visits. Patient asked if this  could speak with her niece regarding these matters. Advised that the patient would need to give permission for this  to speak with her niece, but once permission is provided,  yes. Plan:  Ongoing psychosocial support and resource referral, as desired by the patient and family. This  will follow up with the patient again in two weeks. EPC     01/20/23  Connected with the patient via phone. She has not yet heard back from Dr. Cami Simons ([facility], [address] - [phone]) regarding the waiting list, but hopes to receive a call within the next few weeks. Offered to assist the patient in calling other agencies in [facility], to see if they can accommodate her sooner, but she prefers to wait to hear back from [employer]. Patient shared that the  is working out well. She shared that she also has family visiting today, so she was unable to speak further. Plan:  Ongoing psychosocial support and resource referral, as desired by the patient and family. This  will follow up with the patient again in two weeks. EPC     01/13/23  Reviewed the list of community behavioral health providers, compiled by Lieutenant Salazar, supervisor at [facility]. Called [facility], and spoke with Amelia. She explained that [facility] has two providers in [place] that accept Medicare patients - [facility], in [place], and [place], in [place].   Both providers can provide virtual services. Called [facility], but learned from their voicemail that they do not see patients over the age of 61. Next, called [facility], and spoke with Radha. She explained that they have a provider, Dr. Bora Hollis, who is not currently accepting new clients, but offered a waiting list.  Gali Antony explained that it could take up to a month to get called about an appointment. Called the patient, who declined the virtual psychiatry options through [address] [place] [address]. Patient was agreeable to getting on Dr. David Diane waiting list.  She explained that she prefers a female psychiatrist.  Ritika Parsons back, via conference call with the patient. She is now on the waiting list with Dr. Bora Hollis. The patient plans to research the location of [facility] ([place] [id number], [address] [facility], [address] [place] [address] Avenue - [phone]) and confirm with family/friends that someone would be able to drive her to that location for a future appointment. Patient thanked this  for her assistance. Plan:  Ongoing psychosocial support and resource referral, as desired by the patient and family. This  will follow up with the patient again next week, to check on her status. [facility]     01/06/23  Connected with the patient via phone today. She shared that the  is there 5 days a week now, which has relieved some of her stress. As noted yesterday, she visited [facility] on 12/28/2022, but was turned away, and given a list of community providers. Patient reports that she misplaced the list provided to her by [facility]. Offered the names/information of three providers that accept Medicare (Dr. Malick Tripathi, Dr. Dena Walker, and Madyson Galvin CNP), two in [place], one in [place], [place].   The patient explained that the distance would not work, as she relies on family to drive her to appointments, and would prefer a provider closer to her home. Aaron Villafuerte again today, to request a copy of the list of providers, given to the patient during her visit on 12/28/22. Spoke with Araceli Nguyen, clinical supervisor for the Same Day Access Program.  She confirmed that they are unable to see any new patients at this time, unless they have had a recent inpatient hospitalization. She e-mailed a list of [facility]. Providers to this . Plan:  Ongoing psychosocial support and resource referral, as desired by the patient and family. This  will follow up with the patient again next week, to check on her status, and assist her in connecting with a psychiatrist.    [facility]     01/05/23  Called to follow up with the patient, but was unable to connect with her today. Left a voicemail message. Per chart review, she visited [address] [place] [address] Same Day Access on 12/28/2022, but was turned away, and given a list of community providers. Aaron Villafuerte and spoke with a representative, Sara Holguin, who advised that they are short staffed, and recommended that the patient contact the providers on the list they gave her, rather than make another visit/attempt with their same day access program.  Left a voicemail for [address] [place] [address] Pkwy , Tiarra Cain, to request a copy of that list, in order to be able to assist the patient in calling agencies. Awaiting a call back. Plan:  Ongoing psychosocial support and resource referral, as desired by the patient and family. This  will attempt to reach the patient tomorrow. [facility]     12/22/22  Connected with the patient via phone today. Introduced self and role, and offered support. Patient described caregiver burden, as her  has had multiple health issues, hospitalizations, surgeries, and a fall over the past year. She reports anxiety and trouble sleeping.   Discussed good sleep hygiene habits including going to sleep and waking up at the same times each day, limiting exposure to electric devices prior to sleep, and use of aromatherapy such as lavender. Patient identified securing an appointment with a psychiatrist for medication management as her primary concern today. She saw a psychiatrist in the 1990s for medication management, but that person is no longer in practice. Provided information for [address] [place] [address] [facility].  Explained that they do not offer appointments for screenings, but there are walk-in hours ( Monday through Wednesday: 7:30 to 3:00PM, Thursdays: 7:30 to 5:30 PM. Friday: 7:30 to 11:00 AM). Explained that she will need to provide a photo ID, copy of insurance card, and list of medications. Explained that the screening process can take 2-2.5 hours, and she will be asked to complete paperwork about herself, her concerns, and any health challenges. Explained that she will then meet with a clinician to discuss her treatment needs, history, and plan. Aaron 67 with the patient via conference call today, and they explained that they will be closed on Monday, December 26, 2022. Provided the contact information below, via phone today. Also sent this information via [employer] messaging, per her request.    [address], [address] [place] [address], #[phone]  [address] [place] [address] Road Phone, T#[phone]     She shared that her children have hired a personal care aid to assist she and her  with light housework and cooking, for 6 hours a day, starting next week. She is hopeful that this will help reduce her stress and anxiety levels. Plan:  Ongoing psychosocial support and resource referral, as desired by the patient and family. This  will follow up with the patient in two weeks, to check on her status, and progress with resources provided today.     [facility] Fanta Jenkins, MSW/GOVIND, [facility]   E#[phone]

## 2023-02-23 ENCOUNTER — PATIENT OUTREACH (OUTPATIENT)
Dept: CASE MANAGEMENT | Age: 83
End: 2023-02-23

## 2023-02-23 NOTE — PROGRESS NOTES
Ambulatory Care Social Work   Follow Up Note  2/23/2023     Goals Addressed                      This Visit's Progress      Connect with Keith Durham (pt-stated)   On track      02/23/23  Connected with the patient's niece, Damaso Arteaga, via phone today. Explained that per chart review, Ro Milligan at Dr. Charly Hemphill office called the patient to notify her that there are no appointments available prior to April 2023. Advised that they keep their current April appointment with Dr. Hodan Rice, neuro psychologist.      As previously noted, this  spoke with representative at [facility], who indicated that there are two [facility] Nurse Practitioners who accept Medicare, accept patients over the age of 72, and are able to see patients in person. Explained that this  has not yet heard back from the [facility] intake line. Explained that the voicemail indicates that it can take 72 hours to get a call back, and requests that callers only leave one VM. Advised that there is an adult intake form available online, if they prefer to fill that out, in hopes of a call back <[email or web address]    Advised that the patient's niece call the following options -   -[facility] - Kailey Robison and Juanita Gallardo, Psychiatrists, O#[phone], [facility], Dr. Nivia Santana, Psychiatrist, G#[phone]    Also mentioned that she can call the [employer] Helpline, P#[phone], to request a list of providers that accept Medicare patients over the age of 72. Plan:  Ongoing psychosocial support and resource referral, as desired by the patient and family. This  will follow up with the patient and family within the next two weeks. EPC     02/20/23  Patient's niece, Damaso Arteaga called today.   Barb Ashleigh explained that she has been unable to reach anyone at [facility]  She explained that her aunt's confusion and short term memory has consistently worsened since December. The patient has a neuro psychology appointment scheduled with Dr. Leila Ko III [facility] in April [facility]. Charlene Recinos asked if this  was aware of any providers that could see her prior to April. This  called Dr. eD Tubbs's office, Neuropsychologist, and left a voicemail to ask about the first available appointment; awaiting a call back. This  also followed up with [facility]. Spoke with an , Sadia Rondon, who explained that the former  gave incorrect information. She explained that Shelly Zavaleta is actually an LCSW, and unable to provide medication management. This  called the following providers/practices, listed on the Medicare. gov website:  -Dr. Farrah Amador - Does not accept Medicare. Can charge the private pay rate, but is not accepting patients at this time.   -Dr. Krystina Mcguire - Can accept Medicare, but only provide Transcranial Therapy, not medication management  -Dr. Luis Hanson - Does accept Medicare, but does not see patients over the age of 61.    -Dr. Lionel Pino accepting new patients right now, but two Behavioral Health Nurse Practitioners accept Medicare, accept patients over the age of 72, and are able to see patients in person. Left a message with the scheduling department to determine availability; awaiting a call back. Updated the patient's niece, Kimberly Guzman. Charlene Recinos shared that she may [facility] this [facility], as her aunt has been unable to participate in meaningful conversation, due to challenges with short term memory. Explained that this  would call back when any updates are available.     Plan: Ongoing psychosocial support and resource referral, as desired by the patient and family. This  will follow up with the patient next week. EPC     02/17/23  Unable to connect with the patient via phone. Left a voicemail message to check on her status; awaiting a call back. Later received a call from her niece, Madelaine Stephenson (A#[phone]). Conference called with the patient, who confirmed that she wants this  to speak with her niece. Madelaine Stephenson explained that she schedules all of the appointments for her aunt. She scheduled an appointment with [facility], \[facility], for counseling. She explained that she has been unable to find a psychiatrist for her aunt. Explained that the patient and this  already found a potential option - Dr. Eric Ponce, Psychiatrist with [facility] ([address], [address], [address] [phone]). Explained that she has been on the waiting list with that provider. The patient heard from Samantha at that practice this week, but had not yet called her back. Madelaine Stephenson called Samantha at [facility] and left a voicemail in order to request an appointment/check on the status of the waiting list.    Plan:  Ongoing psychosocial support and resource referral, as desired by the patient and family. This  will follow up with the patient next week. EPC     02/03/23  Connected with the patient via phone. Explained that this  called Dr. Harmeet Peoples office at [facility] today, and spoke with Samantha. Samantha shared that the patient is the next one on their waiting list for an appointment, and should hear back within the next few weeks regarding an appointment. Patient shared that her niece made an appointment with [facility]. Explained that an \[facility]""W is able to provide counseling, but cannot prescribe or manage medications.   Offered that she can see both providers for services, but the patient is concerned about possible copays for these visits. Patient asked if this  could speak with her niece regarding these matters. Advised that the patient would need to give permission for this  to speak with her niece, but once permission is provided,  yes. Plan:  Ongoing psychosocial support and resource referral, as desired by the patient and family. This  will follow up with the patient again in two weeks. EPC     01/20/23  Connected with the patient via phone. She has not yet heard back from Dr. Katrina Zepeda ([facility], [address] [facility], [facility], [address] - [phone]) regarding the waiting list, but hopes to receive a call within the next few weeks. Offered to assist the patient in calling other agencies in town, to see if they can accommodate her sooner, but she prefers to wait to hear back from [facility]. Patient shared that the  is working out well. She shared that she also has family visiting today, so she was unable to speak further. Plan:  Ongoing psychosocial support and resource referral, as desired by the patient and family. This  will follow up with the patient again in two weeks. EPC     01/13/23  Reviewed the list of community behavioral health providers, compiled by Mj Vines, supervisor at [facility]. Called [facility], and spoke with Amelia. She explained that [facility] has two providers in [place] that accept Medicare patients - [facility], in [place], and [facility], in [place]. Both providers can provide virtual services. Called [facility], but learned from their voicemail that they do not see patients over the age of 61. Next, called [facility], and spoke with Radha.   She explained that they have a provider, Dr. Estefania Cormier, who is not currently accepting new clients, but offered a waiting list.  Karlo Basilio explained that it could take up to a month to get called about an appointment. Called the patient, who declined the virtual psychiatry options through [facility]. Patient was agreeable to getting on Dr. Aly Gonzalez waiting list.  She explained that she prefers a female psychiatrist.  Brenda Side back, via conference call with the patient. She is now on the waiting list with Dr. Yuri Jha. The patient plans to research the location of [facility] ([facility] [address], [address] [facility], [address] - [phone]) and confirm with family/friends that someone would be able to drive her to that location for a future appointment. Patient thanked this  for her assistance. Plan:  Ongoing psychosocial support and resource referral, as desired by the patient and family. This  will follow up with the patient again next week, to check on her status. EPC     01/06/23  Connected with the patient via phone today. She shared that the  is there 5 days a week now, which has relieved some of her stress. As noted yesterday, she visited [facility] on 12/28/2022, but was turned away, and given a list of community providers. Patient reports that she misplaced the list provided to her by [address] [place] [address]. Offered the names/information of three providers that accept Medicare (Dr. Michael [place], Dr. Starr Mendoza, and Evelyne Silverman CNP), two in [place], one in [place], [place]. The patient explained that the distance would not work, as she relies on family to drive her to appointments, and would prefer a provider closer to her home. [address] again today, to request a copy of the list of providers, given to the patient during her visit on 12/28/22.   Spoke with Daniella Romero, clinical supervisor for the Same Day Access Program.  She confirmed that they are unable to see any new patients at this time, unless they have had a recent inpatient hospitalization. She e-mailed a list of [address] Providers to this . Plan:  Ongoing psychosocial support and resource referral, as desired by the patient and family. This  will follow up with the patient again next week, to check on her status, and assist her in connecting with a psychiatrist.    [facility]     01/05/23  Called to follow up with the patient, but was unable to connect with her today. Left a voicemail message. Per chart review, she visited [facility] Same Day Access on 12/28/2022, but was turned away, and given a list of community providers. [address] and spoke with a representative, Seth Greene, who advised that they are short staffed, and recommended that the patient contact the providers on the list they gave her, rather than make another visit/attempt with their same day access program.  Left a voicemail for [facility] , Keagan Valero, to request a copy of that list, in order to be able to assist the patient in calling agencies. Awaiting a call back. Plan:  Ongoing psychosocial support and resource referral, as desired by the patient and family. This  will attempt to reach the patient tomorrow. [facility]     12/22/22  Connected with the patient via phone today. Introduced self and role, and offered support. Patient described caregiver burden, as her  has had multiple health issues, hospitalizations, surgeries, and a fall over the past year. She reports anxiety and trouble sleeping. Discussed good sleep hygiene habits including going to sleep and waking up at the same times each day, limiting exposure to electric devices prior to sleep, and use of aromatherapy such as lavender. Patient identified securing an appointment with a psychiatrist for medication management as her primary concern today.   She saw a psychiatrist in the 1990s for medication management, but that person is no longer in practice. Provided information for [facility] program.  Explained that they do not offer appointments for screenings, but there are walk-in hours ( Monday through Wednesday: 7:30 to 3:00PM, Thursdays: 7:30 to 5:30 PM. Friday: 7:30 to 11:00 AM). Explained that she will need to provide a photo ID, copy of insurance card, and list of medications. Explained that the screening process can take 2-2.5 hours, and she will be asked to complete paperwork about herself, her concerns, and any health challenges. Explained that she will then meet with a clinician to discuss her treatment needs, history, and plan. [address] with the patient via conference call today, and they explained that they will be closed on Monday, December 26, 2022. Provided the contact information below, via phone today. Also sent this information via [employer] messaging, per her request.    [address], [address] [place] [address], X#[phone]  [address] [place] [address] Road Phone, Y#[phone]     She shared that her children have hired a personal care aid to assist she and her  with light housework and cooking, for 6 hours a day, starting next week. She is hopeful that this will help reduce her stress and anxiety levels. Plan:  Ongoing psychosocial support and resource referral, as desired by the patient and family. This  will follow up with the patient in two weeks, to check on her status, and progress with resources provided today.     EPC               ALAINA Hernández/GOVIND, [facility]   V#[phone]

## 2023-02-24 ENCOUNTER — PATIENT OUTREACH (OUTPATIENT)
Dept: CASE MANAGEMENT | Age: 83
End: 2023-02-24

## 2023-02-24 NOTE — PROGRESS NOTES
Ambulatory Care Social Work   Follow Up Note  2/24/2023     Goals Addressed                      This Visit's Progress      Connect with Keith Durham (pt-stated)   On track      02/24/23  Patient's niece, Kimberly Guzman, called to report that she secured an appointment with Jasmeet Bagley NP, March 1, 2023 with [facility]. Plan:  Ongoing psychosocial support and resource referral, as desired by the patient and family. This  will follow up with the patient and family within the next two weeks. EPC     02/23/23  Connected with the patient's niece, Kimberly Guzman, via phone today. Explained that per chart review, Hipolito Dwyer at Dr. Vi Phillips office called the patient to notify her that there are no appointments available prior to April 2023. Advised that they keep their current April appointment with Dr. Sade Begum, neuro psychologist.      As previously noted, this  spoke with representative at [facility], who indicated that there are two [facility] Nurse Practitioners who accept Medicare, accept patients over the age of 72, and are able to see patients in person. Explained that this  has not yet heard back from the [facility] intake line. Explained that the voicemail indicates that it can take 72 hours to get a call back, and requests that callers only leave one VM. Advised that there is an adult intake form available online, if they prefer to fill that out, in hopes of a call back <[email or web address]-[email or web address]-[email or web address]    Advised that the patient's niece call the following options -   -[facility] - Kailey Robison and Munira Gentile, Psychiatrists, U#[phone], [facility], Dr. Tiffany Nuno, Psychiatrist, N#[phone]    Also mentioned that she can call the [employer] Helpline, P#[phone], to request a list of providers that accept Medicare patients over the age of 72. Plan:  Ongoing psychosocial support and resource referral, as desired by the patient and family. This  will follow up with the patient and family within the next two weeks. EPC     02/20/23  Patient's niece, Jennifer Rodriguez called today. Leeroy Calhoun explained that she has been unable to reach anyone at [facility].  She explained that her aunt's confusion and short term memory has consistently worsened since December. The patient has a neuro psychology appointment scheduled with Dr. Aneta Wilburn III [facility] in April [facility]23. Haileypapa Quezadao asked if this  was aware of any providers that could see her prior to April. This  called Dr. Halina Ormond. Olsen's office, Neuropsychologist, and left a voicemail to ask about the first available appointment; awaiting a call back. This  also followed up with [facility]. Spoke with an , Shaq Hernández, who explained that the former  gave incorrect information. She explained that Jimenez Vaughan is actually an LCSW, and unable to provide medication management. This  called the following providers/practices, listed on the Medicare. gov website:  -Dr. Nimo Noland - Does not accept Medicare. Can charge the private pay rate, but is not accepting patients at this time.   -Dr. Ramsey Hendrix - Can accept Medicare, but only provide Transcranial Therapy, not medication management  -Dr. Alwyn Romberg - Does accept Medicare, but does not see patients over the age of 61.    -Dr. Rosario Camarillo accepting new patients right now, but two Behavioral Health Nurse Practitioners accept Medicare, accept patients over the age of 72, and are able to see patients in person.   Left a message with the scheduling department to determine availability; awaiting a call back.      Updated the patient's niece, Matt Gómez. Aleyda Cameron shared that she may cancel the appointment with [facility], scheduled for later this week, as her aunt has been unable to participate in meaningful conversation, due to challenges with short term memory. Explained that this  would call back when any updates are available. Plan:  Ongoing psychosocial support and resource referral, as desired by the patient and family. This  will follow up with the patient next week. EPC     02/17/23  Unable to connect with the patient via phone. Left a voicemail message to check on her status; awaiting a call back. Later received a call from her niece, Matt Gómez (G#[phone]). Conference called with the patient, who confirmed that she wants this  to speak with her niece. Matt Gómez explained that she schedules all of the appointments for her aunt. She scheduled an appointment with [facility], for counseling. She explained that she has been unable to find a psychiatrist for her aunt. Explained that the patient and this  already found a potential option - Dr. Dedra Romero, Psychiatrist with [facility] ([address], [address], [address] [phone]). Explained that she has been on the waiting list with that provider. The patient heard from Samantha at that practice this week, but had not yet called her back. Matt Gómez called Samatnha at [facility] and left a voicemail in order to request an appointment/check on the status of the waiting list.    Plan:  Ongoing psychosocial support and resource referral, as desired by the patient and family. This  will follow up with the patient next week. EPC     02/03/23  Connected with the patient via phone. Explained that this  called Dr. Angelique Campos office at [facility] today, and spoke with Samantha.   Samantha shared that the patient is the next one on their waiting list for an appointment, and should hear back within the next few weeks regarding an appointment. Patient shared that her niece made an appointment with [facility], LCSW. Explained that an LCSW is able to provide counseling, but cannot prescribe or manage medications. Offered that she can see both providers for services, but the patient is concerned about possible copays for these visits. Patient asked if this  could speak with her niece regarding these matters. Advised that the patient would need to give permission for this  to speak with her niece, but once permission is provided,  yes. Plan:  Ongoing psychosocial support and resource referral, as desired by the patient and family. This  will follow up with the patient again in two weeks. EPC     01/20/23  Connected with the patient via phone. She has not yet heard back from Dr. Veronica Quinteros ([address], [address] [facility], [address] [place] [address] - [phone]) regarding the waiting list, but hopes to receive a call within the next few weeks. Offered to assist the patient in calling other agencies in town, to see if they can accommodate her sooner, but she prefers to wait to hear back from N(i)Â[facility]. Patient shared that the  is working out well. She shared that she also has family visiting today, so she was unable to speak further. Plan:  Ongoing psychosocial support and resource referral, as desired by the patient and family. This  will follow up with the patient again in two weeks. EPC     01/13/23  Reviewed the list of community behavioral health providers, compiled by Adrianna Patten, supervisor at [facility]. Called [facility], and spoke with Amelia.   She explained that [facility] has two providers in [place] that accept Medicare patients - [facility], in [facility], and [facility] [place], in [place]. Both providers can provide virtual services. Called [facility], but learned from their voicemail that they do not see patients over the age of 61. Next, called [facility], and spoke with Radha. She explained that they have a provider, Dr. Eric Ponce, who is not currently accepting new clients, but offered a waiting list.  Doloris Citizen explained that it could take up to a month to get called about an appointment. Called the patient, who declined the virtual psychiatry options through [address] [place] [address]. Patient was agreeable to getting on Dr. Harmeet Peoples waiting list.  She explained that she prefers a female psychiatrist.  Arlene Teranp back, via conference call with the patient. She is now on the waiting list with Dr. Eric Ponce. The patient plans to research the location of [facility] ([place] [address], [address] - [phone]) and confirm with family/friends that someone would be able to drive her to that location for a future appointment. Patient thanked this  for her assistance. Plan:  Ongoing psychosocial support and resource referral, as desired by the patient and family. This  will follow up with the patient again next week, to check on her status. EPC     01/06/23  Connected with the patient via phone today. She shared that the  is there 5 days a week now, which has relieved some of her stress. As noted yesterday, she visited [facility] on 12/28/2022, but was turned away, and given a list of community providers. Patient reports that she misplaced the list provided to her by [facility]. Offered the names/information of three providers that accept Medicare (Dr. Canelo Layne, Dr. Jerry Dc, and Guanakito [place] CNP), two in [facility] [place], one in [place], [place].   The patient explained that the distance would not work, as she relies on family to drive her to appointments, and would prefer a provider closer to her home. Aaron Villafuerte again today, to request a copy of the list of providers, given to the patient during her visit on 12/28/22. Spoke with Madison Ward, clinical supervisor for the Same Day Access Program.  She confirmed that they are unable to see any new patients at this time, unless they have had a recent inpatient hospitalization. She e-mailed a list of [facility]. Providers to this . Plan:  Ongoing psychosocial support and resource referral, as desired by the patient and family. This  will follow up with the patient again next week, to check on her status, and assist her in connecting with a psychiatrist.    [facility]     01/05/23  Called to follow up with the patient, but was unable to connect with her today. Left a voicemail message. Per chart review, she visited [address] [place] [address] Same Day Access on 12/28/2022, but was turned away, and given a list of community providers. Aaron Villafuerte and spoke with a representative, Aric Aldair, who advised that they are short staffed, and recommended that the patient contact the providers on the list they gave her, rather than make another visit/attempt with their same day access program.  Left a voicemail for [address] [place] [address] , Paige Walters, to request a copy of that list, in order to be able to assist the patient in calling agencies. Awaiting a call back. Plan:  Ongoing psychosocial support and resource referral, as desired by the patient and family. This  will attempt to reach the patient tomorrow. Critical access hospital     12/22/22  Connected with the patient via phone today. Introduced self and role, and offered support. Patient described caregiver burden, as her  has had multiple health issues, hospitalizations, surgeries, and a fall over the past year.   She reports anxiety and trouble sleeping. Discussed good sleep hygiene habits including going to sleep and waking up at the same times each day, limiting exposure to electric devices prior to sleep, and use of aromatherapy such as lavender. Patient identified securing an appointment with a psychiatrist for medication management as her primary concern today. She saw a psychiatrist in the 1990s for medication management, but that person is no longer in practice. Provided information for [address] [place] [address] [facility].  Explained that they do not offer appointments for screenings, but there are walk-in hours ( Monday through Wednesday: 7:30 to 3:00PM, Thursdays: 7:30 to 5:30 PM. Friday: 7:30 to 11:00 AM). Explained that she will need to provide a photo ID, copy of insurance card, and list of medications. Explained that the screening process can take 2-2.5 hours, and she will be asked to complete paperwork about herself, her concerns, and any health challenges. Explained that she will then meet with a clinician to discuss her treatment needs, history, and plan. Aaron 67 with the patient via conference call today, and they explained that they will be closed on Monday, December 26, 2022. Provided the contact information below, via phone today. Also sent this information via [employer] messaging, per her request.    [address], #[phone]  [address] [place] [address] Road Phone, G#[phone]     She shared that her children have hired a personal care aid to assist she and her  with light housework and cooking, for 6 hours a day, starting next week. She is hopeful that this will help reduce her stress and anxiety levels. Plan:  Ongoing psychosocial support and resource referral, as desired by the patient and family.   This  will follow up with the patient in two weeks, to check on her status, and progress with resources provided today.     EPC               Isha Bustos MSW/GOVIND, [facility]   R#[phone]

## 2023-02-28 ENCOUNTER — PATIENT OUTREACH (OUTPATIENT)
Dept: CASE MANAGEMENT | Age: 83
End: 2023-02-28

## 2023-02-28 NOTE — PROGRESS NOTES
Care Transitions Outreach Attempt    Attempted to reach patient for transitions of care follow up. Unable to reach patient. Left voicemail stating my name, CTN with [address], date and time of call, and return telephone number. Patient: Butch Doty Patient : 1940 MRN: [id number]    Last Discharge [address]       Date Complaint Diagnosis Description Type Department Provider    22 Insomnia; Medication Evaluation Insomnia, unspecified type ED (DISCHARGE) Cristina Costello MD              Noted following upcoming appointments from discharge chart review:   [facility]  follow up appointment(s): No future appointments.   Non-[facility] follow up appointment(s): 3/1/2023 ANTONIETTA per VIJAY note

## 2023-03-03 ENCOUNTER — PATIENT OUTREACH (OUTPATIENT)
Dept: CASE MANAGEMENT | Age: 83
End: 2023-03-03

## 2023-03-03 NOTE — PROGRESS NOTES
Care Transitions Outreach Attempt    Attempted to reach patient for transitions of care follow up. Unable to reach patient. Left voicemail stating my name, CTN with [address], date and time of call, and return telephone number. Patient:  Dominick Miranda Patient : 1940 MRN: [id number]    Last Discharge  [facility]       Date Complaint Diagnosis Description Type Department Provider    22 Insomnia; Medication Evaluation Insomnia, unspecified type ED (DISCHARGE) Cam Rendon MD

## 2023-03-07 ENCOUNTER — PATIENT OUTREACH (OUTPATIENT)
Dept: CASE MANAGEMENT | Age: 83
End: 2023-03-07

## 2023-03-07 NOTE — PROGRESS NOTES
Ambulatory Care Social Work   Follow Up Note  3/7/2023     Goals Addressed                      This Visit's Progress      Connect with Keith Marva (pt-stated)   On track      03/07/23  Called patient's niece, Molina Zamudio, to check on their status, and ask how the patient's new patient appointment went with the psychiatrist last week. Jaun Johnston explained that they are currently shopping together, but asked if she can call back another day/time. Jaun Vickie later called back to explain that the psychiatry appointment went well. Per Maykel Khan, the patient has been diagnosed with severe depression, with delusions. She is now taking Seroquel, and already seeing an improvement in her sleep patterns, and has stopped pacing back and forth for hours on end. The patient's children have decided that the patient and her  need assisted living, and have made steps towards a move into a 2BR apartment that faces the water, at [facility]. Plan:  Ongoing psychosocial support and resource referral, as desired by the patient and family. This  will follow up with the patient and family next week. EPC     02/24/23  Patient's niece, Molina Zamudio, called to report that she secured an appointment with Aureliano Holden NP, March 1, 2023 with [facility]. Plan:  Ongoing psychosocial support and resource referral, as desired by the patient and family. This  will follow up with the patient and family within the next two weeks. EPC     02/23/23  Connected with the patient's niece, Molina Zamudio, via phone today. Explained that per chart review, Yoli Urias at Dr. Kari Gonzalez office called the patient to notify her that there are no appointments available prior to April 2023.   Advised that they keep their current April appointment with Dr. Yasmin Davidson, neuro psychologist.      As previously noted, this  spoke with representative at [facility], who indicated that there are two Behavioral Health Nurse Practitioners who accept Medicare, accept patients over the age of 72, and are able to see patients in person. Explained that this  has not yet heard back from the [employer] intake line. Explained that the voicemail indicates that it can take 72 hours to get a call back, and requests that callers only leave one VM. Advised that there is an adult intake form available online, if they prefer to fill that out, in hopes of a call back <[email or web address]    Advised that the patient's niece call the following options -   -[facility] - Kailey Robison and Senthil Maria, Psychiatrists, N#[phone], [facility], Dr. Antoinette Rice, Psychiatrist, B#[phone]    Also mentioned that she can call the [employer] Helpline, P#[phone], to request a list of providers that accept Medicare patients over the age of 72. Plan:  Ongoing psychosocial support and resource referral, as desired by the patient and family. This  will follow up with the patient and family within the next two weeks. EPC     02/20/23  Patient's niece, Butch Pacheco called today. Bill Rodriges explained that she has been unable to reach anyone at [facility].  She explained that her aunt's confusion and short term memory has consistently worsened since December. The patient has a neuro psychology appointment scheduled with Dr. Holmes Dakin, III with [facility] in April [facility]23. Bill Rodriges asked if this  was aware of any providers that could see her prior to April. This  called Dr. Margot Tubbs's office, Neuropsychologist, and left a voicemail to ask about the first available appointment; awaiting a call back. This  also followed up with [facility].   Spoke with an , Pedro Carrasco, who explained that the former  gave incorrect information. She explained that Gavin Rouse is actually an LCSW, and unable to provide medication management. This  called the following providers/practices, listed on the Medicare. gov website:  -Dr. Hilton Mcqueen - Does not accept Medicare. Can charge the private pay rate, but is not accepting patients at this time.   -Dr. Raza Beckford - Can accept Medicare, but only provide Transcranial Therapy, not medication management  -Dr. Steve Corrales - Does accept Medicare, but does not see patients over the age of 61.    -Dr. Clarence Wright accepting new patients right now, but two Behavioral Health Nurse Practitioners accept Medicare, accept patients over the age of 72, and are able to see patients in person. Left a message with the scheduling department to determine availability; awaiting a call back. Updated the patient's niece, Loly Corrales. Mary Kay Monteiro shared that she may cancel the appointment with [facility], scheduled for later this week, as her aunt has been unable to participate in meaningful conversation, due to challenges with short term memory. Explained that this  would call back when any updates are available. Plan:  Ongoing psychosocial support and resource referral, as desired by the patient and family. This  will follow up with the patient next week. EPC     02/17/23  Unable to connect with the patient via phone. Left a voicemail message to check on her status; awaiting a call back. Later received a call from her niece, Loly Antoni (O#[phone]). Conference called with the patient, who confirmed that she wants this  to speak with her niece. Loly Corrales explained that she schedules all of the appointments for her aunt.   She scheduled an appointment with [facility], for counseling. She explained that she has been unable to find a psychiatrist for her aunt. Explained that the patient and this  already found a potential option - Dr. Alfred Barr, Psychiatrist with [facility] ([address], [address], [facility], [phone]). Explained that she has been on the waiting list with that provider. The patient heard from Samantha at that practice this week, but had not yet called her back. Suri Smith called Samantha at [facility] and left a voicemail in order to request an appointment/check on the status of the waiting list.    Plan:  Ongoing psychosocial support and resource referral, as desired by the patient and family. This  will follow up with the patient next week. EPC     02/03/23  Connected with the patient via phone. Explained that this  called Dr. Rachid Jackson office at [facility] today, and spoke with Samantha. Samantha shared that the patient is the next one on their waiting list for an appointment, and should hear back within the next few weeks regarding an appointment. Patient shared that her [facility] Explained that an LCSW is able to provide counseling, but cannot prescribe or manage medications. Offered that she can see both providers for services, but the patient is concerned about possible copays for these visits. Patient asked if this  could speak with her niece regarding these matters. Advised that the patient would need to give permission for this  to speak with her niece, but once permission is provided,  yes. Plan:  Ongoing psychosocial support and resource referral, as desired by the patient and family. This  will follow up with the patient again in two weeks. EPC     01/20/23  Connected with the patient via phone.   She has not yet heard back from Dr. Oksana Godinez/[facility] ([address], [address] [facility], [facility], [address] [phone]) regarding the waiting list, but hopes to receive a call within the next few weeks. Offered to assist the patient in calling other agencies in town, to see if they can accommodate her sooner, but she prefers to wait to hear back from [employer]. Patient shared that the  is working out well. She shared that she also has family visiting today, so she was unable to speak further. Plan:  Ongoing psychosocial support and resource referral, as desired by the patient and family. This  will follow up with the patient again in two weeks. EPC     01/13/23  Reviewed the list of community behavioral health providers, compiled by Roselia Swartz, supervisor at [address] [place] [address]. Called [facility], and spoke with Amelia. She explained that [facility] has two providers in [place] that accept Medicare patients - [facility], in [place], and [facility], in [place]. Both providers can provide virtual services. Called [facility], but learned from their voicemail that they do not see patients over the age of 61. Next, called [facility], and spoke with Radha. She explained that they have a provider, Dr. Tom Modi, who is not currently accepting new clients, but offered a waiting list.  Natacha Pinedo explained that it could take up to a month to get called about an appointment. Called the patient, who declined the virtual psychiatry options through [address] [place] [address]. Patient was agreeable to getting on Dr. Silvestre Mayen waiting list.  She explained that she prefers a female psychiatrist.  Nayeli Potts back, via conference call with the patient. She is now on the waiting list with Dr. Tom Modi.   The patient plans to research the location of [facility] ([address], [address], [facility], [address] - [phone]) and confirm with family/friends that someone would be able to drive her to that location for a future appointment. Patient thanked this  for her assistance. Plan:  Ongoing psychosocial support and resource referral, as desired by the patient and family. This  will follow up with the patient again next week, to check on her status. [facility]     01/06/23  Connected with the patient via phone today. She shared that the  is there 5 days a week now, which has relieved some of her stress. As noted yesterday, she visited [facility] on 12/28/2022, but was turned away, and given a list of community providers. Patient reports that she misplaced the list provided to her by [facility]. Offered the names/information of three providers that accept Medicare (Dr. Vicky Hitchcock, Dr. Padmini Valdovinos, and Hanna Vega CNP), two in [place], one in [address] [place] [address]. The patient explained that the distance would not work, as she relies on family to drive her to appointments, and would prefer a provider closer to her home. [address] again today, to request a copy of the list of providers, given to the patient during her visit on 12/28/22. Spoke with Wilbert Norris, clinical supervisor for the Same Day Access Program.  She confirmed that they are unable to see any new patients at this time, unless they have had a recent inpatient hospitalization. She e-mailed a list of [facility]. Providers to this . Plan:  Ongoing psychosocial support and resource referral, as desired by the patient and family. This  will follow up with the patient again next week, to check on her status, and assist her in connecting with a psychiatrist.    [facility]     01/05/23  Called to follow up with the patient, but was unable to connect with her today. Left a voicemail message.       Per chart review, she visited [facility] Day Access on 12/28/2022, but was turned away, and given a list of community providers. [address] and spoke with a representative, [facility], who advised that they are short staffed, and recommended that the patient contact the providers on the list they gave her, rather than make another visit/attempt with their same day access program.  Left a voicemail for [address] [place] [address] Pkwy , Abby Garvin, to request a copy of that list, in order to be able to assist the patient in calling agencies. Awaiting a call back. Plan:  Ongoing psychosocial support and resource referral, as desired by the patient and family. This  will attempt to reach the patient tomorrow. EPC     12/22/22  Connected with the patient via phone today. Introduced self and role, and offered support. Patient described caregiver burden, as her  has had multiple health issues, hospitalizations, surgeries, and a fall over the past year. She reports anxiety and trouble sleeping. Discussed good sleep hygiene habits including going to sleep and waking up at the same times each day, limiting exposure to electric devices prior to sleep, and use of aromatherapy such as lavender. Patient identified securing an appointment with a psychiatrist for medication management as her primary concern today. She saw a psychiatrist in the 1990s for medication management, but that person is no longer in practice. Provided information for [address] [place] [address] Day Access program.  Explained that they do not offer appointments for screenings, but there are walk-in hours ( Monday through Wednesday: 7:30 to 3:00PM, Thursdays: 7:30 to 5:30 PM. Friday: 7:30 to 11:00 AM). Explained that she will need to provide a photo ID, copy of insurance card, and list of medications.   Explained that the screening process can take 2-2.5 hours, and she will be asked to complete paperwork about herself, her concerns, and any health challenges. Explained that she will then meet with a clinician to discuss her treatment needs, history, and plan. [address] with the patient via conference call today, and they explained that they will be closed on Monday, December 26, 2022. Provided the contact information below, via phone today. Also sent this information via [employer] messaging, per her request.    [address], [address] [place] [address], P#[phone]  [address] [place] [address] Phone, C#[phone]     She shared that her children have hired a personal care aid to assist she and her  with light housework and cooking, for 6 hours a day, starting next week. She is hopeful that this will help reduce her stress and anxiety levels. Plan:  Ongoing psychosocial support and resource referral, as desired by the patient and family. This  will follow up with the patient in two weeks, to check on her status, and progress with resources provided today.     ALAINA Mercado/GOVIND, [facility]   A#[phone]

## 2023-03-07 NOTE — PROGRESS NOTES
Ambulatory Care Social Work   Follow Up Note  3/7/2023     Goals Addressed                      This Visit's Progress      Connect with Keith Durham (pt-stated)   On track      03/07/23  Called patient's nieceMichael, to check on their status, and ask how the patient's new patient appointment went with the psychiatrist last week. Manas Cruz explained that they are currently shopping together, but asked if she can call back another day/time. Awaiting a call back. Plan:  Ongoing psychosocial support and resource referral, as desired by the patient and family. This  will follow up with the patient and family next week. EPC     02/24/23  Patient's nieceMichael, called to report that she secured an appointment with Lizbeth Schneider NP, March 1, 2023 with [facility]. Plan:  Ongoing psychosocial support and resource referral, as desired by the patient and family. This  will follow up with the patient and family within the next two weeks. EPC     02/23/23  Connected with the patient's nieceMichael, via phone today. Explained that per chart review, Darrian Hidalgo at Dr. Chucho Auguste office called the patient to notify her that there are no appointments available prior to April 2023. Advised that they keep their current April appointment with Dr. Theresa Byers, neuro psychologist.      As previously noted, this  spoke with representative at [facility], who indicated that there are two [facility] Nurse Practitioners who accept Medicare, accept patients over the age of 72, and are able to see patients in person. Explained that this  has not yet heard back from the [facility] intake line. Explained that the voicemail indicates that it can take 72 hours to get a call back, and requests that callers only leave one VM.   Advised that there is an adult intake form available online, if they prefer to fill that out, in hopes of a call back <[email or web address]    Advised that the patient's niece call the following options -   -[facility] - Kailey Robison and Chirag Sainz, Psychiatrists, B#[phone], [employer].  -[facility], Dr. Elijah Camarillo, Psychiatrist, [phone]    Also mentioned that she can call the [employer] Helpline, P#[phone], to request a list of providers that accept Medicare patients over the age of 72. Plan:  Ongoing psychosocial support and resource referral, as desired by the patient and family. This  will follow up with the patient and family within the next two weeks. EPC     02/20/23  Patient's niece, Stacey Slater called today. Amie Batres explained that she has been unable to reach anyone at [facility].  She explained that her aunt's confusion and short term memory has consistently worsened since December. The patient has a neuro psychology appointment scheduled with Dr. Manuel Davenport III [facility] in April [facility]. Amie Batres asked if this  was aware of any providers that could see her prior to April. This  called Dr. Micheline Tubbs's office, Neuropsychologist, and left a voicemail to ask about the first available appointment; awaiting a call back. This  also followed up with [facility]. Spoke with an , Noemy Del Real, who explained that the former  gave incorrect information. She explained that Wellington Mack is actually an LCSW, and unable to provide medication management. This  called the following providers/practices, listed on the Medicare. gov website:  -Dr. Mariposa Rouse - Does not accept Medicare.   Can charge the private pay rate, but is not accepting patients at this time.   -Dr. Ari Castro - Can accept Medicare, but only provide Transcranial Therapy, not medication management  -Dr. Harley Martinez - Does accept Medicare, but does not see patients over the age of 61.    -Dr. Eneida Bazan accepting new patients right now, but two Behavioral Health Nurse Practitioners accept Medicare, accept patients over the age of 72, and are able to see patients in person. Left a message with the scheduling department to determine availability; awaiting a call back. Updated the patient's niece, Stacey Slater. Amie Batres shared that she may cancel the appointment with [facility], scheduled for later this week, as her aunt has been unable to participate in meaningful conversation, due to challenges with short term memory. Explained that this  would call back when any updates are available. Plan:  Ongoing psychosocial support and resource referral, as desired by the patient and family. This  will follow up with the patient next week. EPC     02/17/23  Unable to connect with the patient via phone. Left a voicemail message to check on her status; awaiting a call back. Later received a call from her niece, Stacey Slater (H#[phone]). Conference called with the patient, who confirmed that she wants this  to speak with her niece. Stacey Slater explained that she schedules all of the appointments for her aunt. She scheduled an appointment with [facility], for counseling. She explained that she has been unable to find a psychiatrist for her aunt. Explained that the patient and this  already found a potential option - Dr. Michelle Stafford, Psychiatrist with [facility] ([address], [address] [facility], [address], [address] [phone]). Explained that she has been on the waiting list with that provider. The patient heard from James Burciaga at that practice this week, but had not yet called her back.   Stacey Slater called James Burciaga at [facility] and left a voicemail in order to request an appointment/check on the status of the waiting list.    Plan:  Ongoing psychosocial support and resource referral, as desired by the patient and family. This  will follow up with the patient next week. EPC     02/03/23  Connected with the patient via phone. Explained that this  called Dr. Marco Freedman office at [facility] today, and spoke with Marcelo Carver. Marcelo Carver shared that the patient is the next one on their waiting list for an appointment, and should hear back within the next few weeks regarding an appointment. Patient shared that her niece made an appointment with [facility], [facility]. Explained that an LCSW is able to provide counseling, but cannot prescribe or manage medications. Offered that she can see both providers for services, but the patient is concerned about possible copays for these visits. Patient asked if this  could speak with her niece regarding these matters. Advised that the patient would need to give permission for this  to speak with her niece, but once permission is provided,  yes. Plan:  Ongoing psychosocial support and resource referral, as desired by the patient and family. This  will follow up with the patient again in two weeks. EPC     01/20/23  Connected with the patient via phone. She has not yet heard back from Dr. Keyanna Villeda ([facility], [address] - [phone]) regarding the waiting list, but hopes to receive a call within the next few weeks. Offered to assist the patient in calling other agencies in town, to see if they can accommodate her sooner, but she prefers to wait to hear back from [facility]. Patient shared that the  is working out well. She shared that she also has family visiting today, so she was unable to speak further.     Plan:  Ongoing psychosocial support and resource referral, as desired by the patient and family. This  will follow up with the patient again in two weeks. EPC     01/13/23  Reviewed the list of community behavioral health providers, compiled by Tiff Lindquist, supervisor at [facility]. Called [facility], and spoke with Amelia. She explained that [facility] has two providers in [place] that accept Medicare patients - [facility], in [place], and [facility], in [place]. Both providers can provide virtual services. Called [facility], but learned from their voicemail that they do not see patients over the age of 61. Next, called [facility], and spoke with Radha. She explained that they have a provider, Dr. Ahmet Lee, who is not currently accepting new clients, but offered a waiting list.  Lennox Pong explained that it could take up to a month to get called about an appointment. Called the patient, who declined the virtual psychiatry options through [address] [place] [address]. Patient was agreeable to getting on Dr. Marco Freedman waiting list.  She explained that she prefers a female psychiatrist.  Gaurav Malone back, via conference call with the patient. She is now on the waiting list with Dr. Ahmet Lee. The patient plans to research the location of [facility] ([place] [address], [address] [place] [address] - [phone]) and confirm with family/friends that someone would be able to drive her to that location for a future appointment. Patient thanked this  for her assistance. Plan:  Ongoing psychosocial support and resource referral, as desired by the patient and family. This  will follow up with the patient again next week, to check on her status. EPC     01/06/23  Connected with the patient via phone today. She shared that the  is there 5 days a week now, which has relieved some of her stress.   As noted yesterday, she visited [facility] on 12/28/2022, but was turned away, and given a list of community providers. Patient reports that she misplaced the list provided to her by [facility]. Offered the names/information of three providers that accept Medicare (Dr. Tabitha Griffiths, Dr. Christy Leger, and Hero Alvarez [facility]), two in [place], one in [place], [place]. The patient explained that the distance would not work, as she relies on family to drive her to appointments, and would prefer a provider closer to her home. Aaron Villafuerte again today, to request a copy of the list of providers, given to the patient during her visit on 12/28/22. Spoke with Perez Knox, clinical supervisor for the Same Day Access Program.  She confirmed that they are unable to see any new patients at this time, unless they have had a recent inpatient hospitalization. She e-mailed a list of [facility] 74. Providers to this . Plan:  Ongoing psychosocial support and resource referral, as desired by the patient and family. This  will follow up with the patient again next week, to check on her status, and assist her in connecting with a psychiatrist.    [facility]     01/05/23  Called to follow up with the patient, but was unable to connect with her today. Left a voicemail message. Per chart review, she visited [facility] Same Day Access on 12/28/2022, but was turned away, and given a list of community providers.   Aaron Villafuerte and spoke with a representative, Marguerite Pizarro, who advised that they are short staffed, and recommended that the patient contact the providers on the list they gave her, rather than make another visit/attempt with their same day access program.  Left a voicemail for [facility] , Vito Aquino, to request a copy of that list, in order to be able to assist the patient in calling agencies. Awaiting a call back. Plan:  Ongoing psychosocial support and resource referral, as desired by the patient and family. This  will attempt to reach the patient tomorrow. EPC     12/22/22  Connected with the patient via phone today. Introduced self and role, and offered support. Patient described caregiver burden, as her  has had multiple health issues, hospitalizations, surgeries, and a fall over the past year. She reports anxiety and trouble sleeping. Discussed good sleep hygiene habits including going to sleep and waking up at the same times each day, limiting exposure to electric devices prior to sleep, and use of aromatherapy such as lavender. Patient identified securing an appointment with a psychiatrist for medication management as her primary concern today. She saw a psychiatrist in the 1990s for medication management, but that person is no longer in practice. Provided information for [address] [place] [address] [facility] program.  Explained that they do not offer appointments for screenings, but there are walk-in hours ( Monday through Wednesday: 7:30 to 3:00PM, Thursdays: 7:30 to 5:30 PM. Friday: 7:30 to 11:00 AM). Explained that she will need to provide a photo ID, copy of insurance card, and list of medications. Explained that the screening process can take 2-2.5 hours, and she will be asked to complete paperwork about herself, her concerns, and any health challenges. Explained that she will then meet with a clinician to discuss her treatment needs, history, and plan. Aaron 67 with the patient via conference call today, and they explained that they will be closed on Monday, December 26, 2022. Provided the contact information below, via phone today.   Also sent this information via MyChart messaging, per her request.    [facility]   [address] [facility], [address] [facility] [address], E#[phone]  Main Office Phone, P#[phone]     She shared that her children have hired a personal care aid to assist she and her  with light housework and cooking, for 6 hours a day, starting next week. She is hopeful that this will help reduce her stress and anxiety levels. Plan:  Ongoing psychosocial support and resource referral, as desired by the patient and family. This  will follow up with the patient in two weeks, to check on her status, and progress with resources provided today.     EPC               Cyrus Jensen, MSW/GOVIND, [facility]   D#[phone]

## 2023-03-13 ENCOUNTER — PATIENT OUTREACH (OUTPATIENT)
Dept: CASE MANAGEMENT | Age: 83
End: 2023-03-13

## 2023-03-13 NOTE — PROGRESS NOTES
Care Transitions Outreach Attempt    Attempted to reach patient for transitions of care follow up. Unable to reach patient. Call answered and then disconnected. Unable to LVM. Patient: Malick Mendez Patient : 1940 MRN: [id number]    Last Discharge  Street       Date Complaint Diagnosis Description Type Department Provider    22 Insomnia; Medication Evaluation Insomnia, unspecified type ED (DISCHARGE) Lamar Khan MD              Noted following upcoming appointments from discharge chart review:   [facility] follow up appointment(s): No future appointments.   Non-[facility] follow up appointment(s): sundayk

## 2023-03-14 ENCOUNTER — PATIENT OUTREACH (OUTPATIENT)
Dept: CASE MANAGEMENT | Age: 83
End: 2023-03-14

## 2023-03-14 NOTE — PROGRESS NOTES
Ambulatory Care Social Work   Follow Up Note  3/14/2023     Goals Addressed                      This Visit's Progress      Connect with Keith Durham (pt-stated)   On track      03/14/23  Spoke with the patient's niece, Jennifer Rodriguez (I#[phone], G#[phone]). The patient and her  are moving to [facility] this coming Tuesday. They have hired movers to assist.  They plan to maintain their home for a few months, to ensure a smooth transition, before selling it. Leeroy Calhoun explained that the patient's psychiatrist recommended that if they can afford it, to have 24/7 care in the home first, for approximately 3 months, and then move into assisted living. However, the patient's  prefers to make the transition now. The patient is experiencing high anxiety, and shaking. They have spoken with the psychiatrist about these concerns already. Plan:  Ongoing psychosocial support and resource referral, as desired by the patient and family. This  will follow up with the patient's niece in two weeks. EPC     03/07/23  Called patient's niece, Jennifer Rodriguez, to check on their status, and ask how the patient's new patient appointment went with the psychiatrist last week. Leeroy Calohun explained that they are currently shopping together, but asked if she can call back another day/time. Leeroy Calhoun later called back to explain that the psychiatry appointment went well. Per Whit Sidhu, the patient has been diagnosed with severe depression, with delusions. She is now taking Seroquel, and already seeing an improvement in her sleep patterns, and has stopped pacing back and forth for hours on end. The patient's children have decided that the patient and her  need assisted living, and have made steps towards a move into a 2BR apartment that faces the water, at [facility].     Plan:  Ongoing psychosocial support and resource referral, as desired by the patient and family. This  will follow up with the patient and family next week. EPC     02/24/23  Patient's niece, Chito Paul, called to report that she secured an appointment with Kamila Alfaro NP, March 1, 2023 with [facility]. Plan:  Ongoing psychosocial support and resource referral, as desired by the patient and family. This  will follow up with the patient and family within the next two weeks. EPC     02/23/23  Connected with the patient's niece, Chito Paul, via phone today. Explained that per chart review, Clarence Doshi at Dr. Muna Couch office called the patient to notify her that there are no appointments available prior to April 2023. Advised that they keep their current April appointment with Dr. Ivan Felder, neuro psychologist.      As previously noted, this  spoke with representative at [facility], who indicated that there are two [facility] Nurse Practitioners who accept Medicare, accept patients over the age of 72, and are able to see patients in person. Explained that this  has not yet heard back from the [facility] intake line. Explained that the voicemail indicates that it can take 72 hours to get a call back, and requests that callers only leave one VM. Advised that there is an adult intake form available online, if they prefer to fill that out, in hopes of a call back <[email or web address]-[email or web address]/>    Advised that the patient's niece call the following options -   -[facility] - Kailey Robison and Rosalie Amin, Psychiatrists, C#[phone], [facility], Dr. Unique Jenkins, Psychiatrist, K#[phone]    Also mentioned that she can call the [employer] Helpline, P#[phone], to request a list of providers that accept Medicare patients over the age of 72.     Plan:  Ongoing psychosocial support and resource referral, as desired by the patient and family. This  will follow up with the patient and family within the next two weeks. EPC     02/20/23  Patient's niece, Nghia Lovett called today. Robyn Snyder explained that she has been unable to reach anyone at [facility].  She explained that her aunt's confusion and short term memory has consistently worsened since December. The patient has a neuro psychology appointment scheduled with Dr. Kami Lenz III with [facility] in April [facility]23. Robyn Claudio asked if this  was aware of any providers that could see her prior to April. This  called Dr. Felipe Tubbs's office, Neuropsychologist, and left a voicemail to ask about the first available appointment; awaiting a call back. This  also followed up with [facility]. Spoke with an , Bree Hester, who explained that the former  gave incorrect information. She explained that Cami Mckeon is actually an LCSW, and unable to provide medication management. This  called the following providers/practices, listed on the Medicare. gov website:  -Dr. Simeon Boyer - Does not accept Medicare. Can charge the private pay rate, but is not accepting patients at this time.   -Dr. Nitish Hinson - Can accept Medicare, but only provide Transcranial Therapy, not medication management  -Dr. Leonor Wright - Does accept Medicare, but does not see patients over the age of 61.    -Dr. Shashi Stanford accepting new patients right now, but two Behavioral Health Nurse Practitioners accept Medicare, accept patients over the age of 72, and are able to see patients in person. Left a message with the scheduling department to determine availability; awaiting a call back. Updated the patient's niece, Nghia Lovett.   Robyn Snyder shared that she may cancel the appointment with [facility], scheduled for later this week, as her aunt has been unable to participate in meaningful conversation, due to challenges with short term memory. Explained that this  would call back when any updates are available. Plan:  Ongoing psychosocial support and resource referral, as desired by the patient and family. This  will follow up with the patient next week. EPC     02/17/23  Unable to connect with the patient via phone. Left a voicemail message to check on her status; awaiting a call back. Later received a call from her niece, Lizzy Ferguson (B#[phone]). Conference called with the patient, who confirmed that she wants this  to speak with her niece. Lizzy Ferguson explained that she schedules all of the appointments for her aunt. She scheduled an appointment with [facility], for counseling. She explained that she has been unable to find a psychiatrist for her aunt. Explained that the patient and this  already found a potential option - Dr. Teresa Sinha, Psychiatrist with [facility] ([address], [address], [address] [phone]). Explained that she has been on the waiting list with that provider. The patient heard from Samantha at that practice this week, but had not yet called her back. Lizzy Ferguson called Samantha at [facility] and left a voicemail in order to request an appointment/check on the status of the waiting list.    Plan:  Ongoing psychosocial support and resource referral, as desired by the patient and family. This  will follow up with the patient next week. EPC     02/03/23  Connected with the patient via phone. Explained that this  called Dr. Aly goodman at [facility] today, and spoke with Samantha.   Samantha shared that the patient is the next one on their waiting list for an appointment, and should hear back within the next few weeks regarding an appointment. Patient shared that her niece made an appointment with [facility], LCSW. Explained that an LCSW is able to provide counseling, but cannot prescribe or manage medications. Offered that she can see both providers for services, but the patient is concerned about possible copays for these visits. Patient asked if this  could speak with her niece regarding these matters. Advised that the patient would need to give permission for this  to speak with her niece, but once permission is provided,  yes. Plan:  Ongoing psychosocial support and resource referral, as desired by the patient and family. This  will follow up with the patient again in two weeks. EPC     01/20/23  Connected with the patient via phone. She has not yet heard back from Dr. Hartwell Kanner ([facility], [address] [facility], [address] [place] [address] Avenue - [phone]) regarding the waiting list, but hopes to receive a call within the next few weeks. Offered to assist the patient in calling other agencies in [facility], to see if they can accommodate her sooner, but she prefers to wait to hear back from [employer]. Patient shared that the  is working out well. She shared that she also has family visiting today, so she was unable to speak further. Plan:  Ongoing psychosocial support and resource referral, as desired by the patient and family. This  will follow up with the patient again in two weeks. EPC     01/13/23  Reviewed the list of community behavioral health providers, compiled by Tomas Salcido, supervisor at [facility]. Called [facility], and spoke with Amelia. She explained that [facility] has two providers in [place] that accept Medicare patients - [facility], in [place], and [facility], in [place]. Both providers can provide virtual services.   Called [facility], but learned from their voicemail that they do not see patients over the age of 61. Next, called [facility], and spoke with Radha. She explained that they have a provider, Dr. Shelly Zavaleta, who is not currently accepting new clients, but offered a waiting list.  Jeremias Kimball explained that it could take up to a month to get called about an appointment. Called the patient, who declined the virtual psychiatry options through [address] [place] [address]. Patient was agreeable to getting on Dr. John Horner waiting list.  She explained that she prefers a female psychiatrist.  Alli Fish back, via conference call with the patient. She is now on the waiting list with Dr. Sehlly Zavaleta. The patient plans to research the location of [facility] ([place] [address], [address] [place] [address], [address] [place] [address] - [phone]) and confirm with family/friends that someone would be able to drive her to that location for a future appointment. Patient thanked this  for her assistance. Plan:  Ongoing psychosocial support and resource referral, as desired by the patient and family. This  will follow up with the patient again next week, to check on her status. EPC     01/06/23  Connected with the patient via phone today. She shared that the  is there 5 days a week now, which has relieved some of her stress. As noted yesterday, she visited [facility] on 12/28/2022, but was turned away, and given a list of community providers. Patient reports that she misplaced the list provided to her by [facility]. Offered the names/information of three providers that accept Medicare (Dr. Radha Moya, Dr. Munira Gentile, and Amarilis Dee CNP), two in [facility], one in [place], [place].   The patient explained that the distance would not work, as she relies on family to drive her to appointments, and would prefer a provider closer to her home.      Aaron Villafuerte again today, to request a copy of the list of providers, given to the patient during her visit on 12/28/22. Spoke with Jayna Martines, clinical supervisor for the Same Day Access Program.  She confirmed that they are unable to see any new patients at this time, unless they have had a recent inpatient hospitalization. She e-mailed a list of [facility] 72. Providers to this . Plan:  Ongoing psychosocial support and resource referral, as desired by the patient and family. This  will follow up with the patient again next week, to check on her status, and assist her in connecting with a psychiatrist.    [facility]     01/05/23  Called to follow up with the patient, but was unable to connect with her today. Left a voicemail message. Per chart review, she visited [address] [place] [address] Same Day Access on 12/28/2022, but was turned away, and given a list of community providers. Aaron Villafuerte and spoke with a representative, Susie Aguilera, who advised that they are short staffed, and recommended that the patient contact the providers on the list they gave her, rather than make another visit/attempt with their same day access program.  Left a voicemail for [address] , Diane Prieto, to request a copy of that list, in order to be able to assist the patient in calling agencies. Awaiting a call back. Plan:  Ongoing psychosocial support and resource referral, as desired by the patient and family. This  will attempt to reach the patient tomorrow. Counts include 234 beds at the [facility]     12/22/22  Connected with the patient via phone today. Introduced self and role, and offered support. Patient described caregiver burden, as her  has had multiple health issues, hospitalizations, surgeries, and a fall over the past year. She reports anxiety and trouble sleeping.   Discussed good sleep hygiene habits including going to sleep and waking up at the same times each day, limiting exposure to electric devices prior to sleep, and use of aromatherapy such as lavender. Patient identified securing an appointment with a psychiatrist for medication management as her primary concern today. She saw a psychiatrist in the 1990s for medication management, but that person is no longer in practice. Provided information for [address] [place] [address] [employer] Access program.  Explained that they do not offer appointments for screenings, but there are walk-in hours ( Monday through Wednesday: 7:30 to 3:00PM, Thursdays: 7:30 to 5:30 PM. Friday: 7:30 to 11:00 AM). Explained that she will need to provide a photo ID, copy of insurance card, and list of medications. Explained that the screening process can take 2-2.5 hours, and she will be asked to complete paperwork about herself, her concerns, and any health challenges. Explained that she will then meet with a clinician to discuss her treatment needs, history, and plan. Aaron 67 with the patient via conference call today, and they explained that they will be closed on Monday, December 26, 2022. Provided the contact information below, via phone today. Also sent this information via [employer] messaging, per her request.    [address], [address] [place] [address], #[phone]  [address] [place] [address] Road Phone, L#[phone]     She shared that her children have hired a personal care aid to assist she and her  with light housework and cooking, for 6 hours a day, starting next week. She is hopeful that this will help reduce her stress and anxiety levels. Plan:  Ongoing psychosocial support and resource referral, as desired by the patient and family. This  will follow up with the patient in two weeks, to check on her status, and progress with resources provided today.     JOSE Irene MSW/MINGW, [facility]   H#[phone]

## 2023-03-17 ENCOUNTER — PATIENT OUTREACH (OUTPATIENT)
Dept: CASE MANAGEMENT | Age: 83
End: 2023-03-17

## 2023-03-17 NOTE — PROGRESS NOTES
Patient has graduated from the Bundle program on 3/17/2023. Patient/family has the ability to self-manage at this time Care management goals have been completed. Patient was not referred to the [facility] team for further management. Goals Addressed                   This Visit's Progress     COMPLETED: Prevent complications post hospitalization. 12/20/2022  Performed medication reconciliation with pt. Unsure about dosing of remeron with ambien. Pt reports that she is taking on full tab of remeron with ambien, will cut in half for 3 days, cut in half for next 3 days, and cut in half for a total of 9 days to ween off remeron while taking ambien. Pt reports that she has a 7 day supply of ambien and is requesting for Dr. Adam Green to advise. Provided pt with telephone numbers for [facility]. Provided emergency number as well as 2 general numbers. Pt reports that she will not harm herself or others. Pt reports she is very anxious and depressed and is not sleeping. Will call pt tomorrow on home telephone number to follow up. Chart sent to PCP for review. Provided pt with my name and telephone number. Luis Guzman RN [address] [employer], Care Transitions Nurse, [phone]     12/21/2022  Called pt to follow up on medications and pt reports that she is taking Remeron and Ambien. Pt reports that she got 5.5 hours of sleep and then 4.5 hours of sleep. Offered pt SW support to explore caregiving options and options to assist pt. Pt confirms appt with PCP and reports hearing appt at 330 on 12/23/2022. Pt reports that DIL and niece can drive her to appts. Will call pt on 12.23.2022 to follow up on medications. Luis Guzman RN [address] [employer], Care Transitions Nurse, [phone]     Pt is agreeable to SW referral,      12/29/22   Called and spoke to patient and her DIL, patient states she is doing \"terrible\" only slept 3 hours last night. Patient is taking Remeron 15 mg and not Ambien. Started Buspar 5 mg BID and was told if no improvement to increase to 10 mg BID. Will start 10 mg BID today. Patient has a sitter today from 10-6 pm.  Asked if patient sleeps/naps during the day and they said no, her thoughts race too much. When I asked patient about C/P, SOB or palpitation she \"didn't know\". Patient states she is very anxious and has poor memory, DIL also states poor focus and over whelmed with care of spouse. Family also concerned that she might be starting dementia. Patient and family did go to [facility] yesterday and was told that there was not openings for an appt. Patient was given a list of providers to call and had to LM. Patient does not want to call everybody on the list and LM at the same time, LM with first person on list.    Monitor patient sleep, patient anxiety, any C/P, SOB, palpitations, how Buspar is helping, get appt with counselor? Lindsay Myers MSN, RN, [facility] / Care Transition Nurse / [phone]     1/5/2023  Left voicemail stating my name, CTN with [employer], date and time of call, and return telephone number. Ani Mccoy RN [facility], Care Transitions Nurse, [phone]     1/12/2023  Left voicemail stating my name, CTN with [employer], date and time of call, and return telephone number. Ani Mccoy RN [address], Care Transitions Nurse, [phone]     1/23/2023  Left voicemail stating my name, CTN with [employer], date and time of call, and return telephone number. Ani Mccoy RN [address], Care Transitions Nurse, [phone]   Pt returned my call and reports that she is doing fine. Pt asked how long this program lasts and informed pt this is a 90 day program and we are about 30 days in. Pt is agreeable to a call in 2 weeks to follow up. Ani Mccoy RN [address] [facility], Care Transitions Nurse, [phone]     2/28/2023  Left voicemail stating my name, CTN with [employer], date and time of call, and return telephone number.  Sheba Hidalgo RN [address] [facility], Care Transitions Nurse, [phone]     3/3/2023  Left voicemail stating my name, CTN with [employer], date and time of call, and return telephone number. Kimberlee Swartz RN [address] [facility], Care Transitions Nurse, [phone]     3/17/2023  Pt reports that PCP is Dr. Shannon Stephens and verified her . Pt informed that I would no longer call and that Alexia Bee will continue to assist.  Thanked pt for her time. Kimberlee Swartz RN [facility], Care Transitions Nurse, [phone]                 Patient has Care Transition Nurse's contact information for any further questions, concerns, or needs. Patients upcoming visits:  No future appointments.

## 2023-03-19 ENCOUNTER — APPOINTMENT (OUTPATIENT)
Dept: CT IMAGING | Age: 83
DRG: 917 | End: 2023-03-19
Attending: EMERGENCY MEDICINE
Payer: MEDICARE

## 2023-03-19 ENCOUNTER — APPOINTMENT (OUTPATIENT)
Dept: MRI IMAGING | Age: 83
DRG: 917 | End: 2023-03-19
Attending: GENERAL ACUTE CARE HOSPITAL
Payer: MEDICARE

## 2023-03-19 ENCOUNTER — APPOINTMENT (OUTPATIENT)
Dept: GENERAL RADIOLOGY | Age: 83
DRG: 917 | End: 2023-03-19
Attending: EMERGENCY MEDICINE
Payer: MEDICARE

## 2023-03-19 ENCOUNTER — HOSPITAL ENCOUNTER (INPATIENT)
Age: 83
LOS: 8 days | Discharge: PSYCHIATRIC OR SUBSTANCE USE FACILITY | DRG: 917 | End: 2023-03-27
Attending: EMERGENCY MEDICINE | Admitting: GENERAL ACUTE CARE HOSPITAL
Payer: MEDICARE

## 2023-03-19 DIAGNOSIS — E03.9 HYPOTHYROIDISM, UNSPECIFIED TYPE: ICD-10-CM

## 2023-03-19 DIAGNOSIS — F33.2 SEVERE EPISODE OF RECURRENT MAJOR DEPRESSIVE DISORDER, WITHOUT PSYCHOTIC FEATURES (HCC): ICD-10-CM

## 2023-03-19 DIAGNOSIS — R42 VERTIGO: ICD-10-CM

## 2023-03-19 DIAGNOSIS — G93.40 ACUTE ENCEPHALOPATHY: Primary | ICD-10-CM

## 2023-03-19 PROBLEM — R41.82 ALTERED MENTAL STATE: Status: ACTIVE | Noted: 2023-03-19

## 2023-03-19 LAB
ALBUMIN SERPL-MCNC: 4 G/DL (ref 3.5–5)
ALBUMIN/GLOB SERPL: 1.2 (ref 1.1–2.2)
ALP SERPL-CCNC: 62 U/L (ref 45–117)
ALT SERPL-CCNC: 22 U/L (ref 12–78)
ANION GAP SERPL CALC-SCNC: 7 MMOL/L (ref 5–15)
APPEARANCE UR: CLEAR
AST SERPL-CCNC: 18 U/L (ref 15–37)
BACTERIA URNS QL MICRO: NEGATIVE /HPF
BASOPHILS # BLD: 0.1 K/UL (ref 0–0.1)
BASOPHILS NFR BLD: 1 % (ref 0–1)
BILIRUB SERPL-MCNC: 0.5 MG/DL (ref 0.2–1)
BILIRUB UR QL: NEGATIVE
BUN SERPL-MCNC: 16 MG/DL (ref 6–20)
BUN/CREAT SERPL: 20 (ref 12–20)
CALCIUM SERPL-MCNC: 9.7 MG/DL (ref 8.5–10.1)
CHLORIDE SERPL-SCNC: 95 MMOL/L (ref 97–108)
CO2 SERPL-SCNC: 32 MMOL/L (ref 21–32)
COLOR UR: ABNORMAL
COMMENT, HOLDF: NORMAL
CREAT SERPL-MCNC: 0.82 MG/DL (ref 0.55–1.02)
DIFFERENTIAL METHOD BLD: ABNORMAL
EOSINOPHIL # BLD: 0 K/UL (ref 0–0.4)
EOSINOPHIL NFR BLD: 0 % (ref 0–7)
EPITH CASTS URNS QL MICRO: ABNORMAL /LPF
ERYTHROCYTE [DISTWIDTH] IN BLOOD BY AUTOMATED COUNT: 13.8 % (ref 11.5–14.5)
GLOBULIN SER CALC-MCNC: 3.3 G/DL (ref 2–4)
GLUCOSE SERPL-MCNC: 153 MG/DL (ref 65–100)
GLUCOSE UR STRIP.AUTO-MCNC: NEGATIVE MG/DL
HCT VFR BLD AUTO: 41.5 % (ref 35–47)
HGB BLD-MCNC: 13.7 G/DL (ref 11.5–16)
HGB UR QL STRIP: NEGATIVE
IMM GRANULOCYTES # BLD AUTO: 0.1 K/UL (ref 0–0.04)
IMM GRANULOCYTES NFR BLD AUTO: 1 % (ref 0–0.5)
INR PPP: 1 (ref 0.9–1.1)
KETONES UR QL STRIP.AUTO: ABNORMAL MG/DL
LEUKOCYTE ESTERASE UR QL STRIP.AUTO: ABNORMAL
LYMPHOCYTES # BLD: 1.1 K/UL (ref 0.8–3.5)
LYMPHOCYTES NFR BLD: 10 % (ref 12–49)
MAGNESIUM SERPL-MCNC: 1.9 MG/DL (ref 1.6–2.4)
MCH RBC QN AUTO: 30.7 PG (ref 26–34)
MCHC RBC AUTO-ENTMCNC: 33 G/DL (ref 30–36.5)
MCV RBC AUTO: 93 FL (ref 80–99)
MONOCYTES # BLD: 0.6 K/UL (ref 0–1)
MONOCYTES NFR BLD: 6 % (ref 5–13)
NEUTS SEG # BLD: 9 K/UL (ref 1.8–8)
NEUTS SEG NFR BLD: 82 % (ref 32–75)
NITRITE UR QL STRIP.AUTO: NEGATIVE
NRBC # BLD: 0 K/UL (ref 0–0.01)
NRBC BLD-RTO: 0 PER 100 WBC
PH UR STRIP: 7.5 (ref 5–8)
PLATELET # BLD AUTO: 273 K/UL (ref 150–400)
PMV BLD AUTO: 10 FL (ref 8.9–12.9)
POTASSIUM SERPL-SCNC: 2.9 MMOL/L (ref 3.5–5.1)
PROCALCITONIN SERPL-MCNC: <0.05 NG/ML
PROT SERPL-MCNC: 7.3 G/DL (ref 6.4–8.2)
PROT UR STRIP-MCNC: NEGATIVE MG/DL
PROTHROMBIN TIME: 10.7 SEC (ref 9–11.1)
RBC # BLD AUTO: 4.46 M/UL (ref 3.8–5.2)
RBC #/AREA URNS HPF: ABNORMAL /HPF (ref 0–5)
SAMPLES BEING HELD,HOLD: NORMAL
SODIUM SERPL-SCNC: 134 MMOL/L (ref 136–145)
SP GR UR REFRACTOMETRY: 1.02
T3FREE SERPL-MCNC: 1.9 PG/ML (ref 2.2–4)
T4 FREE SERPL-MCNC: 1.2 NG/DL (ref 0.8–1.5)
TROPONIN I SERPL HS-MCNC: 52 NG/L (ref 0–51)
TSH SERPL DL<=0.05 MIU/L-ACNC: 29.4 UIU/ML (ref 0.36–3.74)
UROBILINOGEN UR QL STRIP.AUTO: 0.2 EU/DL (ref 0.2–1)
VIT B12 SERPL-MCNC: 1115 PG/ML (ref 193–986)
WBC # BLD AUTO: 10.9 K/UL (ref 3.6–11)
WBC URNS QL MICRO: ABNORMAL /HPF (ref 0–4)

## 2023-03-19 PROCEDURE — 74011250636 HC RX REV CODE- 250/636: Performed by: EMERGENCY MEDICINE

## 2023-03-19 PROCEDURE — 77010033678 HC OXYGEN DAILY

## 2023-03-19 PROCEDURE — 70496 CT ANGIOGRAPHY HEAD: CPT

## 2023-03-19 PROCEDURE — 96374 THER/PROPH/DIAG INJ IV PUSH: CPT

## 2023-03-19 PROCEDURE — 65270000046 HC RM TELEMETRY

## 2023-03-19 PROCEDURE — 81001 URINALYSIS AUTO W/SCOPE: CPT

## 2023-03-19 PROCEDURE — 84484 ASSAY OF TROPONIN QUANT: CPT

## 2023-03-19 PROCEDURE — 74011250637 HC RX REV CODE- 250/637: Performed by: GENERAL ACUTE CARE HOSPITAL

## 2023-03-19 PROCEDURE — 84439 ASSAY OF FREE THYROXINE: CPT

## 2023-03-19 PROCEDURE — 36415 COLL VENOUS BLD VENIPUNCTURE: CPT

## 2023-03-19 PROCEDURE — 0042T CT CODE NEURO PERF W CBF: CPT

## 2023-03-19 PROCEDURE — 80053 COMPREHEN METABOLIC PANEL: CPT

## 2023-03-19 PROCEDURE — 84443 ASSAY THYROID STIM HORMONE: CPT

## 2023-03-19 PROCEDURE — 85610 PROTHROMBIN TIME: CPT

## 2023-03-19 PROCEDURE — 80307 DRUG TEST PRSMV CHEM ANLYZR: CPT

## 2023-03-19 PROCEDURE — 70551 MRI BRAIN STEM W/O DYE: CPT

## 2023-03-19 PROCEDURE — 4A03X5D MEASUREMENT OF ARTERIAL FLOW, INTRACRANIAL, EXTERNAL APPROACH: ICD-10-PCS | Performed by: RADIOLOGY

## 2023-03-19 PROCEDURE — 71045 X-RAY EXAM CHEST 1 VIEW: CPT

## 2023-03-19 PROCEDURE — 70450 CT HEAD/BRAIN W/O DYE: CPT

## 2023-03-19 PROCEDURE — 82607 VITAMIN B-12: CPT

## 2023-03-19 PROCEDURE — 74011000636 HC RX REV CODE- 636: Performed by: EMERGENCY MEDICINE

## 2023-03-19 PROCEDURE — 83735 ASSAY OF MAGNESIUM: CPT

## 2023-03-19 PROCEDURE — 84145 PROCALCITONIN (PCT): CPT

## 2023-03-19 PROCEDURE — 74011250636 HC RX REV CODE- 250/636: Performed by: GENERAL ACUTE CARE HOSPITAL

## 2023-03-19 PROCEDURE — 99285 EMERGENCY DEPT VISIT HI MDM: CPT

## 2023-03-19 PROCEDURE — 85025 COMPLETE CBC W/AUTO DIFF WBC: CPT

## 2023-03-19 PROCEDURE — 93005 ELECTROCARDIOGRAM TRACING: CPT

## 2023-03-19 PROCEDURE — 84481 FREE ASSAY (FT-3): CPT

## 2023-03-19 PROCEDURE — 82306 VITAMIN D 25 HYDROXY: CPT

## 2023-03-19 RX ORDER — SODIUM CHLORIDE 9 MG/ML
100 INJECTION, SOLUTION INTRAVENOUS CONTINUOUS
Status: DISCONTINUED | OUTPATIENT
Start: 2023-03-19 | End: 2023-03-19

## 2023-03-19 RX ORDER — POTASSIUM CHLORIDE 20 MEQ/1
40 TABLET, EXTENDED RELEASE ORAL 3 TIMES DAILY
Status: COMPLETED | OUTPATIENT
Start: 2023-03-19 | End: 2023-03-19

## 2023-03-19 RX ORDER — ENOXAPARIN SODIUM 100 MG/ML
40 INJECTION SUBCUTANEOUS EVERY 24 HOURS
Status: DISCONTINUED | OUTPATIENT
Start: 2023-03-19 | End: 2023-03-27 | Stop reason: HOSPADM

## 2023-03-19 RX ORDER — ZOLPIDEM TARTRATE 5 MG/1
TABLET ORAL
Status: ON HOLD | COMMUNITY
End: 2023-03-23

## 2023-03-19 RX ORDER — LEVOTHYROXINE SODIUM 100 UG/1
100 TABLET ORAL
Status: DISCONTINUED | OUTPATIENT
Start: 2023-03-20 | End: 2023-03-27 | Stop reason: HOSPADM

## 2023-03-19 RX ORDER — HYDROCHLOROTHIAZIDE 25 MG/1
25 TABLET ORAL DAILY
Status: DISCONTINUED | OUTPATIENT
Start: 2023-03-20 | End: 2023-03-27 | Stop reason: HOSPADM

## 2023-03-19 RX ORDER — MELATONIN
1000 DAILY
Status: DISCONTINUED | OUTPATIENT
Start: 2023-03-20 | End: 2023-03-27 | Stop reason: HOSPADM

## 2023-03-19 RX ORDER — BUSPIRONE HYDROCHLORIDE 10 MG/1
10 TABLET ORAL 2 TIMES DAILY
Status: DISCONTINUED | OUTPATIENT
Start: 2023-03-19 | End: 2023-03-27 | Stop reason: HOSPADM

## 2023-03-19 RX ORDER — MIRTAZAPINE 15 MG/1
15 TABLET, FILM COATED ORAL
Status: DISCONTINUED | OUTPATIENT
Start: 2023-03-19 | End: 2023-03-27 | Stop reason: HOSPADM

## 2023-03-19 RX ORDER — ACETAMINOPHEN 650 MG/1
650 SUPPOSITORY RECTAL
Status: DISCONTINUED | OUTPATIENT
Start: 2023-03-19 | End: 2023-03-27 | Stop reason: HOSPADM

## 2023-03-19 RX ORDER — ACETAMINOPHEN 325 MG/1
650 TABLET ORAL
Status: DISCONTINUED | OUTPATIENT
Start: 2023-03-19 | End: 2023-03-27 | Stop reason: HOSPADM

## 2023-03-19 RX ORDER — SODIUM CHLORIDE AND POTASSIUM CHLORIDE 300; 900 MG/100ML; MG/100ML
INJECTION, SOLUTION INTRAVENOUS CONTINUOUS
Status: DISCONTINUED | OUTPATIENT
Start: 2023-03-19 | End: 2023-03-20

## 2023-03-19 RX ORDER — ATORVASTATIN CALCIUM 20 MG/1
20 TABLET, FILM COATED ORAL EVERY EVENING
Status: DISCONTINUED | OUTPATIENT
Start: 2023-03-19 | End: 2023-03-27 | Stop reason: HOSPADM

## 2023-03-19 RX ORDER — QUETIAPINE FUMARATE 25 MG/1
25 TABLET, FILM COATED ORAL
COMMUNITY
Start: 2023-01-26 | End: 2023-03-27

## 2023-03-19 RX ORDER — ONDANSETRON 2 MG/ML
4 INJECTION INTRAMUSCULAR; INTRAVENOUS ONCE
Status: COMPLETED | OUTPATIENT
Start: 2023-03-19 | End: 2023-03-19

## 2023-03-19 RX ADMIN — IOMEPROL INJECTION 140 ML: 714 INJECTION, SOLUTION INTRAVASCULAR at 13:48

## 2023-03-19 RX ADMIN — POTASSIUM CHLORIDE 40 MEQ: 1500 TABLET, EXTENDED RELEASE ORAL at 18:37

## 2023-03-19 RX ADMIN — ONDANSETRON 4 MG: 2 INJECTION INTRAMUSCULAR; INTRAVENOUS at 14:32

## 2023-03-19 RX ADMIN — POTASSIUM CHLORIDE AND SODIUM CHLORIDE: 900; 300 INJECTION, SOLUTION INTRAVENOUS at 18:55

## 2023-03-19 RX ADMIN — POTASSIUM CHLORIDE 20 MEQ: 1500 TABLET, EXTENDED RELEASE ORAL at 23:39

## 2023-03-19 RX ADMIN — ATORVASTATIN CALCIUM 20 MG: 20 TABLET, FILM COATED ORAL at 18:37

## 2023-03-19 RX ADMIN — ENOXAPARIN SODIUM 40 MG: 100 INJECTION SUBCUTANEOUS at 23:39

## 2023-03-19 NOTE — ED PROVIDER NOTES
[facility] EMERGENCY DEPT  EMERGENCY DEPARTMENT ENCOUNTER       Pt Name: Dontae Livingston  MRN: [id number]  [place] 1940  Date of evaluation: 3/19/2023  Provider: Donna Dave MD   PCP: All Garza MD  Note Started: 1:17 PM 3/19/23     CHIEF COMPLAINT       Chief Complaint   Patient presents with    Fatigue     Ems called for lethargy for the past couple days. Says she is usually up walking around but was not today and is ams. A&Ox4. Says she does not know what is going on. Pupils are pinpoint         HISTORY OF PRESENT ILLNESS: 1 or more elements      History From: Patient, EMS, and Patient's Son  HPI Limitations : Altered Mental Status     Dontae Livingston is a [de-identified] y.o. female who presents with severe AMS, confusion, nausea, vomiting over the past 24-48 hours. Patient is \"not been herself\" over the last 1 to 2 days, over this morning woke up very lethargic and was not responsive for a period of time. There was no seizure activity. Patient came to the ER, confused, vomiting. Patient is not on anticoagulants, no history of stroke or cancer. History limited due to altered mental status. Nursing Notes were all reviewed and agreed with or any disagreements were addressed in the HPI. REVIEW OF SYSTEMS      Review of Systems     Positives and Pertinent negatives as per HPI.     PAST HISTORY     Past Medical History:  Past Medical History:   Diagnosis Date    Allergic rhinitis     Anesthesia complication     Pt reports dizziness after anesthesia     Arrhythmia     Left Bundle Branch Block    Arthritis     BBB (bundle branch block)     Depression     History of not currently    GERD (gastroesophageal reflux disease)     Hyperlipidemia     Impaired fasting glucose     Unspecified essential hypertension     Unspecified hypothyroidism     Uterine prolapse     Vertigo        Past Surgical History:  Past Surgical History:   Procedure Laterality Date    HX APPENDECTOMY  1987    HX BREAST BIOPSY Bilateral     >5, all benign    HX BREAST BIOPSY Left 11/29/2017    benign    HX COLONOSCOPY  8/15/12    diverticulosis    HX ENDOSCOPY  8/15/12    hiatal hernia    HX HAMMER TOE REPAIR Left     w/ bunion surgery (per previous PCP)    HX KNEE REPLACEMENT Left 10/22/2019    [place]    HX PARTIAL HYSTERECTOMY      HX TONSILLECTOMY         Family History:  Family History   Problem Relation Age of Onset    Thyroid Disease Father         hypo    Coronary Art Dis Father         s/p CABG    Heart Disease Father         s/p valve replacement    Diabetes Brother     Thyroid Disease Brother     Cancer Brother         kidney    Coronary Art Dis Brother         s/p CABG    Dementia Mother         vascular    Elevated Lipids Mother     Coronary Art Dis Mother     OSTEOARTHRITIS Mother     Diabetes Mother     Gall Bladder Disease Mother     Heart Disease Mother     Hypertension Mother     Stroke Mother     Obesity Son     Hypertension Son     No Known Problems Son     Diabetes Maternal Grandmother     Cancer Maternal Grandmother         pancreatic    Diabetes Paternal Grandmother     Diabetes Maternal Aunt     Gall Bladder Disease Maternal Aunt     Hypertension Maternal Aunt     Diabetes Paternal Aunt     Hypertension Maternal Aunt        Social History:  Social History     Tobacco Use    Smoking status: Never    Smokeless tobacco: Never   Vaping Use    Vaping Use: Never used   Substance Use Topics    Alcohol use: No    Drug use: No       Allergies: Allergies   Allergen Reactions    Amlodipine Unknown (comments)    Codeine Nausea Only    Epinephrine Palpitations    Erythromycin Other (comments)     GI upset    Losartan Swelling     Lip swelling    Mobic [Meloxicam] Swelling     Lip swelling    Pcn [Penicillins] Hives     Fainted       CURRENT MEDICATIONS      Previous Medications    ACETAMINOPHEN (TYLENOL) 500 MG TABLET    1,000 mg every six (6) hours as needed.     ATORVASTATIN (LIPITOR) 20 MG TABLET    TAKE 1 TABLET BY MOUTH EVERY DAY IN THE EVENING    B COMPLEX-VITAMIN C-FOLIC ACID (NEPHROCAPS) 1 MG CAPSULE    DAILY AM for SUPPLEMENT    BLOOD-GLUCOSE METER MONITORING KIT    Pharmacist to choose preferred meter and strips. Dx: R73.01    BUSPIRONE (BUSPAR) 10 MG TABLET    Take 1 Tablet by mouth two (2) times a day. NOTICE DOSE INCREASE. CALCIUM CITRATE/VITAMIN D3 (CALCIUM CITRATE + D PO)    Take 1 Tab by mouth as needed. CETIRIZINE 10 MG CAP    Take  by mouth every evening. CHOLECALCIFEROL (VITAMIN D3) (1000 UNITS /25 MCG) TABLET    Take  by mouth daily. ESTRADIOL (ESTRACE) 0.01 % (0.1 MG/GRAM) VAGINAL CREAM        GLUCOSE BLOOD VI TEST STRIPS (BLOOD GLUCOSE TEST) STRIP    Pharmacist to choose preferred meter and strips. Dx: R73.01    GUAIFENESIN (MUCINEX PO)    Take  by mouth two (2) times a day. HYDROCHLOROTHIAZIDE (HYDRODIURIL) 25 MG TABLET    TAKE 1 TABLET BY MOUTH EVERY DAY    LANCETS (ONE TOUCH DELICA) 33 GAUGE MISC    Test your sugar three times a week fasting. Dx: R73.01    MIRTAZAPINE (REMERON) 15 MG TABLET    Take 15 mg by mouth nightly. SIMETHICONE (GAS-X) 125 MG CAPSULE    Take 125 mg by mouth four (4) times daily as needed for Flatulence. SYNTHROID 75 MCG TABLET    TAKE 1 TABLET BY MOUTH EVERY DAY. EXCEPT 1/2 TAB ON SUNDAY       PHYSICAL EXAM      ED Triage Vitals   ED Encounter Vitals Group      BP 03/19/23 1258 (!) 177/85      Pulse (Heart Rate) 03/19/23 1258 75      Resp Rate 03/19/23 1258 10      Temp --       Temp src --       O2 Sat (%) 03/19/23 1300 93 %      Weight --       Height --         Physical Exam  Vitals and nursing note reviewed. Constitutional:       General: She is in acute distress. Appearance: She is well-developed. She is ill-appearing. HENT:      Head: Normocephalic and atraumatic. Mouth/Throat:      Pharynx: No oropharyngeal exudate. Eyes:      Conjunctiva/sclera: Conjunctivae normal.      Pupils: Pupils are equal, round, and reactive to light.    Cardiovascular:      Rate and Rhythm: Normal rate and regular rhythm. Heart sounds: Normal heart sounds. No murmur heard. Pulmonary:      Effort: Pulmonary effort is normal. No tachypnea or accessory muscle usage. Breath sounds: Normal breath sounds. No wheezing or rales. Abdominal:      General: Bowel sounds are normal. There is no distension. Palpations: Abdomen is soft. Tenderness: There is no abdominal tenderness. Musculoskeletal:         General: No deformity. Normal range of motion. Cervical back: Normal range of motion and neck supple. Skin:     General: Skin is warm. Findings: No rash. Neurological:      Mental Status: She is lethargic, disoriented and confused. GCS: GCS eye subscore is 4. GCS verbal subscore is 4. GCS motor subscore is 5. Cranial Nerves: No dysarthria or facial asymmetry. Sensory: No sensory deficit. Coordination: Coordination abnormal.      Comments: Symmetric smile. No facial droop. Psychiatric:         Behavior: Behavior normal.          DIAGNOSTIC RESULTS   LABS:     Recent Results (from the past 12 hour(s))   CBC WITH AUTOMATED DIFF    Collection Time: 03/19/23  1:33 PM   Result Value Ref Range    WBC 10.9 3.6 - 11.0 K/uL    RBC 4.46 3.80 - 5.20 M/uL    HGB 13.7 11.5 - 16.0 g/dL    HCT 41.5 35.0 - 47.0 %    MCV 93.0 80.0 - 99.0 FL    MCH 30.7 26.0 - 34.0 PG    MCHC 33.0 30.0 - 36.5 g/dL    RDW 13.8 11.5 - 14.5 %    PLATELET 963 497 - 951 K/uL    MPV 10.0 8.9 - 12.9 FL    NRBC 0.0 0  WBC    ABSOLUTE NRBC 0.00 0.00 - 0.01 K/uL    NEUTROPHILS 82 (H) 32 - 75 %    LYMPHOCYTES 10 (L) 12 - 49 %    MONOCYTES 6 5 - 13 %    EOSINOPHILS 0 0 - 7 %    BASOPHILS 1 0 - 1 %    IMMATURE GRANULOCYTES 1 (H) 0.0 - 0.5 %    ABS. NEUTROPHILS 9.0 (H) 1.8 - 8.0 K/UL    ABS. LYMPHOCYTES 1.1 0.8 - 3.5 K/UL    ABS. MONOCYTES 0.6 0.0 - 1.0 K/UL    ABS. EOSINOPHILS 0.0 0.0 - 0.4 K/UL    ABS. BASOPHILS 0.1 0.0 - 0.1 K/UL    ABS. IMM.  GRANS. 0.1 (H) 0.00 - 0.04 K/UL    DF AUTOMATED     METABOLIC PANEL, COMPREHENSIVE    Collection Time: 03/19/23  1:33 PM   Result Value Ref Range    Sodium 134 (L) 136 - 145 mmol/L    Potassium 2.9 (L) 3.5 - 5.1 mmol/L    Chloride 95 (L) 97 - 108 mmol/L    CO2 32 21 - 32 mmol/L    Anion gap 7 5 - 15 mmol/L    Glucose 153 (H) 65 - 100 mg/dL    BUN 16 6 - 20 MG/DL    Creatinine 0.82 0.55 - 1.02 MG/DL    BUN/Creatinine ratio 20 12 - 20      eGFR >60 >60 ml/min/1.73m2    Calcium 9.7 8.5 - 10.1 MG/DL    Bilirubin, total 0.5 0.2 - 1.0 MG/DL    ALT (SGPT) 22 12 - 78 U/L    AST (SGOT) 18 15 - 37 U/L    Alk. phosphatase 62 45 - 117 U/L    Protein, total 7.3 6.4 - 8.2 g/dL    Albumin 4.0 3.5 - 5.0 g/dL    Globulin 3.3 2.0 - 4.0 g/dL    A-G Ratio 1.2 1.1 - 2.2     PROTHROMBIN TIME + INR    Collection Time: 03/19/23  1:33 PM   Result Value Ref Range    INR 1.0 0.9 - 1.1      Prothrombin time 10.7 9.0 - 11.1 sec   TROPONIN-HIGH SENSITIVITY    Collection Time: 03/19/23  1:33 PM   Result Value Ref Range    Troponin-High Sensitivity 52 (H) 0 - 51 ng/L   PROCALCITONIN    Collection Time: 03/19/23  1:33 PM   Result Value Ref Range    Procalcitonin <0.05 ng/mL   TSH 3RD GENERATION    Collection Time: 03/19/23  1:33 PM   Result Value Ref Range    TSH 29.40 (H) 0.36 - 3.74 uIU/mL        EKG:     Cardiac monitor: Normal sinus rhythm, no ectopy     RADIOLOGY:  Non-plain film images such as CT, Ultrasound and MRI are read by the radiologist. Plain radiographic images are visualized and preliminarily interpreted by the ED Provider with the below findings:     Chest x-ray: Interpreted by me, no edema, no cardiomegaly     Interpretation per the Radiologist below, if available at the time of this note:     XR CHEST PORT    Result Date: 3/19/2023  INDICATION:  CVA COMPARISON: December 2022 FINDINGS: Single AP portable view of the chest obtained at 1422 demonstrates a stable cardiomediastinal silhouette. The lungs are clear bilaterally. No osseous abnormalities are seen.      No evidence of acute cardiopulmonary process. CT CODE NEURO HEAD WO CONTRAST    Result Date: 3/19/2023  EXAM: CT CODE NEURO HEAD WO CONTRAST INDICATION: Code Stroke COMPARISON: CT June 2021. CONTRAST: None. TECHNIQUE: Unenhanced CT of the head was performed using 5 mm images. Brain and bone windows were generated. Coronal and sagittal reformats. CT dose reduction was achieved through use of a standardized protocol tailored for this examination and automatic exposure control for dose modulation. FINDINGS: The ventricles and sulci are normal in size, shape and configuration. There is no significant white matter disease. There is no intracranial hemorrhage, extra-axial collection, or mass effect. The basilar cisterns are open. No CT evidence of acute infarct. The bone windows demonstrate no abnormalities. The visualized portions of the paranasal sinuses and mastoid air cells are clear. No acute intracranial abnormality.         PROCEDURES   Unless otherwise noted below, none  Critical Care  Performed by: Radha Casillas MD  Authorized by: Radha Casillas MD     Critical care provider statement:     Critical care time (minutes):  40    Critical care time was exclusive of:  Separately billable procedures and treating other patients and teaching time    Critical care was necessary to treat or prevent imminent or life-threatening deterioration of the following conditions:  CNS failure or compromise    Critical care was time spent personally by me on the following activities:  Development of treatment plan with patient or surrogate, review of old charts, pulse oximetry, re-evaluation of patient's condition, ordering and performing treatments and interventions, ordering and review of laboratory studies, ordering and review of radiographic studies, evaluation of patient's response to treatment and examination of patient    Care discussed with: admitting provider       CRITICAL CARE TIME   40    EMERGENCY DEPARTMENT COURSE and DIFFERENTIAL DIAGNOSIS/MDM   Vitals:    Vitals:    03/19/23 1300 03/19/23 1416 03/19/23 1430 03/19/23 1500   BP:   (!) 160/84 (!) 155/80   Pulse: 73  74 65   Resp: 18      Temp:  98.4 °F (36.9 °C)     SpO2: 93%  98% 99%        Patient was given the following medications:  Medications   ondansetron (ZOFRAN) injection 4 mg (4 mg IntraVENous Given 3/19/23 1432)   iomeprol (IOMERON 350) 350 mg iodine /mL (71.44 %) contrast injection 200 mL (140 mL IntraVENous Given 3/19/23 1348)       CONSULTS: (Who and What was discussed)  IP CONSULT TO HOSPITALIST    Chronic Conditions: Insomnia, Depression, no CAD/MI, no CVA hx, no DM    Social Determinants affecting Dx or Tx: None    Records Reviewed (source and summary of external notes): Prior medical records and Nursing notes    CC/HPI Summary, DDx, ED Course, and Reassessment: Patient is a 82-year-old female, presenting with acute onset of neurologic deficit within 24 hours based on initial history. A level 2 code stroke was activated, patient taken emergently to CAT scan and had emergency teleneurology evaluation. Patient's blood sugar was normal, symptoms of nausea vomiting were treated with IV fluids and IV Zofran. CT scan of the brain was performed to rule out intracranial hemorrhage, which was negative. CT angiogram of the head and neck was performed to rule out large vessel occlusion, which was also negative. Patient was found found to have a very high TSH level and we will check the free T4 and T3 levels. Unclear etiology of symptoms, teleneurology believes this may be secondary to posterior circulation stroke and patient will need MRI for further stroke work-up. Also further work-up for cause of encephalopathy. Disposition Considerations (Tests not done, Shared Decision Making, Pt Expectation of Test or Tx.):      FINAL IMPRESSION     1. Acute encephalopathy    2. Hypothyroidism, unspecified type    3.  Vertigo DISPOSITION/PLAN   Admitted    Admit Note: Pt is being admitted by Dr. Aleyda Gonsalves. The results of their tests and reason(s) for their admission have been discussed with pt and/or available family. They convey agreement and understanding for the need to be admitted and for the admission diagnosis. I am the Primary Clinician of Record. Marylen Lazier, MD (electronically signed)    (Please note that parts of this dictation were completed with voice recognition software. Quite often unanticipated grammatical, syntax, homophones, and other interpretive errors are inadvertently transcribed by the computer software. Please disregards these errors.  Please excuse any errors that have escaped final proofreading.)

## 2023-03-19 NOTE — H&P
Hospitalist Admission Note    NAME: Walter Rose   :  1940   MRN:  [id number]     Date/Time:  3/19/2023 5:41 PM    Patient PCP: Lucia Villalpando MD  ______________________________________________________________________  Given the patient's current clinical presentation, I have a high level of concern for decompensation if discharged from the emergency department. Complex decision making was performed, which includes reviewing the patient's available past medical records, laboratory results, and x-ray films. My assessment of this patient's clinical condition and my plan of care is as follows. Assessment / Plan:    AMS  Depression  Suicidal ideation  Hypothyroidism  Hypokalemia, severe  R/o CVA  Obtain MRI of the brain for CVA work-up completion, although stroke less likely. If that comes negative then can DC echocardiogram.  One-to-one sitter  Psych consult, will leave it to psych to adjust medications in the setting of suicidal attempt and hypothyroidism. Resume BuSpar and Remeron. Suicide precautions  IV fluids  Evaluation patient was alert and oriented x3 and not lethargic with good respiratory rate. No need for Narcan anymore. Not sure patient really did overdose on tramadol or any other medications. Check urine toxicology. Aggressively replete electrolytes and check magnesium level. Hold HCTZ. Increase Synthroid dose to 200 mcg daily. Patient did report that she is compliant with her medications. Patient wishes to be DNR. As per son, Rick Last, that's been patient's wishes for long time. Arthritis  Hyperlipidemia  Hypertension  Resume home medications    Code Status: DNR  Surrogate Decision Maker:   DVT Prophylaxis:   GI Prophylaxis: not indicated  Baseline:       Subjective:   CHIEF COMPLAINT: AMS    HISTORY OF PRESENT ILLNESS:     Walter Rose is a [de-identified] y.o.  female who presents with the above CC.   History obtained from charts, patient and family at bedside. Patient with known history of depression and anxiety. On Friday family reported that patient was unresponsive, lethargic but the next day she was at her usual state of health and had \" a good day\" but when she went back home she was acting more anxious and irritable. Over the last 24 hours patient became more lethargic and unresponsive and \" zoning out\" for which he decided to bring her to the hospital.  Initially patient did not answer me clearly what why she is in the hospital but later on she admitted to feeling depressed, she also admitted suicidal attempt yesterday by ingesting 8 pills of tramadol. Family bedside is not sure that the patient is on tramadol!!.   Otherwise, patient states that she is compliant with her regular home medications and there was no recent changes. Patient denies any other symptoms. Patient does not have a gun at home. She denies history of suicidal ideation/attempt. ED work-up: CBC essentially not that remarkable. UA negative. Chemistry with potassium 2.9. Procalcitonin negative. TSH 2 9. XR CHEST PORT    Result Date: 3/19/2023  No evidence of acute cardiopulmonary process. CTA CODE NEURO HEAD AND NECK W CONT    Result Date: 3/19/2023  No evidence for acute large vessel arterial occlusion or cerebral perfusion abnormality. CT CODE NEURO HEAD WO CONTRAST    Result Date: 3/19/2023  No acute intracranial abnormality. CT CODE NEURO PERF W CBF    Result Date: 3/19/2023  No evidence for acute large vessel arterial occlusion or cerebral perfusion abnormality. We were asked to admit for work up and evaluation of the above problems.      Past Medical History:   Diagnosis Date    Allergic rhinitis     Anesthesia complication     Pt reports dizziness after anesthesia     Arrhythmia     Left Bundle Branch Block    Arthritis     BBB (bundle branch block)     Depression     History of not currently    GERD (gastroesophageal reflux disease) Hyperlipidemia     Impaired fasting glucose     Unspecified essential hypertension     Unspecified hypothyroidism     Uterine prolapse     Vertigo         Past Surgical History:   Procedure Laterality Date    HX APPENDECTOMY  1987    HX BREAST BIOPSY Bilateral     >5, all benign    HX BREAST BIOPSY Left 11/29/2017    benign    HX COLONOSCOPY  8/15/12    diverticulosis    HX ENDOSCOPY  8/15/12    hiatal hernia    HX HAMMER TOE REPAIR Left     w/ bunion surgery (per previous PCP)    HX KNEE REPLACEMENT Left 10/22/2019    [facility]    HX PARTIAL HYSTERECTOMY      HX TONSILLECTOMY         Social History     Tobacco Use    Smoking status: Never    Smokeless tobacco: Never   Substance Use Topics    Alcohol use: No        Family History   Problem Relation Age of Onset    Thyroid Disease Father         hypo    Coronary Art Dis Father         s/p CABG    Heart Disease Father         s/p valve replacement    Diabetes Brother     Thyroid Disease Brother     Cancer Brother         kidney    Coronary Art Dis Brother         s/p CABG    Dementia Mother         vascular    Elevated Lipids Mother     Coronary Art Dis Mother     OSTEOARTHRITIS Mother     Diabetes Mother     Gall Bladder Disease Mother     Heart Disease Mother     Hypertension Mother     Stroke Mother     Obesity Son     Hypertension Son     No Known Problems Son     Diabetes Maternal Grandmother     Cancer Maternal Grandmother         pancreatic    Diabetes Paternal Grandmother     Diabetes Maternal Aunt     Gall Bladder Disease Maternal Aunt     Hypertension Maternal Aunt     Diabetes Paternal Aunt     Hypertension Maternal Aunt      Allergies   Allergen Reactions    Amlodipine Unknown (comments)    Codeine Nausea Only    Epinephrine Palpitations    Erythromycin Other (comments)     GI upset    Losartan Swelling     Lip swelling    Mobic [Meloxicam] Swelling     Lip swelling    Pcn [Penicillins] Hives     Fainted        Prior to Admission medications    Medication Sig Start Date End Date Taking? Authorizing Provider   busPIRone (BUSPAR) 10 mg tablet Take 1 Tablet by mouth two (2) times a day. NOTICE DOSE INCREASE. 1/4/23   Kaz Henning MD   atorvastatin (LIPITOR) 20 mg tablet TAKE 1 TABLET BY MOUTH EVERY DAY IN THE EVENING 12/16/22   Kaz Henning MD   mirtazapine (REMERON) 15 mg tablet Take 15 mg by mouth nightly. Other, MD Carlo   Synthroid 75 mcg tablet TAKE 1 TABLET BY MOUTH EVERY DAY. EXCEPT 1/2 TAB ON SUNDAY 12/2/22   Kaz Henning MD   hydroCHLOROthiazide (HYDRODIURIL) 25 mg tablet TAKE 1 TABLET BY MOUTH EVERY DAY 9/29/22   Kaz Henning MD   estradioL (ESTRACE) 0.01 % (0.1 mg/gram) vaginal cream  2/28/22   Provider, Historical   Blood-Glucose Meter monitoring kit Pharmacist to choose preferred meter and strips. Dx: R73.01  Patient taking differently: Pharmacist to choose preferred meter and strips. Dx: R73.01 2/2/21   Kaz Henning MD   lancets (One Touch Adaline Manuel) 33 gauge misc Test your sugar three times a week fasting. Dx: R73.01  Patient taking differently: Test your sugar three times a week fasting. Dx: R73.01 2/2/21   Kaz Henning MD   glucose blood VI test strips (blood glucose test) strip Pharmacist to choose preferred meter and strips. Dx: R73.01  Patient taking differently: Pharmacist to choose preferred meter and strips. Dx: R73.01 2/2/21   Kaz Henning MD   guaifenesin (MUCINEX PO) Take  by mouth two (2) times a day. Provider, Historical   b complex-vitamin c-folic acid (NEPHROCAPS) 1 mg capsule DAILY AM for SUPPLEMENT    Provider, Historical   acetaminophen (TYLENOL) 500 mg tablet 1,000 mg every six (6) hours as needed. Provider, Historical   simethicone (GAS-X) 125 mg capsule Take 125 mg by mouth four (4) times daily as needed for Flatulence. Provider, Historical   calcium citrate/vitamin D3 (CALCIUM CITRATE + D PO) Take 1 Tab by mouth as needed.   Patient not taking: No sig reported    Other, MD Carlo   Cetirizine 10 mg cap Take  by mouth every evening. Provider, Historical   cholecalciferol (VITAMIN D3) (1000 Units /25 mcg) tablet Take  by mouth daily. Provider, Historical       REVIEW OF SYSTEMS:     Total of 12 systems reviewed, negative except for the above. I am not able to complete the review of systems because:    The patient is intubated and sedated    The patient has altered mental status due to his acute medical problems    The patient has baseline aphasia from prior stroke(s)    The patient has baseline dementia and is not reliable historian    The patient is in acute medical distress and unable to provide information            POSITIVE= underlined text  Negative = text not underlined  General:  fever, chills, sweats, generalized weakness, weight loss/gain,      loss of appetite   Eyes:    blurred vision, eye pain, loss of vision, double vision  ENT:    rhinorrhea, pharyngitis   Respiratory:   cough, sputum production, SOB, FIGUEROA, wheezing, pleuritic pain   Cardiology:   chest pain, palpitations, orthopnea, PND, edema, syncope   Gastrointestinal:  abdominal pain , N/V, diarrhea, dysphagia, constipation, bleeding   Genitourinary:  frequency, urgency, dysuria, hematuria, incontinence   Muskuloskeletal :  arthralgia, myalgia, back pain  Hematology:  easy bruising, nose or gum bleeding, lymphadenopathy   Dermatological: rash, ulceration, pruritis, color change / jaundice  Endocrine:   hot flashes or polydipsia   Neurological:  headache, dizziness, confusion, focal weakness, paresthesia,     Speech difficulties, memory loss, gait difficulty  Psychological: Feelings of anxiety, depression, agitation    Objective:   VITALS:    Visit Vitals  BP (!) 147/80   Pulse 67   Temp 98.4 °F (36.9 °C)   Resp 18   SpO2 99%     No intake or output data in the 24 hours ending 03/19/23 1741   Wt Readings from Last 10 Encounters:   12/23/22 77.6 kg (171 lb)   12/19/22 79.4 kg (175 lb) 12/13/22 79.7 kg (175 lb 11.3 oz)   12/02/22 83 kg (183 lb)   10/08/22 84.8 kg (187 lb)   05/31/22 86.3 kg (190 lb 3.2 oz)   11/29/21 84.8 kg (187 lb)   06/30/21 81.6 kg (180 lb)   06/18/21 82.3 kg (181 lb 7 oz)   05/24/21 84.4 kg (186 lb)       PHYSICAL EXAM:    General:    Alert, cooperative, no distress, appears stated age. HEENT: Atraumatic, anicteric sclerae, pink conjunctivae, MMM  Neck:  Supple, symmetrical  Lungs:   CTA. No Wheezing/Rhonchi. No rales. No tenderness  No Accessory muscle use. CVS:   Regular rhythm. No murmur. No JVD   GI/:   Soft, NT. ND.  BS normal  Extremities: No edema. No cyanosis. No clubbing. Skin:     Not pale. Not Jaundiced. No rashes   Psych:  Good insight. +  depressed, no eye contact with flat affect. Not anxious or agitated. Neurologic: Alert and oriented X 4. EOMs intact. No facial asymmetry. No slurred speech. Symmetrical strength, Sensation grossly intact.     _______________________________________________________________________  Care Plan discussed with:    Comments   Patient     Family      RN     Care Manager                    Consultant:      _______________________________________________________________________  Expected  Disposition:   Home with Family    HH/PT/OT/RN    SNF/LTC    [facility]    ________________________________________________________________________  TOTAL TIME:  54 Minutes    Critical Care Provided     Minutes non procedure based    Medical Decision Making:    Labs reviewed by myself: yes    Diagnostic data reviewed by myself: yes    Toxic drug monitoring: yes    MDM Discussion with pt, family    Given the patient's current clinical presentation, I have a high level of concern for decompensation if discharged from the emergency department.   Patient will be admitted as an inpt and estimated LOS 2 days     Comments    x Reviewed previous records   >50% of visit spent in counseling and coordination of care x Discussion with patient and/or family and questions answered       ________________________________________________________________________  Signed: Omar Aguila MD    Procedures: see electronic medical records for all procedures/Xrays and details which were not copied into this note but were reviewed prior to creation of Plan. LAB DATA REVIEWED:    Recent Results (from the past 24 hour(s))   CBC WITH AUTOMATED DIFF    Collection Time: 03/19/23  1:33 PM   Result Value Ref Range    WBC 10.9 3.6 - 11.0 K/uL    RBC 4.46 3.80 - 5.20 M/uL    HGB 13.7 11.5 - 16.0 g/dL    HCT 41.5 35.0 - 47.0 %    MCV 93.0 80.0 - 99.0 FL    MCH 30.7 26.0 - 34.0 PG    MCHC 33.0 30.0 - 36.5 g/dL    RDW 13.8 11.5 - 14.5 %    PLATELET 620 476 - 973 K/uL    MPV 10.0 8.9 - 12.9 FL    NRBC 0.0 0  WBC    ABSOLUTE NRBC 0.00 0.00 - 0.01 K/uL    NEUTROPHILS 82 (H) 32 - 75 %    LYMPHOCYTES 10 (L) 12 - 49 %    MONOCYTES 6 5 - 13 %    EOSINOPHILS 0 0 - 7 %    BASOPHILS 1 0 - 1 %    IMMATURE GRANULOCYTES 1 (H) 0.0 - 0.5 %    ABS. NEUTROPHILS 9.0 (H) 1.8 - 8.0 K/UL    ABS. LYMPHOCYTES 1.1 0.8 - 3.5 K/UL    ABS. MONOCYTES 0.6 0.0 - 1.0 K/UL    ABS. EOSINOPHILS 0.0 0.0 - 0.4 K/UL    ABS. BASOPHILS 0.1 0.0 - 0.1 K/UL    ABS. IMM. GRANS. 0.1 (H) 0.00 - 0.04 K/UL    DF AUTOMATED     METABOLIC PANEL, COMPREHENSIVE    Collection Time: 03/19/23  1:33 PM   Result Value Ref Range    Sodium 134 (L) 136 - 145 mmol/L    Potassium 2.9 (L) 3.5 - 5.1 mmol/L    Chloride 95 (L) 97 - 108 mmol/L    CO2 32 21 - 32 mmol/L    Anion gap 7 5 - 15 mmol/L    Glucose 153 (H) 65 - 100 mg/dL    BUN 16 6 - 20 MG/DL    Creatinine 0.82 0.55 - 1.02 MG/DL    BUN/Creatinine ratio 20 12 - 20      eGFR >60 >60 ml/min/1.73m2    Calcium 9.7 8.5 - 10.1 MG/DL    Bilirubin, total 0.5 0.2 - 1.0 MG/DL    ALT (SGPT) 22 12 - 78 U/L    AST (SGOT) 18 15 - 37 U/L    Alk.  phosphatase 62 45 - 117 U/L    Protein, total 7.3 6.4 - 8.2 g/dL    Albumin 4.0 3.5 - 5.0 g/dL    Globulin 3.3 2.0 - 4.0 g/dL    A-G Ratio 1.2 1.1 - 2.2 PROTHROMBIN TIME + INR    Collection Time: 03/19/23  1:33 PM   Result Value Ref Range    INR 1.0 0.9 - 1.1      Prothrombin time 10.7 9.0 - 11.1 sec   TROPONIN-HIGH SENSITIVITY    Collection Time: 03/19/23  1:33 PM   Result Value Ref Range    Troponin-High Sensitivity 52 (H) 0 - 51 ng/L   PROCALCITONIN    Collection Time: 03/19/23  1:33 PM   Result Value Ref Range    Procalcitonin <0.05 ng/mL   TSH 3RD GENERATION    Collection Time: 03/19/23  1:33 PM   Result Value Ref Range    TSH 29.40 (H) 0.36 - 3.74 uIU/mL   URINALYSIS W/ RFLX MICROSCOPIC    Collection Time: 03/19/23  3:26 PM   Result Value Ref Range    Color YELLOW/STRAW      Appearance CLEAR CLEAR      Specific gravity 1.024      pH (UA) 7.5 5.0 - 8.0      Protein Negative NEG mg/dL    Glucose Negative NEG mg/dL    Ketone TRACE (A) NEG mg/dL    Bilirubin Negative NEG      Blood Negative NEG      Urobilinogen 0.2 0.2 - 1.0 EU/dL    Nitrites Negative NEG      Leukocyte Esterase SMALL (A) NEG      WBC 0-4 0 - 4 /hpf    RBC 0-5 0 - 5 /hpf    Epithelial cells FEW FEW /lpf    Bacteria Negative NEG /hpf     XR CHEST PORT    Result Date: 3/19/2023  No evidence of acute cardiopulmonary process. CTA CODE NEURO HEAD AND NECK W CONT    Result Date: 3/19/2023  No evidence for acute large vessel arterial occlusion or cerebral perfusion abnormality. CT CODE NEURO HEAD WO CONTRAST    Result Date: 3/19/2023  No acute intracranial abnormality. CT CODE NEURO PERF W CBF    Result Date: 3/19/2023  No evidence for acute large vessel arterial occlusion or cerebral perfusion abnormality. Current Medications:   No current facility-administered medications for this encounter. Current Outpatient Medications:     busPIRone (BUSPAR) 10 mg tablet, Take 1 Tablet by mouth two (2) times a day.  NOTICE DOSE INCREASE., Disp: 60 Tablet, Rfl: 1    atorvastatin (LIPITOR) 20 mg tablet, TAKE 1 TABLET BY MOUTH EVERY DAY IN THE EVENING, Disp: 90 Tablet, Rfl: 1 mirtazapine (REMERON) 15 mg tablet, Take 15 mg by mouth nightly., Disp: , Rfl:     Synthroid 75 mcg tablet, TAKE 1 TABLET BY MOUTH EVERY DAY. EXCEPT 1/2 TAB ON SUNDAY, Disp: 90 Tablet, Rfl: 1    hydroCHLOROthiazide (HYDRODIURIL) 25 mg tablet, TAKE 1 TABLET BY MOUTH EVERY DAY, Disp: 90 Tablet, Rfl: 1    estradioL (ESTRACE) 0.01 % (0.1 mg/gram) vaginal cream, , Disp: , Rfl:     Blood-Glucose Meter monitoring kit, Pharmacist to choose preferred meter and strips. Dx: R73.01 (Patient taking differently: Pharmacist to choose preferred meter and strips. Dx: R73.01), Disp: 1 Kit, Rfl: 0    lancets (One Touch Delica) 33 gauge misc, Test your sugar three times a week fasting. Dx: R73.01 (Patient taking differently: Test your sugar three times a week fasting. Dx: R73.01), Disp: 100 Lancet, Rfl: 0    glucose blood VI test strips (blood glucose test) strip, Pharmacist to choose preferred meter and strips. Dx: R73.01 (Patient taking differently: Pharmacist to choose preferred meter and strips. Dx: R73.01), Disp: 100 Strip, Rfl: 0    guaifenesin (MUCINEX PO), Take  by mouth two (2) times a day., Disp: , Rfl:     b complex-vitamin c-folic acid (NEPHROCAPS) 1 mg capsule, DAILY AM for SUPPLEMENT, Disp: , Rfl:     acetaminophen (TYLENOL) 500 mg tablet, 1,000 mg every six (6) hours as needed. , Disp: , Rfl:     simethicone (GAS-X) 125 mg capsule, Take 125 mg by mouth four (4) times daily as needed for Flatulence. , Disp: , Rfl:     calcium citrate/vitamin D3 (CALCIUM CITRATE + D PO), Take 1 Tab by mouth as needed. (Patient not taking: No sig reported), Disp: , Rfl:     Cetirizine 10 mg cap, Take  by mouth every evening., Disp: , Rfl:     cholecalciferol (VITAMIN D3) (1000 Units /25 mcg) tablet, Take  by mouth daily. , Disp: , Rfl:

## 2023-03-19 NOTE — PROGRESS NOTES
-Please complete MRI History and Safety Screening Form   - Patient cannot be scanned until this form is completed, including signatures, and reviewed in MRI to ensure patient is SAFE and eligible for MRI. - CALL MRI when this has been successfully completed at [phone].

## 2023-03-19 NOTE — ED NOTES
New update pt is now saying she is SI+. Says she took a bunch of tramadol to kill herself now. This rn was updated by the hospitalist after seeing that he order psych consult. Pt is medically admitted, I spoke to charge rn pro and decision to not strip room was made. There is a sitter but they cannot do the si checks this rn is completing them.

## 2023-03-20 ENCOUNTER — PATIENT OUTREACH (OUTPATIENT)
Dept: CASE MANAGEMENT | Age: 83
End: 2023-03-20

## 2023-03-20 ENCOUNTER — APPOINTMENT (OUTPATIENT)
Dept: NON INVASIVE DIAGNOSTICS | Age: 83
DRG: 917 | End: 2023-03-20
Attending: GENERAL ACUTE CARE HOSPITAL
Payer: MEDICARE

## 2023-03-20 LAB
25(OH)D3 SERPL-MCNC: 50.2 NG/ML (ref 30–100)
AMPHET UR QL SCN: NEGATIVE
ATRIAL RATE: 69 BPM
BARBITURATES UR QL SCN: NEGATIVE
BENZODIAZ UR QL: NEGATIVE
CALCULATED P AXIS, ECG09: 71 DEGREES
CALCULATED R AXIS, ECG10: 10 DEGREES
CALCULATED T AXIS, ECG11: 86 DEGREES
CANNABINOIDS UR QL SCN: NEGATIVE
CHOLEST SERPL-MCNC: 200 MG/DL
COCAINE UR QL SCN: NEGATIVE
DIAGNOSIS, 93000: NORMAL
DRUG SCRN COMMENT,DRGCM: NORMAL
ECHO AO ROOT DIAM: 3 CM
ECHO AO ROOT INDEX: 1.72 CM/M2
ECHO AV AREA PEAK VELOCITY: 2.7 CM2
ECHO AV AREA/BSA PEAK VELOCITY: 1.6 CM2/M2
ECHO AV PEAK GRADIENT: 9 MMHG
ECHO AV PEAK VELOCITY: 1.5 M/S
ECHO AV VELOCITY RATIO: 0.8
ECHO LV EDV A2C: 59 ML
ECHO LV EDV A4C: 81 ML
ECHO LV EDV BP: 69 ML (ref 56–104)
ECHO LV EDV INDEX A4C: 47 ML/M2
ECHO LV EDV INDEX BP: 40 ML/M2
ECHO LV EDV NDEX A2C: 34 ML/M2
ECHO LV EJECTION FRACTION A2C: 65 %
ECHO LV EJECTION FRACTION A4C: 63 %
ECHO LV EJECTION FRACTION BIPLANE: 64 % (ref 55–100)
ECHO LV ESV A2C: 20 ML
ECHO LV ESV A4C: 30 ML
ECHO LV ESV BP: 24 ML (ref 19–49)
ECHO LV ESV INDEX A2C: 11 ML/M2
ECHO LV ESV INDEX A4C: 17 ML/M2
ECHO LV ESV INDEX BP: 14 ML/M2
ECHO LV FRACTIONAL SHORTENING: 21 % (ref 28–44)
ECHO LV INTERNAL DIMENSION DIASTOLE INDEX: 2.7 CM/M2
ECHO LV INTERNAL DIMENSION DIASTOLIC: 4.7 CM (ref 3.9–5.3)
ECHO LV INTERNAL DIMENSION SYSTOLIC INDEX: 2.13 CM/M2
ECHO LV INTERNAL DIMENSION SYSTOLIC: 3.7 CM
ECHO LV IVSD: 1.1 CM (ref 0.6–0.9)
ECHO LV MASS 2D: 187.5 G (ref 67–162)
ECHO LV MASS INDEX 2D: 107.8 G/M2 (ref 43–95)
ECHO LV POSTERIOR WALL DIASTOLIC: 1.1 CM (ref 0.6–0.9)
ECHO LV RELATIVE WALL THICKNESS RATIO: 0.47
ECHO LVOT AREA: 3.5 CM2
ECHO LVOT DIAM: 2.1 CM
ECHO LVOT PEAK GRADIENT: 5 MMHG
ECHO LVOT PEAK VELOCITY: 1.2 M/S
ECHO MV A VELOCITY: 1.01 M/S
ECHO MV E DECELERATION TIME (DT): 184.3 MS
ECHO MV E VELOCITY: 0.87 M/S
ECHO MV E/A RATIO: 0.86
ECHO MV MAX VELOCITY: 1.1 M/S
ECHO MV MEAN GRADIENT: 2 MMHG
ECHO MV MEAN VELOCITY: 0.6 M/S
ECHO MV PEAK GRADIENT: 5 MMHG
ECHO MV VTI: 29.6 CM
ECHO TV REGURGITANT MAX VELOCITY: 2.6 M/S
ECHO TV REGURGITANT PEAK GRADIENT: 27 MMHG
ERYTHROCYTE [DISTWIDTH] IN BLOOD BY AUTOMATED COUNT: 13.8 % (ref 11.5–14.5)
EST. AVERAGE GLUCOSE BLD GHB EST-MCNC: 120 MG/DL
HBA1C MFR BLD: 5.8 % (ref 4–5.6)
HCT VFR BLD AUTO: 40.3 % (ref 35–47)
HDLC SERPL-MCNC: 78 MG/DL
HDLC SERPL: 2.6 (ref 0–5)
HGB BLD-MCNC: 13.1 G/DL (ref 11.5–16)
LDLC SERPL CALC-MCNC: 103 MG/DL (ref 0–100)
MCH RBC QN AUTO: 30.9 PG (ref 26–34)
MCHC RBC AUTO-ENTMCNC: 32.5 G/DL (ref 30–36.5)
MCV RBC AUTO: 95 FL (ref 80–99)
METHADONE UR QL: NEGATIVE
NRBC # BLD: 0 K/UL (ref 0–0.01)
NRBC BLD-RTO: 0 PER 100 WBC
OPIATES UR QL: NEGATIVE
P-R INTERVAL, ECG05: 142 MS
PCP UR QL: NEGATIVE
PLATELET # BLD AUTO: 252 K/UL (ref 150–400)
PMV BLD AUTO: 9.9 FL (ref 8.9–12.9)
Q-T INTERVAL, ECG07: 468 MS
QRS DURATION, ECG06: 158 MS
QTC CALCULATION (BEZET), ECG08: 501 MS
RBC # BLD AUTO: 4.24 M/UL (ref 3.8–5.2)
TRIGL SERPL-MCNC: 95 MG/DL (ref ?–150)
VENTRICULAR RATE, ECG03: 69 BPM
VLDLC SERPL CALC-MCNC: 19 MG/DL
WBC # BLD AUTO: 11.3 K/UL (ref 3.6–11)

## 2023-03-20 PROCEDURE — 74011250636 HC RX REV CODE- 250/636: Performed by: GENERAL ACUTE CARE HOSPITAL

## 2023-03-20 PROCEDURE — 80061 LIPID PANEL: CPT

## 2023-03-20 PROCEDURE — 92610 EVALUATE SWALLOWING FUNCTION: CPT

## 2023-03-20 PROCEDURE — 77010033678 HC OXYGEN DAILY

## 2023-03-20 PROCEDURE — 97165 OT EVAL LOW COMPLEX 30 MIN: CPT

## 2023-03-20 PROCEDURE — 85027 COMPLETE CBC AUTOMATED: CPT

## 2023-03-20 PROCEDURE — 94760 N-INVAS EAR/PLS OXIMETRY 1: CPT

## 2023-03-20 PROCEDURE — 65270000046 HC RM TELEMETRY

## 2023-03-20 PROCEDURE — 99223 1ST HOSP IP/OBS HIGH 75: CPT | Performed by: PSYCHIATRY & NEUROLOGY

## 2023-03-20 PROCEDURE — 83036 HEMOGLOBIN GLYCOSYLATED A1C: CPT

## 2023-03-20 PROCEDURE — 36415 COLL VENOUS BLD VENIPUNCTURE: CPT

## 2023-03-20 PROCEDURE — 97161 PT EVAL LOW COMPLEX 20 MIN: CPT

## 2023-03-20 PROCEDURE — 97116 GAIT TRAINING THERAPY: CPT

## 2023-03-20 PROCEDURE — 97530 THERAPEUTIC ACTIVITIES: CPT

## 2023-03-20 PROCEDURE — 93308 TTE F-UP OR LMTD: CPT

## 2023-03-20 PROCEDURE — 97535 SELF CARE MNGMENT TRAINING: CPT

## 2023-03-20 RX ORDER — SODIUM CHLORIDE 9 MG/ML
100 INJECTION, SOLUTION INTRAVENOUS CONTINUOUS
Status: DISPENSED | OUTPATIENT
Start: 2023-03-20 | End: 2023-03-21

## 2023-03-20 RX ADMIN — ENOXAPARIN SODIUM 40 MG: 100 INJECTION SUBCUTANEOUS at 21:05

## 2023-03-20 RX ADMIN — POTASSIUM CHLORIDE AND SODIUM CHLORIDE: 900; 300 INJECTION, SOLUTION INTRAVENOUS at 16:59

## 2023-03-20 NOTE — PROGRESS NOTES
OCCUPATIONAL THERAPY EVALUATION/DISCHARGE  Patient: Cadence Arnold ([de-identified] y.o. female)  Date: 3/20/2023  Primary Diagnosis: Altered mental state [R41.82]       Precautions:   Fall (sitter)    ASSESSMENT  Based on the objective data described below, the patient presents with no focal deficits impeding pt ADLs with exception of flat affect, generalized, yet functional strength, impaired dynamic standing balance, following admission for AMS. Pt received in bed with sitter at bedside. Pt alert, oriented x4; however, affect withdrawn and limited eye contact. ADLs overall IND to SBA with increased time, did need IV pole support/HHA for functional mobility to bathroom; pt reports she uses SPC at baseline. Pt tolerated standing ADLs at sink for 10 minutes without c/o fatigue or LOB. Pt reports her and  are planning to move into [facility] soon. Pt reports last fall in October 2022 resulting in L 5th metatarsal fracture. Pt would benefit from use of AD (SPC/RW) for household mobility; see PT note for details and recommendations. Pt currently with no acute skilled OT needs; recommend OOB for all meals and completing ADLs OOB with supervision. Current Level of Function (ADLs/self-care): IND to SBA    Functional Outcome Measure: The patient scored 80/100 on the Barthel Index outcome measure which is indicative of independent. Other factors to consider for discharge: at risk for falls, flat affect (awaiting psych consult)     PLAN :  Recommend with staff: OOB TID, complete ADLs OOB with supervision/SBA    Recommendation for discharge: (in order for the patient to meet his/her long term goals)  Occupational therapy at least 2 days/week in the home  for home safety evaluation    This discharge recommendation:  Has not yet been discussed the attending provider and/or case management    IF patient discharges home will need the following DME: may need RW?  See PT recommendations       SUBJECTIVE:   Patient stated Everything takes me a long time to do lately.     OBJECTIVE DATA SUMMARY:   HISTORY:   Past Medical History:   Diagnosis Date    Allergic rhinitis     Anesthesia complication     Pt reports dizziness after anesthesia     Arrhythmia     Left Bundle Branch Block    Arthritis     BBB (bundle branch block)     Depression     History of not currently    GERD (gastroesophageal reflux disease)     Hyperlipidemia     Impaired fasting glucose     Unspecified essential hypertension     Unspecified hypothyroidism     Uterine prolapse     Vertigo      Past Surgical History:   Procedure Laterality Date    HX APPENDECTOMY  1987    HX BREAST BIOPSY Bilateral     >5, all benign    HX BREAST BIOPSY Left 11/29/2017    benign    HX COLONOSCOPY  8/15/12    diverticulosis    HX ENDOSCOPY  8/15/12    hiatal hernia    HX HAMMER TOE REPAIR Left     w/ bunion surgery (per previous PCP)    HX KNEE REPLACEMENT Left 10/22/2019    [place]    HX PARTIAL HYSTERECTOMY      HX TONSILLECTOMY         Prior Level of Function/Environment/Context: Pt lives with  and reports has \"help\" with meals. Children assist with transportation. Expanded or extensive additional review of patient history:   Home Situation  Home Environment: Private residence  # Steps to Enter: 1  One/Two Story Residence: One story  Living Alone: No  Support Systems: Spouse/Significant Other, Caregiver/Home Care Staff  Patient Expects to be Discharged to[de-identified] Home with family assistance  Current DME Used/Available at Home: Cane, straight (built-in shower bench)  Tub or Shower Type: Shower    Hand dominance: Right    EXAMINATION OF PERFORMANCE DEFICITS:  Cognitive/Behavioral Status:  Neurologic State: Alert  Orientation Level: Oriented X4  Cognition: Follows commands; Appropriate safety awareness; Appropriate for age attention/concentration; Appropriate decision making  Perception: Appears intact  Perseveration: No perseveration noted  Safety/Judgement: Awareness of environment; Fall prevention; Insight into deficits    Skin: appears intact    Edema: none noted in BUEs    Hearing: Auditory  Auditory Impairment: None    Vision/Perceptual:                           Acuity: Within Defined Limits    Corrective Lenses: Reading glasses    Range of Motion:    AROM: Within functional limits  PROM: Within functional limits                      Strength:    Strength: Generally decreased, functional                Coordination:  Coordination: Within functional limits  Fine Motor Skills-Upper: Left Intact; Right Intact    Gross Motor Skills-Upper: Left Intact; Right Intact    Tone & Sensation:    Tone: Normal  Sensation: Intact                      Balance:  Sitting: Intact  Standing: Impaired; Without support  Standing - Static: Good  Standing - Dynamic : Occasional;Fair    Functional Mobility and Transfers for ADLs:  Bed Mobility:  Supine to Sit: Supervision    Transfers:  Sit to Stand: Supervision  Stand to Sit: Supervision  Bathroom Mobility: Stand-by assistance  Toilet Transfer : Stand-by assistance    ADL Assessment:  Feeding: Setup    Oral Facial Hygiene/Grooming: Stand-by assistance (standing at sink)    Bathing: Stand-by assistance (seated)         Upper Body Dressing: Stand-by assistance    Lower Body Dressing: Stand-by assistance; Additional time    Toileting: Stand by assistance; Additional time                ADL Intervention and task modifications:                                          Cognitive Retraining  Safety/Judgement: Awareness of environment; Fall prevention; Insight into deficits     Functional Measure:    Barthel Index:  Bathin  Bladder: 10  Bowels: 10  Groomin  Dressing: 10  Feeding: 10  Mobility: 10  Stairs: 5  Toilet Use: 10  Transfer (Bed to Chair and Back): 10  Total: 80/100      The Barthel ADL Index: Guidelines  1. The index should be used as a record of what a patient does, not as a record of what a patient could do.   2. The main aim is to establish degree of independence from any help, physical or verbal, however minor and for whatever reason. 3. The need for supervision renders the patient not independent. 4. A patient's performance should be established using the best available evidence. Asking the patient, friends/relatives and nurses are the usual sources, but direct observation and common sense are also important. However direct testing is not needed. 5. Usually the patient's performance over the preceding 24-48 hours is important, but occasionally longer periods will be relevant. 6. Middle categories imply that the patient supplies over 50 per cent of the effort. 7. Use of aids to be independent is allowed. Score Interpretation (from [address] [facility] 83)    Independent   60-79 Minimally independent   40-59 Partially dependent   20-39 Very dependent   <20 Totally dependent     -Dasha Leon., Barthel, D.W. (1965). Functional evaluation: the Barthel Index. [address] [facility] ([address] [place] [address] (1997). The Barthel activities of daily living index: self-reporting versus actual performance in the old (> or = 75 years). Journal [address] 45(7), 14 [facility], J.LATRICIAF, Radha Luke., Rakel [facility]uro[facility]. (1999). Measuring the change in disability after inpatient rehabilitation; comparison of the responsiveness of the Barthel Index and Functional Hall Measure. Journal of Neurology, Neurosurgery, and Psychiatry, 66(4), 062-172. DAVINA Escobar, CHRISTOPH Garrett, & Rupal Hood MJeetA. (2004) Assessment of post-stroke quality of life in cost-effectiveness studies: The usefulness of the Barthel Index and the EuroQoL-5D.  Quality of Life Research, 15, 263-71         Occupational Therapy Evaluation Charge Determination   History Examination Decision-Making   LOW Complexity : Brief history review  LOW Complexity : 1-3 performance deficits relating to physical, cognitive , or psychosocial skils that result in activity limitations and / or participation restrictions  LOW Complexity : No comorbidities that affect functional and no verbal or physical assistance needed to complete eval tasks       Based on the above components, the patient evaluation is determined to be of the following complexity level: LOW   Pain Rating:  No c/o pain    Activity Tolerance:   Good    After treatment patient left in no apparent distress:    Sitting in chair, Call bell within reach, Bed / chair alarm activated, Caregiver / family present, and sitter present    COMMUNICATION/EDUCATION:   The patients plan of care was discussed with: Physical therapist and Registered nurse.      Thank you for this referral.  Rula Hanson OT  Time Calculation: 32 mins

## 2023-03-20 NOTE — ED NOTES
eTRANSFER SBAR NOTE    IP UNIT CALLED NOTE IS READY: Yes Spoke to Aquiles Alvarez    IF there are questions Call transferring nurse (your name) Nicanor Bowden RN at phone # [phone]    SITUATION/BACKGROUND:    Patient is being transferred to [facility] Neurology Tele, Room# 143    Patient's Chief Complaint on arrival to ED was Altered Mental Status and is admitted for Acute Encephalopathy. CODE STATUS: DNR    VITAL SIGNS (MUST BE WITHIN 1 HOUR OF TRANSFER TO IP UNIT:    TEMP: 98.4  PULSE: 70  RESP: 14  BP: 123/57  PAIN SCORE: 0  MEWS: 0     ISOLATION/PRECAUTIONS: No   ISOLATION TYPE: N/A    Called outstanding consults:  Yes    Are there sign and held orders that need to be released? No   Are all STAT orders completed: Yes    STAT labs collected: Yes  REPEAT LACTIC ACID DUE? No  TIME DUE: N?A  Critical Labs Results? No  What? Are there any titrating drips? No   If so what? N/A    The following personal items will be sent with the patient during transfer to the floor: All valuables:   ITEM:    ITEM: Visual Aid: None  ITEM:           ASSESSMENT:    CIWA Assessment: No  Last Score: N/A    NEURO:   NIH SCORE:   Total: 0 (03/19/23 1925)    [place] SCREENING:   Swallow Screening  Is the Patient Unable to Remain Alert for Testing?: No (03/19/23 2349)  Is the Patient on a Modified Diet (Thickened Liquids) Due to Pre-existing Dysphagia?: No (03/19/23 2349)  Is There Presence of Existing Enteral Tube Feeding via the Stomach or Nose?: No (03/19/23 2349)  Is There Presence of Head-of-Bed Restrictions (Less than 30 Degrees)?: No (03/19/23 2349)  Is There Presence of Tracheotomy Tube?: No (03/19/23 2349)  Is the Patient Ordered Nothing-by-Mouth Status?: No (03/19/23 2349)  3 oz Water Swallow Screen: (!) Fail (03/19/23 2349)  Reason for Fail: Other (comment) (pt starts profuslly burping after drinking water and pt states that she feels like a blockage caused by drainage.  pt states it started 3-4 weeks or more but states it is getting worse) (23 1581)      NEURO ASSESSMENT:   Neuro  Neurologic State: Alert (23 8345)  Orientation Level: Oriented X4 (23)  Cognition: Appropriate decision making, Appropriate for age attention/concentration, Appropriate safety awareness (23)  Speech: Clear, Appropriate for age (23)  Assessment L Pupil: Brisk (23)  Size L Pupil (mm): 2 (23)  Assessment R Pupil: Brisk (23)  Size R Pupil (mm): 2 (23)  LUE Motor Response: Purposeful (23)  LLE Motor Response: Purposeful (23)  RUE Motor Response: Purposeful (23)  RLE Motor Response: Purposeful (23)    Is patient impulsive? No   Is patient oriented? Yes   Do they follow commands? Yes  Is the patient ambulatory? Yes Device need call bell    FALL RISK? Yes   Is the [place] 1 Assessment completed? Yes  INTERVENTIONS: call bell within reach    INTEGUMENTARY:   IS THE PATIENT UNDRESSED? Yes  WOUNDS PRESENT? No  On admit to ED No   ARE THE WOUNDS DOCUMENTED? N?A    RESPIRATORY:   Is patient on Oxygen? No    OXYGEN: Oxygen Therapy  O2 Device: Nasal cannula (23)  O2 Flow Rate (L/min): 2 l/min (23)  IS patient on VENT? No      CARDIAC:   Is cardiac monitoring ordered? Yes Last Rhythm: NSR  Patient to transfer with tele box on? Yes  Is patient using a LIFE VEST? No     LINE ACCESS:   Peripheral IV 23 Right Wrist (Active)        /GI:   CONTINENT BOWEL/BLADDER? Yes  URINARY OUTPUT: voiding and external catheter   Written Order for [place] Cath? No   CHRONIC OR ACUTE? NA   If CHRONIC, is it 1days old, was it changed prior to specimen collection? Yes  WAS UA WITH REFLEX SENT TO LAB? Yes IF NO,  COLLECT AND SEND PRIOR TO TRANSPORT TO INPATIENT AREA    RESTRAINTS IN USE: No      IS DOCUMENTATION COMPLETE: Yes  Is there a current Order? No  When does it ?  N/A    Additional details as Needed:

## 2023-03-20 NOTE — ED NOTES
Perfect serve sent due to updated NPO status    [address] or Facility: [facility] From: Lesley Pac RE: Cadence Arnold 1940 RM: Fay Franco Was giving pt meds for the first time during my shift and after drinking water and the first pill the patient started profusely burping and stated that it felt like drainage was blocking her throat and said this had been going on for 2-3 wks now but has gotten worse tonight. Pt previous passed swallow screen, however, I have reassessed and pt has failed swallow screen, so I made her NPO. I saw that the hospitalist put in a SLP consult for tomorrow, but wanted to know if there were any further actions you wanted me to take.  Need Callback: NO CALLBACK REQ KATHY Mckeon, [id number]  3/20/23 12:06 AM  No thats okay

## 2023-03-20 NOTE — PROGRESS NOTES
Hospitalist Progress Note    NAME: Walter Rose   :  1940   MRN:  [id number]       Assessment / Plan:  AMS/acute encephalopathy  Depression  Suicidal ideation  tramadol overdose  R/o CVA  Without contrast did not show any evidence of acute stroke, there is chronic microvascular ischemic disease from last imaging  One-to-one sitter  Psych input appreciated  Recommended inpatient psych admission  We will continue with mirtazapine and BuSpar    Dysphagia  Follow-up with barium swallow  Speech on board  Currently n.p.o. Hypothyroidism  TSH elevated, compliance with medications unclear  Patient was 75 mcgSynthroid increased to 100 mcg  Need to follow with TSH in 6 weeks      Hypokalemia  Potassium is 2.9  Replaced  Repeat BMP    Arthritis  Hyperlipidemia  Hypertension  Resume home medications      25.0 - 29.9 Overweight / Body mass index is 26.08 kg/m². Estimated discharge date:   Barriers: Barium swallow, diet advanced, patient likely had    Code status: Full  Prophylaxis: Lovenox  Recommended Disposition:  Inpatient psych rehab     Subjective:     Chief Complaint / Reason for Physician Visit  \"Patient seen this morning, appears slightly anxious, denied suicidal thoughts stated  how that happened\". Discussed with RN events overnight. Review of Systems:  Symptom Y/N Comments  Symptom Y/N Comments   Fever/Chills    Chest Pain     Poor Appetite    Edema     Cough    Abdominal Pain     Sputum    Joint Pain     SOB/FIGUEROA    Pruritis/Rash     Nausea/vomit    Tolerating PT/OT     Diarrhea    Tolerating Diet     Constipation    Other       Could NOT obtain due to:      Objective:     VITALS:   Last 24hrs VS reviewed since prior progress note.  Most recent are:  Patient Vitals for the past 24 hrs:   Temp Pulse Resp BP SpO2   23 1919 98.2 °F (36.8 °C) 70 20 135/70 100 %   23 1723 98.8 °F (37.1 °C) 70 16 (!) 140/67 96 %   23 1106 97.5 °F (36.4 °C) 68 16 121/64 97 %   23 1036 -- 70 -- 139/73 96 %   03/20/23 0856 98.4 °F (36.9 °C) 72 16 133/62 96 %   03/20/23 0802 -- -- -- (!) 123/57 --   03/20/23 0700 98.4 °F (36.9 °C) 70 14 (!) 123/57 93 %   03/20/23 0525 98.3 °F (36.8 °C) 73 -- (!) 116/55 94 %   03/20/23 0400 -- 70 -- 120/60 97 %   03/20/23 0300 -- 74 -- 130/60 97 %   03/20/23 0125 97.5 °F (36.4 °C) 67 18 134/66 97 %   03/19/23 2325 -- 70 17 (!) 154/78 98 %   03/19/23 2310 -- 95 18 (!) 144/77 --   03/19/23 2255 -- 70 16 139/77 98 %   03/19/23 2225 -- 72 16 (!) 145/74 98 %   03/19/23 2155 -- 70 16 (!) 146/70 98 %     No intake or output data in the 24 hours ending 03/20/23 2147     I had a face to face encounter and independently examined this patient on 3/20/2023, as outlined below:  PHYSICAL EXAM:  General: WD, WN. Alert, cooperative, no acute distress    EENT:  EOMI. Anicteric sclerae. MMM  Resp:  CTA bilaterally, no wheezing or rales. No accessory muscle use  CV:  Regular  rhythm,  No edema  GI:  Soft, Non distended, Non tender. +Bowel sounds  Neurologic:  Alert and oriented X 3, normal speech,   Psych:   Slightly anxious, no good insight about her condition  Skin:  No rashes. No jaundice    Reviewed most current lab test results and cultures  YES  Reviewed most current radiology test results   YES  Review and summation of old records today    NO  Reviewed patient's current orders and MAR    YES  PMH/SH reviewed - no change compared to H&P  ________________________________________________________________________  Care Plan discussed with:    Comments   Patient     Family      RN     Care Manager     Consultant                        Multidiciplinary team rounds were held today with , nursing, pharmacist and clinical coordinator. Patient's plan of care was discussed; medications were reviewed and discharge planning was addressed.      ________________________________________________________________________  Total NON critical care TIME:  35   Minutes    Total CRITICAL CARE TIME Spent:   Minutes non procedure based      Comments   >50% of visit spent in counseling and coordination of care     ________________________________________________________________________  Gertrude Whitaker MD     Procedures: see electronic medical records for all procedures/Xrays and details which were not copied into this note but were reviewed prior to creation of Plan. LABS:  I reviewed today's most current labs and imaging studies. Pertinent labs include:  Recent Labs     03/20/23  0340 03/19/23  1333   WBC 11.3* 10.9   HGB 13.1 13.7   HCT 40.3 41.5    273     Recent Labs     03/19/23  1824 03/19/23  1333   NA  --  134*   K  --  2.9*   CL  --  95*   CO2  --  32   GLU  --  153*   BUN  --  16   CREA  --  0.82   CA  --  9.7   MG 1.9  --    ALB  --  4.0   TBILI  --  0.5   ALT  --  22   INR  --  1.0       Signed:  Gertrude Whitaker MD

## 2023-03-20 NOTE — CONSULTS
PSYCHIATRY CONSULT NOTE:    REASON FOR CONSULT:  depression and suicide attempt Overdose of: tramadol approximately 3 days ago  suicidal ideation and depression    HISTORY OF PRESENTING COMPLAINT:  Yoel Saenz is a [de-identified] y.o. female admitted to this medical unit after being found down at home by family. She endorsed a suicide attempt by overdosing on 8 tramadol. She is A&Ox4 with anxious mood and her affect is mood congruent. She denies current SI/HI. She denies any prior suicide attempts and states the attempt on Friday was her first attempt. She is unable to provide a full explanation as to why she attempted suicide and repeatedly stated \"it's hard to explain, I just can't find the words to say it. \" When asked if she was glad that she survived, she appeared ambivalent. She is unable to provide any names of individuals who she could reach out to if she was feeling suicidal again. There appears to be some word finding difficulty on her part during the interview. In terms of a depression she endorses symptoms of depressed mood, low energy, decreased appetite, poor sleep (initiating sleep), impaired concentration, and is unable to identify any hobbies or interests to this writer. She endorses anxiety symptoms of impaired concentration, difficulty initiating sleep, racing thoughts, increased psychomotor agitation (she described \"I can't stop shaking my feet\"), tremors, increased perspiration, racing heartbeat, and feeling dizzy. She states when she has the worst of those symptoms, that is when she \"makes bad decisions,\" which she clarified would include the suicide attempt. She does not appear to be experiencing an anxiety or panic attack at time of interview. She is willing to initiate medication modification to better target the anxiety symptoms at this time.        PAST PSYCHIATRIC HISTORY:  Depression, anxiety    SUBSTANCE ABUSE HISTORY:  Social History     Substance and Sexual Activity   Drug Use No     Social History     Substance and Sexual Activity   Alcohol Use No     Social History     Tobacco Use   Smoking Status Never   Smokeless Tobacco Never       PAST MEDICAL HISTORY:  Past Medical History:   Diagnosis Date    Allergic rhinitis     Anesthesia complication     Pt reports dizziness after anesthesia     Arrhythmia     Left Bundle Branch Block    Arthritis     BBB (bundle branch block)     Depression     History of not currently    GERD (gastroesophageal reflux disease)     Hyperlipidemia     Impaired fasting glucose     Unspecified essential hypertension     Unspecified hypothyroidism     Uterine prolapse     Vertigo        SOCIAL HISTORY:   to a man, lives in house     VITALS:  Visit Vitals  /64 (BP 1 Location: Left upper arm, BP Patient Position: Sitting)   Pulse 68   Temp 97.5 °F (36.4 °C)   Resp 16   Ht 5' 4.02\" (1.626 m)   Wt 68.9 kg (152 lb)   SpO2 97%   BMI 26.08 kg/m²       MEDICATIONS:    Current Facility-Administered Medications:     atorvastatin (LIPITOR) tablet 20 mg, 20 mg, Oral, QPM, Haven Khan MD, 20 mg at 03/19/23 1837    B complex-vitaminC-folic acid (NEPHROCAP) cap, 1 Capsule, Oral, DAILY, Haven Khan MD    busPIRone (BUSPAR) tablet 10 mg, 10 mg, Oral, BID, Haven Khan MD    cholecalciferol (VITAMIN D3) (1000 Units /25 mcg) tablet 1,000 Units, 1,000 Units, Oral, DAILY, Haven Khan MD    [Held by provider] hydroCHLOROthiazide (HYDRODIURIL) tablet 25 mg, 25 mg, Oral, DAILY, Haven Khan MD    mirtazapine (REMERON) tablet 15 mg, 15 mg, Oral, QHS, Haven Khan MD    levothyroxine (SYNTHROID) tablet 100 mcg, 100 mcg, Oral, 6am, Haven Khan MD    acetaminophen (TYLENOL) tablet 650 mg, 650 mg, Oral, Q4H PRN **OR** acetaminophen (TYLENOL) solution 650 mg, 650 mg, Per NG tube, Q4H PRN **OR** acetaminophen (TYLENOL) suppository 650 mg, 650 mg, Rectal, Q4H PRN, Haven Khan MD    enoxaparin (LOVENOX) injection 40 mg, 40 mg, SubCUTAneous, Q24H, Tennille Khan MD, 40 mg at 03/19/23 2339    0.9% sodium chloride with KCl 40 mEq/L infusion, , IntraVENous, CONTINUOUS, Dorian Khan MD, Last Rate: 100 mL/hr at 03/19/23 1855, New Bag at 03/19/23 1855    MENTAL STATUS EXAM:  Sensorium  oriented to time, place and person   Relations cooperative and guarded   Eye Contact poor   Appearance:  age appropriate and casually dressed   Speech:  hypoverbal and soft   Thought Process: goal directed and logical   Thought Content free of delusions, free of hallucinations, and internally preoccupied    Suicidal ideations no plan , no intention, and contracts for safety   Mood:  anxious and depressed   Affect:  depressed and mood-congruent   Memory   impaired   Concentration:  impaired   Insight:  fair   Judgment:  limited       ASSESSMENT AND PLAN:  Tala Kevin 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, severe without psychotic features  She additionally meets criteria for a generalized anxiety disorder    RECOMMENDATIONS:  Given inability to provide a substantive safety plan or names of individuals that she would reach out to if she was feeling suicidal, recommend offering voluntary inpatient psychiatric admission    If she is unable to provide substantive safety plan and declines voluntary admission, recommend TDO evaluation as she appeared ambivalent regarding surviving suicide attempt    Her apparent ambivalence regarding survival, the severely depressed mood, inability to safety plan or identify social supports, and stating that this attempt was impulsive in nature are very concerning in terms of safety if discharged.     Medication recommendations:  Continue mirtazapine 15 mg PO qhs for depression/anxiety  Increase buspirone to 15 mg PO BID to better target anxiety symptoms      Thank you for this consultation    Chirag Lozano NP  3/20/2023

## 2023-03-20 NOTE — PROGRESS NOTES
Transition of Care Plan:    RUR: 6  LOS: 1  SEE: 3/21? Waiting for Inpatient Psych Bed. Disposition: Inpatient Psych: Pending  5:19 PM  CM completed chart review. Psych note is in recommending Inpatient Psych. Pt agreed to be voluntary and is willing to consider first available Psych Bed. Preference is to stay in [place] if possible. CM asked RN for PCR COVID Test this evening. CM called Bed Placement at [phone] and started that process to get her on the waiting list. They are reviewing case and will follow up tomorrow. If SNF or IPR: Date FOC offered:  Date FOC received:  Date authorization started with reference number:  Date authorization received and expires:  Accepting facility:    Follow up appointments: PCP: Rick:   Psych: NP Norma Upton/ Dr. Kwan Liter: Stacey Pisano. DME needed: RW needs to be ordered  Transportation at Discharge: BLS to [address] or means to access home:      n/a  IM Medicare Letter: 1st IM 3/19  2nd IM letter needed prior to DC  Is patient a  and connected with the [place]?              no  If yes, was [employer] transfer form completed and [facility] notified? Caregiver Contact: Son : Stephanie Blunt: [phone]  Son: Jacinta Smith III: [phone]  Niece: Lety Serrano: [phone]  Discharge Caregiver contacted prior to discharge? yes  Care Conference needed?:        no    Reason for Admission:  Pt was admitted on 3/19/23 d/t depression and SI and Overdose of tramadol approximately 3 days ago. RUR Score:   6                  Plan for utilizing home health:   after released from psych. . Home Health     PCP: First and Last name:  Sadia Rice MD     Name of Practice:    Are you a current patient: Yes/No: yes   Approximate date of last visit: March 3, 2023   Can you participate in a virtual visit with your PCP: yes                    Current Advanced Directive/Advance Care Plan: DNR      Healthcare Decision Maker:     Primary Decision Maker: Guillermo Angeles - Spouse - [phone]  Son : Placido Alvarado: [phone]  Son: Sri Joshi III: [phone]  Niece: Lucien Emily: [phone]                  Eval  Pt is alert and oriented. Was living with her spouse at home in one story home with 2 ERIKA. Pt was planning on moving to [address] [place] [address] 3/21 but family indicated that plan is on hold for now. Address listed on facesheet is son's address for mail to be received. Pt current address:  [address] [place] [address]  DME: [facility] [employer]. No HH or rehab experience. [facility]acy: [facility]. Therapy was rec a RW and Home Health PT/OT once Pt DC home. CM will continue to monitor discharge plan.      Jeb Huber, [address]  Ext [phone]

## 2023-03-20 NOTE — PROGRESS NOTES
End of Shift Note    Bedside shift change report given to Violetta Rene RN (oncoming nurse) by Nora Warner RN (offgoing nurse). Report included the following information SBAR, Kardex, and ED Summary    Shift worked:  7am to 7pm   Shift summary and any significant changes:     Patient arrived to unit this morning. Suicide precautions, including 1 to 1 sitter, in place. Echo and psychiatry consult completed. Speech therapy recommended to continue NPO. Patient agreeable to in-patient psych placement.        Concerns for physician to address:  None   Zone phone for oncoming shift:   [phone]     Patient Information  Norma Diaz  [de-identified] y.o.  3/19/2023 12:50 PM by Samara Canchola MD. Norma Diaz was admitted from Home    Problem List  Patient Active Problem List    Diagnosis Date Noted    Altered mental state 03/19/2023    Situational mixed anxiety and depressive disorder 12/23/2022    NSTEMI (non-ST elevated myocardial infarction) ([address] 12/13/2022    Obesity (BMI 30.0-34.9) 05/31/2022    Seronegative rheumatoid arthritis of multiple sites ([address] 10/28/2020    Carpal tunnel syndrome of left wrist 10/28/2020    Primary osteoarthritis of left knee 10/16/2019    LBBB (left bundle branch block) 05/01/2019    Abnormal mammogram of left breast 11/15/2017    Primary insomnia 10/13/2017    Uterine prolapse     Osteopenia 10/14/2015    Allergic rhinitis     Recurrent major depressive disorder ([address]     Acquired hypothyroidism     Impaired fasting glucose     Hypertension, essential     Pure hypercholesterolemia      Past Medical History:   Diagnosis Date    Allergic rhinitis     Anesthesia complication     Pt reports dizziness after anesthesia     Arrhythmia     Left Bundle Branch Block    Arthritis     BBB (bundle branch block)     Depression     History of not currently    GERD (gastroesophageal reflux disease)     Hyperlipidemia     Impaired fasting glucose     Unspecified essential hypertension     Unspecified hypothyroidism Uterine prolapse     Vertigo        Core Measures:  CVA: Yes Yes  CHF:No No  PNA:No No    Activity:  Activity Level: Bed Rest  Number times ambulated in hallways past shift: 1  Number of times OOB to chair past shift: 1    Cardiac:   Cardiac Monitoring: Yes      Cardiac Rhythm: Sinus Rhythm    Access:   Current line(s): PIV   Central Line? No     Genitourinary:   Urinary status: voiding  Urinary Catheter?  No    Respiratory:   O2 Device: Nasal cannula  Chronic home O2 use?: NO  Incentive spirometer at bedside: NO       GI:     Current diet:  DIET NPO  Passing flatus: YES  Tolerating current diet: YES       Pain Management:   Patient states pain is manageable on current regimen: YES    Skin:  Angel Score: 17  Interventions: increase time out of bed, PT/OT consult, and limit briefs    Patient Safety:  Fall Score:    Interventions: bed/chair alarm, assistive device (walker, cane, etc), gripper socks, and pt to call before getting OOB     @Rollbelt  @dexterity to release roll belt  Yes/No ( must document dexterity  here by stating Yes or No here, otherwise this is a restraint and must follow restraint documentation policy.)    DVT prophylaxis:  DVT prophylaxis Med- Yes  DVT prophylaxis SCD or JACLYN- No     Wounds: (If Applicable)  Wounds- No  Location    Active Consults:  IP CONSULT TO HOSPITALIST  IP CONSULT TO NEUROLOGY  IP CONSULT TO PSYCHIATRY    Length of Stay:  Expected LOS: - - -  Actual LOS: 1  Discharge Plan: Yes In-patient psychiatric facility      Flor Cuba RN

## 2023-03-20 NOTE — PROGRESS NOTES
SPEECH LANGUAGE PATHOLOGY BEDSIDE SWALLOW EVALUATION  Patient: Vanessa Rai ([de-identified] y.o. female)  Date: 3/20/2023  Primary Diagnosis: Altered mental state [R41.82]       Precautions: aspiration       ASSESSMENT :  Based on the objective data described below, the patient presents with functional basic language and motor speech. Her MRI was negative for stroke. Her oropharyngeal phase of the swallow seems to be grossly wnl. After each bite or sip, she belches excessively. She has reported this has occurred for several months. She has not sought medical attention for this. Recommend barium swallow/esophagram. May need GI consult. We will follow if she has needs after barium swallow. Rosibel Ward a family member said that at least since Dec, she has been belching like she was today. Patient will benefit from skilled intervention to address the above impairments. Patients rehabilitation potential is considered to be Good     PLAN :  Recommendations and Planned Interventions:  Recommend NPO continue until she has had a barium swallow/esophagram due to excessive belching/burping after each sip or bite of food. May need a GI consult   Frequency/Duration: Patient will be followed by speech-language pathology 1 time a week to address goals. Discharge Recommendations: To Be Determined     SUBJECTIVE:   Patient stated she has been burping excessively for several months but had no sought medical attention.  It feels like there is a shelf in her throat and points to the base of her throat      OBJECTIVE:     Past Medical History:   Diagnosis Date    Allergic rhinitis     Anesthesia complication     Pt reports dizziness after anesthesia     Arrhythmia     Left Bundle Branch Block    Arthritis     BBB (bundle branch block)     Depression     History of not currently    GERD (gastroesophageal reflux disease)     Hyperlipidemia     Impaired fasting glucose     Unspecified essential hypertension     Unspecified hypothyroidism Uterine prolapse     Vertigo      Past Surgical History:   Procedure Laterality Date    HX APPENDECTOMY  1987    HX BREAST BIOPSY Bilateral     >5, all benign    HX BREAST BIOPSY Left 11/29/2017    benign    HX COLONOSCOPY  8/15/12    diverticulosis    HX ENDOSCOPY  8/15/12    hiatal hernia    HX HAMMER TOE REPAIR Left     w/ bunion surgery (per previous PCP)    HX KNEE REPLACEMENT Left 10/22/2019    [facility]    HX PARTIAL HYSTERECTOMY      HX TONSILLECTOMY       Prior Level of Function/Home Situation: lives with her      Diet prior to admission: reg/thins  Current Diet:  NPO   Cognitive and Communication Status:  Neurologic State: Alert  Orientation Level: Oriented to person, Oriented to place, Oriented to time  Cognition: Follows commands  Perception: Appears intact  Perseveration: No perseveration noted     Oral Assessment:  Oral Assessment  Labial: No impairment  Dentition: Natural;Full  Lingual: No impairment  Mandible: No impairment  P.O. Trials:  Patient Position: upright in bed  Vocal quality prior to P.O.: No impairment  Consistency Presented: Thin liquid;Puree  How Presented: Self-fed/presented;Straw;SLP-fed/presented;Spoon     Bolus Acceptance: No impairment  Bolus Formation/Control: No impairment     Propulsion: No impairment  Oral Residue: None  Initiation of Swallow: No impairment  Laryngeal Elevation: Functional  Aspiration Signs/Symptoms: None  Pharyngeal Phase Characteristics: No impairment, issues, or problems              Oral Phase Severity: No impairment  Pharyngeal Phase Severity : No impairment    NOMS:   The NOMS functional outcome measure was used to quantify this patient's level of swallowing impairment.   Based on the NOMS, the patient was determined to be at level 1 for swallow function       NOMS Swallowing Levels:  Level 1 (CN): NPO  Level 2 (CM): NPO but takes consistency in therapy  Level 3 (CL): Takes less than 50% of nutrition p.o. and continues with nonoral feedings; and/or safe with mod cues; and/or max diet restriction  Level 4 (CK): Safe swallow but needs mod cues; and/or mod diet restriction; and/or still requires some nonoral feeding/supplements  Level 5 (CJ): Safe swallow with min diet restriction; and/or needs min cues  Level 6 (CI): Independent with p.o.; rare cues; usually self cues; may need to avoid some foods or needs extra time  Level 7 ([facility]): Independent for all p.o.  NATALIA. (2003). National Outcomes Measurement System (NOMS): Adult Speech-Language Pathology User's Guide. Pain:  Pain Scale 1: Numeric (0 - 10)  Pain Intensity 1: 0       After treatment:   Patient left in no apparent distress in bed    COMMUNICATION/EDUCATION:   Patient was educated regarding her deficit(s) of dysphagia. The patient's plan of care including recommendations, planned interventions, and recommended diet changes were discussed with: Registered nurse. Patient/family have participated as able in goal setting and plan of care.     Thank you for this referral.  BOONE Carballo  Time Calculation: 15 mins

## 2023-03-20 NOTE — ED NOTES
1940 - Patient to MRI at this time, ED Tech remains 1:1 with patient. 2010 - Patient returned from MRI at this time, placed back on cardiac monitoring. ED tech remains 1:1 with patient.

## 2023-03-20 NOTE — BSMART NOTE
Initial BSMART Liaison Assessment Form     Section I - Integrated Summary    Chief Complaint is AMS. LOS:  1 day     Presenting problem/Summary:  Patient is an [de-identified]year old female that [facility] [address] This writer met with patient face to face. Pt received sitting by the window with sitter at bedside. Pt was alert and oriented x4. When this writer asked pt what brought her to the ED, she stated \"Yes I got a call, and remember taking those pills. \" Pt reports taking 8 Tramadol pills spontaneously. Pt states this was not planned. Pt had a difficult time expressing reasons for taking the pills but stated she overheard someone tell her she was going to be isolated. Pt reports she heard this conversation and it \"frightened\" her. Pt reports she was going to be \"ostracized. \" Patient shared she fears isolation especially after COVID. Pt states tomorrow there was plan to move to a facility with her  and is worried this is going isolate them. It was unclear what facility they are moving to. Pt denies this being a suicide attempt. Denies a hx of suicide attempt. Pt denied SI/HI/AVH to this writer. Reports a hx of inpatient admission in the 1990s and was seeing a psychiatrist then, but currently has her medications adjusted by a PCP. Encouraged pt to identify additional stressors but she was unable to other then describing herself as  \"procrastinator. \" Pt is worried her 2 sons will no longer be supportive of her. Patient also admits to not being able to perform her tasks around the house and this has been affecting her mental health. Pt reports concerns with her sleep and appetite. Admits she has not slept well over the past few months. Denies substance abuse or hx of trauma. Pt shared she was a . Reports a hx of depression. This writer validated pt and listened to her concerns. Notified her that psychiatric provider will meet with patient to discuss recommendations and disposition. Spoke with RN and updated. Precipitant Factors are \"life overall\". The information is given by the patient. Current Psychiatrist and/or  is none reported. Previous Hospitalizations/Treatment: yes    Lethality Assessment:  The potential for suicide noted by the following: pt denied current SI. The potential for homicide is not noted. The patient has not been a perpetrator of sexual or physical abuse. There are not pending charges. The patient is felt to be at risk for self-harm or harm to others. Section II - Psychosocial  The patient's overall mood and attitude is depressed. Feelings of helplessness and hopelessness are not observed. Generalized anxiety is observed by self-reported. Panic is not observed. Phobias are not observed. Obsessive compulsive tendencies are not observed. Section III - Mental Status Exam  The patient's appearance shows no evidence of impairment. The patient's behavior shows no evidence of impairment. The patient is oriented to time, place, person and situation. The patient's speech is soft. The patient's mood is depressed. The range of affect shows no evidence of impairment. The patient's thought content demonstrates no evidence of impairment. The thought process shows no evidence of impairment. The patient's perception shows no evidence of impairment. The patient's memory shows no evidence of impairment. The patient's appetite is decreased and shows signs of weight loss. The patient's sleep decreased. The patient's insight shows no evidence of impairment. The patient's judgement shows no evidence of impairment. The patient has demonstrated mental capacity to provide informed consent. Section IV - Substance Abuse  The patient is not using substances.      Section V - Medical  Past Medical History:   Diagnosis Date    Allergic rhinitis     Anesthesia complication     Pt reports dizziness after anesthesia     Arrhythmia     Left Bundle Branch Block    Arthritis     BBB (bundle branch block)     Depression     History of not currently    GERD (gastroesophageal reflux disease)     Hyperlipidemia     Impaired fasting glucose     Unspecified essential hypertension     Unspecified hypothyroidism     Uterine prolapse     Vertigo        Section VI - Living Arrangements  The patient is . The spouses approximate age is n/a and appears to be in n/a health. The patient lives with a spouse. The patient has 2 children ages in their 46s. The patient does plan to return home upon discharge. The patient's source of income comes from social security. The patient's greatest support comes from  and this person will be involved with the treatment. The patient has not been in an event described as horrible or outside the realm of ordinary life experience either currently or in the past. The patient has not been a victim of sexual/physical abuse. Section VII - Other Areas of Clinical Concern    The highest grade achieved is college with the overall quality of school experience being described as n/a. The patient is currently unemployed and speaks [place] as a primary language. The patient has no communication impairments affecting communication. The patient's preference for learning can be described as: can read and write adequately.   The patient's hearing is normal.  The patient's vision is normal.    The patient reports coping skills include: not assessed    ALAINA Arcos, Supervisee in Social Work

## 2023-03-20 NOTE — PROGRESS NOTES
Ambulatory Care   Social Work Note     Received notification of patient admission. Chart reviewed. Patient is open to Ambulatory Care Management -Social Work services. Patient information:  Patient currently lives at home with her  of 61 years, with support from family, and some hired help (6 hours per day). According to the patient's niece, Nancy Kuhn, (U#[phone], D#[phone]), the patient's confusion and short term memory has consistently worsened since December. The patient's family has made arrangements for the patient and her  to move into [facility] (2 bedroom apartment that faces the water). The original plan was for them to move tomorrow, 3/21/2023. Patient recently established care with Ashlee Barrios NP (on March 1, 2023) with [facility]. The patient has a neuro psychology appointment scheduled with Dr. Donal Paris III with [facility] in April 2023. Patient has an AMD on file. Ambulatory Social Work to monitor patient status and resume care upon discharge. Please contact ALAINA Israel/GOVIND with any questions.      ALAINA Israel/GOVIND, [facility]   C#[phone]

## 2023-03-20 NOTE — CONSULTS
Neurology Note    Patient ID:  José Sinha  [id number]  89 y.o.  1940      Date of Consultation:  March 20, 2023    Referring Physician: Dr. Marialuisa Downing    Reason for Consultation:  altered mental status      Assessment and Plan:    The patient is an 46-year-old female who presents to the hospital with intermittent bouts of decreased responsiveness. Her current neurological examination reveals mild cognitive slowing with no focal sensory or motor deficits. Acute encephalopathy:    Concern for possible medication overdose. She also does have multiple metabolic derangements (hypokalemia,thyroid abnormalities) which can be contributing to her mental status. Worsening of underlying psychiatric disease contributing. Patient reports current increasing life stressors associated with move scheduled for this week    Brain MRI reveals no evidence of an acute stroke. There was chronic microvascular ischemic changes    Correct underlying metabolic derangements (potassium 2.9)  Psychiatry consult ordered  Minimize cognitively impacting medication    The patient would benefit from more detailed cognitive testing/neuropsychological testing  after discharge. Dyslipidemia: . on lipid-lowering therapy    There is no other additional neurology recommendations at this time. If questions arise, please do not hesitate to contact me and I will return to see the patient. Subjective: confusion     History of Present Illness: José Sinha is a [de-identified] y.o. female with a history of depression and anxiety who family  had noticed 2 days prior to admission that the patient was more lethargic and less responsive It appears that the patient improved the following day. Over the last 24 hours the patient became more lethargic and unresponsive again and was zoning out. The patient was subsequently brought to the emergency department for an evaluation.   It was documented of a possible suicide attempt as the patient ingested 8 pills of tramadol. The history is a bit uncertain. There is no family at the bedside today. The patient reported that she has not been happy and has been unwell for a period of time. She did not answer questions specifically about taking extra pills but kept telling me that she does has not been happy recently. It appears that the patient was scheduled to be moving to an assisted living facility this week. The patient was following with a psychiatrist due to high levels of anxiety. History was obtained from reviewing the medical records, speaking with nursing and admitting hospitalist.    Pertinent labs upon admission include a white blood cell count of 11.3, platelets of 846, hemoglobin of 13.1. Sodium 134, potassium 2.9, magnesium 1.9. Creatinine 0.82. AST of 18 and ALT of 22 vitamin B12 level of 1115. TSH of 29.4. Free T3 was low at 1.9 Free T was 41.2. I did independently review the head CT from March 19, 2023. There is no acute abnormalities. I did independently review the brain MRI from March 19, 2023. There is no acute intracranial abnormalities. There is mild chronic microvascular ischemic changes.   Past Medical History:   Diagnosis Date    Allergic rhinitis     Anesthesia complication     Pt reports dizziness after anesthesia     Arrhythmia     Left Bundle Branch Block    Arthritis     BBB (bundle branch block)     Depression     History of not currently    GERD (gastroesophageal reflux disease)     Hyperlipidemia     Impaired fasting glucose     Unspecified essential hypertension     Unspecified hypothyroidism     Uterine prolapse     Vertigo         Past Surgical History:   Procedure Laterality Date    HX APPENDECTOMY  1987    HX BREAST BIOPSY Bilateral     >5, all benign    HX BREAST BIOPSY Left 11/29/2017    benign    HX COLONOSCOPY  8/15/12    diverticulosis    HX ENDOSCOPY  8/15/12    hiatal hernia    HX HAMMER TOE REPAIR Left     w/ bunion surgery (per previous PCP)    HX KNEE REPLACEMENT Left 10/22/2019    [facility]    HX PARTIAL HYSTERECTOMY      HX TONSILLECTOMY          Family History   Problem Relation Age of Onset    Thyroid Disease Father         hypo    Coronary Art Dis Father         s/p CABG    Heart Disease Father         s/p valve replacement    Diabetes Brother     Thyroid Disease Brother     Cancer Brother         kidney    Coronary Art Dis Brother         s/p CABG    Dementia Mother         vascular    Elevated Lipids Mother     Coronary Art Dis Mother     OSTEOARTHRITIS Mother     Diabetes Mother     Gall Bladder Disease Mother     Heart Disease Mother     Hypertension Mother     Stroke Mother     Obesity Son     Hypertension Son     No Known Problems Son     Diabetes Maternal Grandmother     Cancer Maternal Grandmother         pancreatic    Diabetes Paternal Grandmother     Diabetes Maternal Aunt     Gall Bladder Disease Maternal Aunt     Hypertension Maternal Aunt     Diabetes Paternal Aunt     Hypertension Maternal Aunt         Social History     Tobacco Use    Smoking status: Never    Smokeless tobacco: Never   Substance Use Topics    Alcohol use: No        Allergies   Allergen Reactions    Amlodipine Unknown (comments)    Codeine Nausea Only    Epinephrine Palpitations    Erythromycin Other (comments)     GI upset    Losartan Swelling     Lip swelling    Mobic [Meloxicam] Swelling     Lip swelling    Pcn [Penicillins] Hives     Fainted        Prior to Admission medications    Medication Sig Start Date End Date Taking? Authorizing Provider   QUEtiapine (SEROquel) 25 mg tablet  1/26/23  Yes Other, MD Carlo   zolpidem (AMBIEN) 5 mg tablet     Other, MD Carlo   atorvastatin (LIPITOR) 20 mg tablet TAKE 1 TABLET BY MOUTH EVERY DAY IN THE EVENING 12/16/22   Lupe Soto MD   mirtazapine (REMERON) 15 mg tablet Take 15 mg by mouth nightly. Celine, MD Carlo   Synthroid 75 mcg tablet TAKE 1 TABLET BY MOUTH EVERY DAY.  EXCEPT 1/2 TAB ON SUNDAY 12/2/22 Carlotta Odom MD   hydroCHLOROthiazide (HYDRODIURIL) 25 mg tablet TAKE 1 TABLET BY MOUTH EVERY DAY 9/29/22   Carlotta Odom MD   estradioL (ESTRACE) 0.01 % (0.1 mg/gram) vaginal cream  2/28/22   Provider, Historical   Blood-Glucose Meter monitoring kit Pharmacist to choose preferred meter and strips. Dx: R73.01  Patient taking differently: Pharmacist to choose preferred meter and strips. Dx: R73.01 2/2/21   Carlotta Odom MD   lancets (One Touch Pawan Many) 33 gauge misc Test your sugar three times a week fasting. Dx: R73.01  Patient taking differently: Test your sugar three times a week fasting. Dx: R73.01 2/2/21   Carlotta Odom MD   glucose blood VI test strips (blood glucose test) strip Pharmacist to choose preferred meter and strips. Dx: R73.01  Patient taking differently: Pharmacist to choose preferred meter and strips. Dx: R73.01 2/2/21   Carlotta Odom MD   guaifenesin (MUCINEX PO) Take  by mouth two (2) times a day. Provider, Historical   b complex-vitamin c-folic acid (NEPHROCAPS) 1 mg capsule DAILY AM for SUPPLEMENT    Provider, Historical   acetaminophen (TYLENOL) 500 mg tablet 1,000 mg every six (6) hours as needed. Provider, Historical   simethicone (GAS-X) 125 mg capsule Take 125 mg by mouth four (4) times daily as needed for Flatulence. Provider, Historical   calcium citrate/vitamin D3 (CALCIUM CITRATE + D PO) Take 1 Tab by mouth as needed. Patient not taking: No sig reported    Other, MD Carlo   Cetirizine 10 mg cap Take  by mouth every evening. Provider, Historical   cholecalciferol (VITAMIN D3) (1000 Units /25 mcg) tablet Take  by mouth daily.     Provider, Historical     Current Facility-Administered Medications   Medication Dose Route Frequency    atorvastatin (LIPITOR) tablet 20 mg  20 mg Oral QPM    B complex-vitaminC-folic acid (NEPHROCAP) cap  1 Capsule Oral DAILY    busPIRone (BUSPAR) tablet 10 mg  10 mg Oral BID    cholecalciferol (VITAMIN D3) (1000 Units /25 mcg) tablet 1,000 Units  1,000 Units Oral DAILY    [Held by provider] hydroCHLOROthiazide (HYDRODIURIL) tablet 25 mg  25 mg Oral DAILY    mirtazapine (REMERON) tablet 15 mg  15 mg Oral QHS    levothyroxine (SYNTHROID) tablet 100 mcg  100 mcg Oral 6am    acetaminophen (TYLENOL) tablet 650 mg  650 mg Oral Q4H PRN    Or    acetaminophen (TYLENOL) solution 650 mg  650 mg Per NG tube Q4H PRN    Or    acetaminophen (TYLENOL) suppository 650 mg  650 mg Rectal Q4H PRN    enoxaparin (LOVENOX) injection 40 mg  40 mg SubCUTAneous Q24H    0.9% sodium chloride with KCl 40 mEq/L infusion   IntraVENous CONTINUOUS       Review of Systems:    General, constitutional: depressed  eyes, vision: negative  Ears, nose, throat: negative  Cardiovascular, heart: negative  Respiratory: negative  Gastrointestinal: negative  Genitourinary: negative  Musculoskeletal: negative  Skin and integumentary: negative  Psychiatric: negative  Endocrine: negative  Neurological: negative, except for HPI  Hematologic/lymphatic: negative  Allergy/immunology: negative      Objective:     Visit Vitals  BP (!) 123/57   Pulse 70   Temp 98.4 °F (36.9 °C)   Resp 14   Wt 152 lb (68.9 kg)   SpO2 93%   BMI 26.09 kg/m²       Physical Exam:      General:  appears well nourished in no acute distress  Neck: no carotid bruits  Lungs: clear to auscultation  Heart:  no murmurs, regular rate  Lower extremity: peripheral pulses palpable and no edema  Skin: intact    Neurological exam:    The patient was awake and alert. She is oriented to person and place. She did know the year. She has decreased attention and concentration. She has mild cognitive slowing. There was no dysarthria. She could follow 1 and two-step commands for me. She could tell me the correct year. She knew she was in the hospital.  She has decreased insight into her current medical situation.   She did tell me about moving to an assisted living facility later this week    Cranial nerves:   II-XII were tested    Perrrla  Fundoscopic examination revealed venous pulsations and no clear abnormalities  Visual fields were full  Eomi, no evidence of nystagmus  Facial sensation:  normal and symmetric  Facial motor: normal and symmetric  Hearing intact  SCM strength intact  Tongue: midline without fasciculations    Motor: Tone normal  Pronator drift was absent  No evidence of fasciculations    Strength testing:   deltoid triceps biceps Wrist ext. Wrist flex. intrinsics Hip flex. Hip ext. Knee ext. Knee flex Dorsi flex Plantar flex   Right 5 5 5 5 5 5 5 5 5 5 5 5   Left 5 5 5 5 5 5 5 5 5 5 5 5         Sensory:  Upper extremity: intact to pp  Lower extremity: intact to pp,  Her decreased attention made more detailed testing not possible    Reflexes:    Right Left  Biceps  2 2  Triceps 2 2  Brachiorad. 2 2  Patella  2 2  Achilles 2 2    Plantar response:  flexor bilaterally    Cerebellar testing:  no tremor apparent, finger/nose and ninoska were intact    Gait: not assessed due to ongoing medical conditions. Labs:     Lab Results   Component Value Date/Time    Hemoglobin A1c 5.8 (H) 12/14/2022 02:51 AM    Sodium 134 (L) 03/19/2023 01:33 PM    Potassium 2.9 (L) 03/19/2023 01:33 PM    Chloride 95 (L) 03/19/2023 01:33 PM    Glucose 153 (H) 03/19/2023 01:33 PM    BUN 16 03/19/2023 01:33 PM    Creatinine 0.82 03/19/2023 01:33 PM    Calcium 9.7 03/19/2023 01:33 PM    WBC 11.3 (H) 03/20/2023 03:40 AM    HCT 40.3 03/20/2023 03:40 AM    HGB 13.1 03/20/2023 03:40 AM    PLATELET 597 48/04/9838 03:40 AM       Imaging:    Results from Hospital Encounter encounter on 03/19/23    MRI BRAIN WO CONT    Narrative  EXAM: MRI BRAIN WO CONT    INDICATION: dizziness    COMPARISON: CTA head neck 3/19/2023, MRI brain 2/13/2023. CONTRAST: None. TECHNIQUE:  Multiplanar multisequence acquisition without contrast of the brain. FINDINGS:  The ventricles are normal in size and position.  Unchanged few scattered  periventricular and deep white matter T2/FLAIR hyperintensities, consistent with  mild chronic microvascular ischemic disease. There is no acute infarct,  hemorrhage, extra-axial fluid collection, or mass effect. There is no cerebellar  tonsillar herniation. Expected arterial flow-voids are present. Minimal mucosal thickening in the left maxillary sinus. The remaining paranasal  sinuses, mastoid air cells and middle ears are clear. The orbital contents are  within normal limits. No significant osseous or scalp lesions are identified. Impression  1. No acute intracranial abnormality. 2. Unchanged mild chronic microvascular ischemic disease. Results from Hospital Encounter encounter on 03/19/23    CT CODE NEURO PERF W CBF    Narrative  CLINICAL HISTORY: Code stroke    EXAMINATION:  CT ANGIOGRAPHY HEAD AND NECK    COMPARISON: CT head 3/19/2023, CTA 6/18/2021    TECHNIQUE:  Following the uneventful administration of iodinated contrast  material, axial CT angiography of the head and neck was performed. Delayed axial  images through the head were also obtained. Coronal and sagittal reconstructions  were obtained. Manual postprocessing of images was performed. 3-D  Sagittal  maximal intensity projection images were obtained. 3-D Coronal maximal  intensity projections were obtained. CT dose reduction was achieved through use  of a standardized protocol tailored for this examination and automatic exposure  control for dose modulation. CT perfusion analysis was performed using CT with  contrast administration, including postprocessing of parametric maps with the  determination of cerebral blood flow, cerebral blood volume, and mean transit  time. CT dose reduction was achieved through use of a standardized protocol tailored  for this examination and automatic exposure control for dose modulation. This study was analyzed by the [address] ai algorithm    FINDINGS:    Delayed contrast-enhanced head CT:    The ventricles are midline without hydrocephalus. There is no acute intra or  extra-axial hemorrhage. Mild bilateral subcortical and periventricular areas of  patchy low attenuation is nonspecific but likely related to of chronic small  vessel ischemic disease. The basal cisterns are clear. The paranasal sinuses  are clear. CTA NECK:    Great vessels: Mild ectasia of the ascending aorta measuring 3.7 cm. Patent origins with mild atherosclerosis    Right subclavian artery: Patent    Left subclavian artery: Mild to moderate proximal atherosclerosis    Right common carotid artery: Patent    Left common carotid artery: Mild atherosclerosis    Cervical right internal carotid artery: Patent with mild atherosclerosis causing  no significant stenosis by NASCET criteria. Cervical left internal carotid artery: Patent with mild atherosclerosis causing  no significant stenosis by NASCET criteria. Right vertebral artery: Patent    Left vertebral artery: Patent    CTA HEAD:    Right cavernous internal carotid artery: Mild atherosclerosis    Left cavernous internal carotid artery: Mild atherosclerosis    Anterior cerebral arteries: Patent    Anterior communicating artery: Patent    Right middle cerebral artery: Patent    Left middle cerebral artery: Patent    Posterior communicating arteries: Left is patent. Right is hypoplastic    Posterior cerebral arteries: Patent with left fetal origin    Basilar artery: Patent    Distal vertebral arteries: Patent    CT perfusion brain: No significant cerebral perfusion abnormality    Measurements use NASCET criteria. Moderate multilevel cervical spondylosis    Impression  No evidence for acute large vessel arterial occlusion or cerebral perfusion  abnormality. I spent   75  minutes providing care to this  acutely ill inpatient with > 50% of the time counseling and assisting in the coordination of care of the patient on the patient's hospital floor/unit. Active Problems:    Altered mental state (3/19/2023)                   Signed By:  Maude Pulliam DO FAAN    March 20, 2023

## 2023-03-20 NOTE — PROGRESS NOTES
PHYSICAL THERAPY EVALUATION/DISCHARGE  Patient: Tommy Holden ([de-identified] y.o. female)  Date: 3/20/2023  Primary Diagnosis: Altered mental state [R41.82]       Precautions:   Fall (sitter)      ASSESSMENT  Based on the objective data described below, the patient presents with very flat affect, often putting self down during session (ex: my skin and hair look awful when in fact they looked very good), decreased gait and functional mobility but likely close to her baseline. She follows commands and participated with session. She was able to complete bed mobility and transfers with S. She amb without device but holding onto the IV pole (uses cane at home) with CGA and amb with a walker with SBA. Pt admits to feeling more comfortable with her cane at home with closer spaces and using furniture as needed for light support. She did not have any LOB or path deviation and appeared steadier with single UE support of IV pole as session continued. Pt can mobilize with staff in the hospital setting and if going home with  would benefit from HHPT to assess home safety, however pt is awaiting psychiatry evaluation and may have differing disposition. No further needs in acute setting. Functional Outcome Measure: The patient scored 22/24 on the [facility] outcome measure     Other factors to consider for discharge: psychiatric eval pending     Further skilled acute physical therapy is not indicated at this time. PLAN :  Recommendation for discharge: (in order for the patient to meet his/her long term goals)  Physical therapy at least 2 days/week in the home , assist of  as needed    This discharge recommendation:  Has been made in collaboration with the attending provider and/or case management    IF patient discharges home will need the following DME: patient owns DME required for discharge       SUBJECTIVE:   Patient stated I use my cane at home.     OBJECTIVE DATA SUMMARY:   HISTORY:    Past Medical History: Diagnosis Date    Allergic rhinitis     Anesthesia complication     Pt reports dizziness after anesthesia     Arrhythmia     Left Bundle Branch Block    Arthritis     BBB (bundle branch block)     Depression     History of not currently    GERD (gastroesophageal reflux disease)     Hyperlipidemia     Impaired fasting glucose     Unspecified essential hypertension     Unspecified hypothyroidism     Uterine prolapse     Vertigo      Past Surgical History:   Procedure Laterality Date    HX APPENDECTOMY  1987    HX BREAST BIOPSY Bilateral     >5, all benign    HX BREAST BIOPSY Left 11/29/2017    benign    HX COLONOSCOPY  8/15/12    diverticulosis    HX ENDOSCOPY  8/15/12    hiatal hernia    HX HAMMER TOE REPAIR Left     w/ bunion surgery (per previous PCP)    HX KNEE REPLACEMENT Left 10/22/2019    [facility]    HX PARTIAL HYSTERECTOMY      HX TONSILLECTOMY         Prior level of function: amb with cane mod I, hx of one fall in Oct with 5th met fx, has help with IADLs, was to move to [facility] on 3/21  Personal factors and/or comorbidities impacting plan of care:     Home Situation  Home Environment: Private residence  # Steps to Enter: 1  One/Two Story Residence: One story  Living Alone: No  Support Systems: Spouse/Significant Other, Child(laci), Other Family Member(s), Caregiver/Home Care Staff  Patient Expects to be Discharged to[de-identified] Other:  Current DME Used/Available at Home: Cane, straight  Tub or Shower Type: Shower    EXAMINATION/PRESENTATION/DECISION MAKING:   Critical Behavior:  Neurologic State: Alert  Orientation Level: Oriented X4  Cognition: Follows commands, Appropriate for age attention/concentration  Safety/Judgement: Awareness of environment, Fall prevention, Insight into deficits  Hearing: Auditory  Auditory Impairment: Hearing aid(s) (Not with patient)  Hearing Aids/Status:  At home, Bilateral    Range Of Motion:  AROM: Within functional limits           PROM: Within functional limits           Strength: Strength: Generally decreased, functional                    Tone & Sensation:   Tone: Normal              Sensation: Intact               Coordination:  Coordination: Within functional limits  Vision:   Acuity: Within Defined Limits  Corrective Lenses: Reading glasses  Functional Mobility:  Bed Mobility:     Supine to Sit: Supervision     Scooting: Supervision  Transfers:  Sit to Stand: Supervision  Stand to Sit: Supervision                       Balance:   Sitting: Intact  Standing: Impaired; Without support  Standing - Static: Good  Standing - Dynamic : Occasional;Fair  Ambulation/Gait Training:  Distance (ft): 200 Feet (ft)  Assistive Device: Gait belt; Other (comment) (IV pole only and then rolling walker)  Ambulation - Level of Assistance: Contact guard assistance;Stand-by assistance; Other (comment) (pushed pole and also tried rolling walker)        Gait Abnormalities: Decreased step clearance; Other (slight weakness noted L hip - old issues)        Base of Support: Shift to right  Stance: Left decreased  Speed/Melanie: Slow  Step Length: Right shortened;Left shortened                Functional Measure:  [facility] AM-PAC®      Basic Mobility Inpatient Short Form (6-Clicks) Version 2  How much HELP from another person do you currently need. .. (If the patient hasn't done an activity recently, how much help from another person do you think they would need if they tried?) Total A Lot A Little None   1. Turning from your back to your side while in a flat bed without using bedrails? []  1 []  2 []  3  [x]  4   2. Moving from lying on your back to sitting on the side of a flat bed without using bedrails? []  1 []  2 []  3  [x]  4   3. Moving to and from a bed to a chair (including a wheelchair)? []  1 []  2 []  3  [x]  4   4. Standing up from a chair using your arms (e.g. wheelchair or bedside chair)? []  1 []  2 []  3  [x]  4   5. Walking in hospital room? []  1 []  2 [x]  3  []  4   6.   Climbing 3-5 steps with a railing? []  1 []  2 [x]  3  []  4     Raw Score: 22/24                            Cutoff score ?171,2,3 had higher odds of discharging home with home health or need of SNF/IPR. 1509 Ellen Guzman, Matt Matias. Validity of the AM-PAC 6-Clicks Inpatient Daily Activity and Basic Mobility Short Forms. Physical Therapy Mar 2014, 94 3) 379-391; DOI: 10.2522/ptj.06125884  2. Salome Schmidt. Association of AM-PAC \"6-Clicks\" Basic Mobility and Daily Activity Scores With Discharge Destination. Phys Ther. 2021 Apr 4;101(4):fpwt114. doi: 10.1093/ptj/xgck550. PMID: 17212773. V Monica Banks, Valentino D, Talat Mckay, Yfn K, Loretta S. Activity Measure for Post-Acute Care \"6-Clicks\" Basic Mobility Scores Predict Discharge Destination After Acute Care Hospitalization in Select Patient Groups: A Retrospective, Observational Study. Arch Rehabil Res Clin Transl. 2022 Jul 16;4(3):414115. doi: 10.1016/j.arrct. 7880.911461. PMID: 60032517; PMCID: RYM1016696. 4. Raz Zamarripa Ni P. AM-PAC Short Forms Manual 4.0. Revised 2/2020.         Physical Therapy Evaluation Charge Determination   History Examination Presentation Decision-Making   MEDIUM  Complexity : 1-2 comorbidities / personal factors will impact the outcome/ POC  HIGH Complexity : 4+ Standardized tests and measures addressing body structure, function, activity limitation and / or participation in recreation  LOW Complexity : Stable, uncomplicated  LOW Complexity : FOTO score of       Based on the above components, the patient evaluation is determined to be of the following complexity level: LOW     Pain Rating:  No c/o    Activity Tolerance:   Fair      After treatment patient left in no apparent distress:   Sitting in chair, Call bell within reach, Bed / chair alarm activated, and sitter present    COMMUNICATION/EDUCATION:   The patients plan of care was discussed with: Occupational therapist, Registered nurse, and Case management. Fall prevention education was provided and the patient/caregiver indicated understanding.     Thank you for this referral.  Delia Abernathy, PT   Time Calculation: 31 mins

## 2023-03-21 ENCOUNTER — HOSPITAL ENCOUNTER (INPATIENT)
Dept: GENERAL RADIOLOGY | Age: 83
Discharge: HOME | DRG: 917 | End: 2023-03-21
Attending: STUDENT IN AN ORGANIZED HEALTH CARE EDUCATION/TRAINING PROGRAM
Payer: MEDICARE

## 2023-03-21 ENCOUNTER — ANESTHESIA EVENT (OUTPATIENT)
Dept: ENDOSCOPY | Age: 83
End: 2023-03-21
Payer: MEDICARE

## 2023-03-21 LAB
ANION GAP SERPL CALC-SCNC: 6 MMOL/L (ref 5–15)
BUN SERPL-MCNC: 13 MG/DL (ref 6–20)
BUN/CREAT SERPL: 19 (ref 12–20)
CALCIUM SERPL-MCNC: 9.1 MG/DL (ref 8.5–10.1)
CHLORIDE SERPL-SCNC: 105 MMOL/L (ref 97–108)
CO2 SERPL-SCNC: 27 MMOL/L (ref 21–32)
COMMENT, HOLDF: NORMAL
COMMENT, HOLDF: NORMAL
CREAT SERPL-MCNC: 0.67 MG/DL (ref 0.55–1.02)
GLUCOSE SERPL-MCNC: 84 MG/DL (ref 65–100)
POTASSIUM SERPL-SCNC: 3.9 MMOL/L (ref 3.5–5.1)
SAMPLES BEING HELD,HOLD: NORMAL
SAMPLES BEING HELD,HOLD: NORMAL
SODIUM SERPL-SCNC: 138 MMOL/L (ref 136–145)

## 2023-03-21 PROCEDURE — 74011250637 HC RX REV CODE- 250/637: Performed by: NURSE PRACTITIONER

## 2023-03-21 PROCEDURE — 65270000046 HC RM TELEMETRY

## 2023-03-21 PROCEDURE — 92526 ORAL FUNCTION THERAPY: CPT

## 2023-03-21 PROCEDURE — 74011250636 HC RX REV CODE- 250/636: Performed by: NURSE PRACTITIONER

## 2023-03-21 PROCEDURE — 74011250637 HC RX REV CODE- 250/637: Performed by: STUDENT IN AN ORGANIZED HEALTH CARE EDUCATION/TRAINING PROGRAM

## 2023-03-21 PROCEDURE — 74011250636 HC RX REV CODE- 250/636: Performed by: GENERAL ACUTE CARE HOSPITAL

## 2023-03-21 PROCEDURE — 74011250637 HC RX REV CODE- 250/637: Performed by: GENERAL ACUTE CARE HOSPITAL

## 2023-03-21 PROCEDURE — 77010033678 HC OXYGEN DAILY

## 2023-03-21 PROCEDURE — 74220 X-RAY XM ESOPHAGUS 1CNTRST: CPT

## 2023-03-21 PROCEDURE — 74011250636 HC RX REV CODE- 250/636: Performed by: STUDENT IN AN ORGANIZED HEALTH CARE EDUCATION/TRAINING PROGRAM

## 2023-03-21 PROCEDURE — 36415 COLL VENOUS BLD VENIPUNCTURE: CPT

## 2023-03-21 PROCEDURE — C9113 INJ PANTOPRAZOLE SODIUM, VIA: HCPCS | Performed by: NURSE PRACTITIONER

## 2023-03-21 PROCEDURE — 80048 BASIC METABOLIC PNL TOTAL CA: CPT

## 2023-03-21 PROCEDURE — 74011000250 HC RX REV CODE- 250: Performed by: NURSE PRACTITIONER

## 2023-03-21 PROCEDURE — 94760 N-INVAS EAR/PLS OXIMETRY 1: CPT

## 2023-03-21 RX ORDER — BALSAM PERU/CASTOR OIL
OINTMENT (GRAM) TOPICAL 2 TIMES DAILY
Status: DISCONTINUED | OUTPATIENT
Start: 2023-03-21 | End: 2023-03-27 | Stop reason: HOSPADM

## 2023-03-21 RX ORDER — CLONAZEPAM 0.5 MG/1
0.5 TABLET ORAL ONCE
Status: COMPLETED | OUTPATIENT
Start: 2023-03-21 | End: 2023-03-21

## 2023-03-21 RX ADMIN — NEPHROCAP 1 CAPSULE: 1 CAP ORAL at 12:15

## 2023-03-21 RX ADMIN — CASTOR OIL AND BALSAM, PERU: 788; 87 OINTMENT TOPICAL at 11:46

## 2023-03-21 RX ADMIN — MIRTAZAPINE 15 MG: 15 TABLET, FILM COATED ORAL at 22:20

## 2023-03-21 RX ADMIN — ATORVASTATIN CALCIUM 20 MG: 20 TABLET, FILM COATED ORAL at 17:29

## 2023-03-21 RX ADMIN — SODIUM CHLORIDE 100 ML/HR: 9 INJECTION, SOLUTION INTRAVENOUS at 00:58

## 2023-03-21 RX ADMIN — Medication 1000 UNITS: at 12:15

## 2023-03-21 RX ADMIN — CLONAZEPAM 0.5 MG: 0.5 TABLET ORAL at 20:45

## 2023-03-21 RX ADMIN — CASTOR OIL AND BALSAM, PERU: 788; 87 OINTMENT TOPICAL at 20:45

## 2023-03-21 RX ADMIN — BUSPIRONE HYDROCHLORIDE 10 MG: 10 TABLET ORAL at 12:15

## 2023-03-21 RX ADMIN — SODIUM CHLORIDE 40 MG: 9 INJECTION, SOLUTION INTRAMUSCULAR; INTRAVENOUS; SUBCUTANEOUS at 20:45

## 2023-03-21 RX ADMIN — SODIUM CHLORIDE 40 MG: 9 INJECTION, SOLUTION INTRAMUSCULAR; INTRAVENOUS; SUBCUTANEOUS at 13:01

## 2023-03-21 RX ADMIN — SODIUM CHLORIDE 100 ML/HR: 9 INJECTION, SOLUTION INTRAVENOUS at 13:08

## 2023-03-21 RX ADMIN — ENOXAPARIN SODIUM 40 MG: 100 INJECTION SUBCUTANEOUS at 20:45

## 2023-03-21 RX ADMIN — BUSPIRONE HYDROCHLORIDE 10 MG: 10 TABLET ORAL at 17:29

## 2023-03-21 NOTE — PROGRESS NOTES
Transition of Care Plan:       SEE: 3/21- 3/22 ? Waiting for Inpatient Psych Bed. Disposition: Inpatient Psych: Pending    From previous notes:  CM completed chart review. Psych note is in recommending Inpatient Psych. Pt agreed to be voluntary and is willing to consider first available Psych Bed. Preference is to stay in [facility] if possible. CM asked RN for PCR COVID Test this evening. CM called Bed Placement at [phone] and started that process to get her on the waiting list. They are reviewing case and will follow up tomorrow. If SNF or IPR: Date FOC offered:  Date FOC received:  Date authorization started with reference number:  Date authorization received and expires:  Accepting facility: pending      Follow up appointments: PCP: Rick:   Psych: ROBERT Upton/ Dr. Bonner Reef: Jennifer Caballero. DME needed: RW needs to be ordered  Transportation at Discharge: BLS to [address] or means to access home:      n/a  IM Medicare Letter: 1st IM 3/19  2nd IM letter needed prior to DC  Is patient a [place] and connected with the [place]?              no  If yes, was [employer] transfer form completed and [facility] notified? Caregiver Contact: Son : Escobar Deck: [phone]  Son: Jean-Paul Vazquez III: [phone]  Niece: Jason Samaniegodain: [phone]  Discharge Caregiver contacted prior to discharge? yes  Care Conference needed?:        no    I received a call from bed placement this am ( [phone] ) there are no jonel-psych beds this am but they will keep us posted. Will also follow up with Md as she will need a note stating she is medically cleared when cleared. Will also require a PCR Covid.       Rigo Aguiar RN   855 am   Care manager

## 2023-03-21 NOTE — PROGRESS NOTES
End of Shift Note    Bedside shift change report given to Shirley RN (oncoming nurse) by Alen Rome RN (offgoing nurse). Report included the following information SBAR, Kardex, and ED Summary    Shift worked: Nights   Shift summary and any significant changes:    Suicide precautions in place. Pt not verbalizing thoughts of self harm or suicide idealization.         Concerns for physician to address:  See above    Zone phone for oncoming shift:   [phone]     Patient Information  Anitha Cook  [de-identified] y.o.  3/19/2023 12:50 PM by Fawn Ganser, MD. Anitha Cook was admitted from Home    Problem List  Patient Active Problem List    Diagnosis Date Noted    Altered mental state 03/19/2023    Situational mixed anxiety and depressive disorder 12/23/2022    NSTEMI (non-ST elevated myocardial infarction) ([facility] [address] 12/13/2022    Obesity (BMI 30.0-34.9) 05/31/2022    Seronegative rheumatoid arthritis of multiple sites ([facility] [address] 10/28/2020    Carpal tunnel syndrome of left wrist 10/28/2020    Primary osteoarthritis of left knee 10/16/2019    LBBB (left bundle branch block) 05/01/2019    Abnormal mammogram of left breast 11/15/2017    Primary insomnia 10/13/2017    Uterine prolapse     Osteopenia 10/14/2015    Allergic rhinitis     Recurrent major depressive disorder ([facility] [address]     Acquired hypothyroidism     Impaired fasting glucose     Hypertension, essential     Pure hypercholesterolemia      Past Medical History:   Diagnosis Date    Allergic rhinitis     Anesthesia complication     Pt reports dizziness after anesthesia     Arrhythmia     Left Bundle Branch Block    Arthritis     BBB (bundle branch block)     Depression     History of not currently    GERD (gastroesophageal reflux disease)     Hyperlipidemia     Impaired fasting glucose     Unspecified essential hypertension     Unspecified hypothyroidism     Uterine prolapse     Vertigo        Core Measures:  CVA: Yes Yes  CHF:No No  PNA:No No    Activity:  Activity Level: Bed Rest  Number times ambulated in hallways past shift: 1  Number of times OOB to chair past shift: 1    Cardiac:   Cardiac Monitoring: Yes      Cardiac Rhythm: Sinus Rhythm    Access:   Current line(s): PIV   Central Line? No     Genitourinary:   Urinary status: voiding  Urinary Catheter?  No    Respiratory:   O2 Device: Nasal cannula  Chronic home O2 use?: NO  Incentive spirometer at bedside: NO       GI:     Current diet:  DIET NPO  Passing flatus: YES  Tolerating current diet: YES       Pain Management:   Patient states pain is manageable on current regimen: YES    Skin:  Angel Score: 17  Interventions: increase time out of bed, PT/OT consult, and limit briefs    Patient Safety:  Fall Score:    Interventions: bed/chair alarm, assistive device (walker, cane, etc), gripper socks, and pt to call before getting OOB     @Rollbelt  @dexterity to release roll belt  Yes/No ( must document dexterity  here by stating Yes or No here, otherwise this is a restraint and must follow restraint documentation policy.)    DVT prophylaxis:  DVT prophylaxis Med- Yes  DVT prophylaxis SCD or JACLYN- No     Wounds: (If Applicable)  Wounds- No  Location    Active Consults:  IP CONSULT TO HOSPITALIST  IP CONSULT TO NEUROLOGY  IP CONSULT TO PSYCHIATRY    Length of Stay:  Expected LOS: - - -  Actual LOS: 2  Discharge Plan: Yes In-patient psychiatric facility      Dustin Larry RN

## 2023-03-21 NOTE — BSMART NOTE
BSMART Liaison Team Note     LOS:  2 Days     Patient goal(s) for today: Take medications as prescribed, communicate needs to staff in an appropriate manner  BSMART Liaison team focus/goals: Assess needs, provide education and support, engage in brief therapy session when appropriate    Progress note: Liaison met w/ pt face to face. Pt was received sitting up in bed w/ a half finished dinner tray and 1:1 sitter present at bedside. Pt initially did not respond when liaison introduced herself and presents as staring at her dinner tray. Pt declined to answer any questions at this time stating \"I'm not capable right now\". Pt presents as despondent w/ no eye-contact and soft, mumbled speech. Liaison provided encouragement for pt to reach out to the nursing staff if she needs any support and provided reassurance that the liaison team will be meeting w/ her daily. Spoke w/ pt's nurse, Myrna Santiago RN who explains pt has been complaint all day and verbalized earlier that she is voluntary for psychiatric inpatient treatment. Myrna Santiago states she spoke w/ pt who told her she feels too overwhelmed to make decisions at this time and that is why she did not wish to speak w/ this liaison. Liaison will continue to follow. Barriers to Discharge: [address] [facility]    Outpatient provider(s):  None Reported  Insurance info/prescription coverage:  [facility] PART A & B    Diagnosis: Major Depressive Disorder, severe without psychotic features; Generalized Anxiety Disorder    Plan:  Per Rambo Titus consult note on 3/20, \"Given inability to provide a substantive safety plan or names of individuals that she would reach out to if she was feeling suicidal, recommend offering voluntary inpatient psychiatric admission. .. If she is unable to provide substantive safety plan and declines voluntary admission, recommend TDO evaluation as she appeared ambivalent regarding surviving suicide attempt. .. Patients medications went to [facility] but he wants them to go to [facility] in [place]     Her apparent ambivalence regarding survival, the severely depressed mood, inability to safety plan or identify social supports, and stating that this attempt was impulsive in nature are very concerning in terms of safety if discharged\". Follow up Psych Consult placed? No  Psychiatrist updated? No      Participating treatment team members:  Ivelisse Martinez LMSW

## 2023-03-21 NOTE — CONSULTS
GI CONSULTATION NOTE  Dominique Guallpa NP  [phone] NP in-hospital cell phone M-F until 4:30  After 5pm or on weekends, please call  for physician on call    NAME: Obey Slade   :  1940   MRN:  [id number]   Attending:  Dr. Paul Mendiola  Primary GI:  Dr. Fidelia Chappell   Date/Time:  3/21/2023 12:48 PM  Assessment:   Dysphagia  Increased belching with eating for 2 months   Esophagram:  . Moderate abnormality of the peristalsis. Mild slightly irregular narrowing of the gastroesophageal junction likely chronic reflux    Suicidal Ideation  Tramadol Overdose  1:1 with sitter at bedside  Treatment per primary team     Plan:   Plan for EGD with dilation tomorrow with Dr. Soila Bowden; obtain consent  Details and risks of the procedure to include (but not limited to) anesthesia, bleeding, infection, and perforation were discussed. Patient understands and is in agreement with the plan  NPO  Appreciate SLP input   Serial H&H; goal for hgb >7.0  Monitor for s/s of bleeding; transfuse as clinically indicated  Start PPI  Symptomatic care per primary team  Plan discussed with Dr. Bhavani Galaviz:     HISTORY OF PRESENT ILLNESS:     Obey Slade is an [de-identified] y.o. female who we are asked to see for complaint of dysphagia. Patient with known history of depression and anxiety. On Friday family reported that patient was unresponsive, lethargic but the next day she was at her usual state of health and had \" a good day\" but when she went back home she was acting more anxious and irritable. Patient admitted to feeling depressed, she also admitted suicidal attempt prior to admission by ingesting 8 pills of tramadol. Family bedside is not sure that the patient is on tramadol. Otherwise, patient states that she is compliant with her regular home medications and there was no recent changes. Patient denies any other symptoms. Patient does not have a gun at home.   She denies history of suicidal ideation/attempt.     Past Medical History:   Diagnosis Date    Allergic rhinitis     Anesthesia complication     Pt reports dizziness after anesthesia     Arrhythmia     Left Bundle Branch Block    Arthritis     BBB (bundle branch block)     Depression     History of not currently    GERD (gastroesophageal reflux disease)     Hyperlipidemia     Impaired fasting glucose     Unspecified essential hypertension     Unspecified hypothyroidism     Uterine prolapse     Vertigo       Past Surgical History:   Procedure Laterality Date    HX APPENDECTOMY  1987    HX BREAST BIOPSY Bilateral     >5, all benign    HX BREAST BIOPSY Left 11/29/2017    benign    HX COLONOSCOPY  8/15/12    diverticulosis    HX ENDOSCOPY  8/15/12    hiatal hernia    HX HAMMER TOE REPAIR Left     w/ bunion surgery (per previous PCP)    HX KNEE REPLACEMENT Left 10/22/2019    [place]    HX PARTIAL HYSTERECTOMY      HX TONSILLECTOMY       Social History     Tobacco Use    Smoking status: Never    Smokeless tobacco: Never   Substance Use Topics    Alcohol use: No      Family History   Problem Relation Age of Onset    Thyroid Disease Father         hypo    Coronary Art Dis Father         s/p CABG    Heart Disease Father         s/p valve replacement    Diabetes Brother     Thyroid Disease Brother     Cancer Brother         kidney    Coronary Art Dis Brother         s/p CABG    Dementia Mother         vascular    Elevated Lipids Mother     Coronary Art Dis Mother     OSTEOARTHRITIS Mother     Diabetes Mother     Gall Bladder Disease Mother     Heart Disease Mother     Hypertension Mother     Stroke Mother     Obesity Son     Hypertension Son     No Known Problems Son     Diabetes Maternal Grandmother     Cancer Maternal Grandmother         pancreatic    Diabetes Paternal Grandmother     Diabetes Maternal Aunt     Gall Bladder Disease Maternal Aunt     Hypertension Maternal Aunt     Diabetes Paternal Aunt     Hypertension Maternal Aunt       Allergies Allergen Reactions    Amlodipine Unknown (comments)    Codeine Nausea Only    Epinephrine Palpitations    Erythromycin Other (comments)     GI upset    Losartan Swelling     Lip swelling    Mobic [Meloxicam] Swelling     Lip swelling    Pcn [Penicillins] Hives     Fainted      Prior to Admission medications    Medication Sig Start Date End Date Taking? Authorizing Provider   QUEtiapine (SEROquel) 25 mg tablet  1/26/23  Yes Celine, MD Carlo   atorvastatin (LIPITOR) 20 mg tablet TAKE 1 TABLET BY MOUTH EVERY DAY IN THE EVENING 12/16/22  Yes Magali Zambrano MD   mirtazapine (REMERON) 15 mg tablet Take 15 mg by mouth nightly. Yes Other, MD Carlo   Synthroid 75 mcg tablet TAKE 1 TABLET BY MOUTH EVERY DAY. EXCEPT 1/2 TAB ON SUNDAY 12/2/22  Yes Magali Zambrano MD   hydroCHLOROthiazide (HYDRODIURIL) 25 mg tablet TAKE 1 TABLET BY MOUTH EVERY DAY 9/29/22  Yes Magali Zambrano MD   estradioL (ESTRACE) 0.01 % (0.1 mg/gram) vaginal cream  2/28/22  Yes Provider, Historical   guaifenesin (MUCINEX PO) Take  by mouth two (2) times a day. Yes Provider, Historical   b complex-vitamin c-folic acid (NEPHROCAPS) 1 mg capsule DAILY AM for SUPPLEMENT   Yes Provider, Historical   acetaminophen (TYLENOL) 500 mg tablet 1,000 mg every six (6) hours as needed. Yes Provider, Historical   Cetirizine 10 mg cap Take  by mouth every evening. Yes Provider, Historical   cholecalciferol (VITAMIN D3) (1000 Units /25 mcg) tablet Take  by mouth daily. Yes Provider, Historical   zolpidem (AMBIEN) 5 mg tablet     Other, MD Carlo   Blood-Glucose Meter monitoring kit Pharmacist to choose preferred meter and strips. Dx: R73.01  Patient not taking: Reported on 3/20/2023 2/2/21   Magali Zambrano MD   lancets (One Touch Herberth Schubert) 33 gauge misc Test your sugar three times a week fasting.   Dx: R73.01  Patient not taking: Reported on 3/20/2023 2/2/21   Magali Zambrano MD   glucose blood VI test strips (blood glucose test) strip Pharmacist to choose preferred meter and strips. Dx: R73.01  Patient not taking: Reported on 3/20/2023 2/2/21   Antwan Dean MD   simethicone (GAS-X) 125 mg capsule Take 125 mg by mouth four (4) times daily as needed for Flatulence. Provider, Historical   calcium citrate/vitamin D3 (CALCIUM CITRATE + D PO) Take 1 Tab by mouth as needed. Patient not taking: No sig reported    Other, Phys, MD       Patient Active Problem List   Diagnosis Code    Acquired hypothyroidism E03.9    Impaired fasting glucose R73.01    Hypertension, essential I10    Pure hypercholesterolemia E78.00    Allergic rhinitis J30.9    Recurrent major depressive disorder ([facility]) F33.9    Osteopenia M85.80    Uterine prolapse N81.4    Primary insomnia F51.01    Abnormal mammogram of left breast R92.8    LBBB (left bundle branch block) I44.7    Primary osteoarthritis of left knee M17.12    Seronegative rheumatoid arthritis of multiple sites ([facility]) M06.09    Carpal tunnel syndrome of left wrist G56.02    Obesity (BMI 30.0-34. 9) E66.9    NSTEMI (non-ST elevated myocardial infarction) ([facility]) I21.4    Situational mixed anxiety and depressive disorder F43.23    Altered mental state R41.82       REVIEW OF SYSTEMS:    Constitutional: negative fever, negative chills, negative weight loss  Eyes:   negative visual changes  ENT:   negative sore throat, tongue or lip swelling   Respiratory:  negative cough, negative dyspnea  Cards:  negative for chest pain, palpitations, lower extremity edema  GI:   See HPI  :  negative for frequency, dysuria  Integument:  negative for rash and pruritus  Heme:  negative for easy bruising and gum/nose bleeding  Musculoskel: negative for myalgias,  back pain and muscle weakness  Neuro: negative for headaches, dizziness, vertigo  Psych: negative for feelings of anxiety, depression     Objective:   VITALS:    Visit Vitals  BP (!) 164/78 (BP 1 Location: Right upper arm, BP Patient Position: At rest)   Pulse 64   Temp 98.2 °F (36.8 °C)   Resp 20   Ht 5' 4.02\" (1.626 m)   Wt 68.9 kg (152 lb)   SpO2 95%   BMI 26.08 kg/m²       PHYSICAL EXAM:   General:          Pleasant elderly female. NAD  Head:               Normocephalic, without obvious abnormality, atraumatic. Eyes:               Conjunctivae clear and pale, anicteric sclerae. Pupils are equal  Nose:               Nares normal. No drainage or sinus tenderness. Throat:             Lips, mucosa, and tongue normal.  No Thrush  Neck:               Supple, symmetrical,  no adenopathy, thyroid: non tender  Back:               Symmetric,  No CVA tenderness. Lungs:             CTA bilaterally. No wheezing/rhonchi/rales. Chest wall:      No tenderness or deformity. No Accessory muscle use. Heart:              Regular rate and rhythm,  no murmur, rub or gallop. Abdomen:        Soft, non-tender. Not distended. Bowel sounds normal. No masses  Extremities:     Atraumatic, No cyanosis. No edema. No clubbing  Skin:                Texture, turgor normal. No rashes/lesions/jaundice  Psych:             Good insight. Not depressed. Not anxious or agitated. Neurologic:      EOMs intact. No facial asymmetry. No aphasia or slurred speech. A/O X 3. LAB DATA REVIEWED:    Recent Results (from the past 24 hour(s))   SAMPLES BEING HELD    Collection Time: 03/20/23  5:34 PM   Result Value Ref Range    SAMPLES BEING HELD HOLD1     COMMENT        Add-on orders for these samples will be processed based on acceptable specimen integrity and analyte stability, which may vary by analyte. SAMPLES BEING HELD    Collection Time: 03/21/23 10:34 AM   Result Value Ref Range    SAMPLES BEING HELD SST     COMMENT        Add-on orders for these samples will be processed based on acceptable specimen integrity and analyte stability, which may vary by analyte.        IMAGING RESULTS:  I have personally reviewed the imaging reports      Total time spent with patient: 25 minutes ________________________________________________________________________  Care Plan discussed with:  Patient x   Family  Son Vinicio Folks via phone   RN               Consultant:       CT  3/21/2023:  ________________________________________________________________________    ___________________________________________________  Consulting Provider: Maximus Butts NP      3/21/2023  12:48 PM

## 2023-03-21 NOTE — PROGRESS NOTES
SPEECH LANGUAGE PATHOLOGY DYSPHAGIA TREATMENT/DISCHARGE  Patient: Vanessa Rai ([de-identified] y.o. female)  Date: 3/21/2023  Diagnosis: Altered mental state [R41.82] <principal problem not specified>  Procedure(s) (LRB):  ESOPHAGOGASTRODUODENOSCOPY (EGD) (N/A)  ESOPHAGEAL DILATION (N/A)    Precautions:  Fall (sitter)    ASSESSMENT:  The patient had a barium swallow which showed Moderate abnormality of the peristalsis. And Mild slightly irregular narrowing of the gastroesophageal junction likely chronic reflux. She now has a GI consult and will have an EGD tomorrow. In the meantime she will have a regular diet. Today she is not belching as much as she did on Monday. We will sign off since her dysphagia is esophageal.      PLAN:  Regular diet /thins. Patient will be discharged from [facility] skilled speech therapy at this time. Rationale for discharge:  Goals achieved    Discharge Recommendations:  None     SUBJECTIVE:   Patient with very flat affect today. She stated she had GERD. OBJECTIVE:   Cognitive and Communication Status:  Neurologic State: Alert  Orientation Level: Oriented X4  Cognition: Follows commands    Perception: Appears intact    Perseveration: No perseveration noted    Safety/Judgement: Awareness of environment, Fall prevention, Insight into deficits  Dysphagia Treatment:  Oral Assessment:     P.O. Trials:  Patient Position: upright in bed  Vocal quality prior to P.O.: No impairment  Consistency Presented: Thin liquid;Puree; Solid  How Presented: Self-fed/presented;Cup/gulp; Successive swallows     Bolus Acceptance: No impairment  Bolus Formation/Control: No impairment     Propulsion: No impairment  Oral Residue: None  Initiation of Swallow: No impairment  Laryngeal Elevation: Functional  Aspiration Signs/Symptoms: None  Pharyngeal Phase Characteristics: No impairment, issues, or problems            Oral Phase Severity: No impairment  Pharyngeal Phase Severity : No impairment                NOMS:   The NOMS functional outcome measure was used to quantify this patient's level of swallowing impairment. Based on the NOMS, the patient was determined to be at level 6 for swallow function     NOMS Swallowing Levels:  Level 1 (CN): NPO  Level 2 (CM): NPO but takes consistency in therapy  Level 3 (CL): Takes less than 50% of nutrition p.o. and continues with nonoral feedings; and/or safe with mod cues; and/or max diet restriction  Level 4 (CK): Safe swallow but needs mod cues; and/or mod diet restriction; and/or still requires some nonoral feeding/supplements  Level 5 (CJ): Safe swallow with min diet restriction; and/or needs min cues  Level 6 (CI): Independent with p.o.; rare cues; usually self cues; may need to avoid some foods or needs extra time  Level 7 ([address] [place] [address]): Independent for all p.o.  NATALIA. (2003). National Outcomes Measurement System (NOMS): Adult Speech-Language Pathology User's Guide. Pain:  Pain Scale 1: Numeric (0 - 10)  Pain Intensity 1: 0       After treatment:   Patient left in no apparent distress in bed    COMMUNICATION/EDUCATION:   Patient was educated regarding her deficit(s) of GERD. The patient's plan of care including recommendations, planned interventions, and recommended diet changes were discussed with: Registered nurse.      BOONE Magaña  Time Calculation: 15 mins

## 2023-03-21 NOTE — PROGRESS NOTES
End of Shift Note    Bedside shift change report given to [employer] (oncoming nurse) by Kristy Hughes RN (offgoing nurse). Report included the following information SBAR, Kardex, Procedure Summary, MAR, Recent Results, and Cardiac Rhythm      Shift worked:  Days   Shift summary and any significant changes:     No significant changes. Patient did not complain of pain or appear to be in any distress. Suicide precautions, including 1 to 1 sitter, in place. Barium Swallow study completed today. Labs drawn. Patient to be NPO at midnight for EGD tomorrow 3/22.        Concerns for physician to address:  None   Zone phone for oncoming shift:   [phone]     Patient Information  Adams Linares  [de-identified] y.o.  3/19/2023 12:50 PM by Gabriela Soliman MD. Adams Linares was admitted from Home    Problem List  Patient Active Problem List    Diagnosis Date Noted    Altered mental state 03/19/2023    Situational mixed anxiety and depressive disorder 12/23/2022    NSTEMI (non-ST elevated myocardial infarction) ([address] 12/13/2022    Obesity (BMI 30.0-34.9) 05/31/2022    Seronegative rheumatoid arthritis of multiple sites ([address] 10/28/2020    Carpal tunnel syndrome of left wrist 10/28/2020    Primary osteoarthritis of left knee 10/16/2019    LBBB (left bundle branch block) 05/01/2019    Abnormal mammogram of left breast 11/15/2017    Primary insomnia 10/13/2017    Uterine prolapse     Osteopenia 10/14/2015    Allergic rhinitis     Recurrent major depressive disorder ([address]     Acquired hypothyroidism     Impaired fasting glucose     Hypertension, essential     Pure hypercholesterolemia      Past Medical History:   Diagnosis Date    Allergic rhinitis     Anesthesia complication     Pt reports dizziness after anesthesia     Arrhythmia     Left Bundle Branch Block    Arthritis     BBB (bundle branch block)     Depression     History of not currently    GERD (gastroesophageal reflux disease)     Hyperlipidemia     Impaired fasting glucose Unspecified essential hypertension     Unspecified hypothyroidism     Uterine prolapse     Vertigo        Core Measures:  CVA: Yes Yes  CHF:No No  PNA:No No    Activity:  Activity Level: Up with Assistance  Number times ambulated in hallways past shift: 0  Number of times OOB to chair past shift: 1    Cardiac:   Cardiac Monitoring: Yes      Cardiac Rhythm: Sinus Rhythm    Access:   Current line(s): PIV     Genitourinary:   Urinary status: voiding  Urinary Catheter?  No     Respiratory:   O2 Device: Nasal cannula  Chronic home O2 use?: NO  Incentive spirometer at bedside: NO       GI:     Current diet:  ADULT DIET Regular; 5 carb choices (75 gm/meal)  DIET ONE TIME MESSAGE  DIET NPO  Passing flatus: YES  Tolerating current diet: YES       Pain Management:   Patient states pain is manageable on current regimen: YES    Skin:  Angel Score: 17  Interventions: float heels, increase time out of bed, and PT/OT consult    Patient Safety:  Fall Score:    Interventions: bed/chair alarm, assistive device (walker, cane, etc), gripper socks, pt to call before getting OOB, and stay with me (per policy), sitter at bedside         DVT prophylaxis:  DVT prophylaxis Med- Yes  DVT prophylaxis SCD or JACLYN- No     Wounds: (If Applicable)  Wounds- No      Active Consults:  IP CONSULT TO HOSPITALIST  IP CONSULT TO NEUROLOGY  IP CONSULT TO PSYCHIATRY  IP CONSULT TO GASTROENTEROLOGY    Length of Stay:  Expected LOS: 2d 12h  Actual LOS: 2  Discharge Plan: Yes In patient psychiatric facility      Veronica Mas RN

## 2023-03-21 NOTE — PROGRESS NOTES
Hospitalist Progress Note    NAME: Mazin Palmer   :  1940   MRN:  [id number]       Assessment / Plan:  AMS/acute encephalopathy  Depression  Suicidal ideation  tramadol overdose  R/o CVA  Without contrast did not show any evidence of acute stroke, there is chronic microvascular ischemic disease from last imaging  One-to-one sitter  Psych input appreciated  Recommended inpatient psych admission  We will continue with mirtazapine and BuSpar    Dysphagia  Barium swallow showed:  1. Moderate abnormality of the peristalsis. 2. Mild slightly irregular narrowing of the gastroesophageal junction likely  chronic reflux  Speech on board  Currently n.p.o.  GI consulted     Hypothyroidism  TSH elevated, compliance with medications unclear  Patient was 75 mcgSynthroid increased to 100 mcg  Need to follow with TSH in 6 weeks      Hypokalemia  Potassium is 2.9  Replaced  Repeat BMP    Arthritis  Hyperlipidemia  Hypertension  Resume home medications      25.0 - 29.9 Overweight / Body mass index is 26.08 kg/m². Estimated discharge date:   Barriers: Barium swallow, diet advanced, patient likely had    Code status: Full  Prophylaxis: Lovenox  Recommended Disposition:  Inpatient psych rehab     Subjective:     Chief Complaint / Reason for Physician Visit  \"Patient seen today, n.p.o., had barium swallow eval this morning\". Discussed with RN events overnight. Review of Systems:  Symptom Y/N Comments  Symptom Y/N Comments   Fever/Chills    Chest Pain     Poor Appetite    Edema     Cough    Abdominal Pain     Sputum    Joint Pain     SOB/FIGUEROA    Pruritis/Rash     Nausea/vomit    Tolerating PT/OT     Diarrhea    Tolerating Diet     Constipation    Other       Could NOT obtain due to:      Objective:     VITALS:   Last 24hrs VS reviewed since prior progress note.  Most recent are:  Patient Vitals for the past 24 hrs:   Temp Pulse Resp BP SpO2   23 1109 98.2 °F (36.8 °C) 64 20 (!) 164/78 95 %   23 0712 98.6 °F (37 °C) 62 19 135/65 96 %   03/21/23 0351 98.4 °F (36.9 °C) 69 20 128/73 98 %   03/20/23 2220 98.4 °F (36.9 °C) 72 18 130/71 97 %   03/20/23 1919 98.2 °F (36.8 °C) 70 20 135/70 100 %   03/20/23 1723 98.8 °F (37.1 °C) 70 16 (!) 140/67 96 %       No intake or output data in the 24 hours ending 03/21/23 1139     I had a face to face encounter and independently examined this patient on 3/21/2023, as outlined below:  PHYSICAL EXAM:  General: WD, WN. Alert, cooperative, no acute distress    EENT:  EOMI. Anicteric sclerae. MMM  Resp:  CTA bilaterally, no wheezing or rales. No accessory muscle use  CV:  Regular  rhythm,  No edema  GI:  Soft, Non distended, Non tender. +Bowel sounds  Neurologic:  Alert and oriented X 3, normal speech,   Psych:   Slightly anxious, no good insight about her condition  Skin:  No rashes. No jaundice    Reviewed most current lab test results and cultures  YES  Reviewed most current radiology test results   YES  Review and summation of old records today    NO  Reviewed patient's current orders and MAR    YES  PMH/ reviewed - no change compared to H&P  ________________________________________________________________________  Care Plan discussed with:    Comments   Patient     Family      RN     Care Manager     Consultant                        Multidiciplinary team rounds were held today with , nursing, pharmacist and clinical coordinator. Patient's plan of care was discussed; medications were reviewed and discharge planning was addressed.      ________________________________________________________________________  Total NON critical care TIME:  45   Minutes    Total CRITICAL CARE TIME Spent:   Minutes non procedure based      Comments   >50% of visit spent in counseling and coordination of care     ________________________________________________________________________  Bowen Drake MD     Procedures: see electronic medical records for all procedures/Xrays and details which were not copied into this note but were reviewed prior to creation of Plan. LABS:  I reviewed today's most current labs and imaging studies. Pertinent labs include:  Recent Labs     03/20/23  0340 03/19/23  1333   WBC 11.3* 10.9   HGB 13.1 13.7   HCT 40.3 41.5    273       Recent Labs     03/19/23  1824 03/19/23  1333   NA  --  134*   K  --  2.9*   CL  --  95*   CO2  --  32   GLU  --  153*   BUN  --  16   CREA  --  0.82   CA  --  9.7   MG 1.9  --    ALB  --  4.0   TBILI  --  0.5   ALT  --  22   INR  --  1.0         Signed:  Talat Cali MD

## 2023-03-21 NOTE — ANESTHESIA PREPROCEDURE EVALUATION
Relevant Problems   NEUROLOGY   (+) Recurrent major depressive disorder (HCC)   (+) Situational mixed anxiety and depressive disorder      CARDIOVASCULAR   (+) Hypertension, essential   (+) LBBB (left bundle branch block)   (+) NSTEMI (non-ST elevated myocardial infarction) ([address]      ENDOCRINE   (+) Acquired hypothyroidism   (+) Seronegative rheumatoid arthritis of multiple sites [facility])       Anesthetic History   No history of anesthetic complications            Review of Systems / Medical History  Patient summary reviewed, nursing notes reviewed and pertinent labs reviewed    Pulmonary  Within defined limits                 Neuro/Psych   Within defined limits      Psychiatric history    Comments: Vertigo  Depression Cardiovascular  Within defined limits  Hypertension: well controlled        Dysrhythmias   Past MI, CAD and hyperlipidemia    Exercise tolerance: >4 METS  Comments: LBBB    Recent ECHO shows a 50-55% EF with mild MR and moderate TR   GI/Hepatic/Renal  Within defined limits   GERD: poorly controlled          Comments: Dysphagia Endo/Other  Within defined limits    Hypothyroidism  Arthritis     Other Findings            Physical Exam    Airway  Mallampati: IV  TM Distance: 4 - 6 cm  Neck ROM: normal range of motion   Mouth opening: Diminished (comment)     Cardiovascular    Rhythm: regular  Rate: normal         Dental    Dentition: Caps/crowns     Pulmonary  Breath sounds clear to auscultation               Abdominal  GI exam deferred       Other Findings            Anesthetic Plan    ASA: 3  Anesthesia type: general and total IV anesthesia          Induction: Intravenous  Anesthetic plan and risks discussed with: Patient

## 2023-03-22 ENCOUNTER — ANESTHESIA (OUTPATIENT)
Dept: ENDOSCOPY | Age: 83
End: 2023-03-22
Payer: MEDICARE

## 2023-03-22 PROCEDURE — 77010033678 HC OXYGEN DAILY

## 2023-03-22 PROCEDURE — 74011250636 HC RX REV CODE- 250/636: Performed by: NURSE ANESTHETIST, CERTIFIED REGISTERED

## 2023-03-22 PROCEDURE — 76040000019: Performed by: INTERNAL MEDICINE

## 2023-03-22 PROCEDURE — 76060000031 HC ANESTHESIA FIRST 0.5 HR: Performed by: INTERNAL MEDICINE

## 2023-03-22 PROCEDURE — 74011250636 HC RX REV CODE- 250/636: Performed by: NURSE PRACTITIONER

## 2023-03-22 PROCEDURE — 0DJ08ZZ INSPECTION OF UPPER INTESTINAL TRACT, VIA NATURAL OR ARTIFICIAL OPENING ENDOSCOPIC: ICD-10-PCS | Performed by: INTERNAL MEDICINE

## 2023-03-22 PROCEDURE — 74011000250 HC RX REV CODE- 250: Performed by: NURSE ANESTHETIST, CERTIFIED REGISTERED

## 2023-03-22 PROCEDURE — 74011000250 HC RX REV CODE- 250: Performed by: NURSE PRACTITIONER

## 2023-03-22 PROCEDURE — C9113 INJ PANTOPRAZOLE SODIUM, VIA: HCPCS | Performed by: NURSE PRACTITIONER

## 2023-03-22 PROCEDURE — 77030018712 HC DEV BLLN INFL BSC -B: Performed by: INTERNAL MEDICINE

## 2023-03-22 PROCEDURE — 74011250636 HC RX REV CODE- 250/636: Performed by: INTERNAL MEDICINE

## 2023-03-22 PROCEDURE — 74011000250 HC RX REV CODE- 250: Performed by: INTERNAL MEDICINE

## 2023-03-22 PROCEDURE — 65270000046 HC RM TELEMETRY

## 2023-03-22 PROCEDURE — 74011250637 HC RX REV CODE- 250/637: Performed by: GENERAL ACUTE CARE HOSPITAL

## 2023-03-22 PROCEDURE — 94760 N-INVAS EAR/PLS OXIMETRY 1: CPT

## 2023-03-22 PROCEDURE — 2709999900 HC NON-CHARGEABLE SUPPLY: Performed by: INTERNAL MEDICINE

## 2023-03-22 PROCEDURE — U0005 INFEC AGEN DETEC AMPLI PROBE: HCPCS

## 2023-03-22 PROCEDURE — 74011250636 HC RX REV CODE- 250/636: Performed by: GENERAL ACUTE CARE HOSPITAL

## 2023-03-22 RX ORDER — LIDOCAINE HYDROCHLORIDE 20 MG/ML
INJECTION, SOLUTION EPIDURAL; INFILTRATION; INTRACAUDAL; PERINEURAL AS NEEDED
Status: DISCONTINUED | OUTPATIENT
Start: 2023-03-22 | End: 2023-03-22 | Stop reason: HOSPADM

## 2023-03-22 RX ORDER — DEXTROMETHORPHAN/PSEUDOEPHED 2.5-7.5/.8
1.2 DROPS ORAL
Status: DISCONTINUED | OUTPATIENT
Start: 2023-03-22 | End: 2023-03-22 | Stop reason: HOSPADM

## 2023-03-22 RX ORDER — ATROPINE SULFATE 0.1 MG/ML
0.5 INJECTION INTRAVENOUS
Status: DISCONTINUED | OUTPATIENT
Start: 2023-03-22 | End: 2023-03-22 | Stop reason: HOSPADM

## 2023-03-22 RX ORDER — SODIUM CHLORIDE 0.9 % (FLUSH) 0.9 %
5-40 SYRINGE (ML) INJECTION AS NEEDED
Status: DISCONTINUED | OUTPATIENT
Start: 2023-03-22 | End: 2023-03-27 | Stop reason: HOSPADM

## 2023-03-22 RX ORDER — EPINEPHRINE 0.1 MG/ML
1 INJECTION INTRACARDIAC; INTRAVENOUS
Status: DISCONTINUED | OUTPATIENT
Start: 2023-03-22 | End: 2023-03-22 | Stop reason: HOSPADM

## 2023-03-22 RX ORDER — PROPOFOL 10 MG/ML
INJECTION, EMULSION INTRAVENOUS AS NEEDED
Status: DISCONTINUED | OUTPATIENT
Start: 2023-03-22 | End: 2023-03-22 | Stop reason: HOSPADM

## 2023-03-22 RX ORDER — SODIUM CHLORIDE 9 MG/ML
25 INJECTION, SOLUTION INTRAVENOUS CONTINUOUS
Status: DISPENSED | OUTPATIENT
Start: 2023-03-22 | End: 2023-03-22

## 2023-03-22 RX ORDER — NALOXONE HYDROCHLORIDE 0.4 MG/ML
0.4 INJECTION, SOLUTION INTRAMUSCULAR; INTRAVENOUS; SUBCUTANEOUS
Status: DISCONTINUED | OUTPATIENT
Start: 2023-03-22 | End: 2023-03-22 | Stop reason: HOSPADM

## 2023-03-22 RX ORDER — FLUMAZENIL 0.1 MG/ML
0.2 INJECTION INTRAVENOUS
Status: DISCONTINUED | OUTPATIENT
Start: 2023-03-22 | End: 2023-03-22 | Stop reason: HOSPADM

## 2023-03-22 RX ORDER — SODIUM CHLORIDE 0.9 % (FLUSH) 0.9 %
5-40 SYRINGE (ML) INJECTION EVERY 8 HOURS
Status: DISCONTINUED | OUTPATIENT
Start: 2023-03-22 | End: 2023-03-27 | Stop reason: HOSPADM

## 2023-03-22 RX ORDER — SODIUM CHLORIDE 9 MG/ML
INJECTION, SOLUTION INTRAVENOUS
Status: DISCONTINUED | OUTPATIENT
Start: 2023-03-22 | End: 2023-03-22 | Stop reason: HOSPADM

## 2023-03-22 RX ADMIN — ENOXAPARIN SODIUM 40 MG: 100 INJECTION SUBCUTANEOUS at 21:41

## 2023-03-22 RX ADMIN — LEVOTHYROXINE SODIUM 100 MCG: 0.1 TABLET ORAL at 05:45

## 2023-03-22 RX ADMIN — CASTOR OIL AND BALSAM, PERU: 788; 87 OINTMENT TOPICAL at 11:08

## 2023-03-22 RX ADMIN — CASTOR OIL AND BALSAM, PERU: 788; 87 OINTMENT TOPICAL at 21:42

## 2023-03-22 RX ADMIN — SODIUM CHLORIDE, PRESERVATIVE FREE 10 ML: 5 INJECTION INTRAVENOUS at 18:04

## 2023-03-22 RX ADMIN — PROPOFOL 80 MG: 10 INJECTION, EMULSION INTRAVENOUS at 14:11

## 2023-03-22 RX ADMIN — ATORVASTATIN CALCIUM 20 MG: 20 TABLET, FILM COATED ORAL at 18:03

## 2023-03-22 RX ADMIN — PROPOFOL 40 MG: 10 INJECTION, EMULSION INTRAVENOUS at 14:14

## 2023-03-22 RX ADMIN — SODIUM CHLORIDE 40 MG: 9 INJECTION, SOLUTION INTRAMUSCULAR; INTRAVENOUS; SUBCUTANEOUS at 09:17

## 2023-03-22 RX ADMIN — MIRTAZAPINE 15 MG: 15 TABLET, FILM COATED ORAL at 21:41

## 2023-03-22 RX ADMIN — LIDOCAINE HYDROCHLORIDE 100 MG: 20 INJECTION, SOLUTION INTRAVENOUS at 14:11

## 2023-03-22 RX ADMIN — SODIUM CHLORIDE: 9 INJECTION, SOLUTION INTRAVENOUS at 14:08

## 2023-03-22 RX ADMIN — SODIUM CHLORIDE 40 MG: 9 INJECTION, SOLUTION INTRAMUSCULAR; INTRAVENOUS; SUBCUTANEOUS at 21:41

## 2023-03-22 RX ADMIN — SODIUM CHLORIDE, PRESERVATIVE FREE 10 ML: 5 INJECTION INTRAVENOUS at 21:45

## 2023-03-22 RX ADMIN — SODIUM CHLORIDE 25 ML/HR: 9 INJECTION, SOLUTION INTRAVENOUS at 14:00

## 2023-03-22 RX ADMIN — BUSPIRONE HYDROCHLORIDE 10 MG: 10 TABLET ORAL at 18:03

## 2023-03-22 NOTE — BSMART NOTE
BSMART Liaison Team Note     LOS:  3 days    Patient goal(s) for today: Take medications as prescribed, communicate needs to staff in an appropriate manner, develop and utilize coping skills and positive thinking. BSMART Liaison team focus/goals: Assess needs, provide education and support, engage in brief therapy session when appropriate. Progress note: Liaison met with pt in her medical room face to face with [place], present. Pt sitting up in her recliner watching tv. She presents as alert, calm, cooperative, with depressed mood, flat affect, and soft, tremulous voice. Pt's responses generally delayed. Her responses are typically negative, with themes of guilt and shame due to overdose. Pt able to provide orientation questions readily, particularly, person, month, year, time, place, and her situation. Pt denies any SI, plan, or intent at this time. She regrets overdose on about 8 pills, and repeatedly says that she can't forgive herself for what she did. She says that because she can't forgive herself, she can't expect her family to forgive her. Pt also says that it's, \"the end,\" for her. She exhibits circular themes going back to feelings of helplessness and hopelessness. She explains that \"thoughts stay with you. \" She has trouble verbalizing 1 positive thing to look forward to in her life. She says that \"some things just can't be fixed. \" Pt says that she took the pills not to kill herself, but b/c she was \"scared,\" and she wanted to [employer]. \" She was afraid that she was going to \"this very, very cold place\" in [place]. (Pt can not give details of this place at this time.) She has slight trouble remembering timeline of events leading to her hospitalization, although she speculates correctly that she has been in the hospital since 3/19/23. She admits that she \"wasn't thinking straight\" at the time. She did go for help after the overdose, possibly by calling for EMS.  Pt has reportedly never attempted suicide previously. She denies any HI/AH/VH at this time. No reported hx of hallucinations. After much prompting and reassurance, pt tells liaison that she has a  and 2 sons. She has a grand daughter and a grandson. She says that her  and a son have visited her. She doesn't think that they can forgive her for what she has done. Liaison challenged these assumptions, and encouraged her to forgive herself. Discussed potential means of positive distraction, like reading. Discussed replacing negative thoughts with positive memories, like she and her  traveling to [facility] or across the country. Reinforced her achievements, like being a . Discussed the importance of her grandchildren to her. Pt does a have a hx of depression and anxiety. She remembers seeing a psychiatrist in [address] or 1995, and she was prescribed remeron. She reports being hospitalized for heart issues in December 2022. She reports that she was prescribed buspar, but then weaned off of it by her PCP. Believes her PCP recommended seroquel for her. Discussed the importance of meeting with psychiatric NP to aide in pt's recovery, and she agrees to meet with NP. Also discussed trying to distract herself from negative thoughts, practicing forgiveness, and exploring gratitude. Barriers to Discharge: Medical clearance and BHU placement. Outpatient provider(s):  PCP  Insurance info/prescription coverage: VA MEDICARE/VA MEDICARE PART A & B     Diagnosis: Major Depressive Disorder, severe without psychotic features; Generalized Anxiety Disorder    Plan: Pt agrees to follow up with psychiatric provider after consultation with this liaison at this time. Consulted with Cortez Wei, who agrees to submit IP psychiatric consult.  Please defer to NP psychiatric consult note and recommendations     Per PORTIA Huitron consult note on 3/20, \"Given inability to provide a substantive safety plan or names of individuals that she would reach out to if she was feeling suicidal, recommend offering voluntary inpatient psychiatric admission. .. If she is unable to provide substantive safety plan and declines voluntary admission, recommend TDO evaluation as she appeared ambivalent regarding surviving suicide attempt. .. Her apparent ambivalence regarding survival, the severely depressed mood, inability to safety plan or identify social supports, and stating that this attempt was impulsive in nature are very concerning in terms of safety if discharged\"  Follow up Psych Consult placed? yes. Psychiatrist updated? no       Participating treatment team members: Chapo Davis, GOVIND, [place]

## 2023-03-22 NOTE — PROGRESS NOTES
End of Shift Note    Bedside shift change report given to Tanner Sanchez (oncoming nurse) by Gadiel Betts RN (offgoing nurse). Report included the following information SBAR, Kardex, Procedure Summary, MAR, Recent Results, and Cardiac Rhythm      Shift worked:  Night   Shift summary and any significant changes:     Pt incredibly anxious at shift change. Contacted MD on call for medication. Pt stating \"I'm not in control, just do whatever\". And \"I think my house is going to blow up\". When asked if pt feels suicidal, pt stating \"No\". Pt comforted by RN, and anti anxiety medication given. Sitter at bedside throughout the night.         Concerns for physician to address:  None   Zone phone for oncoming shift:   [phone]     Patient Information  Alvena Session  [de-identified] y.o.  3/19/2023 12:50 PM by Rafael Hardin MD. Alvena Session was admitted from Home    Problem List  Patient Active Problem List    Diagnosis Date Noted    Altered mental state 03/19/2023    Situational mixed anxiety and depressive disorder 12/23/2022    NSTEMI (non-ST elevated myocardial infarction) ([address] 12/13/2022    Obesity (BMI 30.0-34.9) 05/31/2022    Seronegative rheumatoid arthritis of multiple sites ([address] 10/28/2020    Carpal tunnel syndrome of left wrist 10/28/2020    Primary osteoarthritis of left knee 10/16/2019    LBBB (left bundle branch block) 05/01/2019    Abnormal mammogram of left breast 11/15/2017    Primary insomnia 10/13/2017    Uterine prolapse     Osteopenia 10/14/2015    Allergic rhinitis     Recurrent major depressive disorder ([address]     Acquired hypothyroidism     Impaired fasting glucose     Hypertension, essential     Pure hypercholesterolemia      Past Medical History:   Diagnosis Date    Allergic rhinitis     Anesthesia complication     Pt reports dizziness after anesthesia     Arrhythmia     Left Bundle Branch Block    Arthritis     BBB (bundle branch block)     Depression     History of not currently    GERD (gastroesophageal reflux disease)     Hyperlipidemia     Impaired fasting glucose     Unspecified essential hypertension     Unspecified hypothyroidism     Uterine prolapse     Vertigo        Core Measures:  CVA: Yes Yes  CHF:No No  PNA:No No    Activity:  Activity Level: Up with Assistance  Number times ambulated in hallways past shift: 0  Number of times OOB to chair past shift: 1    Cardiac:   Cardiac Monitoring: Yes      Cardiac Rhythm: Sinus Rhythm    Access:   Current line(s): PIV     Genitourinary:   Urinary status: voiding  Urinary Catheter?  No     Respiratory:   O2 Device: Nasal cannula  Chronic home O2 use?: NO  Incentive spirometer at bedside: NO       GI:  Last Bowel Movement Date: 03/20/23  Current diet:  DIET ONE TIME MESSAGE  DIET NPO  Passing flatus: YES  Tolerating current diet: YES       Pain Management:   Patient states pain is manageable on current regimen: YES    Skin:  Angel Score: 17  Interventions: float heels, increase time out of bed, and PT/OT consult    Patient Safety:  Fall Score:    Interventions: bed/chair alarm, assistive device (walker, cane, etc), gripper socks, pt to call before getting OOB, and stay with me (per policy), sitter at bedside         DVT prophylaxis:  DVT prophylaxis Med- Yes  DVT prophylaxis SCD or JACLYN- No     Wounds: (If Applicable)  Wounds- No      Active Consults:  IP CONSULT TO HOSPITALIST  IP CONSULT TO NEUROLOGY  IP CONSULT TO PSYCHIATRY  IP CONSULT TO GASTROENTEROLOGY    Length of Stay:  Expected LOS: 2d 12h  Actual LOS: 3  Discharge Plan: Yes In patient psychiatric facility      Ann Marie Solis RN

## 2023-03-22 NOTE — H&P
Date of Surgery Update: Tasha Resendez was seen and examined. History and physical has been reviewed. The patient has been examined.  There have been no significant clinical changes since the completion of the originally dated History and Physical.    Signed By: Nina Carrero MD     March 22, 2023 2:03 PM

## 2023-03-22 NOTE — PROGRESS NOTES
End of Shift Note    Bedside shift change report given to [address] (oncoming nurse) by Niki Brito RN (offgoing nurse). Report included the following information SBAR and MAR    Shift worked:  7a-7p   Shift summary and any significant changes:     EGD done and patient diet changed to regular. Psych consult put in. Concerns for physician to address:     Zone phone for oncoming shift:   [phone]     Patient Information  Alvena Session  [de-identified] y.o.  3/19/2023 12:50 PM by Rafael Hardin MD. Alvena Session was admitted from Home    Problem List  Patient Active Problem List    Diagnosis Date Noted    Altered mental state 03/19/2023    Situational mixed anxiety and depressive disorder 12/23/2022    NSTEMI (non-ST elevated myocardial infarction) ([facility] 12/13/2022    Obesity (BMI 30.0-34.9) 05/31/2022    Seronegative rheumatoid arthritis of multiple sites ([facility] 10/28/2020    Carpal tunnel syndrome of left wrist 10/28/2020    Primary osteoarthritis of left knee 10/16/2019    LBBB (left bundle branch block) 05/01/2019    Abnormal mammogram of left breast 11/15/2017    Primary insomnia 10/13/2017    Uterine prolapse     Osteopenia 10/14/2015    Allergic rhinitis     Recurrent major depressive disorder ([facility]     Acquired hypothyroidism     Impaired fasting glucose     Hypertension, essential     Pure hypercholesterolemia      Past Medical History:   Diagnosis Date    Allergic rhinitis     Anesthesia complication     Pt reports dizziness after anesthesia     Arrhythmia     Left Bundle Branch Block    Arthritis     BBB (bundle branch block)     Depression     History of not currently    GERD (gastroesophageal reflux disease)     Hyperlipidemia     Impaired fasting glucose     Unspecified essential hypertension     Unspecified hypothyroidism     Uterine prolapse     Vertigo        Core Measures:  CVA: No No  CHF:No No  PNA:No No    Activity:  Activity Level:  Up with Assistance  Number times ambulated in hallways past shift: 0  Number of times OOB to chair past shift: 0    Cardiac:   Cardiac Monitoring: Yes      Cardiac Rhythm: Sinus Chauncey Celestin    Access:   Current line(s): PIV   Central Line? No   PICC LINE? No     Genitourinary:   Urinary status: voiding  Urinary Catheter? No     Respiratory:   O2 Device: None (Room air)  Chronic home O2 use?: NO  Incentive spirometer at bedside: NO       GI:  Last Bowel Movement Date: 03/22/23  Current diet:  DIET ONE TIME MESSAGE  ADULT DIET Regular  DIET ONE TIME MESSAGE  Passing flatus: YES  Tolerating current diet: YES       Pain Management:   Patient states pain is manageable on current regimen: YES    Skin:  Angel Score: 18  Interventions: limit briefs    Patient Safety:  Fall Score:    Interventions: bed/chair alarm, assistive device (walker, cane, etc), gripper socks, and pt to call before getting OOB     @Rollbelt  @dexterity to release roll belt  Yes/No ( must document dexterity  here by stating Yes or No here, otherwise this is a restraint and must follow restraint documentation policy.)    DVT prophylaxis:  DVT prophylaxis Med- Yes  DVT prophylaxis SCD or JACLYN- No     Wounds: (If Applicable)  Wounds- No  Location     Active Consults:  IP CONSULT TO HOSPITALIST  IP CONSULT TO NEUROLOGY  IP CONSULT TO PSYCHIATRY  IP CONSULT TO GASTROENTEROLOGY  IP CONSULT TO PSYCHIATRY    Length of Stay:  Expected LOS: 2d 12h  Actual LOS: 3  Discharge Plan:  Yes       Qian Key RN

## 2023-03-22 NOTE — PROGRESS NOTES
Received notification from bedside RN about patient with regards to: increasing restlessness and anxiety, needs medication to help her calm down    Intervention given:   - Klonopin 0.5 mg PO x 1 dose

## 2023-03-22 NOTE — PROCEDURES
NAME:  Nupur Muniz   :   1940   MRN:   [id number]     Date/Time:  3/22/2023 2:17 PM    Esophagogastroduodenoscopy (EGD) Procedure Note    Procedure: Esophagogastroduodenoscopy --diagnostic    Indication: Dysphagia, question of GEJ narrowing on esophagram  Pre-operative Diagnosis: see indication above  Post-operative Diagnosis: see findings below  :  Sanjana Bustos MD  Referring Provider:   Cassia Day MD    Exam:  Airway: clear, no airway problems anticipated  Heart: RRR, without gallops or rubs  Lungs: clear bilaterally without wheezes, crackles, or rhonchi  Abdomen: soft, nontender, nondistended, bowel sounds present  Mental Status: awake, alert and oriented to person, place and time     Anethesia/Sedation:  MAC anesthesia Propofol  Procedure Details   After informed consent was obtained for the procedure, with all risks and benefits of procedure explained the patient was taken to the endoscopy suite and placed in the left lateral decubitus position. Following sequential administration of sedation as per above, the SYYW360 gastroscope was inserted into the mouth and advanced under direct vision to second portion of the duodenum. A careful inspection was made as the gastroscope was withdrawn, including a retroflexed view of the proximal stomach; findings and interventions are described below. Findings:   OROPHARYNX: Cords and pyriform recesses normal.   ESOPHAGUS:   -- The proximal, mid portions are normal. The Z-Line is intact. There is some mild erythema in the distal esophagus, but no stricture. STOMACH: The fundus on antegrade and retroflex views is normal. The body, antrum, and pylorus are normal.   DUODENUM: The bulb, second and third portions are normal.    Therapies:   no mucosal lesion appreciated    Specimens: none    EBL:  None. Complications:   None; patient tolerated the procedure well.            Impression:  -- There is some mild erythema in the distal esophagus, which likely represents mild reflux; notably, no stricture or narrowing in esophagus  -- otherwise, normal study    Recommendations:  -- PPI daily for GERD symptoms reasonable  -- follow up in the outpatient setting if dysphagia continues   -- GI will sign off while inpatient. Please call if questions or concerns.     Discharge disposition:  back to wards    Agapito Barr MD

## 2023-03-22 NOTE — ROUTINE PROCESS
Arianna Galaviz  1940  [id number]    Situation:  Verbal report received from: Darryl Blanton  Procedure: Procedure(s):  ESOPHAGOGASTRODUODENOSCOPY (EGD)    Background:    Preoperative diagnosis: Dysphagia  Postoperative diagnosis: EGD: Reflux    :  Dr. Tarun Leal  Assistant(s): Endoscopy Technician-1: Chandni Hernandez  Endoscopy RN-1: Clint Barnes RN    Specimens: * No specimens in log *  H. Pylori  no    Assessment:  Intra-procedure medications   V  Anesthesia gave intra-procedure sedation and medications, see anesthesia flow sheet yes    Intravenous fluids: NS@ KVO     Vital signs stable     Abdominal assessment: round and soft     Recommendation:  Discharge patient per MD order.   Return to floor  Permission to share finding with family or friend yes

## 2023-03-22 NOTE — ANESTHESIA POSTPROCEDURE EVALUATION
Procedure(s):  ESOPHAGOGASTRODUODENOSCOPY (EGD). general, total IV anesthesia    Anesthesia Post Evaluation        Patient location during evaluation: PACU  Note status: Adequate. Level of consciousness: responsive to verbal stimuli and sleepy but conscious  Pain management: satisfactory to patient  Airway patency: patent  Anesthetic complications: no  Cardiovascular status: acceptable  Respiratory status: acceptable  Hydration status: acceptable  Comments: +Post-Anesthesia Evaluation and Assessment    Patient: Truong Puckett MRN: [id number]  SSN: [id number]   YOB: 1940  Age: [de-identified] y.o. Sex: female      Cardiovascular Function/Vital Signs    BP (!) 151/67   Pulse 68   Temp 37 °C (98.6 °F)   Resp 15   Ht 5' 4.02\" (1.626 m)   Wt 68.8 kg (151 lb 11.2 oz)   SpO2 96%   Breastfeeding No   BMI 26.03 kg/m²     Patient is status post Procedure(s):  ESOPHAGOGASTRODUODENOSCOPY (EGD). Nausea/Vomiting: Controlled. Postoperative hydration reviewed and adequate. Pain:  Pain Scale 1: Numeric (0 - 10) (03/22/23 1351)  Pain Intensity 1: 0 (03/22/23 1351)   Managed. Neurological Status: At baseline. Mental Status and Level of Consciousness: Arousable. Pulmonary Status:   O2 Device: None (03/22/23 1419)   Adequate oxygenation and airway patent. Complications related to anesthesia: None    Post-anesthesia assessment completed. No concerns. Signed By: Scot Meigs, MD    3/22/2023  Post anesthesia nausea and vomiting:  controlled      INITIAL Post-op Vital signs:   Vitals Value Taken Time   /70 03/22/23 1430   Temp     Pulse 56 03/22/23 1433   Resp 20 03/22/23 1433   SpO2 97 % 03/22/23 1433   Vitals shown include unvalidated device data.

## 2023-03-22 NOTE — PROGRESS NOTES
Endoscopy Case End Note:     Procedure scope was pre-cleaned, per protocol, at bedside by LT. Report received from anesthesia. See anesthesia flowsheet for intra-procedure vital signs and events. Glasses returned to patient. Belongings remain under stretcher with patient.

## 2023-03-22 NOTE — PROGRESS NOTES
Hospitalist Progress Note    NAME: Javon Handley   :  1940   MRN:  [id number]       Assessment / Plan:  AMS/acute encephalopathy  Depression  Suicidal ideation  tramadol overdose  R/o CVA  Without contrast did not show any evidence of acute stroke, there is chronic microvascular ischemic disease from last imaging  One-to-one sitter  Psych input appreciated  Recommended inpatient psych admission  We will continue with mirtazapine and BuSpar    Dysphagia  Barium swallow showed:  1. Moderate abnormality of the peristalsis. 2. Mild slightly irregular narrowing of the gastroesophageal junction likely  chronic reflux  Speech on board  Currently n.p.o.  GI consulted  For EGD today     Hypothyroidism  TSH elevated, compliance with medications unclear  Patient was 75 mcgSynthroid increased to 100 mcg  Need to follow with TSH in 6 weeks      Hypokalemia  Resolved   Replaced  Repeat BMP    Arthritis  Hyperlipidemia  Hypertension  Resume home medications      25.0 - 29.9 Overweight / Body mass index is 26.03 kg/m². Estimated discharge date:   Barriers: EGD, psych bed    Code status: Full  Prophylaxis: Lovenox  Recommended Disposition:  Inpatient psych rehab     Subjective:     Chief Complaint / Reason for Physician Visit  \"Patient seen today, n.p.o., will go for EGD today, she stated doing same\" I do not know what to ask\". Discussed with RN events overnight. Review of Systems:  Symptom Y/N Comments  Symptom Y/N Comments   Fever/Chills    Chest Pain     Poor Appetite    Edema     Cough    Abdominal Pain     Sputum    Joint Pain     SOB/FIGUEROA    Pruritis/Rash     Nausea/vomit    Tolerating PT/OT     Diarrhea    Tolerating Diet     Constipation    Other       Could NOT obtain due to:      Objective:     VITALS:   Last 24hrs VS reviewed since prior progress note.  Most recent are:  Patient Vitals for the past 24 hrs:   Temp Pulse Resp BP SpO2   23 1051 -- (!) 57 18 (!) 162/79 --   23 0739 -- Aleshia Fraser 54 18 139/64 96 %   03/22/23 0437 -- -- -- (!) 149/75 --   03/22/23 0316 98.2 °F (36.8 °C) 68 20 (!) 168/76 96 %   03/21/23 2358 98.4 °F (36.9 °C) 62 20 (!) 142/72 96 %   03/21/23 1857 98.6 °F (37 °C) 67 19 -- 96 %   03/21/23 1512 98.2 °F (36.8 °C) 67 17 (!) 161/73 96 %       No intake or output data in the 24 hours ending 03/22/23 1348     I had a face to face encounter and independently examined this patient on 3/22/2023, as outlined below:  PHYSICAL EXAM:  General: WD, WN. Alert, cooperative, no acute distress    EENT:  EOMI. Anicteric sclerae. MMM  Resp:  CTA bilaterally, no wheezing or rales. No accessory muscle use  CV:  Regular  rhythm,  No edema  GI:  Soft, Non distended, Non tender. +Bowel sounds  Neurologic:  Alert and oriented X 3, normal speech,   Psych:   Slightly anxious, no good insight about her condition  Skin:  No rashes. No jaundice    Reviewed most current lab test results and cultures  YES  Reviewed most current radiology test results   YES  Review and summation of old records today    NO  Reviewed patient's current orders and MAR    YES  PMH/ reviewed - no change compared to H&P  ________________________________________________________________________  Care Plan discussed with:    Comments   Patient x    Family      RN x    Care Manager x    Consultant  x                      Multidiciplinary team rounds were held today with , nursing, pharmacist and clinical coordinator. Patient's plan of care was discussed; medications were reviewed and discharge planning was addressed.      ________________________________________________________________________  Total NON critical care TIME: 35  Minutes    Total CRITICAL CARE TIME Spent:   Minutes non procedure based      Comments   >50% of visit spent in counseling and coordination of care     ________________________________________________________________________  Eliseo Salgado MD     Procedures: see electronic medical records for all procedures/Xrays and details which were not copied into this note but were reviewed prior to creation of Plan. LABS:  I reviewed today's most current labs and imaging studies. Pertinent labs include:  Recent Labs     03/20/23  0340   WBC 11.3*   HGB 13.1   HCT 40.3          Recent Labs     03/20/23  1034 03/19/23  1824     --    K 3.9  --      --    CO2 27  --    GLU 84  --    BUN 13  --    CREA 0.67  --    CA 9.1  --    MG  --  1.9         Signed:  Ginny Guerin MD

## 2023-03-22 NOTE — PROGRESS NOTES
TRANSFER - OUT REPORT:    Verbal report given to Cornelius Vogel RN(name) on Angela Bermudez  being transferred to Neuro (unit) for routine progression of care       Report consisted of patients Situation, Background, Assessment and   Recommendations(SBAR). Information from the following report(s) SBAR, Procedure Summary, Intake/Output, MAR, and Recent Results was reviewed with the receiving nurse. Opportunity for questions and clarification was provided.

## 2023-03-22 NOTE — PROGRESS NOTES
Problem: Aspiration - Risk of  Goal: *Absence of aspiration  Outcome: Progressing Towards Goal     Problem: Patient Education: Go to Patient Education Activity  Goal: Patient/Family Education  Outcome: Progressing Towards Goal     Problem: Pressure Injury - Risk of  Goal: *Prevention of pressure injury  Description: Document Angel Scale and appropriate interventions in the flowsheet.   Outcome: Progressing Towards Goal  Note: Pressure Injury Interventions:       Moisture Interventions: Absorbent underpads    Activity Interventions: Increase time out of bed, PT/OT evaluation    Mobility Interventions: PT/OT evaluation    Nutrition Interventions: Document food/fluid/supplement intake    Friction and Shear Interventions: HOB 30 degrees or less                Problem: Patient Education: Go to Patient Education Activity  Goal: Patient/Family Education  Outcome: Progressing Towards Goal     Problem: Patient Education: Go to Patient Education Activity  Goal: Patient/Family Education  Outcome: Progressing Towards Goal     Problem: TIA/CVA Stroke: 0-24 hours  Goal: Off Pathway (Use only if patient is Off Pathway)  Outcome: Progressing Towards Goal  Goal: Activity/Safety  Outcome: Progressing Towards Goal  Goal: Consults, if ordered  Outcome: Progressing Towards Goal  Goal: Diagnostic Test/Procedures  Outcome: Progressing Towards Goal  Goal: Nutrition/Diet  Outcome: Progressing Towards Goal  Goal: Discharge Planning  Outcome: Progressing Towards Goal  Goal: Medications  Outcome: Progressing Towards Goal  Goal: Respiratory  Outcome: Progressing Towards Goal  Goal: Treatments/Interventions/Procedures  Outcome: Progressing Towards Goal  Goal: Minimize risk of bleeding post-thrombolytic infusion  Outcome: Progressing Towards Goal  Goal: Monitor for complications post-thrombolytic infusion  Outcome: Progressing Towards Goal  Goal: Psychosocial  Outcome: Progressing Towards Goal  Goal: *Hemodynamically stable  Outcome: Progressing Towards Goal  Goal: *Neurologically stable  Description: Absence of additional neurological deficits    Outcome: Progressing Towards Goal  Goal: *Verbalizes anxiety and depression are reduced or absent  Outcome: Progressing Towards Goal  Goal: *Absence of Signs of Aspiration on Current Diet  Outcome: Progressing Towards Goal  Goal: *Absence of deep venous thrombosis signs and symptoms(Stroke Metric)  Outcome: Progressing Towards Goal  Goal: *Ability to perform ADLs and demonstrates progressive mobility and function  Outcome: Progressing Towards Goal  Goal: *Stroke education started(Stroke Metric)  Outcome: Progressing Towards Goal  Goal: *Dysphagia screen performed(Stroke Metric)  Outcome: Progressing Towards Goal  Goal: *Rehab consulted(Stroke Metric)  Outcome: Progressing Towards Goal     Problem: TIA/CVA Stroke: Day 2 Until Discharge  Goal: Off Pathway (Use only if patient is Off Pathway)  Outcome: Progressing Towards Goal  Goal: Activity/Safety  Outcome: Progressing Towards Goal  Goal: Diagnostic Test/Procedures  Outcome: Progressing Towards Goal  Goal: Nutrition/Diet  Outcome: Progressing Towards Goal  Goal: Discharge Planning  Outcome: Progressing Towards Goal  Goal: Medications  Outcome: Progressing Towards Goal  Goal: Respiratory  Outcome: Progressing Towards Goal  Goal: Treatments/Interventions/Procedures  Outcome: Progressing Towards Goal  Goal: Psychosocial  Outcome: Progressing Towards Goal  Goal: *Verbalizes anxiety and depression are reduced or absent  Outcome: Progressing Towards Goal  Goal: *Absence of aspiration  Outcome: Progressing Towards Goal  Goal: *Absence of deep venous thrombosis signs and symptoms(Stroke Metric)  Outcome: Progressing Towards Goal  Goal: *Optimal pain control at patient's stated goal  Outcome: Progressing Towards Goal  Goal: *Tolerating diet  Outcome: Progressing Towards Goal  Goal: *Ability to perform ADLs and demonstrates progressive mobility and function  Outcome: Progressing Towards Goal  Goal: *Stroke education continued(Stroke Metric)  Outcome: Progressing Towards Goal     Problem: Ischemic Stroke: Discharge Outcomes  Goal: *Verbalizes anxiety and depression are reduced or absent  Outcome: Progressing Towards Goal  Goal: *Verbalize understanding of risk factor modification(Stroke Metric)  Outcome: Progressing Towards Goal  Goal: *Hemodynamically stable  Outcome: Progressing Towards Goal  Goal: *Absence of aspiration pneumonia  Outcome: Progressing Towards Goal  Goal: *Aware of needed dietary changes  Outcome: Progressing Towards Goal  Goal: *Verbalize understanding of prescribed medications including anti-coagulants, anti-lipid, and/or anti-platelets(Stroke Metric)  Outcome: Progressing Towards Goal  Goal: *Tolerating diet  Outcome: Progressing Towards Goal  Goal: *Aware of follow-up diagnostics related to anticoagulants  Outcome: Progressing Towards Goal  Goal: *Ability to perform ADLs and demonstrates progressive mobility and function  Outcome: Progressing Towards Goal  Goal: *Absence of DVT(Stroke Metric)  Outcome: Progressing Towards Goal  Goal: *Absence of aspiration  Outcome: Progressing Towards Goal  Goal: *Optimal pain control at patient's stated goal  Outcome: Progressing Towards Goal  Goal: *Home safety concerns addressed  Outcome: Progressing Towards Goal  Goal: *Describes available resources and support systems  Outcome: Progressing Towards Goal  Goal: *Verbalizes understanding of activation of EMS(911) for stroke symptoms(Stroke Metric)  Outcome: Progressing Towards Goal  Goal: *Understands and describes signs and symptoms to report to providers(Stroke Metric)  Outcome: Progressing Towards Goal  Goal: *Neurolgocially stable (absence of additional neurological deficits)  Outcome: Progressing Towards Goal  Goal: *Verbalizes importance of follow-up with primary care physician(Stroke Metric)  Outcome: Progressing Towards Goal  Goal: *Smoking cessation discussed,if applicable(Stroke Metric)  Outcome: Progressing Towards Goal  Goal: *Depression screening completed(Stroke Metric)  Outcome: Progressing Towards Goal     Problem: Pain  Goal: *Control of Pain  Outcome: Progressing Towards Goal  Goal: *PALLIATIVE CARE:  Alleviation of Pain  Outcome: Progressing Towards Goal     Problem: Patient Education: Go to Patient Education Activity  Goal: Patient/Family Education  Outcome: Progressing Towards Goal

## 2023-03-23 LAB
ANION GAP SERPL CALC-SCNC: 8 MMOL/L (ref 5–15)
BUN SERPL-MCNC: 18 MG/DL (ref 6–20)
BUN/CREAT SERPL: 23 (ref 12–20)
CALCIUM SERPL-MCNC: 9 MG/DL (ref 8.5–10.1)
CHLORIDE SERPL-SCNC: 106 MMOL/L (ref 97–108)
CO2 SERPL-SCNC: 26 MMOL/L (ref 21–32)
CREAT SERPL-MCNC: 0.79 MG/DL (ref 0.55–1.02)
ERYTHROCYTE [DISTWIDTH] IN BLOOD BY AUTOMATED COUNT: 13.6 % (ref 11.5–14.5)
GLUCOSE SERPL-MCNC: 81 MG/DL (ref 65–100)
HCT VFR BLD AUTO: 39.1 % (ref 35–47)
HGB BLD-MCNC: 12.6 G/DL (ref 11.5–16)
MCH RBC QN AUTO: 30.4 PG (ref 26–34)
MCHC RBC AUTO-ENTMCNC: 32.2 G/DL (ref 30–36.5)
MCV RBC AUTO: 94.2 FL (ref 80–99)
NRBC # BLD: 0 K/UL (ref 0–0.01)
NRBC BLD-RTO: 0 PER 100 WBC
PLATELET # BLD AUTO: 221 K/UL (ref 150–400)
PMV BLD AUTO: 10 FL (ref 8.9–12.9)
POTASSIUM SERPL-SCNC: 3.3 MMOL/L (ref 3.5–5.1)
RBC # BLD AUTO: 4.15 M/UL (ref 3.8–5.2)
SODIUM SERPL-SCNC: 140 MMOL/L (ref 136–145)
WBC # BLD AUTO: 7.1 K/UL (ref 3.6–11)

## 2023-03-23 PROCEDURE — C9113 INJ PANTOPRAZOLE SODIUM, VIA: HCPCS | Performed by: NURSE PRACTITIONER

## 2023-03-23 PROCEDURE — 36415 COLL VENOUS BLD VENIPUNCTURE: CPT

## 2023-03-23 PROCEDURE — 85027 COMPLETE CBC AUTOMATED: CPT

## 2023-03-23 PROCEDURE — 74011000250 HC RX REV CODE- 250: Performed by: NURSE PRACTITIONER

## 2023-03-23 PROCEDURE — 74011000250 HC RX REV CODE- 250: Performed by: INTERNAL MEDICINE

## 2023-03-23 PROCEDURE — 94760 N-INVAS EAR/PLS OXIMETRY 1: CPT

## 2023-03-23 PROCEDURE — 74011250636 HC RX REV CODE- 250/636: Performed by: STUDENT IN AN ORGANIZED HEALTH CARE EDUCATION/TRAINING PROGRAM

## 2023-03-23 PROCEDURE — 74011250636 HC RX REV CODE- 250/636: Performed by: GENERAL ACUTE CARE HOSPITAL

## 2023-03-23 PROCEDURE — 80048 BASIC METABOLIC PNL TOTAL CA: CPT

## 2023-03-23 PROCEDURE — 74011250636 HC RX REV CODE- 250/636: Performed by: NURSE PRACTITIONER

## 2023-03-23 PROCEDURE — 74011250637 HC RX REV CODE- 250/637: Performed by: STUDENT IN AN ORGANIZED HEALTH CARE EDUCATION/TRAINING PROGRAM

## 2023-03-23 PROCEDURE — 65270000046 HC RM TELEMETRY

## 2023-03-23 PROCEDURE — 74011250637 HC RX REV CODE- 250/637: Performed by: GENERAL ACUTE CARE HOSPITAL

## 2023-03-23 RX ORDER — LEVOTHYROXINE SODIUM 75 UG/1
75 TABLET ORAL
COMMUNITY
End: 2023-03-27

## 2023-03-23 RX ORDER — THERA TABS 400 MCG
1 TAB ORAL DAILY
Status: ON HOLD | COMMUNITY

## 2023-03-23 RX ORDER — MELATONIN
1000 DAILY
Status: ON HOLD | COMMUNITY

## 2023-03-23 RX ORDER — GUAIFENESIN 600 MG/1
600 TABLET, EXTENDED RELEASE ORAL
COMMUNITY
End: 2023-03-27

## 2023-03-23 RX ORDER — POTASSIUM CHLORIDE 7.45 MG/ML
10 INJECTION INTRAVENOUS
Status: DISCONTINUED | OUTPATIENT
Start: 2023-03-23 | End: 2023-03-23

## 2023-03-23 RX ORDER — POTASSIUM CHLORIDE 20 MEQ/1
40 TABLET, EXTENDED RELEASE ORAL
Status: COMPLETED | OUTPATIENT
Start: 2023-03-23 | End: 2023-03-23

## 2023-03-23 RX ORDER — ESTRADIOL 0.1 MG/G
2 CREAM VAGINAL
Status: ON HOLD | COMMUNITY

## 2023-03-23 RX ADMIN — CASTOR OIL AND BALSAM, PERU: 788; 87 OINTMENT TOPICAL at 08:51

## 2023-03-23 RX ADMIN — BUSPIRONE HYDROCHLORIDE 10 MG: 10 TABLET ORAL at 17:29

## 2023-03-23 RX ADMIN — POTASSIUM CHLORIDE 10 MEQ: 7.46 INJECTION, SOLUTION INTRAVENOUS at 09:10

## 2023-03-23 RX ADMIN — LEVOTHYROXINE SODIUM 100 MCG: 0.1 TABLET ORAL at 05:10

## 2023-03-23 RX ADMIN — POTASSIUM CHLORIDE 40 MEQ: 1500 TABLET, EXTENDED RELEASE ORAL at 12:10

## 2023-03-23 RX ADMIN — SODIUM CHLORIDE, PRESERVATIVE FREE 10 ML: 5 INJECTION INTRAVENOUS at 21:22

## 2023-03-23 RX ADMIN — SODIUM CHLORIDE 40 MG: 9 INJECTION, SOLUTION INTRAMUSCULAR; INTRAVENOUS; SUBCUTANEOUS at 08:50

## 2023-03-23 RX ADMIN — ATORVASTATIN CALCIUM 20 MG: 20 TABLET, FILM COATED ORAL at 17:29

## 2023-03-23 RX ADMIN — BUSPIRONE HYDROCHLORIDE 10 MG: 10 TABLET ORAL at 08:50

## 2023-03-23 RX ADMIN — SODIUM CHLORIDE, PRESERVATIVE FREE 10 ML: 5 INJECTION INTRAVENOUS at 05:11

## 2023-03-23 RX ADMIN — SODIUM CHLORIDE 40 MG: 9 INJECTION, SOLUTION INTRAMUSCULAR; INTRAVENOUS; SUBCUTANEOUS at 21:21

## 2023-03-23 RX ADMIN — SODIUM CHLORIDE, PRESERVATIVE FREE 10 ML: 5 INJECTION INTRAVENOUS at 17:29

## 2023-03-23 RX ADMIN — Medication 1000 UNITS: at 08:50

## 2023-03-23 RX ADMIN — ENOXAPARIN SODIUM 40 MG: 100 INJECTION SUBCUTANEOUS at 21:21

## 2023-03-23 RX ADMIN — MIRTAZAPINE 15 MG: 15 TABLET, FILM COATED ORAL at 21:21

## 2023-03-23 RX ADMIN — NEPHROCAP 1 CAPSULE: 1 CAP ORAL at 08:50

## 2023-03-23 RX ADMIN — CASTOR OIL AND BALSAM, PERU: 788; 87 OINTMENT TOPICAL at 21:24

## 2023-03-23 NOTE — PROGRESS NOTES
End of Shift Note    Bedside shift change report given to Heidi RN (oncoming nurse) by Sergo Carias RN (offgoing nurse). Report included the following information SBAR and MAR    Shift worked:  7a-7p   Shift summary and any significant changes:    IV potassium changed to oral potassium due to irritation it caused at the IV site. Routine psych consult ordered. Concerns for physician to address:     Zone phone for oncoming shift:   7a-7p     Patient Information  Adams Linares  [de-identified] y.o.  3/19/2023 12:50 PM by Gabriela Soliman MD. Adams Linares was admitted from Home    Problem List  Patient Active Problem List    Diagnosis Date Noted    Altered mental state 03/19/2023    Situational mixed anxiety and depressive disorder 12/23/2022    NSTEMI (non-ST elevated myocardial infarction) ([facility] [address] 12/13/2022    Obesity (BMI 30.0-34.9) 05/31/2022    Seronegative rheumatoid arthritis of multiple sites ([facility] [address] 10/28/2020    Carpal tunnel syndrome of left wrist 10/28/2020    Primary osteoarthritis of left knee 10/16/2019    LBBB (left bundle branch block) 05/01/2019    Abnormal mammogram of left breast 11/15/2017    Primary insomnia 10/13/2017    Uterine prolapse     Osteopenia 10/14/2015    Allergic rhinitis     Recurrent major depressive disorder ([facility] [address]     Acquired hypothyroidism     Impaired fasting glucose     Hypertension, essential     Pure hypercholesterolemia      Past Medical History:   Diagnosis Date    Allergic rhinitis     Anesthesia complication     Pt reports dizziness after anesthesia     Arrhythmia     Left Bundle Branch Block    Arthritis     BBB (bundle branch block)     Depression     History of not currently    GERD (gastroesophageal reflux disease)     Hyperlipidemia     Impaired fasting glucose     Unspecified essential hypertension     Unspecified hypothyroidism     Uterine prolapse     Vertigo        Core Measures:  CVA: No No  CHF:No No  PNA:No No    Activity:  Activity Level:  Up with Assistance  Number times ambulated in hallways past shift: 0  Number of times OOB to chair past shift: 0    Cardiac:   Cardiac Monitoring: No      Cardiac Rhythm: Sinus Rhythm    Access:   Current line(s): PIV   Central Line? No   PICC LINE? No     Genitourinary:   Urinary status: voiding  Urinary Catheter? No     Respiratory:   O2 Device: None (Room air)  Chronic home O2 use?: YES  Incentive spirometer at bedside: YES       GI:  Last Bowel Movement Date: 03/22/23  Current diet:  DIET ONE TIME MESSAGE  ADULT DIET Regular  DIET ONE TIME MESSAGE  Passing flatus: YES  Tolerating current diet: YES       Pain Management:   Patient states pain is manageable on current regimen: YES    Skin:  Angel Score: 19  Interventions: float heels    Patient Safety:  Fall Score:    Interventions: bed/chair alarm, assistive device (walker, cane, etc), gripper socks, and pt to call before getting OOB     @Rollbelt  @dexterity to release roll belt  Yes/No ( must document dexterity  here by stating Yes or No here, otherwise this is a restraint and must follow restraint documentation policy.)    DVT prophylaxis:  DVT prophylaxis Med- Yes  DVT prophylaxis SCD or JACLYN- No     Wounds: (If Applicable)  Wounds- No  Location     Active Consults:  IP CONSULT TO HOSPITALIST  IP CONSULT TO NEUROLOGY  IP CONSULT TO PSYCHIATRY  IP CONSULT TO GASTROENTEROLOGY  IP CONSULT TO PSYCHIATRY  IP CONSULT TO PSYCHIATRY    Length of Stay:  Expected LOS: 2d 12h  Actual LOS: 4  Discharge Plan:  Yes       Marcos Michelle RN

## 2023-03-23 NOTE — PROGRESS NOTES
End of Shift Note    Bedside shift change report given to Key Mares RN (oncoming nurse) by Krish Coyle RN (offgoing nurse). Report included the following information SBAR and MAR    Shift worked:  Nights    Shift summary and any significant changes:     Psych consult pending for placement        Concerns for physician to address:     Zone phone for oncoming shift:   [phone]     Patient Information  Norma Diaz  [de-identified] y.o.  3/19/2023 12:50 PM by Samara Canchola MD. Norma Diaz was admitted from Home    Problem List  Patient Active Problem List    Diagnosis Date Noted    Altered mental state 03/19/2023    Situational mixed anxiety and depressive disorder 12/23/2022    NSTEMI (non-ST elevated myocardial infarction) ([facility] [address] 12/13/2022    Obesity (BMI 30.0-34.9) 05/31/2022    Seronegative rheumatoid arthritis of multiple sites ([facility] [address] 10/28/2020    Carpal tunnel syndrome of left wrist 10/28/2020    Primary osteoarthritis of left knee 10/16/2019    LBBB (left bundle branch block) 05/01/2019    Abnormal mammogram of left breast 11/15/2017    Primary insomnia 10/13/2017    Uterine prolapse     Osteopenia 10/14/2015    Allergic rhinitis     Recurrent major depressive disorder ([facility] [address]     Acquired hypothyroidism     Impaired fasting glucose     Hypertension, essential     Pure hypercholesterolemia      Past Medical History:   Diagnosis Date    Allergic rhinitis     Anesthesia complication     Pt reports dizziness after anesthesia     Arrhythmia     Left Bundle Branch Block    Arthritis     BBB (bundle branch block)     Depression     History of not currently    GERD (gastroesophageal reflux disease)     Hyperlipidemia     Impaired fasting glucose     Unspecified essential hypertension     Unspecified hypothyroidism     Uterine prolapse     Vertigo        Core Measures:  CVA: No No  CHF:No No  PNA:No No    Activity:  Activity Level:  Up with Assistance  Number times ambulated in hallways past shift: 0  Number of times OOB to chair past shift: 0    Cardiac:   Cardiac Monitoring: Yes      Cardiac Rhythm: Sinus Rhythm    Access:   Current line(s): PIV   Central Line? No   PICC LINE? No     Genitourinary:   Urinary status: voiding  Urinary Catheter?  No     Respiratory:   O2 Device: None (Room air)  Chronic home O2 use?: NO  Incentive spirometer at bedside: NO       GI:  Last Bowel Movement Date: 03/22/23  Current diet:  DIET ONE TIME MESSAGE  ADULT DIET Regular  DIET ONE TIME MESSAGE  Passing flatus: YES  Tolerating current diet: YES       Pain Management:   Patient states pain is manageable on current regimen: YES    Skin:  Angel Score: 18  Interventions: limit briefs    Patient Safety:  Fall Score:    Interventions: bed/chair alarm, assistive device (walker, cane, etc), gripper socks, and pt to call before getting OOB     @Rollbelt  @dexterity to release roll belt  Yes/No ( must document dexterity  here by stating Yes or No here, otherwise this is a restraint and must follow restraint documentation policy.)    DVT prophylaxis:  DVT prophylaxis Med- Yes  DVT prophylaxis SCD or JACLYN- No     Wounds: (If Applicable)  Wounds- No  Location     Active Consults:  IP CONSULT TO HOSPITALIST  IP CONSULT TO NEUROLOGY  IP CONSULT TO PSYCHIATRY  IP CONSULT TO GASTROENTEROLOGY  IP CONSULT TO PSYCHIATRY    Length of Stay:  Expected LOS: 2d 12h  Actual LOS: 4  Discharge Plan: Yes       Naz Boyer RN

## 2023-03-23 NOTE — BSMART NOTE
Liaisons attempted to meet with pt in her room on the medical unit. Pt sitting up in bed heartily eating her lunch. Pt's cousins visiting with her at this time. Liaisons asked pt if she wanted to meet for therapeutic intervention. Pt requests that liaisons return either later today or tomorrow due to visitation with her cousins. Liaisons will respect pt's wishes. Liaison will continue to monitor and support.

## 2023-03-23 NOTE — PROGRESS NOTES
Hospitalist Progress Note    NAME: Vanessa Rai   :  1940   MRN:  [id number]       Assessment / Plan:  AMS/acute encephalopathy  Depression  Suicidal ideation  tramadol overdose  R/o CVA  Without contrast did not show any evidence of acute stroke, there is chronic microvascular ischemic disease from last imaging  One-to-one sitter  Psych input appreciated  Recommended inpatient psych admission  We will continue with mirtazapine and BuSpar    Dysphagia  Barium swallow showed:  1. Moderate abnormality of the peristalsis. 2. Mild slightly irregular narrowing of the gastroesophageal junction likely  chronic reflux  Speech on board  EGD done yesterday  PPI daily  We will follow with GI as outpatient     Hypothyroidism  TSH elevated, compliance with medications unclear  Patient was 75 mcgSynthroid increased to 100 mcg  Need to follow with TSH in 6 weeks      Hypokalemia  Resolved   Replaced  Repeat BMP    Arthritis  Hyperlipidemia  Hypertension  Resume home medications      25.0 - 29.9 Overweight / Body mass index is 26.03 kg/m². Estimated discharge date:   Barriers: Stable for discharge    Code status: Full  Prophylaxis: Lovenox  Recommended Disposition:  Inpatient psych rehab     Subjective:     Chief Complaint / Reason for Physician Visit  \". Discussed with RN events overnight. Review of Systems:  Symptom Y/N Comments  Symptom Y/N Comments   Fever/Chills    Chest Pain     Poor Appetite    Edema     Cough    Abdominal Pain     Sputum    Joint Pain     SOB/FIGUEROA    Pruritis/Rash     Nausea/vomit    Tolerating PT/OT     Diarrhea    Tolerating Diet     Constipation    Other       Could NOT obtain due to:      Objective:     VITALS:   Last 24hrs VS reviewed since prior progress note.  Most recent are:  Patient Vitals for the past 24 hrs:   Temp Pulse Resp BP SpO2   23 0738 97.3 °F (36.3 °C) 60 16 120/70 99 %   23 0337 97.3 °F (36.3 °C) 63 16 136/62 97 %   23 2305 98.2 °F (36.8 °C) (!) 59 16 (!) 109/58 97 %   03/22/23 1930 97.2 °F (36.2 °C) 72 14 137/69 97 %   03/22/23 1439 -- (!) 56 15 (!) 150/68 96 %   03/22/23 1433 -- (!) 56 20 (!) 163/72 97 %   03/22/23 1430 -- (!) 56 16 (!) 157/70 96 %   03/22/23 1427 98.1 °F (36.7 °C) (!) 55 16 (!) 151/67 96 %   03/22/23 1419 98.6 °F (37 °C) 68 15 (!) 151/62 98 %   03/22/23 1351 98 °F (36.7 °C) 67 18 (!) 165/65 96 %   03/22/23 1051 -- (!) 57 18 (!) 162/79 --         Intake/Output Summary (Last 24 hours) at 3/23/2023 0836  Last data filed at 3/22/2023 1439  Gross per 24 hour   Intake 640 ml   Output --   Net 640 ml          I had a face to face encounter and independently examined this patient on 3/23/2023, as outlined below:  PHYSICAL EXAM:  General: WD, WN. Alert, cooperative, no acute distress    EENT:  EOMI. Anicteric sclerae. MMM  Resp:  CTA bilaterally, no wheezing or rales. No accessory muscle use  CV:  Regular  rhythm,  No edema  GI:  Soft, Non distended, Non tender. +Bowel sounds  Neurologic:  Alert and oriented X 3, normal speech,   Psych:   Slightly anxious, no good insight about her condition  Skin:  No rashes. No jaundice    Reviewed most current lab test results and cultures  YES  Reviewed most current radiology test results   YES  Review and summation of old records today    NO  Reviewed patient's current orders and MAR    YES  PMH/SH reviewed - no change compared to H&P  ________________________________________________________________________  Care Plan discussed with:    Comments   Patient x    Family      RN x    Care Manager x    Consultant  x                      Multidiciplinary team rounds were held today with , nursing, pharmacist and clinical coordinator. Patient's plan of care was discussed; medications were reviewed and discharge planning was addressed.      ________________________________________________________________________  Total NON critical care TIME: 35  Minutes    Total CRITICAL CARE TIME Spent: Minutes non procedure based      Comments   >50% of visit spent in counseling and coordination of care     ________________________________________________________________________  Teresa Melendez MD     Procedures: see electronic medical records for all procedures/Xrays and details which were not copied into this note but were reviewed prior to creation of Plan. LABS:  I reviewed today's most current labs and imaging studies. Pertinent labs include:  Recent Labs     03/23/23 0114   WBC 7.1   HGB 12.6   HCT 39.1          Recent Labs     03/23/23 0114 03/20/23  1034    138   K 3.3* 3.9    105   CO2 26 27   GLU 81 84   BUN 18 13   CREA 0.79 0.67   CA 9.0 9.1         Signed:  Teresa Melendez MD

## 2023-03-23 NOTE — PROGRESS NOTES
Problem: Aspiration - Risk of  Goal: *Absence of aspiration  Outcome: Progressing Towards Goal     Problem: Patient Education: Go to Patient Education Activity  Goal: Patient/Family Education  Outcome: Progressing Towards Goal     Problem: Pressure Injury - Risk of  Goal: *Prevention of pressure injury  Description: Document Angel Scale and appropriate interventions in the flowsheet.   Outcome: Progressing Towards Goal  Note: Pressure Injury Interventions:       Moisture Interventions: Minimize layers    Activity Interventions: Increase time out of bed    Mobility Interventions: Float heels, HOB 30 degrees or less    Nutrition Interventions: Document food/fluid/supplement intake    Friction and Shear Interventions: HOB 30 degrees or less                Problem: Patient Education: Go to Patient Education Activity  Goal: Patient/Family Education  Outcome: Progressing Towards Goal     Problem: Patient Education: Go to Patient Education Activity  Goal: Patient/Family Education  Outcome: Progressing Towards Goal     Problem: TIA/CVA Stroke: 0-24 hours  Goal: Off Pathway (Use only if patient is Off Pathway)  Outcome: Progressing Towards Goal  Goal: Activity/Safety  Outcome: Progressing Towards Goal  Goal: Consults, if ordered  Outcome: Progressing Towards Goal  Goal: Diagnostic Test/Procedures  Outcome: Progressing Towards Goal  Goal: Nutrition/Diet  Outcome: Progressing Towards Goal  Goal: Discharge Planning  Outcome: Progressing Towards Goal  Goal: Medications  Outcome: Progressing Towards Goal  Goal: Respiratory  Outcome: Progressing Towards Goal  Goal: Treatments/Interventions/Procedures  Outcome: Progressing Towards Goal  Goal: Minimize risk of bleeding post-thrombolytic infusion  Outcome: Progressing Towards Goal  Goal: Monitor for complications post-thrombolytic infusion  Outcome: Progressing Towards Goal  Goal: Psychosocial  Outcome: Progressing Towards Goal  Goal: *Hemodynamically stable  Outcome: Progressing Towards Goal  Goal: *Neurologically stable  Description: Absence of additional neurological deficits    Outcome: Progressing Towards Goal  Goal: *Verbalizes anxiety and depression are reduced or absent  Outcome: Progressing Towards Goal  Goal: *Absence of Signs of Aspiration on Current Diet  Outcome: Progressing Towards Goal  Goal: *Absence of deep venous thrombosis signs and symptoms(Stroke Metric)  Outcome: Progressing Towards Goal  Goal: *Ability to perform ADLs and demonstrates progressive mobility and function  Outcome: Progressing Towards Goal  Goal: *Stroke education started(Stroke Metric)  Outcome: Progressing Towards Goal  Goal: *Dysphagia screen performed(Stroke Metric)  Outcome: Progressing Towards Goal  Goal: *Rehab consulted(Stroke Metric)  Outcome: Progressing Towards Goal     Problem: TIA/CVA Stroke: Day 2 Until Discharge  Goal: Off Pathway (Use only if patient is Off Pathway)  Outcome: Progressing Towards Goal  Goal: Activity/Safety  Outcome: Progressing Towards Goal  Goal: Diagnostic Test/Procedures  Outcome: Progressing Towards Goal  Goal: Nutrition/Diet  Outcome: Progressing Towards Goal  Goal: Discharge Planning  Outcome: Progressing Towards Goal  Goal: Medications  Outcome: Progressing Towards Goal  Goal: Respiratory  Outcome: Progressing Towards Goal  Goal: Treatments/Interventions/Procedures  Outcome: Progressing Towards Goal  Goal: Psychosocial  Outcome: Progressing Towards Goal  Goal: *Verbalizes anxiety and depression are reduced or absent  Outcome: Progressing Towards Goal  Goal: *Absence of aspiration  Outcome: Progressing Towards Goal  Goal: *Absence of deep venous thrombosis signs and symptoms(Stroke Metric)  Outcome: Progressing Towards Goal  Goal: *Optimal pain control at patient's stated goal  Outcome: Progressing Towards Goal  Goal: *Tolerating diet  Outcome: Progressing Towards Goal  Goal: *Ability to perform ADLs and demonstrates progressive mobility and function  Outcome: Progressing Towards Goal  Goal: *Stroke education continued(Stroke Metric)  Outcome: Progressing Towards Goal     Problem: Ischemic Stroke: Discharge Outcomes  Goal: *Verbalizes anxiety and depression are reduced or absent  Outcome: Progressing Towards Goal  Goal: *Verbalize understanding of risk factor modification(Stroke Metric)  Outcome: Progressing Towards Goal  Goal: *Hemodynamically stable  Outcome: Progressing Towards Goal  Goal: *Absence of aspiration pneumonia  Outcome: Progressing Towards Goal  Goal: *Aware of needed dietary changes  Outcome: Progressing Towards Goal  Goal: *Verbalize understanding of prescribed medications including anti-coagulants, anti-lipid, and/or anti-platelets(Stroke Metric)  Outcome: Progressing Towards Goal  Goal: *Tolerating diet  Outcome: Progressing Towards Goal  Goal: *Aware of follow-up diagnostics related to anticoagulants  Outcome: Progressing Towards Goal  Goal: *Ability to perform ADLs and demonstrates progressive mobility and function  Outcome: Progressing Towards Goal  Goal: *Absence of DVT(Stroke Metric)  Outcome: Progressing Towards Goal  Goal: *Absence of aspiration  Outcome: Progressing Towards Goal  Goal: *Optimal pain control at patient's stated goal  Outcome: Progressing Towards Goal  Goal: *Home safety concerns addressed  Outcome: Progressing Towards Goal  Goal: *Describes available resources and support systems  Outcome: Progressing Towards Goal  Goal: *Verbalizes understanding of activation of EMS(911) for stroke symptoms(Stroke Metric)  Outcome: Progressing Towards Goal  Goal: *Understands and describes signs and symptoms to report to providers(Stroke Metric)  Outcome: Progressing Towards Goal  Goal: *Neurolgocially stable (absence of additional neurological deficits)  Outcome: Progressing Towards Goal  Goal: *Verbalizes importance of follow-up with primary care physician(Stroke Metric)  Outcome: Progressing Towards Goal  Goal: *Smoking cessation discussed,if applicable(Stroke Metric)  Outcome: Progressing Towards Goal  Goal: *Depression screening completed(Stroke Metric)  Outcome: Progressing Towards Goal     Problem: Pain  Goal: *Control of Pain  Outcome: Progressing Towards Goal  Goal: *PALLIATIVE CARE:  Alleviation of Pain  Outcome: Progressing Towards Goal     Problem: Patient Education: Go to Patient Education Activity  Goal: Patient/Family Education  Outcome: Progressing Towards Goal

## 2023-03-23 NOTE — PROGRESS NOTES
Pharmacy Medication Reconciliation     The patient was interviewed regarding current PTA medication list, use and drug allergies. Patient is a poor historian. Allergy Update: Amlodipine, Codeine, Epinephrine, Erythromycin, Losartan, Mobic [meloxicam], and Pcn [penicillins]    Recommendations/Findings: The following amendments were made to the patient's active medication list on file at [facility]:   1) Additions:   None    2) Deletions:   Zolpidem  Calcium citrate    3) Changes:   None      Pertinent Findings: Patient claims the nurse from her doctor's office told her to start taking a full levothyroxine tablet every  instead of half    Clarified PTA med list with patient, [facility]. PTA medication list was corrected to the following:     Prior to Admission Medications   Prescriptions Last Dose Informant Taking? QUEtiapine (SEROquel) 25 mg tablet 3/19/2023  Yes   Sig: Take 25 mg by mouth nightly. acetaminophen (TYLENOL) 500 mg tablet 3/13/2023  Yes   Si,000 mg every six (6) hours as needed. atorvastatin (LIPITOR) 20 mg tablet 3/19/2023  Yes   Sig: TAKE 1 TABLET BY MOUTH EVERY DAY IN THE EVENING   cholecalciferol (VITAMIN D3) (2,000 UNITS /50 MCG) cap capsule   Yes   Sig: Take  by mouth daily. estradioL (Estrace) 0.01 % (0.1 mg/gram) vaginal cream   Yes   Sig: Insert 2 g into vagina every seven (7) days. guaiFENesin ER (Mucinex) 600 mg ER tablet   Yes   Sig: Take 600 mg by mouth two (2) times daily as needed for Congestion. hydroCHLOROthiazide (HYDRODIURIL) 25 mg tablet 3/19/2023  Yes   Sig: TAKE 1 TABLET BY MOUTH EVERY DAY   levothyroxine (Synthroid) 75 mcg tablet   Yes   Sig: Take 75 mcg by mouth Daily (before breakfast). 1 tablet daily except 1/2 tablet on Sundays   mirtazapine (REMERON) 15 mg tablet 3/19/2023  Yes   Sig: Take 15 mg by mouth nightly. simethicone (GAS-X) 125 mg capsule   Yes   Sig: Take 125 mg by mouth four (4) times daily as needed for Flatulence.    therapeutic multivitamin [facility]) tablet   Yes   Sig: Take 1 Tablet by mouth daily.       Facility-Administered Medications: None        Thank you,  MELISSA Murdock

## 2023-03-24 LAB
ANION GAP SERPL CALC-SCNC: 5 MMOL/L (ref 5–15)
BUN SERPL-MCNC: 15 MG/DL (ref 6–20)
BUN/CREAT SERPL: 19 (ref 12–20)
CALCIUM SERPL-MCNC: 9.2 MG/DL (ref 8.5–10.1)
CHLORIDE SERPL-SCNC: 106 MMOL/L (ref 97–108)
CO2 SERPL-SCNC: 27 MMOL/L (ref 21–32)
CREAT SERPL-MCNC: 0.77 MG/DL (ref 0.55–1.02)
ERYTHROCYTE [DISTWIDTH] IN BLOOD BY AUTOMATED COUNT: 13.8 % (ref 11.5–14.5)
GLUCOSE SERPL-MCNC: 103 MG/DL (ref 65–100)
HCT VFR BLD AUTO: 39.9 % (ref 35–47)
HGB BLD-MCNC: 12.8 G/DL (ref 11.5–16)
MCH RBC QN AUTO: 30.3 PG (ref 26–34)
MCHC RBC AUTO-ENTMCNC: 32.1 G/DL (ref 30–36.5)
MCV RBC AUTO: 94.5 FL (ref 80–99)
NRBC # BLD: 0 K/UL (ref 0–0.01)
NRBC BLD-RTO: 0 PER 100 WBC
PLATELET # BLD AUTO: 204 K/UL (ref 150–400)
PMV BLD AUTO: 9.9 FL (ref 8.9–12.9)
POTASSIUM SERPL-SCNC: 3.7 MMOL/L (ref 3.5–5.1)
RBC # BLD AUTO: 4.22 M/UL (ref 3.8–5.2)
SARS-COV-2 RNA RESP QL NAA+PROBE: NOT DETECTED
SODIUM SERPL-SCNC: 138 MMOL/L (ref 136–145)
SOURCE, COVRS: NORMAL
WBC # BLD AUTO: 8.8 K/UL (ref 3.6–11)

## 2023-03-24 PROCEDURE — 74011250636 HC RX REV CODE- 250/636: Performed by: GENERAL ACUTE CARE HOSPITAL

## 2023-03-24 PROCEDURE — C9113 INJ PANTOPRAZOLE SODIUM, VIA: HCPCS | Performed by: NURSE PRACTITIONER

## 2023-03-24 PROCEDURE — 74011000250 HC RX REV CODE- 250: Performed by: NURSE PRACTITIONER

## 2023-03-24 PROCEDURE — 74011000250 HC RX REV CODE- 250: Performed by: INTERNAL MEDICINE

## 2023-03-24 PROCEDURE — 85027 COMPLETE CBC AUTOMATED: CPT

## 2023-03-24 PROCEDURE — 94760 N-INVAS EAR/PLS OXIMETRY 1: CPT

## 2023-03-24 PROCEDURE — 36415 COLL VENOUS BLD VENIPUNCTURE: CPT

## 2023-03-24 PROCEDURE — 80048 BASIC METABOLIC PNL TOTAL CA: CPT

## 2023-03-24 PROCEDURE — 65270000046 HC RM TELEMETRY

## 2023-03-24 PROCEDURE — 74011250637 HC RX REV CODE- 250/637: Performed by: GENERAL ACUTE CARE HOSPITAL

## 2023-03-24 PROCEDURE — 74011250636 HC RX REV CODE- 250/636: Performed by: NURSE PRACTITIONER

## 2023-03-24 RX ADMIN — MIRTAZAPINE 15 MG: 15 TABLET, FILM COATED ORAL at 21:42

## 2023-03-24 RX ADMIN — Medication 1000 UNITS: at 08:36

## 2023-03-24 RX ADMIN — ATORVASTATIN CALCIUM 20 MG: 20 TABLET, FILM COATED ORAL at 17:05

## 2023-03-24 RX ADMIN — LEVOTHYROXINE SODIUM 100 MCG: 0.1 TABLET ORAL at 05:41

## 2023-03-24 RX ADMIN — BUSPIRONE HYDROCHLORIDE 10 MG: 10 TABLET ORAL at 17:05

## 2023-03-24 RX ADMIN — SODIUM CHLORIDE, PRESERVATIVE FREE 10 ML: 5 INJECTION INTRAVENOUS at 21:45

## 2023-03-24 RX ADMIN — BUSPIRONE HYDROCHLORIDE 10 MG: 10 TABLET ORAL at 08:36

## 2023-03-24 RX ADMIN — CASTOR OIL AND BALSAM, PERU: 788; 87 OINTMENT TOPICAL at 21:44

## 2023-03-24 RX ADMIN — SODIUM CHLORIDE 40 MG: 9 INJECTION, SOLUTION INTRAMUSCULAR; INTRAVENOUS; SUBCUTANEOUS at 21:42

## 2023-03-24 RX ADMIN — SODIUM CHLORIDE, PRESERVATIVE FREE 10 ML: 5 INJECTION INTRAVENOUS at 05:41

## 2023-03-24 RX ADMIN — CASTOR OIL AND BALSAM, PERU: 788; 87 OINTMENT TOPICAL at 08:45

## 2023-03-24 RX ADMIN — SODIUM CHLORIDE 40 MG: 9 INJECTION, SOLUTION INTRAMUSCULAR; INTRAVENOUS; SUBCUTANEOUS at 08:36

## 2023-03-24 RX ADMIN — SODIUM CHLORIDE, PRESERVATIVE FREE 10 ML: 5 INJECTION INTRAVENOUS at 17:05

## 2023-03-24 RX ADMIN — ENOXAPARIN SODIUM 40 MG: 100 INJECTION SUBCUTANEOUS at 21:44

## 2023-03-24 NOTE — PROGRESS NOTES
Problem: Pressure Injury - Risk of  Goal: *Prevention of pressure injury  Description: Document Angel Scale and appropriate interventions in the flowsheet.   Outcome: Progressing Towards Goal  Note: Pressure Injury Interventions:       Moisture Interventions: Absorbent underpads    Activity Interventions: Assess need for specialty bed    Mobility Interventions: Float heels, HOB 30 degrees or less, PT/OT evaluation    Nutrition Interventions: Document food/fluid/supplement intake    Friction and Shear Interventions: HOB 30 degrees or less

## 2023-03-24 NOTE — PROGRESS NOTES
Hospitalist Progress Note    NAME: Huyen Barraza   :  1940   MRN:  [id number]       Assessment / Plan:  AMS/acute encephalopathy  Depression  Suicidal ideation  tramadol overdose  R/o CVA  Without contrast did not show any evidence of acute stroke, there is chronic microvascular ischemic disease from last imaging  One-to-one sitter  Psych input appreciated  Recommended inpatient psych admission  We will continue with mirtazapine and BuSpar  Patient doing good with above medications    Dysphagia  Barium swallow showed:  1. Moderate abnormality of the peristalsis. 2. Mild slightly irregular narrowing of the gastroesophageal junction likely  chronic reflux  Speech on board  EGD done yesterday  PPI daily  We will follow with GI as outpatient     Hypothyroidism  TSH elevated, compliance with medications unclear  Patient was 75 mcgSynthroid increased to 100 mcg  Need to follow with TSH in 6 weeks      Hypokalemia  Resolved   Replaced  Repeat BMP    Arthritis  Hyperlipidemia  Hypertension  Resume home medications      25.0 - 29.9 Overweight / Body mass index is 26.03 kg/m². Estimated discharge date:   Barriers: Stable for discharge    Code status: Full  Prophylaxis: Lovenox  Recommended Disposition:  Inpatient psych rehab     Subjective:     Chief Complaint / Reason for Physician Visit  \". Patient seen today, doing fine, stated did not know if she have questions or could not, no any symptoms endorsed   discussed with RN events overnight. Review of Systems:  Symptom Y/N Comments  Symptom Y/N Comments   Fever/Chills    Chest Pain     Poor Appetite    Edema     Cough    Abdominal Pain     Sputum    Joint Pain     SOB/FIGUEROA    Pruritis/Rash     Nausea/vomit    Tolerating PT/OT     Diarrhea    Tolerating Diet     Constipation    Other       Could NOT obtain due to:      Objective:     VITALS:   Last 24hrs VS reviewed since prior progress note.  Most recent are:  Patient Vitals for the past 24 hrs:   Temp Pulse Resp BP SpO2   03/24/23 0728 97.7 °F (36.5 °C) 63 16 (!) 151/74 97 %   03/24/23 0327 -- 66 -- (!) 144/82 98 %   03/23/23 2307 98.1 °F (36.7 °C) (!) 58 20 (!) 144/72 96 %   03/23/23 2004 97.9 °F (36.6 °C) 66 20 (!) 106/51 98 %   03/23/23 1534 97.7 °F (36.5 °C) (!) 59 18 136/72 96 %       No intake or output data in the 24 hours ending 03/24/23 1031       I had a face to face encounter and independently examined this patient on 3/24/2023, as outlined below:  PHYSICAL EXAM:  General: WD, WN. Alert, cooperative, no acute distress    EENT:  EOMI. Anicteric sclerae. MMM  Resp:  CTA bilaterally, no wheezing or rales. No accessory muscle use  CV:  Regular  rhythm,  No edema  GI:  Soft, Non distended, Non tender. +Bowel sounds  Neurologic:  Alert and oriented X 3, normal speech,   Psych:   Slightly anxious, no good insight about her condition  Skin:  No rashes. No jaundice    Reviewed most current lab test results and cultures  YES  Reviewed most current radiology test results   YES  Review and summation of old records today    NO  Reviewed patient's current orders and MAR    YES  PMH/ reviewed - no change compared to H&P  ________________________________________________________________________  Care Plan discussed with:    Comments   Patient x    Family      RN x    Care Manager x    Consultant  x                      Multidiciplinary team rounds were held today with , nursing, pharmacist and clinical coordinator. Patient's plan of care was discussed; medications were reviewed and discharge planning was addressed.      ________________________________________________________________________  Total NON critical care TIME: 35  Minutes    Total CRITICAL CARE TIME Spent:   Minutes non procedure based      Comments   >50% of visit spent in counseling and coordination of care     ________________________________________________________________________  Domo Santos MD     Procedures: see electronic medical records for all procedures/Xrays and details which were not copied into this note but were reviewed prior to creation of Plan. LABS:  I reviewed today's most current labs and imaging studies. Pertinent labs include:  Recent Labs     03/24/23  0335 03/23/23  0114   WBC 8.8 7.1   HGB 12.8 12.6   HCT 39.9 39.1    221       Recent Labs     03/24/23  0335 03/23/23  0114    140   K 3.7 3.3*    106   CO2 27 26   * 81   BUN 15 18   CREA 0.77 0.79   CA 9.2 9.0         Signed:  Carina Verduzco MD

## 2023-03-24 NOTE — PROGRESS NOTES
End of Shift Note    Bedside shift change report given to VU PADILLA (oncoming nurse) by Chauncey Schwartz RN (offgoing nurse). Report included the following information SBAR and MAR    Shift worked:  DAYS    Shift summary and any significant changes:     -PATIENT DID NOT WANT TO BE BOTHERED THIS MORNING.  HAD TO GIVE PATIENT MORNING MEDICATIONS WHEN SHE WAS GOOD AND READY    -1:1 SITTER AT BEDSIDE     Concerns for physician to address:  NONE   Zone phone for oncoming shift:  [phone]       Patient Information  Vanessa Rai  [de-identified] y.o.  3/19/2023 12:50 PM by Mulugeta Sánchez MD. Vanessa Rai was admitted from Home    Problem List  Patient Active Problem List    Diagnosis Date Noted    Altered mental state 03/19/2023    Situational mixed anxiety and depressive disorder 12/23/2022    NSTEMI (non-ST elevated myocardial infarction) ([facility] [facility] 12/13/2022    Obesity (BMI 30.0-34.9) 05/31/2022    [facility]oneg[facility] 10/28/2020    Carpal tunnel syndrome of left wrist 10/28/2020    Primary osteoarthritis of left knee 10/16/2019    LBBB (left bundle branch block) 05/01/2019    Abnormal mammogram of left breast 11/15/2017    Primary insomnia 10/13/2017    Uterine prolapse     Osteopenia 10/14/2015    Allergic rhinitis     Recurrent major depressive disorder (HCC)     Acquired hypothyroidism     Impaired fasting glucose     Hypertension, essential     Pure hypercholesterolemia      Past Medical History:   Diagnosis Date    Allergic rhinitis     Anesthesia complication     Pt reports dizziness after anesthesia     Arrhythmia     Left Bundle Branch Block    Arthritis     BBB (bundle branch block)     Depression     History of not currently    GERD (gastroesophageal reflux disease)     Hyperlipidemia     Impaired fasting glucose     Unspecified essential hypertension     Unspecified hypothyroidism     Uterine prolapse     Vertigo        Core Measures:  CVA: No No  CHF:No No  PNA:No No    Activity:  Activity Level: Up with Assistance  Number times ambulated in hallways past shift: 0  Number of times OOB to chair past shift: 0    Cardiac:   Cardiac Monitoring: No      Cardiac Rhythm: Sinus Rhythm    Access:   Current line(s): PIV   Central Line? No   PICC LINE? No     Genitourinary:   Urinary status: voiding  Urinary Catheter?  No     Respiratory:   O2 Device: None (Room air)  Chronic home O2 use?: YES  Incentive spirometer at bedside: YES       GI:  Last Bowel Movement Date: 03/22/23  Current diet:  DIET ONE TIME MESSAGE  ADULT DIET Regular  DIET ONE TIME MESSAGE  Passing flatus: YES  Tolerating current diet: YES       Pain Management:   Patient states pain is manageable on current regimen: YES    Skin:  Angel Score: 19  Interventions: float heels    Patient Safety:  Fall Score:    Interventions: bed/chair alarm, assistive device (walker, cane, etc), gripper socks, and pt to call before getting OOB     @Rollbelt  @dexterity to release roll belt  Yes/No ( must document dexterity  here by stating Yes or No here, otherwise this is a restraint and must follow restraint documentation policy.)    DVT prophylaxis:  DVT prophylaxis Med- Yes  DVT prophylaxis SCD or JACLYN- No     Wounds: (If Applicable)  Wounds- No  Location     Active Consults:  IP CONSULT TO HOSPITALIST  IP CONSULT TO NEUROLOGY  IP CONSULT TO PSYCHIATRY  IP CONSULT TO GASTROENTEROLOGY  IP CONSULT TO PSYCHIATRY  IP CONSULT TO PSYCHIATRY    Length of Stay:  Expected LOS: 2d 9h  Actual LOS: 5  Discharge Plan: Yes       Jordan Reece RN

## 2023-03-24 NOTE — PROGRESS NOTES
Spiritual Care Assessment/Progress Note  [facility]      NAME: Cadence Arnold      MRN: [id number]  AGE: [de-identified] y.o.  SEX: female  Yarsani Affiliation: Confucianism   Language: English     3/24/2023     Total Time (in minutes): 15     Spiritual Assessment begun in [facility]OSCIENCE TELEMETRY through conversation with:         [x]Patient        [] Family    [x] Friend(s)        Reason for Consult: Request by staff     Spiritual beliefs: (Please include comment if needed)     [x] Identifies with a domingo tradition: Hammad Rgigs from childhood        [] Supported by a domingo community:            [] Claims no spiritual orientation:           [] Seeking spiritual identity:                [] Adheres to an individual form of spirituality:           [] Not able to assess:                           Identified resources for coping:      [x] Prayer                               [] Music                  [] Guided Imagery     [x] Family/friends                 [] Pet visits     [] Devotional reading                         [] Unknown     [] Other:                                               Interventions offered during this visit: (See comments for more details)    Patient Interventions: Iconic (affirming the presence of God/Higher Power), Affirmation of domingo, Initial/Spiritual assessment, patient floor, Coping skills reviewed/reinforced, Affirmation of emotions/emotional suffering, Reframing, Yarsani beliefs/image of God discussed, Prayer (actual)     Family/Friend(s): Iconic (affirming the presence of God/Higher Power), Prayer (actual)     Plan of Care:     [x] Support spiritual and/or cultural needs    [] Support AMD and/or advance care planning process      [] Support grieving process   [] Coordinate Rites and/or Rituals    [] Coordination with community clergy   [] No spiritual needs identified at this time   [] Detailed Plan of Care below (See Comments)  [] Make referral to Music Therapy  [] Make referral to Pet Therapy     [] Make referral to Addiction services  [] Make referral to [facility]  [] Make referral to Spiritual Care Partner  [] No future visits requested        [x] Contact Spiritual Care for further referrals     Comments:     Met with Ms. Rey Tawny and nurse sitting in room. Shared ideas about God's love for the patient. She is a strong believer. Provided listening presence and support as she shared some of her childhood and domingo tradition of attending [employer] as a child. Prayed for and with patient and the attending nurse. Reminded patient of God's unconditional love, constant love and presence to her. She seemed to appreciate hearing about this and how He is the [employer] who never leaves or forsakes His children. We shared ideas and thoughts. She loves to read. Was grateful for visit and thoughts and prayers. [address].   PRN

## 2023-03-24 NOTE — BSMART NOTE
BSMART Liaison Team Note     LOS:  5 days     Patient goal(s) for today: Take medications as prescribed, communicate needs in an appropriate manner, explore and utilize coping skills and positive thinking. BSMART Liaison team focus/goals: Assess needs, provide support and education, and coordinate care. Progress note: Amrita met with pt face to face in her room on the medical floor. 1:1 sitter at her bedside. Pt laying in bed, eyes closed, with partially eaten lunch in front of her. Pt continues to appear depressed, withdrawn, with flat affect, soft, hesitant speech, and poor eye contact. Pt continues to dwell upon negative themes. She tells amrita, \"I don't feel well. .. period. \" She endorses feeling \"sad, tired,\" and \"everything. \" Endorses feeling \"really down\"  She reportedly slept \"off and on\" last night. Pt eating well, and this is corroborated by sitter. Pt denies any SI/HI/AH/VH at this time. She is oriented by person, place, time, and her situation. It is questionable if pt experiencing some confused or delusional thinking. She continues to say that she can't forgive herself for her overdose attempt that led to her hospitalization. She feels nervous and \"can't explain it. \" When asked about the conversation during her visit yesterday with her 2 cousins, she says, \"I told them to check on the gas at my home in [place] because I didn't want anyone to get blown up. \" Amrita tried to get pt to clarify if she still owned the home, and she didn't say anything. She said that she and her cousins \"used to\" live in [place]. Amrita tried to engage pt in exploring some positive thoughts, and she replies, \"There's nothing positive. \" Pt tells amrita that she can't ever go home again because of what happened [overdose?], and she reports feeling \"scared of everything. \" Amrita encouraged pt to continue trying to distract herself from negative thoughts.  Also encouraged pt to practice themes of forgiveness and gratitude. Pt prompted to watch the news, which she said was something that she like to do, with perico's help to distract her. Barriers to Discharge: BHU placement     Outpatient provider(s):  PCP  Insurance info/prescription coverage:  [place] MEDICARE/VA MEDICARE PART A & B    Diagnosis: Major Depressive Disorder, severe, Generalized Anxiety Disorder    Plan:  Defer to psychiatric consult note and recommendations. .   Follow up Psych Consult placed? yes. Psychiatrist updated? yes       Participating treatment team members:  Yonas Hickman LCSW, 1:1 perico

## 2023-03-24 NOTE — PROGRESS NOTES
End of Shift Note    Bedside shift change report given to [address] [place] [address] (oncoming nurse) by Alivia Hernández RN (offgoing nurse). Report included the following information SBAR and MAR    Shift worked:  Nights    Shift summary and any significant changes:    Ipt slept throughout night. Very flat affect tonight. At one point pt pretended to be asleep and would not rouse or communicate with RN vitals taken pt stable. Then spoke with sitter when RN left room.     Concerns for physician to address: none   Zone phone for oncoming shift:  [phone]       Patient Information  Clayton Saunders  [de-identified] y.o.  3/19/2023 12:50 PM by Ye Henry MD. Clayton Saunders was admitted from Home    Problem List  Patient Active Problem List    Diagnosis Date Noted    Altered mental state 03/19/2023    Situational mixed anxiety and depressive disorder 12/23/2022    NSTEMI (non-ST elevated myocardial infarction) ([facility] [address] 12/13/2022    Obesity (BMI 30.0-34.9) 05/31/2022    Seronegative rheumatoid arthritis of multiple sites ([facility] [address] 10/28/2020    Carpal tunnel syndrome of left wrist 10/28/2020    Primary osteoarthritis of left knee 10/16/2019    LBBB (left bundle branch block) 05/01/2019    Abnormal mammogram of left breast 11/15/2017    Primary insomnia 10/13/2017    Uterine prolapse     Osteopenia 10/14/2015    Allergic rhinitis     Recurrent major depressive disorder ([facility] [address]     Acquired hypothyroidism     Impaired fasting glucose     Hypertension, essential     Pure hypercholesterolemia      Past Medical History:   Diagnosis Date    Allergic rhinitis     Anesthesia complication     Pt reports dizziness after anesthesia     Arrhythmia     Left Bundle Branch Block    Arthritis     BBB (bundle branch block)     Depression     History of not currently    GERD (gastroesophageal reflux disease)     Hyperlipidemia     Impaired fasting glucose     Unspecified essential hypertension     Unspecified hypothyroidism     Uterine prolapse     Vertigo        Core Measures:  CVA: No No  CHF:No No  PNA:No No    Activity:  Activity Level: Up with Assistance  Number times ambulated in hallways past shift: 0  Number of times OOB to chair past shift: 0    Cardiac:   Cardiac Monitoring: No      Cardiac Rhythm: Sinus Rhythm    Access:   Current line(s): PIV   Central Line? No   PICC LINE? No     Genitourinary:   Urinary status: voiding  Urinary Catheter?  No     Respiratory:   O2 Device: None (Room air)  Chronic home O2 use?: YES  Incentive spirometer at bedside: YES       GI:  Last Bowel Movement Date: 03/22/23  Current diet:  DIET ONE TIME MESSAGE  ADULT DIET Regular  DIET ONE TIME MESSAGE  Passing flatus: YES  Tolerating current diet: YES       Pain Management:   Patient states pain is manageable on current regimen: YES    Skin:  Angel Score: 19  Interventions: float heels    Patient Safety:  Fall Score:    Interventions: bed/chair alarm, assistive device (walker, cane, etc), gripper socks, and pt to call before getting OOB     @Rollbelt  @dexterity to release roll belt  Yes/No ( must document dexterity  here by stating Yes or No here, otherwise this is a restraint and must follow restraint documentation policy.)    DVT prophylaxis:  DVT prophylaxis Med- Yes  DVT prophylaxis SCD or JACLYN- No     Wounds: (If Applicable)  Wounds- No  Location     Active Consults:  IP CONSULT TO HOSPITALIST  IP CONSULT TO NEUROLOGY  IP CONSULT TO PSYCHIATRY  IP CONSULT TO GASTROENTEROLOGY  IP CONSULT TO PSYCHIATRY  IP CONSULT TO PSYCHIATRY    Length of Stay:  Expected LOS: 2d 9h  Actual LOS: 4  Discharge Plan: Yes       Kendal Bernal RN

## 2023-03-24 NOTE — PROGRESS NOTES
Physician Progress Note      PATIENT:               Philomena Carpenter  CSN #:                  [id number]  :                       1940  ADMIT DATE:       3/19/2023 12:50 PM  [address] [place] DATE:  RESPONDING  PROVIDER #:        Sammy Ahuja MD          QUERY TEXT:    [de-identified] pt admitted with AMS and has acute encephalopathy documented. If possible, please document in progress notes and discharge summary further specificity regarding the type of encephalopathy:    The medical record reflects the following:  Risk Factors: severe depression, Tramadol OD  Clinical Indicators: noted by family pt was more lethargic and less responsive, pt endorsed a suicide attempt by overdosing on 8 tramadol; K 2.9; Na 134.    3/20 Neurology noted 'Acute encephalopathy:  Concern for possible medication overdose. She also does have multiple metabolic derangements  (hypokalemia, thyroid abnormalities) which can be contributing to her mental status. Worsening of  underlying psychiatric disease contributing. Patient reports current increasing life stressors associated with  move scheduled for this week.'  Treatment: Correct underlying metabolic derangements, Psychiatry consult, Zofran, IVF w/ kcl. Thank you,  Bridget Lopez RN, CDI  Options provided:  -- Metabolic encephalopathy  -- Toxic encephalopathy  -- Drug-induced encephalopathy due to Tramadol overdose  -- Toxic metabolic encephalopathy  -- Other - I will add my own diagnosis  -- Disagree - Not applicable / Not valid  -- Disagree - Clinically unable to determine / Unknown  -- Refer to Clinical Documentation Reviewer    PROVIDER RESPONSE TEXT:    This patient has metabolic encephalopathy.     Query created by: Huseyin Montaño on 3/21/2023 1:31 PM      Electronically signed by:  Sammy Ahuja MD 3/24/2023 10:31 AM

## 2023-03-25 LAB
ANION GAP SERPL CALC-SCNC: 4 MMOL/L (ref 5–15)
BUN SERPL-MCNC: 13 MG/DL (ref 6–20)
BUN/CREAT SERPL: 17 (ref 12–20)
CALCIUM SERPL-MCNC: 9.2 MG/DL (ref 8.5–10.1)
CHLORIDE SERPL-SCNC: 105 MMOL/L (ref 97–108)
CO2 SERPL-SCNC: 28 MMOL/L (ref 21–32)
CREAT SERPL-MCNC: 0.76 MG/DL (ref 0.55–1.02)
ERYTHROCYTE [DISTWIDTH] IN BLOOD BY AUTOMATED COUNT: 13.8 % (ref 11.5–14.5)
GLUCOSE SERPL-MCNC: 105 MG/DL (ref 65–100)
HCT VFR BLD AUTO: 39.4 % (ref 35–47)
HGB BLD-MCNC: 12.9 G/DL (ref 11.5–16)
MCH RBC QN AUTO: 30.9 PG (ref 26–34)
MCHC RBC AUTO-ENTMCNC: 32.7 G/DL (ref 30–36.5)
MCV RBC AUTO: 94.3 FL (ref 80–99)
NRBC # BLD: 0 K/UL (ref 0–0.01)
NRBC BLD-RTO: 0 PER 100 WBC
PLATELET # BLD AUTO: 206 K/UL (ref 150–400)
PMV BLD AUTO: 10.1 FL (ref 8.9–12.9)
POTASSIUM SERPL-SCNC: 3.5 MMOL/L (ref 3.5–5.1)
RBC # BLD AUTO: 4.18 M/UL (ref 3.8–5.2)
SODIUM SERPL-SCNC: 137 MMOL/L (ref 136–145)
WBC # BLD AUTO: 7.8 K/UL (ref 3.6–11)

## 2023-03-25 PROCEDURE — 74011250637 HC RX REV CODE- 250/637: Performed by: STUDENT IN AN ORGANIZED HEALTH CARE EDUCATION/TRAINING PROGRAM

## 2023-03-25 PROCEDURE — 74011000250 HC RX REV CODE- 250: Performed by: NURSE PRACTITIONER

## 2023-03-25 PROCEDURE — 94760 N-INVAS EAR/PLS OXIMETRY 1: CPT

## 2023-03-25 PROCEDURE — 74011250637 HC RX REV CODE- 250/637: Performed by: GENERAL ACUTE CARE HOSPITAL

## 2023-03-25 PROCEDURE — 74011250636 HC RX REV CODE- 250/636: Performed by: NURSE PRACTITIONER

## 2023-03-25 PROCEDURE — 80048 BASIC METABOLIC PNL TOTAL CA: CPT

## 2023-03-25 PROCEDURE — C9113 INJ PANTOPRAZOLE SODIUM, VIA: HCPCS | Performed by: NURSE PRACTITIONER

## 2023-03-25 PROCEDURE — 74011000250 HC RX REV CODE- 250: Performed by: INTERNAL MEDICINE

## 2023-03-25 PROCEDURE — 65270000046 HC RM TELEMETRY

## 2023-03-25 PROCEDURE — 36415 COLL VENOUS BLD VENIPUNCTURE: CPT

## 2023-03-25 PROCEDURE — 85027 COMPLETE CBC AUTOMATED: CPT

## 2023-03-25 PROCEDURE — 74011250636 HC RX REV CODE- 250/636: Performed by: GENERAL ACUTE CARE HOSPITAL

## 2023-03-25 RX ADMIN — ATORVASTATIN CALCIUM 20 MG: 20 TABLET, FILM COATED ORAL at 17:01

## 2023-03-25 RX ADMIN — BUSPIRONE HYDROCHLORIDE 10 MG: 10 TABLET ORAL at 17:01

## 2023-03-25 RX ADMIN — SODIUM CHLORIDE, PRESERVATIVE FREE 10 ML: 5 INJECTION INTRAVENOUS at 05:24

## 2023-03-25 RX ADMIN — MIRTAZAPINE 15 MG: 15 TABLET, FILM COATED ORAL at 21:10

## 2023-03-25 RX ADMIN — ENOXAPARIN SODIUM 40 MG: 100 INJECTION SUBCUTANEOUS at 21:10

## 2023-03-25 RX ADMIN — SODIUM CHLORIDE, PRESERVATIVE FREE 10 ML: 5 INJECTION INTRAVENOUS at 21:20

## 2023-03-25 RX ADMIN — BUSPIRONE HYDROCHLORIDE 10 MG: 10 TABLET ORAL at 08:01

## 2023-03-25 RX ADMIN — NEPHROCAP 1 CAPSULE: 1 CAP ORAL at 08:01

## 2023-03-25 RX ADMIN — SODIUM CHLORIDE, PRESERVATIVE FREE 10 ML: 5 INJECTION INTRAVENOUS at 17:03

## 2023-03-25 RX ADMIN — SODIUM CHLORIDE 40 MG: 9 INJECTION, SOLUTION INTRAMUSCULAR; INTRAVENOUS; SUBCUTANEOUS at 21:15

## 2023-03-25 RX ADMIN — Medication 1000 UNITS: at 08:01

## 2023-03-25 RX ADMIN — CASTOR OIL AND BALSAM, PERU: 788; 87 OINTMENT TOPICAL at 08:05

## 2023-03-25 RX ADMIN — ACETAMINOPHEN 650 MG: 325 TABLET ORAL at 08:01

## 2023-03-25 RX ADMIN — CASTOR OIL AND BALSAM, PERU: 788; 87 OINTMENT TOPICAL at 22:26

## 2023-03-25 RX ADMIN — SODIUM CHLORIDE 40 MG: 9 INJECTION, SOLUTION INTRAMUSCULAR; INTRAVENOUS; SUBCUTANEOUS at 08:02

## 2023-03-25 RX ADMIN — LEVOTHYROXINE SODIUM 100 MCG: 0.1 TABLET ORAL at 05:25

## 2023-03-25 NOTE — PROGRESS NOTES
ZACHERY placed call to [facility] [phone] spoke with Kathy.  She reviewed chart and does NOT have jonel bed at [facility] [place] or [address] [place] [address] Colby Mckenzie. Kathy will update me in am prior to extending search.     [facility]  [phone]/Available on [employer]

## 2023-03-25 NOTE — PROGRESS NOTES
Problem: Pressure Injury - Risk of  Goal: *Prevention of pressure injury  Description: Document Angel Scale and appropriate interventions in the flowsheet.   Outcome: Progressing Towards Goal  Note: Pressure Injury Interventions:  Sensory Interventions: Assess changes in LOC    Moisture Interventions: Absorbent underpads    Activity Interventions: Increase time out of bed, PT/OT evaluation    Mobility Interventions: Float heels, HOB 30 degrees or less    Nutrition Interventions: Document food/fluid/supplement intake    Friction and Shear Interventions: Apply protective barrier, creams and emollients, HOB 30 degrees or less

## 2023-03-25 NOTE — PROGRESS NOTES
End of Shift Note    Bedside shift change report given to Shadi Quintana RN (oncoming nurse) by Janina Cheema RN (offgoing nurse). Report included the following information SBAR and MAR    Shift worked:  DAYS    Shift summary and any significant changes:     -PATIENT WAS VERY PLEASANT AND COOPERATIVE TODAY   -1:1 SITTER AT BEDSIDE   -SON CAME TO VISIT     Concerns for physician to address:  NONE   Zone phone for oncoming shift:   [phone]       Patient Information  Angela Bermudez  [de-identified] y.o.  3/19/2023 12:50 PM by Cale Velásquez MD. Angela Bermudez was admitted from Home    Problem List  Patient Active Problem List    Diagnosis Date Noted    Altered mental state 03/19/2023    Situational mixed anxiety and depressive disorder 12/23/2022    NSTEMI (non-ST elevated myocardial infarction) ([facility] [address] 12/13/2022    Obesity (BMI 30.0-34.9) 05/31/2022    Seronegative rheumatoid arthritis of multiple sites ([facility] [address] 10/28/2020    Carpal tunnel syndrome of left wrist 10/28/2020    Primary osteoarthritis of left knee 10/16/2019    LBBB (left bundle branch block) 05/01/2019    Abnormal mammogram of left breast 11/15/2017    Primary insomnia 10/13/2017    Uterine prolapse     Osteopenia 10/14/2015    Allergic rhinitis     Recurrent major depressive disorder ([facility] [address]     Acquired hypothyroidism     Impaired fasting glucose     Hypertension, essential     Pure hypercholesterolemia      Past Medical History:   Diagnosis Date    Allergic rhinitis     Anesthesia complication     Pt reports dizziness after anesthesia     Arrhythmia     Left Bundle Branch Block    Arthritis     BBB (bundle branch block)     Depression     History of not currently    GERD (gastroesophageal reflux disease)     Hyperlipidemia     Impaired fasting glucose     Unspecified essential hypertension     Unspecified hypothyroidism     Uterine prolapse     Vertigo        Core Measures:  CVA: No No  CHF:No No  PNA:No No    Activity:  Activity Level:  Up with Assistance  Number times ambulated in hallways past shift: 0  Number of times OOB to chair past shift: 0    Cardiac:   Cardiac Monitoring: No      Cardiac Rhythm: Sinus Rhythm    Access:   Current line(s): PIV   Central Line? No   PICC LINE? No     Genitourinary:   Urinary status: voiding  Urinary Catheter?  No     Respiratory:   O2 Device: None (Room air)  Chronic home O2 use?: YES  Incentive spirometer at bedside: YES       GI:  Last Bowel Movement Date: 03/22/23  Current diet:  DIET ONE TIME MESSAGE  ADULT DIET Regular  DIET ONE TIME MESSAGE  Passing flatus: YES  Tolerating current diet: YES       Pain Management:   Patient states pain is manageable on current regimen: YES    Skin:  Angel Score: 19  Interventions: float heels    Patient Safety:  Fall Score:    Interventions: bed/chair alarm, assistive device (walker, cane, etc), gripper socks, and pt to call before getting OOB     @Rollbelt  @dexterity to release roll belt  Yes/No ( must document dexterity  here by stating Yes or No here, otherwise this is a restraint and must follow restraint documentation policy.)    DVT prophylaxis:  DVT prophylaxis Med- Yes  DVT prophylaxis SCD or JACLYN- No     Wounds: (If Applicable)  Wounds- No  Location     Active Consults:  IP CONSULT TO HOSPITALIST  IP CONSULT TO NEUROLOGY  IP CONSULT TO PSYCHIATRY  IP CONSULT TO GASTROENTEROLOGY  IP CONSULT TO PSYCHIATRY  IP CONSULT TO PSYCHIATRY    Length of Stay:  Expected LOS: 3d 14h  Actual LOS: 6  Discharge Plan: Yes       Ric Posey RN

## 2023-03-25 NOTE — PROGRESS NOTES
Hospitalist Progress Note    NAME: Jessica Gomez   :  1940   MRN:  [id number]       Assessment / Plan:  AMS/acute encephalopathy POA- stable MS now  Depression  Suicidal ideation  tramadol overdose  R/o CVA  Without contrast did not show any evidence of acute stroke, there is chronic microvascular ischemic disease from last imaging  One-to-one sitter  Psych input appreciated  Recommended inpatient psych admission  We will continue with mirtazapine and BuSpar  Patient doing good with above medications    Dysphagia  Barium swallow showed:  1. Moderate abnormality of the peristalsis. 2. Mild slightly irregular narrowing of the gastroesophageal junction likely  chronic reflux  Speech on board  EGD done yesterday  PPI daily  We will follow with GI as outpatient     Hypothyroidism  TSH elevated, compliance with medications unclear  Patient was 75 mcgSynthroid increased to 100 mcg  Need to follow with TSH in 6 weeks      Hypokalemia  Resolved   Replaced  Repeat BMP    Arthritis  Hyperlipidemia  Hypertension  Resume home medications      25.0 - 29.9 Overweight / Body mass index is 26.03 kg/m². Estimated discharge date: ?-  Barriers: IP Psych bed availability    Code status: Full  Prophylaxis: Lovenox  Recommended Disposition:  Inpatient psych rehab - awaiting bed availability     Subjective:     Chief Complaint / Reason for Physician Visit: FU Depression/suicidal ideation, Overdose attempt, Dysphagia  \"I am ok\". Patient seen today, doing fine, no new complains   discussed with RN events overnight.  1:1 sitter at bedside with suicide precautions    Review of Systems:  Symptom Y/N Comments  Symptom Y/N Comments   Fever/Chills n   Chest Pain n    Poor Appetite n   Edema n    Cough n   Abdominal Pain n    Sputum n   Joint Pain     SOB/FIGUEROA n   Pruritis/Rash     Nausea/vomit n   Tolerating PT/OT y    Diarrhea    Tolerating Diet y    Constipation    Other       Could NOT obtain due to:      Objective: VITALS:   Last 24hrs VS reviewed since prior progress note. Most recent are:  Patient Vitals for the past 24 hrs:   Temp Pulse Resp BP SpO2   03/25/23 0719 97.9 °F (36.6 °C) (!) 57 18 (!) 143/77 96 %   03/25/23 0256 97.7 °F (36.5 °C) (!) 58 18 (!) 146/83 98 %   03/24/23 2305 98.1 °F (36.7 °C) (!) 58 18 131/63 97 %   03/24/23 1442 98.2 °F (36.8 °C) 62 18 132/69 95 %       No intake or output data in the 24 hours ending 03/25/23 1028       I had a face to face encounter and independently examined this patient on 3/25/2023, as outlined below:  PHYSICAL EXAM:  General: WD, WN. Alert, cooperative, no acute distress    EENT:  EOMI. Anicteric sclerae. MMM  Resp:  CTA bilaterally, no wheezing or rales. No accessory muscle use  CV:  Regular  rhythm,  No edema  GI:  Soft, Non distended, Non tender. +Bowel sounds  Neurologic:  Alert and oriented X 3, normal speech,   Psych:   Slightly anxious, no good insight about her condition  Skin:  No rashes. No jaundice    Reviewed most current lab test results and cultures  YES  Reviewed most current radiology test results   YES  Review and summation of old records today    NO  Reviewed patient's current orders and MAR    YES  PMH/ reviewed - no change compared to H&P  ________________________________________________________________________  Care Plan discussed with:    Comments   Patient x    Family      RN x    Care Manager x Weekend Team   Consultant                        Multidiciplinary team rounds were held today with , nursing, pharmacist and clinical coordinator. Patient's plan of care was discussed; medications were reviewed and discharge planning was addressed.      ________________________________________________________________________  Total NON critical care TIME: 26  Minutes    Total CRITICAL CARE TIME Spent:   Minutes non procedure based      Comments   >50% of visit spent in counseling and coordination of care ________________________________________________________________________  Braden Erickson MD     Procedures: see electronic medical records for all procedures/Xrays and details which were not copied into this note but were reviewed prior to creation of Plan. LABS:  I reviewed today's most current labs and imaging studies.   Pertinent labs include:  Recent Labs     03/25/23  0353 03/24/23  0335 03/23/23  0114   WBC 7.8 8.8 7.1   HGB 12.9 12.8 12.6   HCT 39.4 39.9 39.1    204 221       Recent Labs     03/25/23  0353 03/24/23  0335 03/23/23  0114    138 140   K 3.5 3.7 3.3*    106 106   CO2 28 27 26   * 103* 81   BUN 13 15 18   CREA 0.76 0.77 0.79   CA 9.2 9.2 9.0         Signed: Braden Erickson MD

## 2023-03-25 NOTE — PROGRESS NOTES
End of Shift Note    Bedside shift change report given to Mehdi Anglin RN (oncoming nurse) by Nat Beavers RN (offgoing nurse). Report included the following information SBAR and MAR    Shift worked:  Nights    Shift summary and any significant changes:    1:1 SITTER AT BEDSIDE  No significant shift events to report. Pt compliant and pleasant throughout the night.  Stated several times \"I just cant do anything right even when I try\"    Concerns for physician to address:  NONE   [facility] phone for oncoming shift:  [phone]       Patient Information  Dimitri Adkins  [de-identified] y.o.  3/19/2023 12:50 PM by Mami Mendoza MD. Dimitri Adkins was admitted from Home    Problem List  Patient Active Problem List    Diagnosis Date Noted    Altered mental state 03/19/2023    Situational mixed anxiety and depressive disorder 12/23/2022    NSTEMI (non-ST elevated myocardial infarction) ([facility] [address] 12/13/2022    Obesity (BMI 30.0-34.9) 05/31/2022    Seronegative rheumatoid arthritis of multiple sites ([facility] [address] 10/28/2020    Carpal tunnel syndrome of left wrist 10/28/2020    Primary osteoarthritis of left knee 10/16/2019    LBBB (left bundle branch block) 05/01/2019    Abnormal mammogram of left breast 11/15/2017    Primary insomnia 10/13/2017    Uterine prolapse     Osteopenia 10/14/2015    Allergic rhinitis     Recurrent major depressive disorder (HCC)     Acquired hypothyroidism     Impaired fasting glucose     Hypertension, essential     Pure hypercholesterolemia      Past Medical History:   Diagnosis Date    Allergic rhinitis     Anesthesia complication     Pt reports dizziness after anesthesia     Arrhythmia     Left Bundle Branch Block    Arthritis     BBB (bundle branch block)     Depression     History of not currently    GERD (gastroesophageal reflux disease)     Hyperlipidemia     Impaired fasting glucose     Unspecified essential hypertension     Unspecified hypothyroidism     Uterine prolapse     Vertigo        Core Measures:  CVA: No No  CHF:No No  PNA:No No    Activity:  Activity Level: Up with Assistance  Number times ambulated in hallways past shift: 0  Number of times OOB to chair past shift: 0    Cardiac:   Cardiac Monitoring: No      Cardiac Rhythm: Sinus Rhythm    Access:   Current line(s): PIV   Central Line? No   PICC LINE? No     Genitourinary:   Urinary status: voiding  Urinary Catheter?  No     Respiratory:   O2 Device: None (Room air)  Chronic home O2 use?: YES  Incentive spirometer at bedside: YES       GI:  Last Bowel Movement Date: 03/22/23  Current diet:  DIET ONE TIME MESSAGE  ADULT DIET Regular  DIET ONE TIME MESSAGE  Passing flatus: YES  Tolerating current diet: YES       Pain Management:   Patient states pain is manageable on current regimen: YES    Skin:  Angel Score: 19  Interventions: float heels    Patient Safety:  Fall Score:    Interventions: bed/chair alarm, assistive device (walker, cane, etc), gripper socks, and pt to call before getting OOB     @Rollbelt  @dexterity to release roll belt  Yes/No ( must document dexterity  here by stating Yes or No here, otherwise this is a restraint and must follow restraint documentation policy.)    DVT prophylaxis:  DVT prophylaxis Med- Yes  DVT prophylaxis SCD or JACLYN- No     Wounds: (If Applicable)  Wounds- No  Location     Active Consults:  IP CONSULT TO HOSPITALIST  IP CONSULT TO NEUROLOGY  IP CONSULT TO PSYCHIATRY  IP CONSULT TO GASTROENTEROLOGY  IP CONSULT TO PSYCHIATRY  IP CONSULT TO PSYCHIATRY    Length of Stay:  Expected LOS: 3d 14h  Actual LOS: 5  Discharge Plan: Yes       Munira Jackson RN

## 2023-03-26 PROCEDURE — 74011250636 HC RX REV CODE- 250/636: Performed by: NURSE PRACTITIONER

## 2023-03-26 PROCEDURE — C9113 INJ PANTOPRAZOLE SODIUM, VIA: HCPCS | Performed by: NURSE PRACTITIONER

## 2023-03-26 PROCEDURE — 74011250637 HC RX REV CODE- 250/637: Performed by: GENERAL ACUTE CARE HOSPITAL

## 2023-03-26 PROCEDURE — 74011000250 HC RX REV CODE- 250: Performed by: NURSE PRACTITIONER

## 2023-03-26 PROCEDURE — 74011250636 HC RX REV CODE- 250/636: Performed by: GENERAL ACUTE CARE HOSPITAL

## 2023-03-26 PROCEDURE — 74011000250 HC RX REV CODE- 250: Performed by: INTERNAL MEDICINE

## 2023-03-26 PROCEDURE — 94760 N-INVAS EAR/PLS OXIMETRY 1: CPT

## 2023-03-26 PROCEDURE — 65270000046 HC RM TELEMETRY

## 2023-03-26 RX ORDER — PANTOPRAZOLE SODIUM 40 MG/1
40 TABLET, DELAYED RELEASE ORAL DAILY
Qty: 30 TABLET | Refills: 0 | Status: ON HOLD | OUTPATIENT
Start: 2023-03-26 | End: 2023-04-25

## 2023-03-26 RX ORDER — METOPROLOL TARTRATE 25 MG/1
12.5 TABLET, FILM COATED ORAL EVERY 12 HOURS
Status: DISCONTINUED | OUTPATIENT
Start: 2023-03-26 | End: 2023-03-27 | Stop reason: HOSPADM

## 2023-03-26 RX ORDER — METOPROLOL TARTRATE 25 MG/1
12.5 TABLET, FILM COATED ORAL EVERY 12 HOURS
Qty: 30 TABLET | Refills: 1 | Status: ON HOLD | OUTPATIENT
Start: 2023-03-26 | End: 2023-04-25

## 2023-03-26 RX ORDER — CLOPIDOGREL BISULFATE 75 MG/1
75 TABLET ORAL DAILY
Qty: 30 TABLET | Refills: 1 | Status: ON HOLD | OUTPATIENT
Start: 2023-03-27 | End: 2023-04-26

## 2023-03-26 RX ORDER — CLOPIDOGREL BISULFATE 75 MG/1
75 TABLET ORAL DAILY
Status: DISCONTINUED | OUTPATIENT
Start: 2023-03-26 | End: 2023-03-27 | Stop reason: HOSPADM

## 2023-03-26 RX ORDER — LEVOTHYROXINE SODIUM 100 UG/1
100 TABLET ORAL
Qty: 30 TABLET | Refills: 1 | Status: ON HOLD | OUTPATIENT
Start: 2023-03-27 | End: 2023-04-26

## 2023-03-26 RX ORDER — BUSPIRONE HYDROCHLORIDE 10 MG/1
10 TABLET ORAL 2 TIMES DAILY
Qty: 60 TABLET | Refills: 1 | Status: ON HOLD | OUTPATIENT
Start: 2023-03-27 | End: 2023-04-26

## 2023-03-26 RX ADMIN — ATORVASTATIN CALCIUM 20 MG: 20 TABLET, FILM COATED ORAL at 17:10

## 2023-03-26 RX ADMIN — BUSPIRONE HYDROCHLORIDE 10 MG: 10 TABLET ORAL at 08:04

## 2023-03-26 RX ADMIN — ACETAMINOPHEN 650 MG: 325 TABLET ORAL at 08:07

## 2023-03-26 RX ADMIN — SODIUM CHLORIDE, PRESERVATIVE FREE 10 ML: 5 INJECTION INTRAVENOUS at 17:15

## 2023-03-26 RX ADMIN — SODIUM CHLORIDE 40 MG: 9 INJECTION, SOLUTION INTRAMUSCULAR; INTRAVENOUS; SUBCUTANEOUS at 22:04

## 2023-03-26 RX ADMIN — ACETAMINOPHEN 650 MG: 325 TABLET ORAL at 17:16

## 2023-03-26 RX ADMIN — CLOPIDOGREL BISULFATE 75 MG: 75 TABLET ORAL at 10:15

## 2023-03-26 RX ADMIN — Medication 1000 UNITS: at 08:04

## 2023-03-26 RX ADMIN — METOPROLOL TARTRATE 12.5 MG: 25 TABLET, FILM COATED ORAL at 10:15

## 2023-03-26 RX ADMIN — LEVOTHYROXINE SODIUM 100 MCG: 0.1 TABLET ORAL at 06:36

## 2023-03-26 RX ADMIN — MIRTAZAPINE 15 MG: 15 TABLET, FILM COATED ORAL at 22:08

## 2023-03-26 RX ADMIN — METOPROLOL TARTRATE 12.5 MG: 25 TABLET, FILM COATED ORAL at 22:09

## 2023-03-26 RX ADMIN — CASTOR OIL AND BALSAM, PERU: 788; 87 OINTMENT TOPICAL at 08:04

## 2023-03-26 RX ADMIN — SODIUM CHLORIDE, PRESERVATIVE FREE 10 ML: 5 INJECTION INTRAVENOUS at 22:11

## 2023-03-26 RX ADMIN — NEPHROCAP 1 CAPSULE: 1 CAP ORAL at 08:04

## 2023-03-26 RX ADMIN — ENOXAPARIN SODIUM 40 MG: 100 INJECTION SUBCUTANEOUS at 22:03

## 2023-03-26 RX ADMIN — SODIUM CHLORIDE 40 MG: 9 INJECTION, SOLUTION INTRAMUSCULAR; INTRAVENOUS; SUBCUTANEOUS at 08:09

## 2023-03-26 RX ADMIN — SODIUM CHLORIDE, PRESERVATIVE FREE 10 ML: 5 INJECTION INTRAVENOUS at 06:37

## 2023-03-26 RX ADMIN — BUSPIRONE HYDROCHLORIDE 10 MG: 10 TABLET ORAL at 17:10

## 2023-03-26 RX ADMIN — CASTOR OIL AND BALSAM, PERU: 788; 87 OINTMENT TOPICAL at 21:59

## 2023-03-26 NOTE — PROGRESS NOTES
[employer] [phone]  [facility], [address] [place] [address]. Left message requesting availability for geriatric psych bed, voluntary, medically stable. Kathy called back and is reviewing admission. [address]  [phone], spoke with Susan. They are at capacity for geriatric psych and can NOT accept any admissions. Hospitals include [facility] and [address] [place] [address] [phone] - cm spoke with liaison, she asked that I fax medicals to [address] [place] [address] Management  [phone]/Available on Perfect [employer]

## 2023-03-26 NOTE — ROUTINE PROCESS
End of Shift Note    Bedside shift change report given to [facility]. RN (oncoming nurse) by eCcilio Yousif RN (offgoing nurse). Report included the following information SBAR and MAR    Shift worked:  7p - 7a   Shift summary and any significant changes:    1:! Sitter continues     Concerns for physician to address:  Noted above,     Zone phone for oncoming shift:   [phone]       Patient Information  Dontae Livingston  [de-identified] y.o.  3/19/2023 12:50 PM by Gilda Zambrano MD. Dontae Livingston was admitted from Home    Problem List  Patient Active Problem List    Diagnosis Date Noted    Altered mental state 03/19/2023    Situational mixed anxiety and depressive disorder 12/23/2022    NSTEMI (non-ST elevated myocardial infarction) ([facility] [address] 12/13/2022    Obesity (BMI 30.0-34.9) 05/31/2022    Seronegative rheumatoid arthritis of multiple sites ([facility] [address] 10/28/2020    Carpal tunnel syndrome of left wrist 10/28/2020    Primary osteoarthritis of left knee 10/16/2019    LBBB (left bundle branch block) 05/01/2019    Abnormal mammogram of left breast 11/15/2017    Primary insomnia 10/13/2017    Uterine prolapse     Osteopenia 10/14/2015    Allergic rhinitis     Recurrent major depressive disorder ([facility] [address]     Acquired hypothyroidism     Impaired fasting glucose     Hypertension, essential     Pure hypercholesterolemia      Past Medical History:   Diagnosis Date    Allergic rhinitis     Anesthesia complication     Pt reports dizziness after anesthesia     Arrhythmia     Left Bundle Branch Block    Arthritis     BBB (bundle branch block)     Depression     History of not currently    GERD (gastroesophageal reflux disease)     Hyperlipidemia     Impaired fasting glucose     Unspecified essential hypertension     Unspecified hypothyroidism     Uterine prolapse     Vertigo        Core Measures:  CVA: No No  CHF:No No  PNA:No No    Activity:  Activity Level:  Up with Assistance  Number times ambulated in hallways past shift: 0  Number of times OOB to chair past shift: 0    Cardiac:   Cardiac Monitoring: No      Cardiac Rhythm: Sinus Rhythm    Access:   Current line(s): PIV   Central Line? No   PICC LINE? No     Genitourinary:   Urinary status: voiding  Urinary Catheter?  No     Respiratory:   O2 Device: None (Room air)  Chronic home O2 use?: YES  Incentive spirometer at bedside: YES       GI:  Last Bowel Movement Date: 03/22/23  Current diet:  DIET ONE TIME MESSAGE  ADULT DIET Regular  DIET ONE TIME MESSAGE  Passing flatus: YES  Tolerating current diet: YES       Pain Management:   Patient states pain is manageable on current regimen: YES    Skin:  Angel Score: 21  Interventions: float heels    Patient Safety:  Fall Score:    Interventions: bed/chair alarm, assistive device (walker, cane, etc), gripper socks, and pt to call before getting OOB     @Rollbelt  @dexterity to release roll belt  Yes/No ( must document dexterity  here by stating Yes or No here, otherwise this is a restraint and must follow restraint documentation policy.)    DVT prophylaxis:  DVT prophylaxis Med- Yes  DVT prophylaxis SCD or JACLYN- No     Wounds: (If Applicable)  Wounds- No  Location     Active Consults:  IP CONSULT TO HOSPITALIST  IP CONSULT TO NEUROLOGY  IP CONSULT TO PSYCHIATRY  IP CONSULT TO GASTROENTEROLOGY  IP CONSULT TO PSYCHIATRY  IP CONSULT TO PSYCHIATRY    Length of Stay:  Expected LOS: 3d 14h  Actual LOS: 6  Discharge Plan: Yes, claribel Velasco RN

## 2023-03-26 NOTE — PROGRESS NOTES
CM received call from  [address] [phone]. [facility] has available bed. Perfect [employer] to attending Dr. Sue Tellez with Linda/RN, will set up transport via stretcher to [facility] [place]. Page Watts MD:  Dr. Tinsley Player:  [facility]  Room 744/02  Please call report to [phone]    EMTALA    [facility] phone [phone]    Requesting first available stretcher transport.     Jeet Hayes  Management  [phone]/Available on Perfect Serve'

## 2023-03-26 NOTE — DISCHARGE INSTRUCTIONS
Patient Follow Up Instructions: Activity: Activity as tolerated  Diet: DIET ONE TIME MESSAGE  ADULT DIET Regular  DIET ONE TIME MESSAGE  Wound Care: None needed  Follow-up with PCP in  1 week. Follow up with GI and cardiology in 2 weeks  Follow-up tests/labs None  If you have any concerns that you feel you need to come back to the hospital, please do not hesitate.

## 2023-03-26 NOTE — DISCHARGE SUMMARY
Hospitalist Discharge Summary     Patient ID:  Al Valencia  [id number]  89 y.o.  1940  3/19/2023    PCP on record: Nayely Hyatt MD    Admit date: 3/19/2023  Discharge date and time: 3/26/2023    DISCHARGE DIAGNOSIS:  AMS/acute encephalopathy POA- stable MS now  Depression  Suicidal ideation  tramadol overdose  CVA ruled out  Dysphagia  Hypothyroidism  CAD  Hypokalemia  Arthritis  Hyperlipidemia  Hypertension    CONSULTATIONS:  IP CONSULT TO HOSPITALIST  IP CONSULT TO NEUROLOGY  IP CONSULT TO PSYCHIATRY  IP CONSULT TO GASTROENTEROLOGY  IP CONSULT TO PSYCHIATRY  IP CONSULT TO PSYCHIATRY    Excerpted HPI from H&P of Geovanni Nguyen MD:  Al Valencia is a [de-identified] y.o.  female who presents with the above CC. History obtained from charts, patient and family at bedside. Patient with known history of depression and anxiety. On Friday family reported that patient was unresponsive, lethargic but the next day she was at her usual state of health and had \" a good day\" but when she went back home she was acting more anxious and irritable. Over the last 24 hours patient became more lethargic and unresponsive and \" zoning out\" for which he decided to bring her to the hospital.  Initially patient did not answer me clearly what why she is in the hospital but later on she admitted to feeling depressed, she also admitted suicidal attempt yesterday by ingesting 8 pills of tramadol. Family bedside is not sure that the patient is on tramadol!!.   Otherwise, patient states that she is compliant with her regular home medications and there was no recent changes. Patient denies any other symptoms. Patient does not have a gun at home. She denies history of suicidal ideation/attempt. ED work-up: CBC essentially not that remarkable. UA negative. Chemistry with potassium 2.9. Procalcitonin negative. TSH 2 9.         XR CHEST PORT     Result Date: 3/19/2023  No evidence of acute cardiopulmonary process. CTA CODE NEURO HEAD AND NECK W CONT     Result Date: 3/19/2023  No evidence for acute large vessel arterial occlusion or cerebral perfusion abnormality. CT CODE NEURO HEAD WO CONTRAST     Result Date: 3/19/2023  No acute intracranial abnormality. CT CODE NEURO PERF W CBF     Result Date: 3/19/2023  No evidence for acute large vessel arterial occlusion or cerebral perfusion abnormality. We were asked to admit for work up and evaluation of the above problems. ____________________________________________________________________  DISCHARGE SUMMARY/HOSPITAL COURSE:  for full details see H&P, daily progress notes, labs, consult notes. AMS/acute encephalopathy POA- stable MS now  Depression  Suicidal ideation  tramadol overdose  CVA ruled out  Without contrast did not show any evidence of acute stroke, there is chronic microvascular ischemic disease from last imaging  One-to-one sitter  Psych input appreciated  Recommended inpatient psych admission  We will continue with mirtazapine and BuSpar  Patient doing good with above medications     Dysphagia  Barium swallow showed:  1. Moderate abnormality of the peristalsis. 2. Mild slightly irregular narrowing of the gastroesophageal junction likely  chronic reflux  Speech on board  EGD: some mild erythema in the distal esophagus, which likely represents mild reflux; notably, no stricture or narrowing in esophagus  PPI daily for GERD  We will follow with GI as outpatient     Hypothyroidism  TSH elevated, compliance with medications unclear  Patient was 75 mcg Synthroid increased to 100 mcg  Need to follow with TSH in 6 weeks     CAD  EF of 50 - 55%. Left ventricle size is normal. Increased wall thickness. There are regional wall motion abnormalities. Normal diastolic function.   Start plavix  Cont statin  Start BB  Cardiology f/up as outpt     Hypokalemia  Resolved      Arthritis  Hyperlipidemia  Hypertension  Resume home medications ECHO ADULT FOLLOW-UP OR LIMITED 03/20/2023 3/20/2023    Interpretation Summary    Left Ventricle: Low normal left ventricular systolic function with a visually estimated EF of 50 - 55%. Left ventricle size is normal. Increased wall thickness. There are regional wall motion abnormalities. Normal diastolic function. Mitral Valve: Valve structure is normal. Mild annular calcification of the mitral valve. Mild regurgitation. Tricuspid Valve: Moderate regurgitation. Mildly elevated RVSP. Signed by: Ave Bales MD on 3/20/2023  2:27 PM    _______________________________________________________________________  Patient seen and examined by me on discharge day. Pertinent Findings:  Gen:    Not in distress  Chest: Clear lungs  CVS:   Regular rhythm. No edema  Abd/: Soft, not distended, not tender  Neuro:  Alert, oriented   _______________________________________________________________________  DISCHARGE MEDICATIONS:   Current Discharge Medication List        START taking these medications    Details   clopidogreL (PLAVIX) 75 mg tab Take 1 Tablet by mouth daily for 30 days. Qty: 30 Tablet, Refills: 1  Start date: 3/27/2023, End date: 4/26/2023      metoprolol tartrate (LOPRESSOR) 25 mg tablet Take 0.5 Tablets by mouth every twelve (12) hours for 30 days. Qty: 30 Tablet, Refills: 1  Start date: 3/26/2023, End date: 4/25/2023      pantoprazole (Protonix) 40 mg tablet Take 1 Tablet by mouth daily for 30 days. Qty: 30 Tablet, Refills: 0  Start date: 3/26/2023, End date: 4/25/2023           CONTINUE these medications which have CHANGED    Details   levothyroxine (SYNTHROID) 100 mcg tablet Take 1 Tablet by mouth every morning for 30 days. Qty: 30 Tablet, Refills: 1  Start date: 3/27/2023, End date: 4/26/2023      busPIRone (BUSPAR) 10 mg tablet Take 1 Tablet by mouth two (2) times a day for 30 days.   Qty: 60 Tablet, Refills: 1  Start date: 3/27/2023, End date: 4/26/2023           CONTINUE these medications which have NOT CHANGED    Details   therapeutic multivitamin (THERAGRAN) tablet Take 1 Tablet by mouth daily. cholecalciferol (VITAMIN D3) (2,000 UNITS /50 MCG) cap capsule Take  by mouth daily. estradioL (Estrace) 0.01 % (0.1 mg/gram) vaginal cream Insert 2 g into vagina every seven (7) days. atorvastatin (LIPITOR) 20 mg tablet TAKE 1 TABLET BY MOUTH EVERY DAY IN THE EVENING  Qty: 90 Tablet, Refills: 1    Associated Diagnoses: Hyperlipidemia with target LDL less than 100      mirtazapine (REMERON) 15 mg tablet Take 15 mg by mouth nightly. acetaminophen (TYLENOL) 500 mg tablet 1,000 mg every six (6) hours as needed. simethicone (GAS-X) 125 mg capsule Take 125 mg by mouth four (4) times daily as needed for Flatulence. STOP taking these medications       guaiFENesin ER (Mucinex) 600 mg ER tablet Comments:   Reason for Stopping:         QUEtiapine (SEROquel) 25 mg tablet Comments:   Reason for Stopping:         hydroCHLOROthiazide (HYDRODIURIL) 25 mg tablet Comments:   Reason for Stopping:         b complex-vitamin c-folic acid (NEPHROCAPS) 1 mg capsule Comments:   Reason for Stopping:         cholecalciferol (VITAMIN D3) (1000 Units /25 mcg) tablet Comments:   Reason for Stopping:                 Patient Follow Up Instructions: Activity: Activity as tolerated  Diet: DIET ONE TIME MESSAGE  ADULT DIET Regular  DIET ONE TIME MESSAGE  Wound Care: None needed  Follow-up with PCP in  1 week. Follow up with GI and cardiology in 2 weeks  Follow-up tests/labs None  If you have any concerns that you feel you need to come back to the hospital, please do not hesitate.     Follow-up Information       Follow up With Specialties Details Why Contact Info    Hoda Avila MD Internal Medicine Physician   [facility] [place]      Ramo Palmer MD Internal Medicine Physician   [facility] [address] [place] [address] [phone] Sandor Browne MD Cardio Vascular Surgery, Cardiovascular Disease Physician Follow up in 2 week(s)  [address]  [id number]  [facility] [id number] [phone]      Amberly Lawton MD Gastroenterology Follow up in 2 week(s)  [address] [facility]  [phone]            ________________________________________________________________    Risk of deterioration: Low    Condition at Discharge:  Stable  __________________________________________________________________    [facility]    ____________________________________________________________________    Code Status: DNR/DNI  ___________________________________________________________________      Total time in minutes spent coordinating this discharge (includes going over instructions, follow-up, prescriptions, and preparing report for sign off to her PCP) :  40 minutes    Signed:  Eber Phelps MD

## 2023-03-26 NOTE — PROGRESS NOTES
End of Shift Note    Bedside shift change report given to Anthony Peterson RN (oncoming nurse) by Ric Posey RN (offgoing nurse). Report included the following information SBAR and MAR    Shift worked:  DAYS    Shift summary and any significant changes:     -PATIENT WAS VERY PLEASANT AND COOPERATIVE TODAY   -1:1 SITTER AT BEDSIDE   -POSSIBLE DC TODAY? ??     Concerns for physician to address:  NONE   Zone phone for oncoming shift:   [phone]       Patient Information  Yoel Saenz  [de-identified] y.o.  3/19/2023 12:50 PM by Deysi Thibodeaux MD. Yoel Saenz was admitted from Home    Problem List  Patient Active Problem List    Diagnosis Date Noted    Altered mental state 03/19/2023    Situational mixed anxiety and depressive disorder 12/23/2022    NSTEMI (non-ST elevated myocardial infarction) ([facility] [address] 12/13/2022    Obesity (BMI 30.0-34.9) 05/31/2022    Seronegative rheumatoid arthritis of multiple sites ([facility] [address] 10/28/2020    Carpal tunnel syndrome of left wrist 10/28/2020    Primary osteoarthritis of left knee 10/16/2019    LBBB (left bundle branch block) 05/01/2019    Abnormal mammogram of left breast 11/15/2017    Primary insomnia 10/13/2017    Uterine prolapse     Osteopenia 10/14/2015    Allergic rhinitis     Recurrent major depressive disorder ([facility] [address]     Acquired hypothyroidism     Impaired fasting glucose     Hypertension, essential     Pure hypercholesterolemia      Past Medical History:   Diagnosis Date    Allergic rhinitis     Anesthesia complication     Pt reports dizziness after anesthesia     Arrhythmia     Left Bundle Branch Block    Arthritis     BBB (bundle branch block)     Depression     History of not currently    GERD (gastroesophageal reflux disease)     Hyperlipidemia     Impaired fasting glucose     Unspecified essential hypertension     Unspecified hypothyroidism     Uterine prolapse     Vertigo        Core Measures:  CVA: No No  CHF:No No  PNA:No No    Activity:  Activity Level:  Up with Assistance  Number times ambulated in hallways past shift: 0  Number of times OOB to chair past shift: 0    Cardiac:   Cardiac Monitoring: No      Cardiac Rhythm: Sinus Rhythm    Access:   Current line(s): PIV   Central Line? No   PICC LINE? No     Genitourinary:   Urinary status: voiding  Urinary Catheter?  No     Respiratory:   O2 Device: None (Room air)  Chronic home O2 use?: YES  Incentive spirometer at bedside: YES       GI:  Last Bowel Movement Date: 03/22/23  Current diet:  DIET ONE TIME MESSAGE  ADULT DIET Regular  DIET ONE TIME MESSAGE  Passing flatus: YES  Tolerating current diet: YES       Pain Management:   Patient states pain is manageable on current regimen: YES    Skin:  Angel Score: 19  Interventions: float heels    Patient Safety:  Fall Score:    Interventions: bed/chair alarm, assistive device (walker, cane, etc), gripper socks, and pt to call before getting OOB     @Rollbelt  @dexterity to release roll belt  Yes/No ( must document dexterity  here by stating Yes or No here, otherwise this is a restraint and must follow restraint documentation policy.)    DVT prophylaxis:  DVT prophylaxis Med- Yes  DVT prophylaxis SCD or JACLYN- No     Wounds: (If Applicable)  Wounds- No  Location     Active Consults:  IP CONSULT TO HOSPITALIST  IP CONSULT TO NEUROLOGY  IP CONSULT TO PSYCHIATRY  IP CONSULT TO GASTROENTEROLOGY  IP CONSULT TO PSYCHIATRY  IP CONSULT TO PSYCHIATRY    Length of Stay:  Expected LOS: 3d 14h  Actual LOS: 7  Discharge Plan: Yes       Beau Nixon RN

## 2023-03-27 ENCOUNTER — HOSPITAL ENCOUNTER (INPATIENT)
Facility: HOSPITAL | Age: 83
DRG: 881 | End: 2023-03-27
Attending: PSYCHIATRY & NEUROLOGY | Admitting: PSYCHIATRY & NEUROLOGY
Payer: MEDICARE

## 2023-03-27 ENCOUNTER — HOSPITAL ENCOUNTER (INPATIENT)
Age: 83
LOS: 40 days | Discharge: STILL ADMITTED OR ADMINISTRATIVE | End: 2023-05-06
Attending: PSYCHIATRY & NEUROLOGY | Admitting: PSYCHIATRY & NEUROLOGY
Payer: MEDICARE

## 2023-03-27 VITALS
OXYGEN SATURATION: 96 % | BODY MASS INDEX: 25.9 KG/M2 | RESPIRATION RATE: 18 BRPM | HEART RATE: 66 BPM | WEIGHT: 151.7 LBS | SYSTOLIC BLOOD PRESSURE: 131 MMHG | HEIGHT: 64 IN | DIASTOLIC BLOOD PRESSURE: 84 MMHG | TEMPERATURE: 97.2 F

## 2023-03-27 PROBLEM — F32.9 MAJOR DEPRESSION: Status: ACTIVE | Noted: 2023-03-27

## 2023-03-27 PROCEDURE — 65220000003 HC RM SEMIPRIVATE PSYCH

## 2023-03-27 PROCEDURE — 74011250637 HC RX REV CODE- 250/637: Performed by: NURSE PRACTITIONER

## 2023-03-27 PROCEDURE — 74011000250 HC RX REV CODE- 250: Performed by: INTERNAL MEDICINE

## 2023-03-27 PROCEDURE — 74011250637 HC RX REV CODE- 250/637: Performed by: GENERAL ACUTE CARE HOSPITAL

## 2023-03-27 PROCEDURE — 9990 CHARGE CONVERSION

## 2023-03-27 RX ORDER — THERA TABS 400 MCG
1 TAB ORAL DAILY
Status: DISCONTINUED | OUTPATIENT
Start: 2023-03-28 | End: 2023-05-06 | Stop reason: HOSPADM

## 2023-03-27 RX ORDER — PANTOPRAZOLE SODIUM 40 MG/1
40 TABLET, DELAYED RELEASE ORAL DAILY
Status: DISCONTINUED | OUTPATIENT
Start: 2023-03-28 | End: 2023-05-06 | Stop reason: HOSPADM

## 2023-03-27 RX ORDER — ADHESIVE BANDAGE
30 BANDAGE TOPICAL DAILY PRN
Status: DISCONTINUED | OUTPATIENT
Start: 2023-03-27 | End: 2023-05-04

## 2023-03-27 RX ORDER — SIMETHICONE 80 MG
160 TABLET,CHEWABLE ORAL
Status: DISCONTINUED | OUTPATIENT
Start: 2023-03-27 | End: 2023-05-04

## 2023-03-27 RX ORDER — METOPROLOL TARTRATE 25 MG/1
12.5 TABLET, FILM COATED ORAL EVERY 12 HOURS
Status: DISCONTINUED | OUTPATIENT
Start: 2023-03-27 | End: 2023-05-06 | Stop reason: HOSPADM

## 2023-03-27 RX ORDER — ACETAMINOPHEN 325 MG/1
650 TABLET ORAL
Status: DISCONTINUED | OUTPATIENT
Start: 2023-03-27 | End: 2023-05-06 | Stop reason: HOSPADM

## 2023-03-27 RX ORDER — HALOPERIDOL 5 MG/ML
2.5 INJECTION INTRAMUSCULAR
Status: DISCONTINUED | OUTPATIENT
Start: 2023-03-27 | End: 2023-05-04

## 2023-03-27 RX ORDER — CLOPIDOGREL BISULFATE 75 MG/1
75 TABLET ORAL DAILY
Status: DISCONTINUED | OUTPATIENT
Start: 2023-03-28 | End: 2023-05-06 | Stop reason: HOSPADM

## 2023-03-27 RX ORDER — ATORVASTATIN CALCIUM 10 MG/1
20 TABLET, FILM COATED ORAL EVERY EVENING
Status: DISCONTINUED | OUTPATIENT
Start: 2023-03-27 | End: 2023-05-06 | Stop reason: HOSPADM

## 2023-03-27 RX ORDER — OLANZAPINE 2.5 MG/1
2.5 TABLET ORAL
Status: DISCONTINUED | OUTPATIENT
Start: 2023-03-27 | End: 2023-05-06 | Stop reason: HOSPADM

## 2023-03-27 RX ORDER — LEVOTHYROXINE SODIUM 50 UG/1
100 TABLET ORAL
Status: DISCONTINUED | OUTPATIENT
Start: 2023-03-28 | End: 2023-05-06 | Stop reason: HOSPADM

## 2023-03-27 RX ORDER — BENZTROPINE MESYLATE 1 MG/1
0.5 TABLET ORAL
Status: DISCONTINUED | OUTPATIENT
Start: 2023-03-27 | End: 2023-05-06 | Stop reason: HOSPADM

## 2023-03-27 RX ORDER — DIPHENHYDRAMINE HYDROCHLORIDE 50 MG/ML
25 INJECTION, SOLUTION INTRAMUSCULAR; INTRAVENOUS
Status: DISCONTINUED | OUTPATIENT
Start: 2023-03-27 | End: 2023-05-04

## 2023-03-27 RX ADMIN — SODIUM CHLORIDE, PRESERVATIVE FREE 10 ML: 5 INJECTION INTRAVENOUS at 05:41

## 2023-03-27 RX ADMIN — BUSPIRONE HYDROCHLORIDE 10 MG: 10 TABLET ORAL at 08:58

## 2023-03-27 RX ADMIN — Medication 1000 UNITS: at 08:59

## 2023-03-27 RX ADMIN — METOPROLOL TARTRATE 12.5 MG: 25 TABLET, FILM COATED ORAL at 17:47

## 2023-03-27 RX ADMIN — NEPHROCAP 1 CAPSULE: 1 CAP ORAL at 08:58

## 2023-03-27 RX ADMIN — LEVOTHYROXINE SODIUM 100 MCG: 0.1 TABLET ORAL at 05:40

## 2023-03-27 RX ADMIN — ATORVASTATIN CALCIUM 20 MG: 10 TABLET, FILM COATED ORAL at 17:47

## 2023-03-27 RX ADMIN — CLOPIDOGREL BISULFATE 75 MG: 75 TABLET ORAL at 08:58

## 2023-03-27 RX ADMIN — METOPROLOL TARTRATE 12.5 MG: 25 TABLET, FILM COATED ORAL at 08:59

## 2023-03-27 NOTE — ROUTINE PROCESS
End of Shift Note    Bedside shift change report given to  Sophie Cole (oncoming nurse) by Ziggy Stephens RN (offgoing nurse). Report included the following information SBAR and MAR    Shift worked:  7p - 7a   Shift summary and any significant changes:    1:1 sitter continues     Concerns for physician to address: Noted above   Zone phone for oncoming shift:  [phone]     Patient Information  Clayton Saunders  [de-identified] y.o.  3/19/2023 12:50 PM by Ye Henry MD. Clayton Saunders was admitted from Home    Problem List  Patient Active Problem List    Diagnosis Date Noted    Altered mental state 03/19/2023    Situational mixed anxiety and depressive disorder 12/23/2022    NSTEMI (non-ST elevated myocardial infarction) ([facility] [address] 12/13/2022    Obesity (BMI 30.0-34.9) 05/31/2022    Seronegative rheumatoid arthritis of multiple sites ([facility] [address] 10/28/2020    Carpal tunnel syndrome of left wrist 10/28/2020    Primary osteoarthritis of left knee 10/16/2019    LBBB (left bundle branch block) 05/01/2019    Abnormal mammogram of left breast 11/15/2017    Primary insomnia 10/13/2017    Uterine prolapse     Osteopenia 10/14/2015    Allergic rhinitis     Recurrent major depressive disorder ([facility] [address]     Acquired hypothyroidism     Impaired fasting glucose     Hypertension, essential     Pure hypercholesterolemia      Past Medical History:   Diagnosis Date    Allergic rhinitis     Anesthesia complication     Pt reports dizziness after anesthesia     Arrhythmia     Left Bundle Branch Block    Arthritis     BBB (bundle branch block)     Depression     History of not currently    GERD (gastroesophageal reflux disease)     Hyperlipidemia     Impaired fasting glucose     Unspecified essential hypertension     Unspecified hypothyroidism     Uterine prolapse     Vertigo        Core Measures:  CVA: No No  CHF:No No  PNA:No No    Activity:  Activity Level:  Up with Assistance  Number times ambulated in hallways past shift: 0  Number of times OOB to chair past shift: 0    Cardiac:   Cardiac Monitoring: No      Cardiac Rhythm: Sinus Rhythm    Access:   Current line(s): PIV   Central Line? No   PICC LINE? No     Genitourinary:   Urinary status: voiding  Urinary Catheter?  No     Respiratory:   O2 Device: None (Room air)  Chronic home O2 use?: YES  Incentive spirometer at bedside: YES       GI:  Last Bowel Movement Date: 03/22/23  Current diet:  DIET ONE TIME MESSAGE  ADULT DIET Regular  DIET ONE TIME MESSAGE  Passing flatus: YES  Tolerating current diet: YES       Pain Management:   Patient states pain is manageable on current regimen: YES    Skin:  Angel Score: 19  Interventions: float heels    Patient Safety:  Fall Score:    Interventions: bed/chair alarm, assistive device (walker, cane, etc), gripper socks, and pt to call before getting OOB     @Rollbelt  @dexterity to release roll belt  Yes/No ( must document dexterity  here by stating Yes or No here, otherwise this is a restraint and must follow restraint documentation policy.)    DVT prophylaxis:  DVT prophylaxis Med- Yes  DVT prophylaxis SCD or JACLYN- No     Wounds: (If Applicable)  Wounds- No  Location     Active Consults:  IP CONSULT TO HOSPITALIST  IP CONSULT TO NEUROLOGY  IP CONSULT TO PSYCHIATRY  IP CONSULT TO GASTROENTEROLOGY  IP CONSULT TO PSYCHIATRY  IP CONSULT TO PSYCHIATRY    Length of Stay:  Expected LOS: 3d 14h  Actual LOS: 8  Discharge Plan: Yes       Rain Mercado RN

## 2023-03-27 NOTE — PROGRESS NOTES
Report given to Heather Ely on Soha Hdez who is going to [facility]. Medications confirmed. Time for questions given. MAGDALENO to take patient at [facility].

## 2023-03-28 PROCEDURE — 9990 CHARGE CONVERSION

## 2023-03-28 PROCEDURE — 74011250637 HC RX REV CODE- 250/637: Performed by: NURSE PRACTITIONER

## 2023-03-28 PROCEDURE — 65220000003 HC RM SEMIPRIVATE PSYCH

## 2023-03-28 PROCEDURE — 74011250637 HC RX REV CODE- 250/637: Performed by: PSYCHIATRY & NEUROLOGY

## 2023-03-28 PROCEDURE — 97161 PT EVAL LOW COMPLEX 20 MIN: CPT

## 2023-03-28 PROCEDURE — 97116 GAIT TRAINING THERAPY: CPT

## 2023-03-28 RX ORDER — MIRTAZAPINE 15 MG/1
30 TABLET, FILM COATED ORAL
Status: DISCONTINUED | OUTPATIENT
Start: 2023-03-28 | End: 2023-04-20

## 2023-03-28 RX ORDER — SERTRALINE HYDROCHLORIDE 50 MG/1
25 TABLET, FILM COATED ORAL DAILY
Status: DISCONTINUED | OUTPATIENT
Start: 2023-03-28 | End: 2023-03-29

## 2023-03-28 RX ADMIN — PANTOPRAZOLE SODIUM 40 MG: 40 TABLET, DELAYED RELEASE ORAL at 09:33

## 2023-03-28 RX ADMIN — ATORVASTATIN CALCIUM 20 MG: 10 TABLET, FILM COATED ORAL at 17:14

## 2023-03-28 RX ADMIN — SERTRALINE HYDROCHLORIDE 25 MG: 50 TABLET ORAL at 12:23

## 2023-03-28 RX ADMIN — CLOPIDOGREL BISULFATE 75 MG: 75 TABLET, FILM COATED ORAL at 09:33

## 2023-03-28 RX ADMIN — MIRTAZAPINE 30 MG: 15 TABLET, FILM COATED ORAL at 21:58

## 2023-03-28 RX ADMIN — THERA TABS 1 TABLET: TAB at 09:33

## 2023-03-28 RX ADMIN — METOPROLOL TARTRATE 12.5 MG: 25 TABLET, FILM COATED ORAL at 09:33

## 2023-03-29 ENCOUNTER — PATIENT OUTREACH (OUTPATIENT)
Dept: CASE MANAGEMENT | Age: 83
End: 2023-03-29

## 2023-03-29 LAB
ALBUMIN SERPL-MCNC: 3.2 G/DL (ref 3.5–5)
ALBUMIN/GLOB SERPL: 1.1 (ref 1.1–2.2)
ALP SERPL-CCNC: 54 U/L (ref 45–117)
ALT SERPL-CCNC: 58 U/L (ref 12–78)
ANION GAP SERPL CALC-SCNC: 3 MMOL/L (ref 5–15)
AST SERPL-CCNC: 41 U/L (ref 15–37)
BILIRUB SERPL-MCNC: 0.4 MG/DL (ref 0.2–1)
BUN SERPL-MCNC: 22 MG/DL (ref 6–20)
BUN/CREAT SERPL: 26 (ref 12–20)
CALCIUM SERPL-MCNC: 9.8 MG/DL (ref 8.5–10.1)
CHLORIDE SERPL-SCNC: 105 MMOL/L (ref 97–108)
CHOLEST SERPL-MCNC: 192 MG/DL
CO2 SERPL-SCNC: 30 MMOL/L (ref 21–32)
CREAT SERPL-MCNC: 0.84 MG/DL (ref 0.55–1.02)
ERYTHROCYTE [DISTWIDTH] IN BLOOD BY AUTOMATED COUNT: 14 % (ref 11.5–14.5)
GLOBULIN SER CALC-MCNC: 3 G/DL (ref 2–4)
GLUCOSE P FAST SERPL-MCNC: 115 MG/DL (ref 65–100)
GLUCOSE SERPL-MCNC: 115 MG/DL (ref 65–100)
HCT VFR BLD AUTO: 39.2 % (ref 35–47)
HDLC SERPL-MCNC: 63 MG/DL
HDLC SERPL: 3 (ref 0–5)
HGB BLD-MCNC: 13 G/DL (ref 11.5–16)
LDLC SERPL CALC-MCNC: 106.2 MG/DL (ref 0–100)
MCH RBC QN AUTO: 30.9 PG (ref 26–34)
MCHC RBC AUTO-ENTMCNC: 33.2 G/DL (ref 30–36.5)
MCV RBC AUTO: 93.1 FL (ref 80–99)
NRBC # BLD: 0 K/UL (ref 0–0.01)
NRBC BLD-RTO: 0 PER 100 WBC
PLATELET # BLD AUTO: 202 K/UL (ref 150–400)
PMV BLD AUTO: 10.2 FL (ref 8.9–12.9)
POTASSIUM SERPL-SCNC: 3.6 MMOL/L (ref 3.5–5.1)
PROT SERPL-MCNC: 6.2 G/DL (ref 6.4–8.2)
RBC # BLD AUTO: 4.21 M/UL (ref 3.8–5.2)
SARS-COV-2, COV2: NORMAL
SODIUM SERPL-SCNC: 138 MMOL/L (ref 136–145)
TRIGL SERPL-MCNC: 114 MG/DL (ref ?–150)
TSH SERPL DL<=0.05 MIU/L-ACNC: 21.6 UIU/ML (ref 0.36–3.74)
VLDLC SERPL CALC-MCNC: 22.8 MG/DL
WBC # BLD AUTO: 7.9 K/UL (ref 3.6–11)

## 2023-03-29 PROCEDURE — 80061 LIPID PANEL: CPT

## 2023-03-29 PROCEDURE — U0005 INFEC AGEN DETEC AMPLI PROBE: HCPCS

## 2023-03-29 PROCEDURE — 74011250637 HC RX REV CODE- 250/637: Performed by: PSYCHIATRY & NEUROLOGY

## 2023-03-29 PROCEDURE — 74011250637 HC RX REV CODE- 250/637: Performed by: NURSE PRACTITIONER

## 2023-03-29 PROCEDURE — 85027 COMPLETE CBC AUTOMATED: CPT

## 2023-03-29 PROCEDURE — 36415 COLL VENOUS BLD VENIPUNCTURE: CPT

## 2023-03-29 PROCEDURE — 82947 ASSAY GLUCOSE BLOOD QUANT: CPT

## 2023-03-29 PROCEDURE — 80053 COMPREHEN METABOLIC PANEL: CPT

## 2023-03-29 PROCEDURE — 84443 ASSAY THYROID STIM HORMONE: CPT

## 2023-03-29 PROCEDURE — 65220000003 HC RM SEMIPRIVATE PSYCH

## 2023-03-29 PROCEDURE — 9990 CHARGE CONVERSION

## 2023-03-29 RX ORDER — SERTRALINE HYDROCHLORIDE 50 MG/1
50 TABLET, FILM COATED ORAL DAILY
Status: DISCONTINUED | OUTPATIENT
Start: 2023-03-30 | End: 2023-04-03

## 2023-03-29 RX ADMIN — OLANZAPINE 2.5 MG: 2.5 TABLET, FILM COATED ORAL at 15:35

## 2023-03-29 RX ADMIN — THERA TABS 1 TABLET: TAB at 09:55

## 2023-03-29 RX ADMIN — METOPROLOL TARTRATE 12.5 MG: 25 TABLET, FILM COATED ORAL at 09:55

## 2023-03-29 RX ADMIN — LEVOTHYROXINE SODIUM 100 MCG: 50 TABLET ORAL at 05:55

## 2023-03-29 RX ADMIN — CLOPIDOGREL BISULFATE 75 MG: 75 TABLET, FILM COATED ORAL at 09:56

## 2023-03-29 RX ADMIN — MIRTAZAPINE 30 MG: 15 TABLET, FILM COATED ORAL at 21:05

## 2023-03-29 RX ADMIN — PANTOPRAZOLE SODIUM 40 MG: 40 TABLET, DELAYED RELEASE ORAL at 09:55

## 2023-03-29 RX ADMIN — SERTRALINE HYDROCHLORIDE 25 MG: 50 TABLET ORAL at 09:55

## 2023-03-29 RX ADMIN — ATORVASTATIN CALCIUM 20 MG: 10 TABLET, FILM COATED ORAL at 18:42

## 2023-03-29 RX ADMIN — METOPROLOL TARTRATE 12.5 MG: 25 TABLET, FILM COATED ORAL at 18:42

## 2023-03-29 NOTE — PROGRESS NOTES
Ambulatory Care   Social Work Note    Patient has graduated from the Complex Case Management  program on 03/29/23. At this time all Social Work goals have been completed and the patient/family has the ability to self-manage. No further Social Work follow-up scheduled. Patient/family has Social Work contact information for further questions, concerns, or needs. Goals Addressed                      This Visit's Progress      COMPLETED: Connect with kabuku (pt-stated)   On track      03/29/23  Patient's niece, Stacey Slater, called. The patient has been transferred to [address] [place] [address]. They were not notified of her transfer from [facility]. Provided the contact information for Patient Advocacy at [facility], N#[phone]. Stacey Slater (patient's niece) explained that they no longer plan to pursue Assisted Living at this time, as the patient is adamantly opposed to that particular facility. Stacey Slater and the patient's children currently plan to have the patient return home with her , with care aids. They are prepared to provide 24/7 care, with assistance from family, if needed. Stacey Slater and other family members are already connected with [facility], F#[phone], that is helping them find other WILLI options, if they choose to pursue this in the future. Encouraged Amie Batres and her family members to connect with the  on the behavioral unit, in order to prepare a safe discharge plan together. Plan:  No further social work outreach planned at this time. The patient's niece, Stacey Slater, plans to reach out if any needs arise in the future. [facility]     03/20/23  Received Inpatient ADT Notice. Patient was admitted to [facility] on 3/19/2023. Plan:  Ongoing psychosocial support and resource referral, as desired by the patient and family.    [facility]     03/14/23  Spoke with the patient's niece, Stacey Slater (E#[phone], X#[phone]). The patient and her  are moving to [facility] this coming Tuesday. They have hired movers to assist.  They plan to maintain their home for a few months, to ensure a smooth transition, before selling it. Jerri Robles explained that the patient's psychiatrist recommended that if they can afford it, to have 24/7 care in the home first, for approximately 3 months, and then move into assisted living. However, the patient's  prefers to make the transition now. The patient is experiencing high anxiety, and shaking. They have spoken with the psychiatrist about these concerns already. Plan:  Ongoing psychosocial support and resource referral, as desired by the patient and family. This  will follow up with the patient's niece in two weeks. EPC     03/07/23  Called patient's niece, Karli Padron, to check on their status, and ask how the patient's new patient appointment went with the psychiatrist last week. Jerri Robles explained that they are currently shopping together, but asked if she can call back another day/time. Jerri Robles later called back to explain that the psychiatry appointment went well. Per Mary Kay Patricia, the patient has been diagnosed with severe depression, with delusions. She is now taking Seroquel, and already seeing an improvement in her sleep patterns, and has stopped pacing back and forth for hours on end. The patient's children have decided that the patient and her  need assisted living, and have made steps towards a move into a 2BR apartment that faces the water, at [facility]. Plan:  Ongoing psychosocial support and resource referral, as desired by the patient and family. This  will follow up with the patient and family next week. EPC     02/24/23  Patient's niece, Karli Padron, called to report that she secured an appointment with Daniella Blevins NP, March 1, 2023 with [facility]. Plan:  Ongoing psychosocial support and resource referral, as desired by the patient and family. This  will follow up with the patient and family within the next two weeks. EPC     02/23/23  Connected with the patient's niece, Oscar Tompkins, via phone today. Explained that per chart review, Thuan Doll at Dr. Jennifer Simons office called the patient to notify her that there are no appointments available prior to April 2023. Advised that they keep their current April appointment with Dr. Kimberly Chin, neuro psychologist.      As previously noted, this  spoke with representative at [facility], who indicated that there are two [facility] Nurse Practitioners who accept Medicare, accept patients over the age of 72, and are able to see patients in person. Explained that this  has not yet heard back from the [facility] intake line. Explained that the voicemail indicates that it can take 72 hours to get a call back, and requests that callers only leave one VM. Advised that there is an adult intake form available online, if they prefer to fill that out, in hopes of a call back <[email or web address]    Advised that the patient's niece call the following options -   -[facility] - Kailey Robison and [place] [facility], Psychiatrists, V#[phone], [facility], Dr. Natalie Dexter, Psychiatrist, T#[phone]    Also mentioned that she can call the [employer] Helpline, P#[phone], to request a list of providers that accept Medicare patients over the age of 72. Plan:  Ongoing psychosocial support and resource referral, as desired by the patient and family. This  will follow up with the patient and family within the next two weeks. EPC     02/20/23  Patient's niece, Oscar Tompkins called today.   Carroll Landers explained that she has been unable to reach anyone at [facility].  She explained that her aunt's confusion and short term memory has consistently worsened since December. The patient has a neuro psychology appointment scheduled with Dr. Elizabeth Justin III [facility] in April [facility]23. Jaun Johnston asked if this  was aware of any providers that could see her prior to April. This  called Dr. Shayan Tubbs's office, Neuropsychologist, and left a voicemail to ask about the first available appointment; awaiting a call back. This  also followed up with [facility]. Spoke with an , Efe De Leon, who explained that the former  gave incorrect information. She explained that Ahmet Lee is actually an LCSW, and unable to provide medication management. This  called the following providers/practices, listed on the Medicare. gov website:  -Dr. Eloina Muir - Does not accept Medicare. Can charge the private pay rate, but is not accepting patients at this time.   -Dr. Oscar Bullock - Can accept Medicare, but only provide Transcranial Therapy, not medication management  -Dr. Lloyd Camacho - Does accept Medicare, but does not see patients over the age of 61.    -Dr. Jaquelin Mcmillan accepting new patients right now, but two Behavioral Health Nurse Practitioners accept Medicare, accept patients over the age of 72, and are able to see patients in person. Left a message with the scheduling department to determine availability; awaiting a call back. Updated the patient's niece, Molina Zamudio. Jaun Johnston shared that she may cancel [facility] scheduled for later this week, as her aunt has been unable to participate in meaningful conversation, due to challenges with short term memory. Explained that this  would call back when any updates are available. Plan:  Ongoing psychosocial support and resource referral, as desired by the patient and family. This  will follow up with the patient next week. EPC     02/17/23  Unable to connect with the patient via phone. Left a voicemail message to check on her status; awaiting a call back. Later received a call from her niece, Loly Corrales (T#[phone]). Conference called with the patient, who confirmed that she wants this  to speak with her niece. Loly Corrales explained that she schedules all of the appointments for her aunt. She scheduled an appointment with [facility], for counseling. She explained that she has been unable to find a psychiatrist for her aunt. Explained that the patient and this  already found a potential option - Dr. Sendy Paulino, Psychiatrist with [facility] ([address], [address], [address] [phone]). Explained that she has been on the waiting list with that provider. The patient heard from Samantha at that practice this week, but had not yet called her back. Loly Corrales called Samantha at [facility] and left a voicemail in order to request an appointment/check on the status of the waiting list.    Plan:  Ongoing psychosocial support and resource referral, as desired by the patient and family. This  will follow up with the patient next week. EPC     02/03/23  Connected with the patient via phone. Explained that this  called Dr. Neeraj Johnson office at [facility] today, and spoke with Samantha. Samantha shared that the patient is the next one on their waiting list for an appointment, and should hear back within the next few weeks regarding an appointment. Patient shared that her niece made an appointment with [facility]. Explained that an LCSW is able to provide counseling, but cannot prescribe or manage medications.   Offered that she can see both providers for services, but the patient is concerned about possible copays for these visits. Patient asked if this  could speak with her niece regarding these matters. Advised that the patient would need to give permission for this  to speak with her niece, but once permission is provided,  yes. Plan:  Ongoing psychosocial support and resource referral, as desired by the patient and family. This  will follow up with the patient again in two weeks. EPC     01/20/23  Connected with the patient via phone. She has not yet heard back from Dr. Raoul Medina ([facility], [address] - [phone]) regarding the waiting list, but hopes to receive a call within the next few weeks. Offered to assist the patient in calling other agencies in town, to see if they can accommodate her sooner, but she prefers to wait to hear back from [employer]. Patient shared that the  is working out well. She shared that she also has family visiting today, so she was unable to speak further. Plan:  Ongoing psychosocial support and resource referral, as desired by the patient and family. This  will follow up with the patient again in two weeks. EPC     01/13/23  Reviewed the list of community behavioral health providers, compiled by Roselia Swartz, supervisor at [facility]. Called [facility], and spoke with Amelia. She explained that [facility] has two providers in [place] that accept Medicare patients - [facility], in [place], and [place], in [place]. Both providers can provide virtual services. Called [facility], but learned from their voicemail that they do not see patients over the age of 61. Next, called [facility], and spoke with Radha.   She explained that they have a provider, Dr. Tom Modi, who is not currently accepting new clients, but offered a waiting list.  Rakel Burgess explained that it could take up to a month to get called about an appointment. Called the patient, who declined the virtual psychiatry options through [facility]. Patient was agreeable to getting on Dr. Patty Bedolla waiting list.  She explained that she prefers a female psychiatrist.  Zaira Palencia back, via conference call with the patient. She is now on the waiting list with Dr. Wellington Mack. The patient plans to research the location of [facility] ([facility], [address] [facility] [place], [address] - [phone]) and confirm with family/friends that someone would be able to drive her to that location for a future appointment. Patient thanked this  for her assistance. Plan:  Ongoing psychosocial support and resource referral, as desired by the patient and family. This  will follow up with the patient again next week, to check on her status. EPC     01/06/23  Connected with the patient via phone today. She shared that the  is there 5 days a week now, which has relieved some of her stress. As noted yesterday, she visited [facility] on 12/28/2022, but was turned away, and given a list of community providers. Patient reports that she misplaced the list provided to her by [facility]. Offered the names/information of three providers that accept Medicare (Dr. Inder Jacobson, Dr. Chirag Sainz, and Kelli Nava CNP), two in [place], one in [address]. The patient explained that the distance would not work, as she relies on family to drive her to appointments, and would prefer a provider closer to her home. [address] again today, to request a copy of the list of providers, given to the patient during her visit on 12/28/22.   Spoke with Bryant Lizama, clinical supervisor for the Same Day Access Program.  She confirmed that they are unable to see any new patients at this time, unless they have had a recent inpatient hospitalization. She e-mailed a list of [facility]. Providers to this . Plan:  Ongoing psychosocial support and resource referral, as desired by the patient and family. This  will follow up with the patient again next week, to check on her status, and assist her in connecting with a psychiatrist.    [facility]     01/05/23  Called to follow up with the patient, but was unable to connect with her today. Left a voicemail message. Per chart review, she visited [address] [place] [address] Same Day Access on 12/28/2022, but was turned away, and given a list of community providers. [address] and spoke with a representative, Angelica Kapadia, who advised that they are short staffed, and recommended that the patient contact the providers on the list they gave her, rather than make another visit/attempt with their same day access program.  Left a voicemail for [address] [place] [address] , Lindsay Arenas, to request a copy of that list, in order to be able to assist the patient in calling agencies. Awaiting a call back. Plan:  Ongoing psychosocial support and resource referral, as desired by the patient and family. This  will attempt to reach the patient tomorrow. [facility]     12/22/22  Connected with the patient via phone today. Introduced self and role, and offered support. Patient described caregiver burden, as her  has had multiple health issues, hospitalizations, surgeries, and a fall over the past year. She reports anxiety and trouble sleeping. Discussed good sleep hygiene habits including going to sleep and waking up at the same times each day, limiting exposure to electric devices prior to sleep, and use of aromatherapy such as lavender. Patient identified securing an appointment with a psychiatrist for medication management as her primary concern today. She saw a psychiatrist in the 1990s for medication management, but that person is no longer in practice. Provided information for [address] [place] [address] [facility].  Explained that they do not offer appointments for screenings, but there are walk-in hours ( Monday through Wednesday: 7:30 to 3:00PM, Thursdays: 7:30 to 5:30 PM. Friday: 7:30 to 11:00 AM). Explained that she will need to provide a photo ID, copy of insurance card, and list of medications. Explained that the screening process can take 2-2.5 hours, and she will be asked to complete paperwork about herself, her concerns, and any health challenges. Explained that she will then meet with a clinician to discuss her treatment needs, history, and plan. [address] with the patient via conference call today, and they explained that they will be closed on Monday, December 26, 2022. Provided the contact information below, via phone today. Also sent this information via [employer] messaging, per her request.    [address], #[phone]  [address] [place] [address] Phone, D#[phone]     She shared that her children have hired a personal care aid to assist she and her  with light housework and cooking, for 6 hours a day, starting next week. She is hopeful that this will help reduce her stress and anxiety levels. Plan:  Ongoing psychosocial support and resource referral, as desired by the patient and family. This  will follow up with the patient in two weeks, to check on her status, and progress with resources provided today. EPC               Patient's upcoming visits:  No future appointments.      Daphney Babinski, MSW/GOVIND, [facility]   S#[phone]

## 2023-03-30 PROBLEM — E44.0 MODERATE PROTEIN-CALORIE MALNUTRITION (HCC): Status: ACTIVE | Noted: 2023-03-30

## 2023-03-30 PROCEDURE — 74011250637 HC RX REV CODE- 250/637: Performed by: PSYCHIATRY & NEUROLOGY

## 2023-03-30 PROCEDURE — 74011250637 HC RX REV CODE- 250/637: Performed by: NURSE PRACTITIONER

## 2023-03-30 PROCEDURE — 65220000003 HC RM SEMIPRIVATE PSYCH

## 2023-03-30 PROCEDURE — 9990 CHARGE CONVERSION

## 2023-03-30 RX ORDER — QUETIAPINE FUMARATE 25 MG/1
25 TABLET, FILM COATED ORAL
Status: DISCONTINUED | OUTPATIENT
Start: 2023-03-30 | End: 2023-03-31

## 2023-03-30 RX ADMIN — OLANZAPINE 2.5 MG: 2.5 TABLET, FILM COATED ORAL at 10:00

## 2023-03-30 RX ADMIN — QUETIAPINE FUMARATE 25 MG: 25 TABLET ORAL at 21:53

## 2023-03-30 RX ADMIN — METOPROLOL TARTRATE 12.5 MG: 25 TABLET, FILM COATED ORAL at 09:46

## 2023-03-30 RX ADMIN — PANTOPRAZOLE SODIUM 40 MG: 40 TABLET, DELAYED RELEASE ORAL at 09:47

## 2023-03-30 RX ADMIN — SERTRALINE HYDROCHLORIDE 50 MG: 50 TABLET ORAL at 09:46

## 2023-03-30 RX ADMIN — MIRTAZAPINE 30 MG: 15 TABLET, FILM COATED ORAL at 21:53

## 2023-03-30 RX ADMIN — THERA TABS 1 TABLET: TAB at 09:46

## 2023-03-30 RX ADMIN — METOPROLOL TARTRATE 12.5 MG: 25 TABLET, FILM COATED ORAL at 18:53

## 2023-03-30 RX ADMIN — CLOPIDOGREL BISULFATE 75 MG: 75 TABLET, FILM COATED ORAL at 09:47

## 2023-03-30 RX ADMIN — LEVOTHYROXINE SODIUM 100 MCG: 50 TABLET ORAL at 06:13

## 2023-03-30 RX ADMIN — ATORVASTATIN CALCIUM 20 MG: 10 TABLET, FILM COATED ORAL at 18:53

## 2023-03-31 PROCEDURE — 74011250637 HC RX REV CODE- 250/637: Performed by: NURSE PRACTITIONER

## 2023-03-31 PROCEDURE — 65220000003 HC RM SEMIPRIVATE PSYCH

## 2023-03-31 PROCEDURE — 74011250637 HC RX REV CODE- 250/637: Performed by: PSYCHIATRY & NEUROLOGY

## 2023-03-31 PROCEDURE — 9990 CHARGE CONVERSION

## 2023-03-31 RX ORDER — QUETIAPINE FUMARATE 25 MG/1
50 TABLET, FILM COATED ORAL
Status: DISCONTINUED | OUTPATIENT
Start: 2023-03-31 | End: 2023-04-03

## 2023-03-31 RX ADMIN — MIRTAZAPINE 30 MG: 15 TABLET, FILM COATED ORAL at 21:44

## 2023-03-31 RX ADMIN — ATORVASTATIN CALCIUM 20 MG: 10 TABLET, FILM COATED ORAL at 17:41

## 2023-03-31 RX ADMIN — QUETIAPINE FUMARATE 50 MG: 25 TABLET ORAL at 21:45

## 2023-03-31 RX ADMIN — METOPROLOL TARTRATE 12.5 MG: 25 TABLET, FILM COATED ORAL at 09:36

## 2023-03-31 RX ADMIN — OLANZAPINE 2.5 MG: 2.5 TABLET, FILM COATED ORAL at 17:41

## 2023-03-31 RX ADMIN — CLOPIDOGREL BISULFATE 75 MG: 75 TABLET, FILM COATED ORAL at 09:36

## 2023-03-31 RX ADMIN — METOPROLOL TARTRATE 12.5 MG: 25 TABLET, FILM COATED ORAL at 17:41

## 2023-03-31 RX ADMIN — PANTOPRAZOLE SODIUM 40 MG: 40 TABLET, DELAYED RELEASE ORAL at 09:36

## 2023-03-31 RX ADMIN — LEVOTHYROXINE SODIUM 100 MCG: 50 TABLET ORAL at 06:21

## 2023-03-31 RX ADMIN — SERTRALINE HYDROCHLORIDE 50 MG: 50 TABLET ORAL at 09:36

## 2023-03-31 RX ADMIN — THERA TABS 1 TABLET: TAB at 09:36

## 2023-04-01 PROCEDURE — 74011250637 HC RX REV CODE- 250/637: Performed by: PSYCHIATRY & NEUROLOGY

## 2023-04-01 PROCEDURE — 65220000003 HC RM SEMIPRIVATE PSYCH

## 2023-04-01 PROCEDURE — 9990 CHARGE CONVERSION

## 2023-04-01 PROCEDURE — 74011250637 HC RX REV CODE- 250/637: Performed by: NURSE PRACTITIONER

## 2023-04-01 PROCEDURE — 74011250636 HC RX REV CODE- 250/636: Performed by: PSYCHIATRY & NEUROLOGY

## 2023-04-01 RX ADMIN — ATORVASTATIN CALCIUM 20 MG: 10 TABLET, FILM COATED ORAL at 17:37

## 2023-04-01 RX ADMIN — MIRTAZAPINE 30 MG: 15 TABLET, FILM COATED ORAL at 20:32

## 2023-04-01 RX ADMIN — METOPROLOL TARTRATE 12.5 MG: 25 TABLET, FILM COATED ORAL at 17:37

## 2023-04-01 RX ADMIN — PANTOPRAZOLE SODIUM 40 MG: 40 TABLET, DELAYED RELEASE ORAL at 08:55

## 2023-04-01 RX ADMIN — METOPROLOL TARTRATE 12.5 MG: 25 TABLET, FILM COATED ORAL at 08:55

## 2023-04-01 RX ADMIN — CLOPIDOGREL BISULFATE 75 MG: 75 TABLET, FILM COATED ORAL at 08:55

## 2023-04-01 RX ADMIN — SERTRALINE HYDROCHLORIDE 50 MG: 50 TABLET ORAL at 08:55

## 2023-04-01 RX ADMIN — HALOPERIDOL LACTATE 2.5 MG: 5 INJECTION, SOLUTION INTRAMUSCULAR at 00:27

## 2023-04-01 RX ADMIN — QUETIAPINE FUMARATE 50 MG: 25 TABLET ORAL at 20:32

## 2023-04-01 RX ADMIN — THERA TABS 1 TABLET: TAB at 08:55

## 2023-04-01 RX ADMIN — LEVOTHYROXINE SODIUM 100 MCG: 50 TABLET ORAL at 07:00

## 2023-04-02 PROCEDURE — 74011250637 HC RX REV CODE- 250/637: Performed by: NURSE PRACTITIONER

## 2023-04-02 PROCEDURE — 65220000003 HC RM SEMIPRIVATE PSYCH

## 2023-04-02 PROCEDURE — 9990 CHARGE CONVERSION

## 2023-04-02 PROCEDURE — 74011250637 HC RX REV CODE- 250/637: Performed by: PSYCHIATRY & NEUROLOGY

## 2023-04-02 RX ADMIN — SERTRALINE HYDROCHLORIDE 50 MG: 50 TABLET ORAL at 08:44

## 2023-04-02 RX ADMIN — QUETIAPINE FUMARATE 50 MG: 25 TABLET ORAL at 21:28

## 2023-04-02 RX ADMIN — METOPROLOL TARTRATE 12.5 MG: 25 TABLET, FILM COATED ORAL at 17:04

## 2023-04-02 RX ADMIN — CLOPIDOGREL BISULFATE 75 MG: 75 TABLET, FILM COATED ORAL at 08:44

## 2023-04-02 RX ADMIN — MIRTAZAPINE 30 MG: 15 TABLET, FILM COATED ORAL at 21:28

## 2023-04-02 RX ADMIN — LEVOTHYROXINE SODIUM 100 MCG: 50 TABLET ORAL at 07:00

## 2023-04-02 RX ADMIN — THERA TABS 1 TABLET: TAB at 08:44

## 2023-04-02 RX ADMIN — METOPROLOL TARTRATE 12.5 MG: 25 TABLET, FILM COATED ORAL at 08:44

## 2023-04-02 RX ADMIN — ATORVASTATIN CALCIUM 20 MG: 10 TABLET, FILM COATED ORAL at 17:04

## 2023-04-02 RX ADMIN — OLANZAPINE 2.5 MG: 2.5 TABLET, FILM COATED ORAL at 17:07

## 2023-04-03 PROCEDURE — 65220000003 HC RM SEMIPRIVATE PSYCH

## 2023-04-03 PROCEDURE — 74011250637 HC RX REV CODE- 250/637: Performed by: NURSE PRACTITIONER

## 2023-04-03 PROCEDURE — 74011250637 HC RX REV CODE- 250/637: Performed by: PSYCHIATRY & NEUROLOGY

## 2023-04-03 PROCEDURE — 9990 CHARGE CONVERSION

## 2023-04-03 RX ORDER — QUETIAPINE FUMARATE 100 MG/1
100 TABLET, FILM COATED ORAL
Status: DISCONTINUED | OUTPATIENT
Start: 2023-04-03 | End: 2023-04-05

## 2023-04-03 RX ORDER — SERTRALINE HYDROCHLORIDE 50 MG/1
75 TABLET, FILM COATED ORAL
Status: DISCONTINUED | OUTPATIENT
Start: 2023-04-04 | End: 2023-04-05

## 2023-04-03 RX ADMIN — PANTOPRAZOLE SODIUM 40 MG: 40 TABLET, DELAYED RELEASE ORAL at 09:43

## 2023-04-03 RX ADMIN — METOPROLOL TARTRATE 12.5 MG: 25 TABLET, FILM COATED ORAL at 09:43

## 2023-04-03 RX ADMIN — METOPROLOL TARTRATE 12.5 MG: 25 TABLET, FILM COATED ORAL at 17:24

## 2023-04-03 RX ADMIN — SERTRALINE HYDROCHLORIDE 50 MG: 50 TABLET ORAL at 09:43

## 2023-04-03 RX ADMIN — ATORVASTATIN CALCIUM 20 MG: 10 TABLET, FILM COATED ORAL at 17:24

## 2023-04-03 RX ADMIN — THERA TABS 1 TABLET: TAB at 09:43

## 2023-04-03 RX ADMIN — QUETIAPINE FUMARATE 100 MG: 100 TABLET ORAL at 21:20

## 2023-04-03 RX ADMIN — LEVOTHYROXINE SODIUM 100 MCG: 50 TABLET ORAL at 06:11

## 2023-04-03 RX ADMIN — MIRTAZAPINE 30 MG: 15 TABLET, FILM COATED ORAL at 21:20

## 2023-04-03 RX ADMIN — CLOPIDOGREL BISULFATE 75 MG: 75 TABLET, FILM COATED ORAL at 09:43

## 2023-04-04 PROCEDURE — 65220000003 HC RM SEMIPRIVATE PSYCH

## 2023-04-04 PROCEDURE — 9990 CHARGE CONVERSION

## 2023-04-04 PROCEDURE — 74011250637 HC RX REV CODE- 250/637: Performed by: NURSE PRACTITIONER

## 2023-04-04 PROCEDURE — 74011250637 HC RX REV CODE- 250/637: Performed by: PSYCHIATRY & NEUROLOGY

## 2023-04-04 RX ADMIN — MIRTAZAPINE 30 MG: 15 TABLET, FILM COATED ORAL at 22:12

## 2023-04-04 RX ADMIN — ATORVASTATIN CALCIUM 20 MG: 10 TABLET, FILM COATED ORAL at 17:35

## 2023-04-04 RX ADMIN — METOPROLOL TARTRATE 12.5 MG: 25 TABLET, FILM COATED ORAL at 09:48

## 2023-04-04 RX ADMIN — PANTOPRAZOLE SODIUM 40 MG: 40 TABLET, DELAYED RELEASE ORAL at 09:49

## 2023-04-04 RX ADMIN — SERTRALINE HYDROCHLORIDE 75 MG: 50 TABLET ORAL at 22:12

## 2023-04-04 RX ADMIN — QUETIAPINE FUMARATE 100 MG: 100 TABLET ORAL at 22:12

## 2023-04-04 RX ADMIN — THERA TABS 1 TABLET: TAB at 09:48

## 2023-04-04 RX ADMIN — LEVOTHYROXINE SODIUM 100 MCG: 50 TABLET ORAL at 06:36

## 2023-04-04 RX ADMIN — CLOPIDOGREL BISULFATE 75 MG: 75 TABLET, FILM COATED ORAL at 09:48

## 2023-04-04 RX ADMIN — METOPROLOL TARTRATE 12.5 MG: 25 TABLET, FILM COATED ORAL at 17:35

## 2023-04-05 PROCEDURE — 74011250637 HC RX REV CODE- 250/637: Performed by: PSYCHIATRY & NEUROLOGY

## 2023-04-05 PROCEDURE — 74011250637 HC RX REV CODE- 250/637: Performed by: NURSE PRACTITIONER

## 2023-04-05 PROCEDURE — 65220000003 HC RM SEMIPRIVATE PSYCH

## 2023-04-05 PROCEDURE — 9990 CHARGE CONVERSION

## 2023-04-05 RX ORDER — QUETIAPINE FUMARATE 25 MG/1
25 TABLET, FILM COATED ORAL DAILY
Status: DISCONTINUED | OUTPATIENT
Start: 2023-04-05 | End: 2023-04-06

## 2023-04-05 RX ORDER — SERTRALINE HYDROCHLORIDE 50 MG/1
100 TABLET, FILM COATED ORAL
Status: DISCONTINUED | OUTPATIENT
Start: 2023-04-05 | End: 2023-04-12

## 2023-04-05 RX ADMIN — THERA TABS 1 TABLET: TAB at 09:35

## 2023-04-05 RX ADMIN — ATORVASTATIN CALCIUM 20 MG: 10 TABLET, FILM COATED ORAL at 18:41

## 2023-04-05 RX ADMIN — METOPROLOL TARTRATE 12.5 MG: 25 TABLET, FILM COATED ORAL at 09:35

## 2023-04-05 RX ADMIN — PANTOPRAZOLE SODIUM 40 MG: 40 TABLET, DELAYED RELEASE ORAL at 09:35

## 2023-04-05 RX ADMIN — SERTRALINE HYDROCHLORIDE 100 MG: 50 TABLET ORAL at 22:01

## 2023-04-05 RX ADMIN — MIRTAZAPINE 30 MG: 15 TABLET, FILM COATED ORAL at 22:01

## 2023-04-05 RX ADMIN — QUETIAPINE FUMARATE 150 MG: 100 TABLET ORAL at 22:01

## 2023-04-05 RX ADMIN — METOPROLOL TARTRATE 12.5 MG: 25 TABLET, FILM COATED ORAL at 18:41

## 2023-04-05 RX ADMIN — CLOPIDOGREL BISULFATE 75 MG: 75 TABLET, FILM COATED ORAL at 09:35

## 2023-04-05 RX ADMIN — LEVOTHYROXINE SODIUM 100 MCG: 50 TABLET ORAL at 06:52

## 2023-04-05 RX ADMIN — QUETIAPINE FUMARATE 25 MG: 25 TABLET ORAL at 12:54

## 2023-04-06 PROCEDURE — 74011250637 HC RX REV CODE- 250/637: Performed by: NURSE PRACTITIONER

## 2023-04-06 PROCEDURE — 9990 CHARGE CONVERSION

## 2023-04-06 PROCEDURE — 74011250637 HC RX REV CODE- 250/637: Performed by: PSYCHIATRY & NEUROLOGY

## 2023-04-06 PROCEDURE — 65220000003 HC RM SEMIPRIVATE PSYCH

## 2023-04-06 RX ORDER — QUETIAPINE FUMARATE 25 MG/1
25 TABLET, FILM COATED ORAL ONCE
Status: COMPLETED | OUTPATIENT
Start: 2023-04-06 | End: 2023-04-06

## 2023-04-06 RX ORDER — QUETIAPINE FUMARATE 25 MG/1
50 TABLET, FILM COATED ORAL DAILY
Status: DISCONTINUED | OUTPATIENT
Start: 2023-04-07 | End: 2023-04-13

## 2023-04-06 RX ADMIN — QUETIAPINE FUMARATE 150 MG: 100 TABLET ORAL at 21:43

## 2023-04-06 RX ADMIN — METOPROLOL TARTRATE 12.5 MG: 25 TABLET, FILM COATED ORAL at 09:24

## 2023-04-06 RX ADMIN — CLOPIDOGREL BISULFATE 75 MG: 75 TABLET, FILM COATED ORAL at 09:24

## 2023-04-06 RX ADMIN — SERTRALINE HYDROCHLORIDE 100 MG: 50 TABLET ORAL at 21:43

## 2023-04-06 RX ADMIN — LEVOTHYROXINE SODIUM 100 MCG: 50 TABLET ORAL at 06:33

## 2023-04-06 RX ADMIN — MIRTAZAPINE 30 MG: 15 TABLET, FILM COATED ORAL at 21:43

## 2023-04-06 RX ADMIN — THERA TABS 1 TABLET: TAB at 09:24

## 2023-04-06 RX ADMIN — QUETIAPINE FUMARATE 25 MG: 25 TABLET ORAL at 14:59

## 2023-04-06 RX ADMIN — PANTOPRAZOLE SODIUM 40 MG: 40 TABLET, DELAYED RELEASE ORAL at 09:24

## 2023-04-06 RX ADMIN — ATORVASTATIN CALCIUM 20 MG: 10 TABLET, FILM COATED ORAL at 17:04

## 2023-04-06 RX ADMIN — QUETIAPINE FUMARATE 25 MG: 25 TABLET ORAL at 09:24

## 2023-04-06 RX ADMIN — METOPROLOL TARTRATE 12.5 MG: 25 TABLET, FILM COATED ORAL at 17:04

## 2023-04-07 PROCEDURE — 9990 CHARGE CONVERSION

## 2023-04-07 PROCEDURE — 74011250637 HC RX REV CODE- 250/637: Performed by: PSYCHIATRY & NEUROLOGY

## 2023-04-07 PROCEDURE — 65220000003 HC RM SEMIPRIVATE PSYCH

## 2023-04-07 PROCEDURE — 74011250637 HC RX REV CODE- 250/637: Performed by: NURSE PRACTITIONER

## 2023-04-07 RX ADMIN — SERTRALINE HYDROCHLORIDE 100 MG: 50 TABLET ORAL at 21:24

## 2023-04-07 RX ADMIN — QUETIAPINE FUMARATE 150 MG: 100 TABLET ORAL at 21:23

## 2023-04-07 RX ADMIN — LEVOTHYROXINE SODIUM 100 MCG: 50 TABLET ORAL at 06:09

## 2023-04-07 RX ADMIN — CLOPIDOGREL BISULFATE 75 MG: 75 TABLET, FILM COATED ORAL at 09:13

## 2023-04-07 RX ADMIN — QUETIAPINE FUMARATE 50 MG: 25 TABLET ORAL at 09:12

## 2023-04-07 RX ADMIN — MIRTAZAPINE 30 MG: 15 TABLET, FILM COATED ORAL at 21:23

## 2023-04-07 RX ADMIN — PANTOPRAZOLE SODIUM 40 MG: 40 TABLET, DELAYED RELEASE ORAL at 09:12

## 2023-04-07 RX ADMIN — ATORVASTATIN CALCIUM 20 MG: 10 TABLET, FILM COATED ORAL at 17:19

## 2023-04-07 RX ADMIN — THERA TABS 1 TABLET: TAB at 09:12

## 2023-04-07 RX ADMIN — METOPROLOL TARTRATE 12.5 MG: 25 TABLET, FILM COATED ORAL at 17:19

## 2023-04-07 RX ADMIN — METOPROLOL TARTRATE 12.5 MG: 25 TABLET, FILM COATED ORAL at 09:12

## 2023-04-08 PROCEDURE — 74011250637 HC RX REV CODE- 250/637: Performed by: PSYCHIATRY & NEUROLOGY

## 2023-04-08 PROCEDURE — 9990 CHARGE CONVERSION

## 2023-04-08 PROCEDURE — 74011250637 HC RX REV CODE- 250/637: Performed by: NURSE PRACTITIONER

## 2023-04-08 PROCEDURE — 65220000003 HC RM SEMIPRIVATE PSYCH

## 2023-04-08 RX ADMIN — ATORVASTATIN CALCIUM 20 MG: 10 TABLET, FILM COATED ORAL at 17:32

## 2023-04-08 RX ADMIN — QUETIAPINE FUMARATE 150 MG: 100 TABLET ORAL at 21:58

## 2023-04-08 RX ADMIN — CLOPIDOGREL BISULFATE 75 MG: 75 TABLET, FILM COATED ORAL at 08:46

## 2023-04-08 RX ADMIN — QUETIAPINE FUMARATE 50 MG: 25 TABLET ORAL at 08:45

## 2023-04-08 RX ADMIN — PANTOPRAZOLE SODIUM 40 MG: 40 TABLET, DELAYED RELEASE ORAL at 08:45

## 2023-04-08 RX ADMIN — METOPROLOL TARTRATE 12.5 MG: 25 TABLET, FILM COATED ORAL at 08:46

## 2023-04-08 RX ADMIN — LEVOTHYROXINE SODIUM 100 MCG: 50 TABLET ORAL at 07:18

## 2023-04-08 RX ADMIN — MIRTAZAPINE 30 MG: 15 TABLET, FILM COATED ORAL at 21:57

## 2023-04-08 RX ADMIN — THERA TABS 1 TABLET: TAB at 08:45

## 2023-04-08 RX ADMIN — METOPROLOL TARTRATE 12.5 MG: 25 TABLET, FILM COATED ORAL at 17:32

## 2023-04-08 RX ADMIN — SERTRALINE HYDROCHLORIDE 100 MG: 50 TABLET ORAL at 21:57

## 2023-04-09 PROCEDURE — 74011250637 HC RX REV CODE- 250/637: Performed by: PSYCHIATRY & NEUROLOGY

## 2023-04-09 PROCEDURE — 65220000003 HC RM SEMIPRIVATE PSYCH

## 2023-04-09 PROCEDURE — 74011250637 HC RX REV CODE- 250/637: Performed by: NURSE PRACTITIONER

## 2023-04-09 PROCEDURE — 9990 CHARGE CONVERSION

## 2023-04-09 RX ADMIN — QUETIAPINE FUMARATE 50 MG: 25 TABLET ORAL at 09:18

## 2023-04-09 RX ADMIN — LEVOTHYROXINE SODIUM 100 MCG: 50 TABLET ORAL at 06:43

## 2023-04-09 RX ADMIN — ATORVASTATIN CALCIUM 20 MG: 10 TABLET, FILM COATED ORAL at 17:31

## 2023-04-09 RX ADMIN — PANTOPRAZOLE SODIUM 40 MG: 40 TABLET, DELAYED RELEASE ORAL at 09:17

## 2023-04-09 RX ADMIN — MIRTAZAPINE 30 MG: 15 TABLET, FILM COATED ORAL at 21:56

## 2023-04-09 RX ADMIN — METOPROLOL TARTRATE 12.5 MG: 25 TABLET, FILM COATED ORAL at 09:18

## 2023-04-09 RX ADMIN — THERA TABS 1 TABLET: TAB at 09:18

## 2023-04-09 RX ADMIN — CLOPIDOGREL BISULFATE 75 MG: 75 TABLET, FILM COATED ORAL at 09:18

## 2023-04-09 RX ADMIN — QUETIAPINE FUMARATE 150 MG: 100 TABLET ORAL at 21:56

## 2023-04-09 RX ADMIN — SERTRALINE HYDROCHLORIDE 100 MG: 50 TABLET ORAL at 21:56

## 2023-04-09 RX ADMIN — METOPROLOL TARTRATE 12.5 MG: 25 TABLET, FILM COATED ORAL at 17:31

## 2023-04-10 PROCEDURE — 74011250637 HC RX REV CODE- 250/637: Performed by: PSYCHIATRY & NEUROLOGY

## 2023-04-10 PROCEDURE — 65220000003 HC RM SEMIPRIVATE PSYCH

## 2023-04-10 PROCEDURE — 9990 CHARGE CONVERSION

## 2023-04-10 PROCEDURE — 74011250637 HC RX REV CODE- 250/637: Performed by: NURSE PRACTITIONER

## 2023-04-10 RX ORDER — ESTRADIOL 0.1 MG/G
2 CREAM VAGINAL
Status: DISCONTINUED | OUTPATIENT
Start: 2023-04-10 | End: 2023-05-06 | Stop reason: HOSPADM

## 2023-04-10 RX ORDER — ESTRADIOL 0.1 MG/G
2 CREAM VAGINAL
Status: DISCONTINUED | OUTPATIENT
Start: 2023-04-10 | End: 2023-04-10

## 2023-04-10 RX ORDER — MELATONIN
1000 DAILY
Status: DISCONTINUED | OUTPATIENT
Start: 2023-04-10 | End: 2023-05-06 | Stop reason: HOSPADM

## 2023-04-10 RX ADMIN — QUETIAPINE FUMARATE 150 MG: 100 TABLET ORAL at 21:00

## 2023-04-10 RX ADMIN — CHOLECALCIFEROL TAB 25 MCG (1000 UNIT) 1000 UNITS: 25 TAB at 17:47

## 2023-04-10 RX ADMIN — METOPROLOL TARTRATE 12.5 MG: 25 TABLET, FILM COATED ORAL at 17:42

## 2023-04-10 RX ADMIN — THERA TABS 1 TABLET: TAB at 09:09

## 2023-04-10 RX ADMIN — QUETIAPINE FUMARATE 50 MG: 25 TABLET ORAL at 09:09

## 2023-04-10 RX ADMIN — METOPROLOL TARTRATE 12.5 MG: 25 TABLET, FILM COATED ORAL at 09:09

## 2023-04-10 RX ADMIN — CLOPIDOGREL BISULFATE 75 MG: 75 TABLET, FILM COATED ORAL at 09:09

## 2023-04-10 RX ADMIN — LEVOTHYROXINE SODIUM 100 MCG: 50 TABLET ORAL at 05:48

## 2023-04-10 RX ADMIN — ESTRADIOL 2 G: 0.1 CREAM VAGINAL at 21:26

## 2023-04-10 RX ADMIN — ATORVASTATIN CALCIUM 20 MG: 10 TABLET, FILM COATED ORAL at 17:42

## 2023-04-10 RX ADMIN — MIRTAZAPINE 30 MG: 15 TABLET, FILM COATED ORAL at 21:24

## 2023-04-10 RX ADMIN — PANTOPRAZOLE SODIUM 40 MG: 40 TABLET, DELAYED RELEASE ORAL at 09:09

## 2023-04-10 RX ADMIN — SERTRALINE HYDROCHLORIDE 100 MG: 50 TABLET ORAL at 21:24

## 2023-04-11 PROCEDURE — 74011250637 HC RX REV CODE- 250/637: Performed by: PSYCHIATRY & NEUROLOGY

## 2023-04-11 PROCEDURE — 74011250637 HC RX REV CODE- 250/637: Performed by: NURSE PRACTITIONER

## 2023-04-11 PROCEDURE — 65220000003 HC RM SEMIPRIVATE PSYCH

## 2023-04-11 PROCEDURE — 9990 CHARGE CONVERSION

## 2023-04-11 RX ORDER — QUETIAPINE FUMARATE 100 MG/1
200 TABLET, FILM COATED ORAL
Status: DISCONTINUED | OUTPATIENT
Start: 2023-04-11 | End: 2023-04-13

## 2023-04-11 RX ADMIN — METOPROLOL TARTRATE 12.5 MG: 25 TABLET, FILM COATED ORAL at 09:04

## 2023-04-11 RX ADMIN — LEVOTHYROXINE SODIUM 100 MCG: 50 TABLET ORAL at 06:27

## 2023-04-11 RX ADMIN — ATORVASTATIN CALCIUM 20 MG: 10 TABLET, FILM COATED ORAL at 17:30

## 2023-04-11 RX ADMIN — CLOPIDOGREL BISULFATE 75 MG: 75 TABLET, FILM COATED ORAL at 09:03

## 2023-04-11 RX ADMIN — SERTRALINE HYDROCHLORIDE 100 MG: 50 TABLET ORAL at 21:46

## 2023-04-11 RX ADMIN — QUETIAPINE FUMARATE 200 MG: 100 TABLET ORAL at 21:46

## 2023-04-11 RX ADMIN — CHOLECALCIFEROL TAB 25 MCG (1000 UNIT) 1000 UNITS: 25 TAB at 09:00

## 2023-04-11 RX ADMIN — MIRTAZAPINE 30 MG: 15 TABLET, FILM COATED ORAL at 21:47

## 2023-04-11 RX ADMIN — PANTOPRAZOLE SODIUM 40 MG: 40 TABLET, DELAYED RELEASE ORAL at 09:03

## 2023-04-11 RX ADMIN — QUETIAPINE FUMARATE 50 MG: 25 TABLET ORAL at 09:03

## 2023-04-11 RX ADMIN — METOPROLOL TARTRATE 12.5 MG: 25 TABLET, FILM COATED ORAL at 17:31

## 2023-04-11 RX ADMIN — THERA TABS 1 TABLET: TAB at 09:04

## 2023-04-12 PROCEDURE — 74011250637 HC RX REV CODE- 250/637: Performed by: PSYCHIATRY & NEUROLOGY

## 2023-04-12 PROCEDURE — 65220000003 HC RM SEMIPRIVATE PSYCH

## 2023-04-12 PROCEDURE — 9990 CHARGE CONVERSION

## 2023-04-12 PROCEDURE — 74011250637 HC RX REV CODE- 250/637: Performed by: NURSE PRACTITIONER

## 2023-04-12 RX ORDER — SERTRALINE HYDROCHLORIDE 50 MG/1
125 TABLET, FILM COATED ORAL
Status: DISCONTINUED | OUTPATIENT
Start: 2023-04-12 | End: 2023-04-13

## 2023-04-12 RX ADMIN — QUETIAPINE FUMARATE 200 MG: 100 TABLET ORAL at 21:00

## 2023-04-12 RX ADMIN — CLOPIDOGREL BISULFATE 75 MG: 75 TABLET, FILM COATED ORAL at 09:09

## 2023-04-12 RX ADMIN — THERA TABS 1 TABLET: TAB at 09:10

## 2023-04-12 RX ADMIN — PANTOPRAZOLE SODIUM 40 MG: 40 TABLET, DELAYED RELEASE ORAL at 09:09

## 2023-04-12 RX ADMIN — MIRTAZAPINE 30 MG: 15 TABLET, FILM COATED ORAL at 22:15

## 2023-04-12 RX ADMIN — ATORVASTATIN CALCIUM 20 MG: 10 TABLET, FILM COATED ORAL at 17:32

## 2023-04-12 RX ADMIN — SERTRALINE HYDROCHLORIDE 125 MG: 50 TABLET ORAL at 21:14

## 2023-04-12 RX ADMIN — METOPROLOL TARTRATE 12.5 MG: 25 TABLET, FILM COATED ORAL at 09:10

## 2023-04-12 RX ADMIN — LEVOTHYROXINE SODIUM 100 MCG: 50 TABLET ORAL at 06:22

## 2023-04-12 RX ADMIN — CHOLECALCIFEROL TAB 25 MCG (1000 UNIT) 1000 UNITS: 25 TAB at 09:10

## 2023-04-12 RX ADMIN — QUETIAPINE FUMARATE 50 MG: 25 TABLET ORAL at 09:09

## 2023-04-12 RX ADMIN — METOPROLOL TARTRATE 12.5 MG: 25 TABLET, FILM COATED ORAL at 17:31

## 2023-04-13 PROCEDURE — 9990 CHARGE CONVERSION

## 2023-04-13 PROCEDURE — 74011250637 HC RX REV CODE- 250/637: Performed by: PSYCHIATRY & NEUROLOGY

## 2023-04-13 PROCEDURE — 65220000003 HC RM SEMIPRIVATE PSYCH

## 2023-04-13 PROCEDURE — 74011250637 HC RX REV CODE- 250/637: Performed by: NURSE PRACTITIONER

## 2023-04-13 RX ORDER — SERTRALINE HYDROCHLORIDE 50 MG/1
150 TABLET, FILM COATED ORAL
Status: DISCONTINUED | OUTPATIENT
Start: 2023-04-13 | End: 2023-04-17

## 2023-04-13 RX ADMIN — MIRTAZAPINE 30 MG: 15 TABLET, FILM COATED ORAL at 21:19

## 2023-04-13 RX ADMIN — ATORVASTATIN CALCIUM 20 MG: 10 TABLET, FILM COATED ORAL at 18:13

## 2023-04-13 RX ADMIN — PANTOPRAZOLE SODIUM 40 MG: 40 TABLET, DELAYED RELEASE ORAL at 09:14

## 2023-04-13 RX ADMIN — CHOLECALCIFEROL TAB 25 MCG (1000 UNIT) 1000 UNITS: 25 TAB at 09:14

## 2023-04-13 RX ADMIN — SERTRALINE HYDROCHLORIDE 150 MG: 50 TABLET ORAL at 21:17

## 2023-04-13 RX ADMIN — LEVOTHYROXINE SODIUM 100 MCG: 50 TABLET ORAL at 06:29

## 2023-04-13 RX ADMIN — METOPROLOL TARTRATE 12.5 MG: 25 TABLET, FILM COATED ORAL at 18:13

## 2023-04-13 RX ADMIN — QUETIAPINE FUMARATE 250 MG: 100 TABLET ORAL at 21:18

## 2023-04-13 RX ADMIN — THERA TABS 1 TABLET: TAB at 09:14

## 2023-04-13 RX ADMIN — CLOPIDOGREL BISULFATE 75 MG: 75 TABLET, FILM COATED ORAL at 09:13

## 2023-04-13 RX ADMIN — METOPROLOL TARTRATE 12.5 MG: 25 TABLET, FILM COATED ORAL at 09:16

## 2023-04-13 RX ADMIN — QUETIAPINE FUMARATE 50 MG: 25 TABLET ORAL at 09:13

## 2023-04-14 PROCEDURE — 9990 CHARGE CONVERSION

## 2023-04-14 PROCEDURE — 65220000003 HC RM SEMIPRIVATE PSYCH

## 2023-04-14 PROCEDURE — 74011250637 HC RX REV CODE- 250/637: Performed by: PSYCHIATRY & NEUROLOGY

## 2023-04-14 PROCEDURE — 74011250637 HC RX REV CODE- 250/637: Performed by: NURSE PRACTITIONER

## 2023-04-14 RX ADMIN — MIRTAZAPINE 30 MG: 15 TABLET, FILM COATED ORAL at 21:14

## 2023-04-14 RX ADMIN — QUETIAPINE FUMARATE 250 MG: 100 TABLET ORAL at 21:14

## 2023-04-14 RX ADMIN — CHOLECALCIFEROL TAB 25 MCG (1000 UNIT) 1000 UNITS: 25 TAB at 09:32

## 2023-04-14 RX ADMIN — METOPROLOL TARTRATE 12.5 MG: 25 TABLET, FILM COATED ORAL at 17:51

## 2023-04-14 RX ADMIN — CLOPIDOGREL BISULFATE 75 MG: 75 TABLET, FILM COATED ORAL at 09:32

## 2023-04-14 RX ADMIN — ATORVASTATIN CALCIUM 20 MG: 10 TABLET, FILM COATED ORAL at 17:51

## 2023-04-14 RX ADMIN — SERTRALINE HYDROCHLORIDE 150 MG: 50 TABLET ORAL at 21:14

## 2023-04-14 RX ADMIN — THERA TABS 1 TABLET: TAB at 09:32

## 2023-04-14 RX ADMIN — PANTOPRAZOLE SODIUM 40 MG: 40 TABLET, DELAYED RELEASE ORAL at 09:32

## 2023-04-14 RX ADMIN — LEVOTHYROXINE SODIUM 100 MCG: 50 TABLET ORAL at 05:42

## 2023-04-14 RX ADMIN — METOPROLOL TARTRATE 12.5 MG: 25 TABLET, FILM COATED ORAL at 09:32

## 2023-04-15 PROCEDURE — 65220000003 HC RM SEMIPRIVATE PSYCH

## 2023-04-15 PROCEDURE — 74011250637 HC RX REV CODE- 250/637: Performed by: NURSE PRACTITIONER

## 2023-04-15 PROCEDURE — 9990 CHARGE CONVERSION

## 2023-04-15 PROCEDURE — 74011250637 HC RX REV CODE- 250/637: Performed by: PSYCHIATRY & NEUROLOGY

## 2023-04-15 RX ADMIN — MIRTAZAPINE 30 MG: 15 TABLET, FILM COATED ORAL at 21:04

## 2023-04-15 RX ADMIN — PANTOPRAZOLE SODIUM 40 MG: 40 TABLET, DELAYED RELEASE ORAL at 08:53

## 2023-04-15 RX ADMIN — THERA TABS 1 TABLET: TAB at 08:53

## 2023-04-15 RX ADMIN — LEVOTHYROXINE SODIUM 100 MCG: 50 TABLET ORAL at 06:33

## 2023-04-15 RX ADMIN — METOPROLOL TARTRATE 12.5 MG: 25 TABLET, FILM COATED ORAL at 17:19

## 2023-04-15 RX ADMIN — CLOPIDOGREL BISULFATE 75 MG: 75 TABLET, FILM COATED ORAL at 08:53

## 2023-04-15 RX ADMIN — METOPROLOL TARTRATE 12.5 MG: 25 TABLET, FILM COATED ORAL at 08:53

## 2023-04-15 RX ADMIN — ATORVASTATIN CALCIUM 20 MG: 10 TABLET, FILM COATED ORAL at 17:19

## 2023-04-15 RX ADMIN — CHOLECALCIFEROL TAB 25 MCG (1000 UNIT) 1000 UNITS: 25 TAB at 08:53

## 2023-04-15 RX ADMIN — QUETIAPINE FUMARATE 250 MG: 100 TABLET ORAL at 21:04

## 2023-04-15 RX ADMIN — SERTRALINE HYDROCHLORIDE 150 MG: 50 TABLET ORAL at 21:04

## 2023-04-16 PROCEDURE — 93005 ELECTROCARDIOGRAM TRACING: CPT

## 2023-04-16 PROCEDURE — 74011250637 HC RX REV CODE- 250/637: Performed by: PSYCHIATRY & NEUROLOGY

## 2023-04-16 PROCEDURE — 74011250637 HC RX REV CODE- 250/637: Performed by: NURSE PRACTITIONER

## 2023-04-16 PROCEDURE — 65220000003 HC RM SEMIPRIVATE PSYCH

## 2023-04-16 PROCEDURE — 9990 CHARGE CONVERSION

## 2023-04-16 RX ADMIN — PANTOPRAZOLE SODIUM 40 MG: 40 TABLET, DELAYED RELEASE ORAL at 09:04

## 2023-04-16 RX ADMIN — METOPROLOL TARTRATE 12.5 MG: 25 TABLET, FILM COATED ORAL at 09:03

## 2023-04-16 RX ADMIN — CHOLECALCIFEROL TAB 25 MCG (1000 UNIT) 1000 UNITS: 25 TAB at 09:04

## 2023-04-16 RX ADMIN — THERA TABS 1 TABLET: TAB at 09:04

## 2023-04-16 RX ADMIN — ATORVASTATIN CALCIUM 20 MG: 10 TABLET, FILM COATED ORAL at 17:04

## 2023-04-16 RX ADMIN — CLOPIDOGREL BISULFATE 75 MG: 75 TABLET, FILM COATED ORAL at 09:03

## 2023-04-16 RX ADMIN — METOPROLOL TARTRATE 12.5 MG: 25 TABLET, FILM COATED ORAL at 17:04

## 2023-04-16 RX ADMIN — LEVOTHYROXINE SODIUM 100 MCG: 50 TABLET ORAL at 06:37

## 2023-04-16 RX ADMIN — QUETIAPINE FUMARATE 250 MG: 100 TABLET ORAL at 21:43

## 2023-04-16 RX ADMIN — MIRTAZAPINE 30 MG: 15 TABLET, FILM COATED ORAL at 21:43

## 2023-04-16 RX ADMIN — SERTRALINE HYDROCHLORIDE 150 MG: 50 TABLET ORAL at 21:43

## 2023-04-17 LAB — TSH SERPL DL<=0.05 MIU/L-ACNC: 2.09 UIU/ML (ref 0.36–3.74)

## 2023-04-17 PROCEDURE — 74011250637 HC RX REV CODE- 250/637: Performed by: PSYCHIATRY & NEUROLOGY

## 2023-04-17 PROCEDURE — 9990 CHARGE CONVERSION

## 2023-04-17 PROCEDURE — 93005 ELECTROCARDIOGRAM TRACING: CPT

## 2023-04-17 PROCEDURE — 36415 COLL VENOUS BLD VENIPUNCTURE: CPT

## 2023-04-17 PROCEDURE — 74011250637 HC RX REV CODE- 250/637: Performed by: NURSE PRACTITIONER

## 2023-04-17 PROCEDURE — 65220000003 HC RM SEMIPRIVATE PSYCH

## 2023-04-17 PROCEDURE — 84443 ASSAY THYROID STIM HORMONE: CPT

## 2023-04-17 RX ORDER — SERTRALINE HYDROCHLORIDE 50 MG/1
200 TABLET, FILM COATED ORAL
Status: DISCONTINUED | OUTPATIENT
Start: 2023-04-17 | End: 2023-04-24

## 2023-04-17 RX ORDER — QUETIAPINE FUMARATE 100 MG/1
300 TABLET, FILM COATED ORAL
Status: DISCONTINUED | OUTPATIENT
Start: 2023-04-17 | End: 2023-04-20

## 2023-04-17 RX ADMIN — THERA TABS 1 TABLET: TAB at 08:31

## 2023-04-17 RX ADMIN — CHOLECALCIFEROL TAB 25 MCG (1000 UNIT) 1000 UNITS: 25 TAB at 08:32

## 2023-04-17 RX ADMIN — METOPROLOL TARTRATE 12.5 MG: 25 TABLET, FILM COATED ORAL at 18:35

## 2023-04-17 RX ADMIN — SERTRALINE HYDROCHLORIDE 200 MG: 50 TABLET ORAL at 22:08

## 2023-04-17 RX ADMIN — MIRTAZAPINE 30 MG: 15 TABLET, FILM COATED ORAL at 22:08

## 2023-04-17 RX ADMIN — QUETIAPINE FUMARATE 300 MG: 100 TABLET ORAL at 22:09

## 2023-04-17 RX ADMIN — LEVOTHYROXINE SODIUM 100 MCG: 50 TABLET ORAL at 06:23

## 2023-04-17 RX ADMIN — ATORVASTATIN CALCIUM 20 MG: 10 TABLET, FILM COATED ORAL at 18:34

## 2023-04-17 RX ADMIN — METOPROLOL TARTRATE 12.5 MG: 25 TABLET, FILM COATED ORAL at 08:31

## 2023-04-17 RX ADMIN — PANTOPRAZOLE SODIUM 40 MG: 40 TABLET, DELAYED RELEASE ORAL at 08:31

## 2023-04-17 RX ADMIN — CLOPIDOGREL BISULFATE 75 MG: 75 TABLET, FILM COATED ORAL at 08:31

## 2023-04-18 PROCEDURE — 74011250637 HC RX REV CODE- 250/637: Performed by: PSYCHIATRY & NEUROLOGY

## 2023-04-18 PROCEDURE — 9990 CHARGE CONVERSION

## 2023-04-18 PROCEDURE — 74011250637 HC RX REV CODE- 250/637: Performed by: NURSE PRACTITIONER

## 2023-04-18 PROCEDURE — 65220000003 HC RM SEMIPRIVATE PSYCH

## 2023-04-18 RX ORDER — LOPERAMIDE HYDROCHLORIDE 2 MG/1
2 CAPSULE ORAL
Status: DISCONTINUED | OUTPATIENT
Start: 2023-04-18 | End: 2023-05-06 | Stop reason: HOSPADM

## 2023-04-18 RX ADMIN — PANTOPRAZOLE SODIUM 40 MG: 40 TABLET, DELAYED RELEASE ORAL at 09:19

## 2023-04-18 RX ADMIN — METOPROLOL TARTRATE 12.5 MG: 25 TABLET, FILM COATED ORAL at 09:19

## 2023-04-18 RX ADMIN — QUETIAPINE FUMARATE 300 MG: 100 TABLET ORAL at 20:52

## 2023-04-18 RX ADMIN — LOPERAMIDE HYDROCHLORIDE 2 MG: 2 CAPSULE ORAL at 15:53

## 2023-04-18 RX ADMIN — CHOLECALCIFEROL TAB 25 MCG (1000 UNIT) 1000 UNITS: 25 TAB at 09:18

## 2023-04-18 RX ADMIN — SERTRALINE HYDROCHLORIDE 200 MG: 50 TABLET ORAL at 20:52

## 2023-04-18 RX ADMIN — CLOPIDOGREL BISULFATE 75 MG: 75 TABLET, FILM COATED ORAL at 09:18

## 2023-04-18 RX ADMIN — METOPROLOL TARTRATE 12.5 MG: 25 TABLET, FILM COATED ORAL at 17:31

## 2023-04-18 RX ADMIN — ATORVASTATIN CALCIUM 20 MG: 10 TABLET, FILM COATED ORAL at 17:31

## 2023-04-18 RX ADMIN — LEVOTHYROXINE SODIUM 100 MCG: 50 TABLET ORAL at 06:19

## 2023-04-18 RX ADMIN — THERA TABS 1 TABLET: TAB at 09:19

## 2023-04-18 RX ADMIN — MIRTAZAPINE 30 MG: 15 TABLET, FILM COATED ORAL at 20:51

## 2023-04-19 PROCEDURE — 74011250637 HC RX REV CODE- 250/637: Performed by: PSYCHIATRY & NEUROLOGY

## 2023-04-19 PROCEDURE — 9990 CHARGE CONVERSION

## 2023-04-19 PROCEDURE — 65220000003 HC RM SEMIPRIVATE PSYCH

## 2023-04-19 PROCEDURE — 74011250637 HC RX REV CODE- 250/637: Performed by: NURSE PRACTITIONER

## 2023-04-19 RX ORDER — MAG HYDROX/ALUMINUM HYD/SIMETH 200-200-20
30 SUSPENSION, ORAL (FINAL DOSE FORM) ORAL
Status: DISCONTINUED | OUTPATIENT
Start: 2023-04-19 | End: 2023-05-06 | Stop reason: HOSPADM

## 2023-04-19 RX ADMIN — SERTRALINE HYDROCHLORIDE 200 MG: 50 TABLET ORAL at 21:07

## 2023-04-19 RX ADMIN — SIMETHICONE 160 MG: 80 TABLET, CHEWABLE ORAL at 08:55

## 2023-04-19 RX ADMIN — THERA TABS 1 TABLET: TAB at 08:52

## 2023-04-19 RX ADMIN — QUETIAPINE FUMARATE 300 MG: 100 TABLET ORAL at 21:07

## 2023-04-19 RX ADMIN — ATORVASTATIN CALCIUM 20 MG: 10 TABLET, FILM COATED ORAL at 17:16

## 2023-04-19 RX ADMIN — LEVOTHYROXINE SODIUM 100 MCG: 50 TABLET ORAL at 06:37

## 2023-04-19 RX ADMIN — PANTOPRAZOLE SODIUM 40 MG: 40 TABLET, DELAYED RELEASE ORAL at 08:52

## 2023-04-19 RX ADMIN — METOPROLOL TARTRATE 12.5 MG: 25 TABLET, FILM COATED ORAL at 17:16

## 2023-04-19 RX ADMIN — MIRTAZAPINE 30 MG: 15 TABLET, FILM COATED ORAL at 22:00

## 2023-04-19 RX ADMIN — CHOLECALCIFEROL TAB 25 MCG (1000 UNIT) 1000 UNITS: 25 TAB at 08:52

## 2023-04-19 RX ADMIN — CLOPIDOGREL BISULFATE 75 MG: 75 TABLET, FILM COATED ORAL at 08:52

## 2023-04-20 PROCEDURE — 9990 CHARGE CONVERSION

## 2023-04-20 PROCEDURE — 74011250637 HC RX REV CODE- 250/637: Performed by: PSYCHIATRY & NEUROLOGY

## 2023-04-20 PROCEDURE — 74011250637 HC RX REV CODE- 250/637: Performed by: NURSE PRACTITIONER

## 2023-04-20 PROCEDURE — 65220000003 HC RM SEMIPRIVATE PSYCH

## 2023-04-20 RX ORDER — QUETIAPINE FUMARATE 25 MG/1
50 TABLET, FILM COATED ORAL DAILY
Status: DISCONTINUED | OUTPATIENT
Start: 2023-04-20 | End: 2023-05-01

## 2023-04-20 RX ORDER — MIRTAZAPINE 15 MG/1
45 TABLET, FILM COATED ORAL
Status: DISCONTINUED | OUTPATIENT
Start: 2023-04-20 | End: 2023-05-06 | Stop reason: HOSPADM

## 2023-04-20 RX ADMIN — QUETIAPINE FUMARATE 50 MG: 25 TABLET ORAL at 14:17

## 2023-04-20 RX ADMIN — METOPROLOL TARTRATE 12.5 MG: 25 TABLET, FILM COATED ORAL at 18:00

## 2023-04-20 RX ADMIN — METOPROLOL TARTRATE 12.5 MG: 25 TABLET, FILM COATED ORAL at 09:28

## 2023-04-20 RX ADMIN — SERTRALINE HYDROCHLORIDE 200 MG: 50 TABLET ORAL at 20:20

## 2023-04-20 RX ADMIN — ATORVASTATIN CALCIUM 20 MG: 10 TABLET, FILM COATED ORAL at 18:00

## 2023-04-20 RX ADMIN — THERA TABS 1 TABLET: TAB at 09:28

## 2023-04-20 RX ADMIN — PANTOPRAZOLE SODIUM 40 MG: 40 TABLET, DELAYED RELEASE ORAL at 09:28

## 2023-04-20 RX ADMIN — CHOLECALCIFEROL TAB 25 MCG (1000 UNIT) 1000 UNITS: 25 TAB at 09:28

## 2023-04-20 RX ADMIN — LEVOTHYROXINE SODIUM 100 MCG: 50 TABLET ORAL at 06:30

## 2023-04-20 RX ADMIN — CLOPIDOGREL BISULFATE 75 MG: 75 TABLET, FILM COATED ORAL at 09:28

## 2023-04-20 RX ADMIN — QUETIAPINE FUMARATE 250 MG: 100 TABLET ORAL at 20:19

## 2023-04-20 RX ADMIN — MIRTAZAPINE 45 MG: 15 TABLET, FILM COATED ORAL at 20:19

## 2023-04-21 LAB
ATRIAL RATE: 63 BPM
ATRIAL RATE: 67 BPM
CALCULATED P AXIS, ECG09: 56 DEGREES
CALCULATED P AXIS, ECG09: 58 DEGREES
CALCULATED R AXIS, ECG10: -34 DEGREES
CALCULATED R AXIS, ECG10: -53 DEGREES
CALCULATED T AXIS, ECG11: 87 DEGREES
CALCULATED T AXIS, ECG11: 89 DEGREES
DIAGNOSIS, 93000: NORMAL
DIAGNOSIS, 93000: NORMAL
P-R INTERVAL, ECG05: 142 MS
P-R INTERVAL, ECG05: 144 MS
Q-T INTERVAL, ECG07: 462 MS
Q-T INTERVAL, ECG07: 472 MS
QRS DURATION, ECG06: 144 MS
QRS DURATION, ECG06: 144 MS
QTC CALCULATION (BEZET), ECG08: 483 MS
QTC CALCULATION (BEZET), ECG08: 488 MS
VENTRICULAR RATE, ECG03: 63 BPM
VENTRICULAR RATE, ECG03: 67 BPM

## 2023-04-21 PROCEDURE — 74011250637 HC RX REV CODE- 250/637: Performed by: PSYCHIATRY & NEUROLOGY

## 2023-04-21 PROCEDURE — 74011250637 HC RX REV CODE- 250/637: Performed by: NURSE PRACTITIONER

## 2023-04-21 PROCEDURE — 9990 CHARGE CONVERSION

## 2023-04-21 PROCEDURE — 65220000003 HC RM SEMIPRIVATE PSYCH

## 2023-04-21 RX ORDER — CALCIUM CARBONATE 200(500)MG
200 TABLET,CHEWABLE ORAL
Status: DISCONTINUED | OUTPATIENT
Start: 2023-04-21 | End: 2023-05-04

## 2023-04-21 RX ADMIN — ALUMINUM HYDROXIDE, MAGNESIUM HYDROXIDE, AND SIMETHICONE 30 ML: 200; 200; 20 SUSPENSION ORAL at 17:26

## 2023-04-21 RX ADMIN — CHOLECALCIFEROL TAB 25 MCG (1000 UNIT) 1000 UNITS: 25 TAB at 08:35

## 2023-04-21 RX ADMIN — SERTRALINE HYDROCHLORIDE 200 MG: 50 TABLET ORAL at 21:04

## 2023-04-21 RX ADMIN — THERA TABS 1 TABLET: TAB at 08:35

## 2023-04-21 RX ADMIN — QUETIAPINE FUMARATE 50 MG: 25 TABLET ORAL at 08:35

## 2023-04-21 RX ADMIN — CLOPIDOGREL BISULFATE 75 MG: 75 TABLET, FILM COATED ORAL at 08:35

## 2023-04-21 RX ADMIN — MIRTAZAPINE 45 MG: 15 TABLET, FILM COATED ORAL at 21:06

## 2023-04-21 RX ADMIN — PANTOPRAZOLE SODIUM 40 MG: 40 TABLET, DELAYED RELEASE ORAL at 08:35

## 2023-04-21 RX ADMIN — ATORVASTATIN CALCIUM 20 MG: 10 TABLET, FILM COATED ORAL at 17:27

## 2023-04-21 RX ADMIN — METOPROLOL TARTRATE 12.5 MG: 25 TABLET, FILM COATED ORAL at 17:27

## 2023-04-21 RX ADMIN — LEVOTHYROXINE SODIUM 100 MCG: 50 TABLET ORAL at 05:40

## 2023-04-21 RX ADMIN — METOPROLOL TARTRATE 12.5 MG: 25 TABLET, FILM COATED ORAL at 08:35

## 2023-04-21 RX ADMIN — QUETIAPINE FUMARATE 250 MG: 100 TABLET ORAL at 21:06

## 2023-04-22 PROCEDURE — 74011250637 HC RX REV CODE- 250/637: Performed by: PSYCHIATRY & NEUROLOGY

## 2023-04-22 PROCEDURE — 65220000003 HC RM SEMIPRIVATE PSYCH

## 2023-04-22 PROCEDURE — 74011250637 HC RX REV CODE- 250/637: Performed by: NURSE PRACTITIONER

## 2023-04-22 PROCEDURE — 9990 CHARGE CONVERSION

## 2023-04-22 RX ADMIN — LEVOTHYROXINE SODIUM 100 MCG: 50 TABLET ORAL at 06:04

## 2023-04-22 RX ADMIN — THERA TABS 1 TABLET: TAB at 09:10

## 2023-04-22 RX ADMIN — CLOPIDOGREL BISULFATE 75 MG: 75 TABLET, FILM COATED ORAL at 09:10

## 2023-04-22 RX ADMIN — QUETIAPINE FUMARATE 50 MG: 25 TABLET ORAL at 09:10

## 2023-04-22 RX ADMIN — METOPROLOL TARTRATE 12.5 MG: 25 TABLET, FILM COATED ORAL at 09:11

## 2023-04-22 RX ADMIN — CALCIUM CARBONATE 200 MG: 500 TABLET, CHEWABLE ORAL at 09:10

## 2023-04-22 RX ADMIN — PANTOPRAZOLE SODIUM 40 MG: 40 TABLET, DELAYED RELEASE ORAL at 09:11

## 2023-04-22 RX ADMIN — MIRTAZAPINE 45 MG: 15 TABLET, FILM COATED ORAL at 22:00

## 2023-04-22 RX ADMIN — ATORVASTATIN CALCIUM 20 MG: 10 TABLET, FILM COATED ORAL at 17:36

## 2023-04-22 RX ADMIN — CHOLECALCIFEROL TAB 25 MCG (1000 UNIT) 1000 UNITS: 25 TAB at 09:11

## 2023-04-22 RX ADMIN — METOPROLOL TARTRATE 12.5 MG: 25 TABLET, FILM COATED ORAL at 17:36

## 2023-04-22 RX ADMIN — OLANZAPINE 2.5 MG: 2.5 TABLET, FILM COATED ORAL at 18:00

## 2023-04-22 RX ADMIN — SERTRALINE HYDROCHLORIDE 200 MG: 50 TABLET ORAL at 21:06

## 2023-04-22 RX ADMIN — QUETIAPINE FUMARATE 250 MG: 100 TABLET ORAL at 21:06

## 2023-04-23 PROCEDURE — 74011250637 HC RX REV CODE- 250/637: Performed by: PSYCHIATRY & NEUROLOGY

## 2023-04-23 PROCEDURE — 65220000003 HC RM SEMIPRIVATE PSYCH

## 2023-04-23 PROCEDURE — 74011250637 HC RX REV CODE- 250/637: Performed by: NURSE PRACTITIONER

## 2023-04-23 PROCEDURE — 9990 CHARGE CONVERSION

## 2023-04-23 RX ADMIN — LOPERAMIDE HYDROCHLORIDE 2 MG: 2 CAPSULE ORAL at 14:48

## 2023-04-23 RX ADMIN — LEVOTHYROXINE SODIUM 100 MCG: 50 TABLET ORAL at 05:50

## 2023-04-23 RX ADMIN — THERA TABS 1 TABLET: TAB at 09:08

## 2023-04-23 RX ADMIN — OLANZAPINE 2.5 MG: 2.5 TABLET, FILM COATED ORAL at 18:00

## 2023-04-23 RX ADMIN — SERTRALINE HYDROCHLORIDE 200 MG: 50 TABLET ORAL at 21:25

## 2023-04-23 RX ADMIN — ESTRADIOL 2 G: 0.1 CREAM VAGINAL at 21:34

## 2023-04-23 RX ADMIN — METOPROLOL TARTRATE 12.5 MG: 25 TABLET, FILM COATED ORAL at 09:08

## 2023-04-23 RX ADMIN — QUETIAPINE FUMARATE 50 MG: 25 TABLET ORAL at 09:08

## 2023-04-23 RX ADMIN — CLOPIDOGREL BISULFATE 75 MG: 75 TABLET, FILM COATED ORAL at 09:08

## 2023-04-23 RX ADMIN — CHOLECALCIFEROL TAB 25 MCG (1000 UNIT) 1000 UNITS: 25 TAB at 09:08

## 2023-04-23 RX ADMIN — QUETIAPINE FUMARATE 250 MG: 100 TABLET ORAL at 21:25

## 2023-04-23 RX ADMIN — ATORVASTATIN CALCIUM 20 MG: 10 TABLET, FILM COATED ORAL at 17:19

## 2023-04-23 RX ADMIN — METOPROLOL TARTRATE 12.5 MG: 25 TABLET, FILM COATED ORAL at 17:19

## 2023-04-23 RX ADMIN — PANTOPRAZOLE SODIUM 40 MG: 40 TABLET, DELAYED RELEASE ORAL at 09:08

## 2023-04-23 RX ADMIN — MIRTAZAPINE 45 MG: 15 TABLET, FILM COATED ORAL at 22:16

## 2023-04-24 PROCEDURE — 74011250637 HC RX REV CODE- 250/637: Performed by: PSYCHIATRY & NEUROLOGY

## 2023-04-24 PROCEDURE — 9990 CHARGE CONVERSION

## 2023-04-24 PROCEDURE — 74011250637 HC RX REV CODE- 250/637: Performed by: NURSE PRACTITIONER

## 2023-04-24 PROCEDURE — 65220000003 HC RM SEMIPRIVATE PSYCH

## 2023-04-24 RX ORDER — VENLAFAXINE HYDROCHLORIDE 37.5 MG/1
75 CAPSULE, EXTENDED RELEASE ORAL
Status: DISCONTINUED | OUTPATIENT
Start: 2023-04-25 | End: 2023-04-26

## 2023-04-24 RX ORDER — SERTRALINE HYDROCHLORIDE 50 MG/1
100 TABLET, FILM COATED ORAL
Status: DISCONTINUED | OUTPATIENT
Start: 2023-04-24 | End: 2023-04-25

## 2023-04-24 RX ORDER — QUETIAPINE FUMARATE 100 MG/1
300 TABLET, FILM COATED ORAL
Status: DISCONTINUED | OUTPATIENT
Start: 2023-04-24 | End: 2023-04-28

## 2023-04-24 RX ORDER — LORAZEPAM 0.5 MG/1
1 TABLET ORAL ONCE
Status: COMPLETED | OUTPATIENT
Start: 2023-04-24 | End: 2023-04-24

## 2023-04-24 RX ADMIN — QUETIAPINE FUMARATE 300 MG: 100 TABLET ORAL at 21:27

## 2023-04-24 RX ADMIN — CHOLECALCIFEROL TAB 25 MCG (1000 UNIT) 1000 UNITS: 25 TAB at 09:56

## 2023-04-24 RX ADMIN — METOPROLOL TARTRATE 12.5 MG: 25 TABLET, FILM COATED ORAL at 09:57

## 2023-04-24 RX ADMIN — METOPROLOL TARTRATE 12.5 MG: 25 TABLET, FILM COATED ORAL at 17:49

## 2023-04-24 RX ADMIN — SERTRALINE HYDROCHLORIDE 100 MG: 50 TABLET ORAL at 21:27

## 2023-04-24 RX ADMIN — QUETIAPINE FUMARATE 50 MG: 25 TABLET ORAL at 09:56

## 2023-04-24 RX ADMIN — LEVOTHYROXINE SODIUM 100 MCG: 50 TABLET ORAL at 06:30

## 2023-04-24 RX ADMIN — MIRTAZAPINE 45 MG: 15 TABLET, FILM COATED ORAL at 21:27

## 2023-04-24 RX ADMIN — LORAZEPAM 1 MG: 0.5 TABLET ORAL at 11:36

## 2023-04-24 RX ADMIN — CLOPIDOGREL BISULFATE 75 MG: 75 TABLET, FILM COATED ORAL at 09:56

## 2023-04-24 RX ADMIN — CALCIUM CARBONATE 200 MG: 500 TABLET, CHEWABLE ORAL at 17:52

## 2023-04-24 RX ADMIN — ATORVASTATIN CALCIUM 20 MG: 10 TABLET, FILM COATED ORAL at 17:49

## 2023-04-24 RX ADMIN — PANTOPRAZOLE SODIUM 40 MG: 40 TABLET, DELAYED RELEASE ORAL at 09:56

## 2023-04-24 RX ADMIN — LOPERAMIDE HYDROCHLORIDE 2 MG: 2 CAPSULE ORAL at 09:56

## 2023-04-24 RX ADMIN — THERA TABS 1 TABLET: TAB at 09:56

## 2023-04-25 PROCEDURE — 74011250637 HC RX REV CODE- 250/637: Performed by: NURSE PRACTITIONER

## 2023-04-25 PROCEDURE — 74011250637 HC RX REV CODE- 250/637: Performed by: PSYCHIATRY & NEUROLOGY

## 2023-04-25 PROCEDURE — 9990 CHARGE CONVERSION

## 2023-04-25 PROCEDURE — 65220000003 HC RM SEMIPRIVATE PSYCH

## 2023-04-25 RX ORDER — SERTRALINE HYDROCHLORIDE 50 MG/1
50 TABLET, FILM COATED ORAL
Status: DISCONTINUED | OUTPATIENT
Start: 2023-04-25 | End: 2023-04-26

## 2023-04-25 RX ADMIN — QUETIAPINE FUMARATE 300 MG: 100 TABLET ORAL at 21:00

## 2023-04-25 RX ADMIN — CLOPIDOGREL BISULFATE 75 MG: 75 TABLET, FILM COATED ORAL at 09:09

## 2023-04-25 RX ADMIN — METOPROLOL TARTRATE 12.5 MG: 25 TABLET, FILM COATED ORAL at 17:45

## 2023-04-25 RX ADMIN — THERA TABS 1 TABLET: TAB at 09:09

## 2023-04-25 RX ADMIN — LEVOTHYROXINE SODIUM 100 MCG: 50 TABLET ORAL at 05:58

## 2023-04-25 RX ADMIN — METOPROLOL TARTRATE 12.5 MG: 25 TABLET, FILM COATED ORAL at 09:09

## 2023-04-25 RX ADMIN — QUETIAPINE FUMARATE 50 MG: 25 TABLET ORAL at 09:09

## 2023-04-25 RX ADMIN — CHOLECALCIFEROL TAB 25 MCG (1000 UNIT) 1000 UNITS: 25 TAB at 09:09

## 2023-04-25 RX ADMIN — MIRTAZAPINE 45 MG: 15 TABLET, FILM COATED ORAL at 22:00

## 2023-04-25 RX ADMIN — PANTOPRAZOLE SODIUM 40 MG: 40 TABLET, DELAYED RELEASE ORAL at 09:09

## 2023-04-25 RX ADMIN — ATORVASTATIN CALCIUM 20 MG: 10 TABLET, FILM COATED ORAL at 17:45

## 2023-04-25 RX ADMIN — SERTRALINE HYDROCHLORIDE 50 MG: 50 TABLET ORAL at 21:21

## 2023-04-25 RX ADMIN — VENLAFAXINE HYDROCHLORIDE 75 MG: 37.5 CAPSULE, EXTENDED RELEASE ORAL at 09:09

## 2023-04-26 PROCEDURE — 65220000003 HC RM SEMIPRIVATE PSYCH

## 2023-04-26 PROCEDURE — 9990 CHARGE CONVERSION

## 2023-04-26 PROCEDURE — 74011250637 HC RX REV CODE- 250/637: Performed by: PSYCHIATRY & NEUROLOGY

## 2023-04-26 PROCEDURE — 74011250637 HC RX REV CODE- 250/637: Performed by: NURSE PRACTITIONER

## 2023-04-26 PROCEDURE — 93005 ELECTROCARDIOGRAM TRACING: CPT

## 2023-04-26 RX ORDER — VENLAFAXINE HYDROCHLORIDE 150 MG/1
150 CAPSULE, EXTENDED RELEASE ORAL
Status: DISCONTINUED | OUTPATIENT
Start: 2023-04-27 | End: 2023-05-06 | Stop reason: HOSPADM

## 2023-04-26 RX ADMIN — CLOPIDOGREL BISULFATE 75 MG: 75 TABLET, FILM COATED ORAL at 09:18

## 2023-04-26 RX ADMIN — QUETIAPINE FUMARATE 50 MG: 25 TABLET ORAL at 09:18

## 2023-04-26 RX ADMIN — PANTOPRAZOLE SODIUM 40 MG: 40 TABLET, DELAYED RELEASE ORAL at 09:18

## 2023-04-26 RX ADMIN — QUETIAPINE FUMARATE 300 MG: 100 TABLET ORAL at 21:18

## 2023-04-26 RX ADMIN — ATORVASTATIN CALCIUM 20 MG: 10 TABLET, FILM COATED ORAL at 17:10

## 2023-04-26 RX ADMIN — LEVOTHYROXINE SODIUM 100 MCG: 50 TABLET ORAL at 06:52

## 2023-04-26 RX ADMIN — THERA TABS 1 TABLET: TAB at 09:25

## 2023-04-26 RX ADMIN — CHOLECALCIFEROL TAB 25 MCG (1000 UNIT) 1000 UNITS: 25 TAB at 09:18

## 2023-04-26 RX ADMIN — METOPROLOL TARTRATE 12.5 MG: 25 TABLET, FILM COATED ORAL at 17:10

## 2023-04-26 RX ADMIN — VENLAFAXINE HYDROCHLORIDE 75 MG: 37.5 CAPSULE, EXTENDED RELEASE ORAL at 09:18

## 2023-04-26 RX ADMIN — METOPROLOL TARTRATE 12.5 MG: 25 TABLET, FILM COATED ORAL at 09:18

## 2023-04-26 RX ADMIN — MIRTAZAPINE 45 MG: 15 TABLET, FILM COATED ORAL at 21:18

## 2023-04-27 LAB
ALBUMIN SERPL-MCNC: 3.3 G/DL (ref 3.5–5)
ALBUMIN/GLOB SERPL: 1.2 (ref 1.1–2.2)
ALP SERPL-CCNC: 66 U/L (ref 45–117)
ALT SERPL-CCNC: 30 U/L (ref 12–78)
ANION GAP SERPL CALC-SCNC: 5 MMOL/L (ref 5–15)
AST SERPL-CCNC: 22 U/L (ref 15–37)
ATRIAL RATE: 61 BPM
BASOPHILS # BLD: 0 K/UL (ref 0–0.1)
BASOPHILS NFR BLD: 1 % (ref 0–1)
BILIRUB SERPL-MCNC: 0.3 MG/DL (ref 0.2–1)
BUN SERPL-MCNC: 18 MG/DL (ref 6–20)
BUN/CREAT SERPL: 24 (ref 12–20)
CALCIUM SERPL-MCNC: 9.2 MG/DL (ref 8.5–10.1)
CALCULATED P AXIS, ECG09: 40 DEGREES
CALCULATED R AXIS, ECG10: -48 DEGREES
CALCULATED T AXIS, ECG11: 83 DEGREES
CHLORIDE SERPL-SCNC: 106 MMOL/L (ref 97–108)
CO2 SERPL-SCNC: 28 MMOL/L (ref 21–32)
CREAT SERPL-MCNC: 0.74 MG/DL (ref 0.55–1.02)
DIAGNOSIS, 93000: NORMAL
DIFFERENTIAL METHOD BLD: ABNORMAL
EOSINOPHIL # BLD: 0.1 K/UL (ref 0–0.4)
EOSINOPHIL NFR BLD: 3 % (ref 0–7)
ERYTHROCYTE [DISTWIDTH] IN BLOOD BY AUTOMATED COUNT: 13.8 % (ref 11.5–14.5)
GLOBULIN SER CALC-MCNC: 2.7 G/DL (ref 2–4)
GLUCOSE SERPL-MCNC: 98 MG/DL (ref 65–100)
HCT VFR BLD AUTO: 37.8 % (ref 35–47)
HGB BLD-MCNC: 12 G/DL (ref 11.5–16)
IMM GRANULOCYTES # BLD AUTO: 0.1 K/UL (ref 0–0.04)
IMM GRANULOCYTES NFR BLD AUTO: 1 % (ref 0–0.5)
LYMPHOCYTES # BLD: 1 K/UL (ref 0.8–3.5)
LYMPHOCYTES NFR BLD: 20 % (ref 12–49)
MCH RBC QN AUTO: 29.9 PG (ref 26–34)
MCHC RBC AUTO-ENTMCNC: 31.7 G/DL (ref 30–36.5)
MCV RBC AUTO: 94.3 FL (ref 80–99)
MONOCYTES # BLD: 0.5 K/UL (ref 0–1)
MONOCYTES NFR BLD: 9 % (ref 5–13)
NEUTS SEG # BLD: 3.5 K/UL (ref 1.8–8)
NEUTS SEG NFR BLD: 66 % (ref 32–75)
NRBC # BLD: 0 K/UL (ref 0–0.01)
NRBC BLD-RTO: 0 PER 100 WBC
P-R INTERVAL, ECG05: 134 MS
PLATELET # BLD AUTO: 171 K/UL (ref 150–400)
PMV BLD AUTO: 10.3 FL (ref 8.9–12.9)
POTASSIUM SERPL-SCNC: 3.7 MMOL/L (ref 3.5–5.1)
PROT SERPL-MCNC: 6 G/DL (ref 6.4–8.2)
Q-T INTERVAL, ECG07: 464 MS
QRS DURATION, ECG06: 144 MS
QTC CALCULATION (BEZET), ECG08: 467 MS
RBC # BLD AUTO: 4.01 M/UL (ref 3.8–5.2)
SODIUM SERPL-SCNC: 139 MMOL/L (ref 136–145)
VENTRICULAR RATE, ECG03: 61 BPM
WBC # BLD AUTO: 5.2 K/UL (ref 3.6–11)

## 2023-04-27 PROCEDURE — 80053 COMPREHEN METABOLIC PANEL: CPT

## 2023-04-27 PROCEDURE — 74011250637 HC RX REV CODE- 250/637: Performed by: NURSE PRACTITIONER

## 2023-04-27 PROCEDURE — 85025 COMPLETE CBC W/AUTO DIFF WBC: CPT

## 2023-04-27 PROCEDURE — 9990 CHARGE CONVERSION

## 2023-04-27 PROCEDURE — 74011250637 HC RX REV CODE- 250/637: Performed by: PSYCHIATRY & NEUROLOGY

## 2023-04-27 PROCEDURE — 65220000003 HC RM SEMIPRIVATE PSYCH

## 2023-04-27 PROCEDURE — 36415 COLL VENOUS BLD VENIPUNCTURE: CPT

## 2023-04-27 RX ADMIN — QUETIAPINE FUMARATE 300 MG: 100 TABLET ORAL at 21:06

## 2023-04-27 RX ADMIN — METOPROLOL TARTRATE 12.5 MG: 25 TABLET, FILM COATED ORAL at 17:40

## 2023-04-27 RX ADMIN — CLOPIDOGREL BISULFATE 75 MG: 75 TABLET, FILM COATED ORAL at 08:46

## 2023-04-27 RX ADMIN — QUETIAPINE FUMARATE 50 MG: 25 TABLET ORAL at 08:46

## 2023-04-27 RX ADMIN — PANTOPRAZOLE SODIUM 40 MG: 40 TABLET, DELAYED RELEASE ORAL at 08:47

## 2023-04-27 RX ADMIN — ATORVASTATIN CALCIUM 20 MG: 10 TABLET, FILM COATED ORAL at 17:40

## 2023-04-27 RX ADMIN — VENLAFAXINE HYDROCHLORIDE 150 MG: 150 CAPSULE, EXTENDED RELEASE ORAL at 08:46

## 2023-04-27 RX ADMIN — MIRTAZAPINE 45 MG: 15 TABLET, FILM COATED ORAL at 21:47

## 2023-04-27 RX ADMIN — THERA TABS 1 TABLET: TAB at 08:47

## 2023-04-27 RX ADMIN — CHOLECALCIFEROL TAB 25 MCG (1000 UNIT) 1000 UNITS: 25 TAB at 08:45

## 2023-04-27 RX ADMIN — LEVOTHYROXINE SODIUM 100 MCG: 50 TABLET ORAL at 05:37

## 2023-04-27 RX ADMIN — METOPROLOL TARTRATE 12.5 MG: 25 TABLET, FILM COATED ORAL at 08:47

## 2023-04-28 PROCEDURE — 74011250637 HC RX REV CODE- 250/637: Performed by: PSYCHIATRY & NEUROLOGY

## 2023-04-28 PROCEDURE — 74011250637 HC RX REV CODE- 250/637: Performed by: NURSE PRACTITIONER

## 2023-04-28 PROCEDURE — 9990 CHARGE CONVERSION

## 2023-04-28 PROCEDURE — 65220000003 HC RM SEMIPRIVATE PSYCH

## 2023-04-28 RX ORDER — QUETIAPINE FUMARATE 100 MG/1
350 TABLET, FILM COATED ORAL
Status: DISCONTINUED | OUTPATIENT
Start: 2023-04-28 | End: 2023-05-05

## 2023-04-28 RX ADMIN — METOPROLOL TARTRATE 12.5 MG: 25 TABLET, FILM COATED ORAL at 08:45

## 2023-04-28 RX ADMIN — THERA TABS 1 TABLET: TAB at 08:45

## 2023-04-28 RX ADMIN — VENLAFAXINE HYDROCHLORIDE 150 MG: 150 CAPSULE, EXTENDED RELEASE ORAL at 08:45

## 2023-04-28 RX ADMIN — ATORVASTATIN CALCIUM 20 MG: 10 TABLET, FILM COATED ORAL at 16:55

## 2023-04-28 RX ADMIN — CLOPIDOGREL BISULFATE 75 MG: 75 TABLET, FILM COATED ORAL at 08:45

## 2023-04-28 RX ADMIN — CHOLECALCIFEROL TAB 25 MCG (1000 UNIT) 1000 UNITS: 25 TAB at 08:45

## 2023-04-28 RX ADMIN — METOPROLOL TARTRATE 12.5 MG: 25 TABLET, FILM COATED ORAL at 16:55

## 2023-04-28 RX ADMIN — MIRTAZAPINE 45 MG: 15 TABLET, FILM COATED ORAL at 21:48

## 2023-04-28 RX ADMIN — QUETIAPINE FUMARATE 50 MG: 25 TABLET ORAL at 08:45

## 2023-04-28 RX ADMIN — PANTOPRAZOLE SODIUM 40 MG: 40 TABLET, DELAYED RELEASE ORAL at 08:44

## 2023-04-28 RX ADMIN — LEVOTHYROXINE SODIUM 100 MCG: 50 TABLET ORAL at 05:52

## 2023-04-28 RX ADMIN — QUETIAPINE FUMARATE 350 MG: 100 TABLET ORAL at 21:48

## 2023-04-29 PROCEDURE — 74011250637 HC RX REV CODE- 250/637: Performed by: PSYCHIATRY & NEUROLOGY

## 2023-04-29 PROCEDURE — 74011250637 HC RX REV CODE- 250/637: Performed by: NURSE PRACTITIONER

## 2023-04-29 PROCEDURE — 65220000003 HC RM SEMIPRIVATE PSYCH

## 2023-04-29 PROCEDURE — 9990 CHARGE CONVERSION

## 2023-04-29 RX ADMIN — QUETIAPINE FUMARATE 350 MG: 100 TABLET ORAL at 21:03

## 2023-04-29 RX ADMIN — METOPROLOL TARTRATE 12.5 MG: 25 TABLET, FILM COATED ORAL at 08:35

## 2023-04-29 RX ADMIN — THERA TABS 1 TABLET: TAB at 08:35

## 2023-04-29 RX ADMIN — CLOPIDOGREL BISULFATE 75 MG: 75 TABLET, FILM COATED ORAL at 08:35

## 2023-04-29 RX ADMIN — LEVOTHYROXINE SODIUM 100 MCG: 50 TABLET ORAL at 06:33

## 2023-04-29 RX ADMIN — MIRTAZAPINE 45 MG: 15 TABLET, FILM COATED ORAL at 21:03

## 2023-04-29 RX ADMIN — VENLAFAXINE HYDROCHLORIDE 150 MG: 150 CAPSULE, EXTENDED RELEASE ORAL at 08:35

## 2023-04-29 RX ADMIN — PANTOPRAZOLE SODIUM 40 MG: 40 TABLET, DELAYED RELEASE ORAL at 08:35

## 2023-04-29 RX ADMIN — ATORVASTATIN CALCIUM 20 MG: 10 TABLET, FILM COATED ORAL at 17:51

## 2023-04-29 RX ADMIN — ACETAMINOPHEN 650 MG: 325 TABLET ORAL at 10:20

## 2023-04-29 RX ADMIN — CHOLECALCIFEROL TAB 25 MCG (1000 UNIT) 1000 UNITS: 25 TAB at 08:35

## 2023-04-29 RX ADMIN — METOPROLOL TARTRATE 12.5 MG: 25 TABLET, FILM COATED ORAL at 17:51

## 2023-04-29 RX ADMIN — QUETIAPINE FUMARATE 50 MG: 25 TABLET ORAL at 08:35

## 2023-04-30 PROCEDURE — 74011250637 HC RX REV CODE- 250/637: Performed by: PSYCHIATRY & NEUROLOGY

## 2023-04-30 PROCEDURE — 74011250637 HC RX REV CODE- 250/637: Performed by: NURSE PRACTITIONER

## 2023-04-30 PROCEDURE — 65220000003 HC RM SEMIPRIVATE PSYCH

## 2023-04-30 PROCEDURE — 9990 CHARGE CONVERSION

## 2023-04-30 RX ADMIN — PANTOPRAZOLE SODIUM 40 MG: 40 TABLET, DELAYED RELEASE ORAL at 08:30

## 2023-04-30 RX ADMIN — MIRTAZAPINE 45 MG: 15 TABLET, FILM COATED ORAL at 21:42

## 2023-04-30 RX ADMIN — LEVOTHYROXINE SODIUM 100 MCG: 50 TABLET ORAL at 06:39

## 2023-04-30 RX ADMIN — CHOLECALCIFEROL TAB 25 MCG (1000 UNIT) 1000 UNITS: 25 TAB at 08:30

## 2023-04-30 RX ADMIN — LOPERAMIDE HYDROCHLORIDE 2 MG: 2 CAPSULE ORAL at 12:01

## 2023-04-30 RX ADMIN — ATORVASTATIN CALCIUM 20 MG: 10 TABLET, FILM COATED ORAL at 17:52

## 2023-04-30 RX ADMIN — QUETIAPINE FUMARATE 350 MG: 100 TABLET ORAL at 21:41

## 2023-04-30 RX ADMIN — CLOPIDOGREL BISULFATE 75 MG: 75 TABLET, FILM COATED ORAL at 08:30

## 2023-04-30 RX ADMIN — METOPROLOL TARTRATE 12.5 MG: 25 TABLET, FILM COATED ORAL at 08:30

## 2023-04-30 RX ADMIN — QUETIAPINE FUMARATE 50 MG: 25 TABLET ORAL at 08:30

## 2023-04-30 RX ADMIN — ESTRADIOL 2 G: 0.1 CREAM VAGINAL at 21:42

## 2023-04-30 RX ADMIN — METOPROLOL TARTRATE 12.5 MG: 25 TABLET, FILM COATED ORAL at 17:52

## 2023-04-30 RX ADMIN — THERA TABS 1 TABLET: TAB at 08:30

## 2023-04-30 RX ADMIN — VENLAFAXINE HYDROCHLORIDE 150 MG: 150 CAPSULE, EXTENDED RELEASE ORAL at 08:30

## 2023-05-01 PROCEDURE — 74011250637 HC RX REV CODE- 250/637: Performed by: NURSE PRACTITIONER

## 2023-05-01 PROCEDURE — 9990 CHARGE CONVERSION

## 2023-05-01 PROCEDURE — 74011250637 HC RX REV CODE- 250/637: Performed by: PSYCHIATRY & NEUROLOGY

## 2023-05-01 PROCEDURE — 65220000003 HC RM SEMIPRIVATE PSYCH

## 2023-05-01 RX ORDER — QUETIAPINE FUMARATE 25 MG/1
75 TABLET, FILM COATED ORAL DAILY
Status: DISCONTINUED | OUTPATIENT
Start: 2023-05-02 | End: 2023-05-03

## 2023-05-01 RX ADMIN — PANTOPRAZOLE SODIUM 40 MG: 40 TABLET, DELAYED RELEASE ORAL at 09:17

## 2023-05-01 RX ADMIN — MIRTAZAPINE 45 MG: 15 TABLET, FILM COATED ORAL at 21:37

## 2023-05-01 RX ADMIN — QUETIAPINE FUMARATE 350 MG: 100 TABLET ORAL at 21:37

## 2023-05-01 RX ADMIN — QUETIAPINE FUMARATE 50 MG: 25 TABLET ORAL at 09:17

## 2023-05-01 RX ADMIN — METOPROLOL TARTRATE 12.5 MG: 25 TABLET, FILM COATED ORAL at 09:17

## 2023-05-01 RX ADMIN — LEVOTHYROXINE SODIUM 100 MCG: 50 TABLET ORAL at 06:51

## 2023-05-01 RX ADMIN — METOPROLOL TARTRATE 12.5 MG: 25 TABLET, FILM COATED ORAL at 17:32

## 2023-05-01 RX ADMIN — THERA TABS 1 TABLET: TAB at 09:17

## 2023-05-01 RX ADMIN — CLOPIDOGREL BISULFATE 75 MG: 75 TABLET, FILM COATED ORAL at 09:22

## 2023-05-01 RX ADMIN — CHOLECALCIFEROL TAB 25 MCG (1000 UNIT) 1000 UNITS: 25 TAB at 09:17

## 2023-05-01 RX ADMIN — ATORVASTATIN CALCIUM 20 MG: 10 TABLET, FILM COATED ORAL at 17:32

## 2023-05-01 RX ADMIN — VENLAFAXINE HYDROCHLORIDE 150 MG: 150 CAPSULE, EXTENDED RELEASE ORAL at 09:17

## 2023-05-01 NOTE — PROGRESS NOTES
Problem: Falls - Risk of  Goal: *Absence of Falls  Description: Document Scout Byrnes Fall Risk and appropriate interventions in the flowsheet. Outcome: Progressing Towards Goal  Note: Fall Risk Interventions:  Mobility Interventions: Utilize walker, cane, or other assistive device         Medication Interventions: Teach patient to arise slowly            Pt received resting in bed with eyes closed. Breathing is even and unlabored. Walkways are free and clear from clutter.

## 2023-05-01 NOTE — PROGRESS NOTES
Patient out on unit for meals only, otherwise isolative to room. Patient showered today. Medication and meal compliant. Patient cooperative, though continues to be anxious with depressed mood. Problem: Falls - Risk of  Goal: *Absence of Falls  Description: Document Triny Martins Fall Risk and appropriate interventions in the flowsheet.   Outcome: Progressing Towards Goal  Note: Fall Risk Interventions:  Mobility Interventions: Utilize walker, cane, or other assistive device         Medication Interventions: Teach patient to arise slowly                   Problem: Depressed Mood (Adult/Pediatric)  Goal: *STG: Demonstrates reduction in symptoms and increase in insight into coping skills/future focused  Outcome: Not Progressing Towards Goal

## 2023-05-01 NOTE — BH NOTES
Chief Complaint:  Jordana Willis don't want me home. .\"    Length of Stay: 35 Days    Interval History:  05/01/2023  Patient says that it is difficult for her to sit around the table with her family when she feels they truly wish her ill. She remains with significant paranoia. She reports experiencing recurrent audio hallucinations that she can't discern. Her mood is flat. 04/30/2023 (weekend coverage)   Nicola Moody reports sleeping fairly last night. Denies medication side effects. She reports that she was up all day yesterday. Denies SI/HI. She does appear anxious. She has been visible in the milieu. 04/29/2023 (weekend coverage)  Nicola Moody reports having some difficulties with diarrhea this morning. She reports that she is feeling anxious. She makes little eye contact. She was upset by a peer who was agitated earlier this morning. Denies medication side effects. Jordana Willis bring me the pills and I just swallow them. \"     04/28/2023  Nursing staff report that patient requires significant re-direction, as well as prompting to fill out her menus and to join others in the day room. [facility] Upon evaluation, patient is anxious, rubbing her hands, and has poor eye contact. She is saying that everyone hates here. She reports experiencing audio hallucinations, but she is unable to make out what is being said. 04/27/2023  Nursing report no acute events overnight. I saw Mrs Skye Alvarez on the unit, she was ambulating using her walker. She appeared anxious. When she stopped to speak with me she maintained poor eye contact throughout. She was wringing her hands. She kept saying that she is 'done for,' and that nothing I do is going to help her feel better. 04/26/2023  Patient remains isolative. She is significantly internally pre-occupied. She remains under the belief that she is guilty and unwanted to by her family. At this time she denies experiencing bryan audio hallucinations, but says that she is unable to make out what is being said. Patient doesn't believe that this is depression. 04/25/2023  Nursing report patient slept well and responded to ativan well yesterday. Today she complains of dry mouth. She appears flat and withdrawn during my evaluation. However, she denies AVH. She has significant paranoid delusions that she would be arrested and framed. 04/24/2023  Patient has been quite anxious so much so that she hasn't been re-directible. Require PRN PO ativan 1mg. Upon my evaluation, she tells me that she is in trouble, most likely. She 'heard' that she maybe framed for her 's illness/death. (Even though he is well and alive). She also says that she feels hopeless and helpless. She complains that she is on a lot of medication. She complains of diarrhea, worsened here during her stay. I attempted to discuss with her ECT, but she is unable to lose site of her symptoms. She says that her , Valentine Chong, would be the person she chooses to make decisions on her behalf in the event she can't. I advised her that I will communicate my recommendations to her family, as ECT's benefits are faster than a medication cross taper; and, that in the mean time, I would cross taper her off of zoloft, as it is likely the culprit in worsening her loose stools, causing continued diarrhea. 4/23 Nayeli Murillo reports modest improvement in mood today. She appears less anxious this morning. She denies side effects to medications. Sleeping fair per her report. Some complaints of GI upset continue. No other new concerns noted. Calm and cooperative today. 4/22 Nayeli Murillo reports little change in mood. She complains of some diarrhea in the interim. She was encouraged to use PRN imodium. She reports ongoing anxiety today but denies any SI. No side effects to medications noted. Sleeping fair per her report. No other new concerns noted. 04/21/2023  Candy Mastman states she is OK today.  She continues to report depressive symptoms and feels hopeless, but denies SI/plan/intent, denies HI/plan/intent, denies AVH. States she did not sleep very well last night. She tolerated the medication adjustments well and denies adverse effects. She continues to appear internally preoccupied. No other changes or new concerns at this time. 04/20/2023  Nursing report that patient had a good night's sleep, that she is eating her meals and taking her medications. However, this morning she was having several episodes of belching. She is more isolative. Upon my evaluation, she had poor eye contact and appeared significant internally pre-occupied. She was anxious. She had difficulty paying attention during my evaluation. She does deny experiencing hallucinations. She does deny having suicidal thoughts or intents. However, she tells me that just doesn't feel that we would let her leave, because she isn't functioning. 04/19/2023  Nursing report this morning patient is quite anxious. Upon entering treatment room she has many episodes of eructation. She appears quite anxious, wringing her hands. She makes poor eye contact. She is unable to participate meaningfully in my evaluation. 04/18/2023  Nursing report patient sleeping 7 hours. Patient is eating her meals, but her diet is easy chew. Patient says that she feels about the same with regards to her mood. But staff say that patient is less anxious, she does attend group with prompting. She had her  visit yesterday. She says that she is safe, but that she isn't sure about discharge home, because it is up to us. She denies SI. Gabino Oscar says that she isn't listening to the voices. 04/17/2023  Patient appears flat. She says that she doesn't have anybody, that she has no place to go. Gabino Oscar has many cognitive distortion. She is significantly internally preoccupied. Says that she is not hearing the voices because she feels she is losing her hearing. 04/16/2023  Nursing report patient had broken sleep overnight.   Patient shares that she can't believe she is depressed at this time in her life. She says that she has no intention of harming herself or ending her life. She says that she doesn't hear the voices any more since the door is closed. Tamera Liu is afraid of returning home, but doesn't divulge a reason. 04/15/2023  Nursing report no acute events overnight. She slept 7 hours. Staff say that she is out on the unit and is interacting with others. However upon my evaluation, she remains quite withdrawn. She appears internally pre-occupied. She keeps ruminating on the past and her overdose. 04/14/2023  Nursing report patient is eating her meals and taking her medications, had no acute events overnight. She had a visit with her family yesterday. Patient was sound asleep upon my evaluation. Evaluation is deferred. 04/13/2023  Patient says that she's been feeling tired and difficult to start he day. She focusses on walking to get her mind active as well as her body. She experienced AH yesterday when she was alone, but she says that she didn't actually tune in or listen to what is being said. Mrs. Masood Baugh says that she doesn't feel as terribly as when she first came in. She also says that there is zero chance for her to harm herself. Yet, she appears sad. She says that she is somewhat dwelling on her attempt and says that it is a mistake. Had family meeting with patient's family, patient was brought in after to discuss the plan. She expressed paranoid delusion that she doesn't have a choice in going home and that she is in trouble, that her family doesn't want her home. Even upon voicing the contrary, patient was upset, anxious and disbelieving their report. When I told her that she can go home either Friday or Monday, she said that she can't because she 'heard' differently from others. Patient still disbelieves that even upon clarifying that we have final say. 04/12/2023  Nursing report patient slept well.   Patient appears significantly internally pre-occupied and sad today. She says that she doesn't know why. The last time she experienced hearing the voices was 2 days ago    04/11/2023  Patient says that the last time she experienced hearing voices yesterday. However, she says that she made a decision not to listen to the voices. She reflects back and says that since she fell in October she stopped reading and function. Patient remains suspicious of her family's intentions when we tell her that they want her to return home. 04/10/2023  Nursing report that patient appears brighter and is attending to her ADLs without prompting. Upon my exam she had better eye contact today. She shared that she is experiencing the audio hallucinations less frequently, but that she feels more empowered to not 'pay attention to them.'  She talks about her care and advocates for herself, asking about vaginal cream that she needs as well as follow up care for her hands. Patient denies passive death wish.    04/09/2023  Mary Palmer is exhibiting significantly more psychomotor retardation today. Eye contact is poor. Her affect is flat. She states she is \"tired\" and that she took her nighttime medication and \"the next thing I knew it was morning. \" However, she did not agree to feeling well rested. She denied SI/HI/AVH but without great compunction. She does appear internally preoccupied today. She has been mostly isolative to herself today. 04/08/2023  Mary Palmer states she is feeling OK today. She states her mood is stable for the most part, but continues to endorse anxiety with racing thoughts as she tries to figure out her next steps. No SI/HI. When asked about AH, she states \"I'm trying not to listen. \" Eating and sleeping without difficulty. No other changes at this time. 04/07/2023  Nursing report that patient was out on the unit.   She attended one group, but tells me that she wasn't focussed on it; rather, she was focusing on all that is going on in her life. At one point in my evaluation, she starts answering and repeating, 'I don't know sir. Everything is together. \" She was experiencing audio hallucinations, but today she denies experiencing any audio hallucinations. Patient remains significantly internally preoccupied. She has poor eye contact and hardly looks up. Speech is non-spontaneous. 04/06/2023  Nursing report that patient was a bit more engaged and stayed out of her room yesterday. She groomed her hair and changed her outfit. Patient appears significantly internally pre-occupied. She tells the treatment team that there are a lot of things that are making sad. She stops and remains silent for quite some time. She feels that her attention is poor and says that she is having difficulty with decision making. Today she reports that periodically she experiences audio hallucinations, sounding like a recording, and is 'played periodically.' She says that it sounds more like Debbie Bell, her niece, Kimberly Collier, her son, and her daughter in law Leilani Later. When pointing out that she is actually starting to improve in her behaviors, she is dismissive. 04/05/2023  Nursing report patient is seen responding to internally pre-occupied. Patient tells me that she is confused about doing the things she used to do in the past. She says that she has no energy and feels that this is likely a medication side-effect. She also complains of experiencing audio hallucinations that are derogatory. She is says that she doesn't want to hurt anyone. She appears significantly internally pre-occupied. 04/04/2023  Nursing report patient slept well overnight. However, patient says that she has been up and down all night and feels tired this morning. Patient appears internally significantly pre-occupied. She makes poor eye contact throughout. She reports feeling that she is no better.  She says that she is having trouble 'remembering.'  She still complains of experiencing audio hallucinations, but that she is unable to make out what is being said. Patient is awake, alert, oriented to place, situation, month, and year, but not the date. She recalled 0/3 at 3 minutes. She wasn't able to identify even with use of multiple choices. Her registration, naming, repetition, 3 step command are all intact. 04/03/2023  Nursing report that patient appears withdrawn and at times responding to internal stimuli. Upon my evaluation today she appeared internally pre-occupied and reported feeling 'down.' She says that she feels unsteady and thinks it is her medications that are causing her to no feel this 'unwell.' She reports feeling others are indeed talking about her. 04/02/2023  Joe José reports having had better days feeling depressed. However slept better than days before. Denies SI/HI/AH/VH. No aggression or violence. Appropriately interactive and aware. Tolerating medications well. Eating fairly. Some memory concerns. 04/01/2023  Joe José presents as paranoid  and isolative and seeing things last night. Reports feeling well and moods are good. Confusing dynamic with medications being  somewhat helpful. Feels helpless and hopeless. Denies SI/HI/AH/VH. No aggression or violence. Appropriately interactive and aware. Tolerating medications well. Eating and sleeping fairly (7 hrs). 03/31/2023  Nursing report patient slept well last night, approximately 7 hours. She reports feeling rested overnight. John Montes appears anxious and is internally preoccupied. She tells me that she worries about 'the money thing.'  She tells me that she still experiences audio hallucinations in the form of 'bits and pieces,' but that are all derogatory. 03/30/2023  Patient appears apprehensive. She makes poor eye contact throughout. Nursing staff report that patient was concerned about the fungus on her toes, as she was afraid that it was going to overcome all her body.   She tells the treatment team that she experiences hearing 'voices.' She is unable to discern what is being said at times. However, she describes being quite afraid. She says what she hears is negative and it is against her.    03/29/2023  Patient reports that she doesn't feel any better. She had poor sleep last night and says that it is challenging to feel energized today. She starts out by saying that she hasn't been 'a perrfect person by any stretch of the imagination.'  She shares that she thought that her daughter-in-law is plotting against her. She denies having passive death wish. She denies SI. Patient is vague about experiencing audio hallucinations. At times, she says that she heard something being said about her not being able to have more than one pair of pants.     Past Medical History:  Past Medical History:   Diagnosis Date    Allergic rhinitis     Anesthesia complication     Pt reports dizziness after anesthesia     Arrhythmia     Left Bundle Branch Block    Arthritis     BBB (bundle branch block)     Depression     History of not currently    GERD (gastroesophageal reflux disease)     Hyperlipidemia     Impaired fasting glucose     Unspecified essential hypertension     Unspecified hypothyroidism     Uterine prolapse     Vertigo        Labs:  Lab Results   Component Value Date/Time    WBC 5.2 04/27/2023 06:00 AM    HGB 12.0 04/27/2023 06:00 AM    HCT 37.8 04/27/2023 06:00 AM    PLATELET 203 86/71/9537 06:00 AM    MCV 94.3 04/27/2023 06:00 AM      Lab Results   Component Value Date/Time    Sodium 139 04/27/2023 06:00 AM    Potassium 3.7 04/27/2023 06:00 AM    Chloride 106 04/27/2023 06:00 AM    CO2 28 04/27/2023 06:00 AM    Anion gap 5 04/27/2023 06:00 AM    Glucose 98 04/27/2023 06:00 AM    Glucose 115 (H) 03/29/2023 08:20 AM    BUN 18 04/27/2023 06:00 AM    Creatinine 0.74 04/27/2023 06:00 AM    BUN/Creatinine ratio 24 (H) 04/27/2023 06:00 AM    GFR est AA >60 11/29/2021 11:13 AM    GFR est non-AA >60 11/29/2021 11:13 AM    Calcium 9.2 04/27/2023 06:00 AM    Bilirubin, total 0.3 04/27/2023 06:00 AM    Alk.  phosphatase 66 04/27/2023 06:00 AM    Protein, total 6.0 (L) 04/27/2023 06:00 AM    Albumin 3.3 (L) 04/27/2023 06:00 AM    Globulin 2.7 04/27/2023 06:00 AM    A-G Ratio 1.2 04/27/2023 06:00 AM    ALT (SGPT) 30 04/27/2023 06:00 AM      Vitals:    04/30/23 1518 04/30/23 1752 05/01/23 0700 05/01/23 0705   BP: (!) 150/73 117/78 124/72 124/72   Pulse: 62 73 60 60   Resp: 16  16 16   Temp: 97.7 °F (36.5 °C)  97.3 °F (36.3 °C) 97.3 °F (36.3 °C)   SpO2: 95%  96% 96%   Weight:       Height:             Current Facility-Administered Medications   Medication Dose Route Frequency Provider Last Rate Last Admin    QUEtiapine (SEROquel) tablet 350 mg  350 mg Oral QHS Ellis Roa MD   350 mg at 04/30/23 2141    venlafaxine-SR (EFFEXOR-XR) capsule 150 mg  150 mg Oral DAILY WITH BREAKFAST Ellis Roa MD   150 mg at 05/01/23 0917    artificial saliva (MOUTH KOTE) 1 Spray  1 Spray Oral PRN Ellis Roa MD        calcium carbonate (TUMS) chewable tablet 200 mg [elemental]  200 mg Oral TID PRN Saturnino Llamas NP   200 mg at 04/24/23 1752    mirtazapine (REMERON) tablet 45 mg  45 mg Oral QHS Ellis Roa MD   45 mg at 04/30/23 2142    QUEtiapine (SEROquel) tablet 50 mg  50 mg Oral DAILY Ellis Roa MD   50 mg at 05/01/23 0917    alum-mag hydroxide-simeth (MYLANTA) oral suspension 30 mL  30 mL Oral Q4H PRN Ellis Roa MD   30 mL at 04/21/23 1726    loperamide (IMODIUM) capsule 2 mg  2 mg Oral Q4H PRN Ellis Roa MD   2 mg at 04/30/23 1201    cholecalciferol (VITAMIN D3) (1000 Units /25 mcg) tablet 1,000 Units  1,000 Units Oral DAILY Ellis Aparicio MD   1,000 Units at 05/01/23 0917    estradioL (ESTRACE) 0.01 % (0.1 mg/gram) vaginal cream 2 g  2 g Vaginal every Sunday Ellis Roa MD   2 g at 04/30/23 2142    OLANZapine (ZyPREXA) tablet 2.5 mg  2.5 mg Oral Q6H PRN Heather Balderrama MD   2.5 mg at 04/23/23 1800    haloperidol lactate (HALDOL) injection 2.5 mg  2.5 mg IntraMUSCular Q6H PRN Ellis Roa MD   2.5 mg at 04/01/23 0027    benztropine (COGENTIN) tablet 0.5 mg  0.5 mg Oral BID PRN Ellis Roa MD        diphenhydrAMINE (BENADRYL) injection 25 mg  25 mg IntraMUSCular BID PRN Ellis Roa MD        acetaminophen (TYLENOL) tablet 650 mg  650 mg Oral Q4H PRN Ellis Roa MD   650 mg at 04/29/23 1020    magnesium hydroxide (MILK OF MAGNESIA) 400 mg/5 mL oral suspension 30 mL  30 mL Oral DAILY PRN Ellis Roa MD        atorvastatin (LIPITOR) tablet 20 mg  20 mg Oral QPM Sade Cristina NP   20 mg at 04/30/23 1752    clopidogreL (PLAVIX) tablet 75 mg  75 mg Oral DAILY Alla Navarro MD   75 mg at 05/01/23 3669    levothyroxine (SYNTHROID) tablet 100 mcg  100 mcg Oral 6am Sade Cristina NP   100 mcg at 05/01/23 0651    metoprolol tartrate (LOPRESSOR) tablet 12.5 mg  12.5 mg Oral Q12H Sade Cristina NP   12.5 mg at 05/01/23 0917    pantoprazole (PROTONIX) tablet 40 mg  40 mg Oral DAILY Sade Cristina, NP   40 mg at 05/01/23 0917    simethicone (MYLICON) tablet 412 mg  160 mg Oral QID PRN Mely Calderón NP   160 mg at 04/19/23 0855    therapeutic multivitamin (THERAGRAN) tablet 1 Tablet  1 Tablet Oral DAILY Sade Cristina, NP   1 Tablet at 05/01/23 0917     Mental Status Exam:  [de-identified] WF is in fair grooming. Maintains poor eye contact. She is wringing her hands. She appears internally pre-occupied. Speech is non-spontaneous  Affect is constricted  Thought process is ruminative  Passive death wish  Recurrent audio hallucinations, last experienced yesterday. Significant paranoia noted. Insight and judgment are both poor    Physical Exam:  Body habitus: Body mass index is 24.89 kg/m². Musculoskeletal system: normal gait  Tremor - neg  Cog wheeling - neg    Assessment and Plan: Lian Gentile meets criteria for a diagnosis of   MDD, Recurrent, Severe, without psychosis.     05/01/2023  Increase seroquel from 50mg po qday yesi 75mg po qday    04/30/2023 (weekend coverage)  Continue current medications    04/29/2023 (weekend coverage)  Continue current medications    04/28/2023  Increase seroqeul from 300mg po qhs to  350mg po qhs. Await to hear back from [facility] re:ECT.    04/27/2023  Will Continue current medication regimen as we've just cross tapered patient from zoloft to effexor. 04/26/2023  See, son, on behalf of the family is agreeable to ECT, prefers [facility] over [facility] for family reason. As assessed, patient lacks the capacity to consent to ECT. SW to contact Margarito PINZON for TDO. Will request second opinion for ECT over objection. D/c zoloft 50mg po qhs, b/c of poor effectiveness and diarrhea. Increase effexor xr from 75mg po qday to 150mg po qday starting tomorrow. Will likely attempt to cross taper seroquel with risperdal as we wait for the ECT process. 04/25/2023  Will continue effexor 75mg po qday. Decrease zoloft from 100mg to 50mg po qhs  Continue seroquel 50mg po qday and 300mg po qhs  Will reach out to son to discuss ECT. Staff is contacting [facility] to inquire about availability of ECT  Spoke to patient's son, Ike Ann, who gave permission for ECT. He requests to look into options other than [facility] because the [facility] is located near that [facility] and he says this would be contrary to what his mother would wish. He asked about other options, and he is agreeable for a [facility] referral.      4/24/2023  One time ativan 1mg po for worsened anxiety. Decrease zoloft from 200mg po qhs to 100mg po qhs. Instead, start effexor 75mg po qday tomorrow. Continue seroquel 50mg po qday, increase seroquel 250mg po qhs to 300mg po qhs. Will reach out to patient's son, Ovi Scott ([phone]) to recommend ECT. Will reach out to Dr. Odalis Shah for availability.     4/23 -  Continue current treatment    4/22 -   Continue current treatment  Consider Increasing Seroquel  Discharge planning    04/21/2023  Continue medications as ordered, no changes today. 04/20/2023  Instead of Seroque 300mg po qhs, will schedule seroquel 50mg po qday and leave 250mg po qhs. Increase remeron from 30mg po qhs to 45mg po qhs  Continue zoloft 200mg po qhs.    04/19/2023  Continue current medications  Added mylanta for eructation and indigestion prn.    04/18/2023  Will give her medications some time as they were just increased. Imodium for diarrhea prn    04/17/2023  Increase seroquel from 250 to 300mg po qhs  Increase zoloft 150mg to 200mg po qhs  Continue remeron 30mg po qhs    04/16/2023  Continue current medication regimen  EKG today to follow QTC.    04/15/2023  Will continue current regimen of medications. Will give thought to cross taper to a different antipsychotic.    04/14/2023  Will continue current regimen of medications and give them some time as they were just increased. 04/13/2023  D/c Serroquel 50mg po qday, instead increase seroquel 150mg po qhs to 200mg po qhs. Increase zoloft from 125mg to 150mg po qhs. Continue remeron 30mg po qhs.    04/12/2023  Increase seroquel from 150mg po qhs to 200mg po qhs. Continue seroquel 50mg po qday  Increase Zoloft from 100mg to 125mg po qhs   Continue Remeron 30mg po qhs.    04/11/2023  Increase seroquel from 150mg po qhs to 200mg po qhs. Continue seroquel 50mg po qday  Continue Zoloft 100mg po qhs   Continue Remeron 30mg po qhs. Family meeting tomorrow 1pm    04/10/2023  SW to contact family for further collateral in order to start planning disposition for d/c.    04/09/2023  Continue current care. 04/08/2023  Continue current care. 04/07/2023  Continue zoloft 100mg po qhs  Continue Seroquel 150mg po qhs, Continue Serouqel 50mg po qday that was just increased. Reached out to her 's niece, Ms. Lobito Seay [phone], provided update and treatment options including further med dosage increases, cross taper to different regimen, as well as ECT.  Olamide Brito said she would talk to patient's sons and call back with further input. 04/06/2023  Continue zoloft 100mg po qhs  Continue Seroquel 150mg po qhs  Increase seroquel from 25mg po qam to 50mg po qam. Will give one dose extra this day to make the difference. 04/05/2023  Increase zoloft from 75mg to 100mg po qhs  Schedule seroquel 25mg po qday  Increase seroquel from 100 to 150mg po qhs    04/04/2023  Continue medications at current dosages, since they were just changed yesterday. If patient isn't progressing well, would consider ECT. 04/03/2023  To reduce the burden of somnolence, will switch zoloft to night time and further increase it from 50 to 75mg po qhs. Will increase seroquel from 50mg to 100mg po qhs to target psychotic symptoms. 04/02/2023  Continue current care  Medication modification as appropriate  Disposition planning with social work    04/01/2023  Continue current care  Medications modification as appropriate  Disposition planning with social work    03/31/2023  Continue zoloft 50mg po qday  Continue Remeron 30mg po qhs  Increase seroquel from 25mg po qhs to 50mg po qhs.    03/30/2023  Continue zoloft 50mg po qday  Continue Remeron 30mg po qhs  Start seroquel 25mg po qhs.    03/29/2023   Increase zoloft 25mg to 50mg po qday. Continue Remeron 30mg po qhs (was increased yesterday)  SW to reach out to family to discern what was being said and if any of patient's statements are factual, in order for us to discern is patient's symptoms are secondary to delusions. A coordinated, multidisplinary treatment team round was conducted with the patient, nurses, pharmcist,  and writer present. Discussions held with , and/or with family members; Complete current electronic health record for patient was reviewed in full including consultant notes, ancillary staff notes, nurses and tech notes, labs and vitals.     I certify that this patients inpatient psychiatric hospital services furnished since the previous certification were, and continue to be, required for treatment that could reasonably be expected to improve the patient's condition, or for diagnostic study, and that the patient continues to need, on a daily basis, active treatment furnished directly by or requiring the supervision of inpatient psychiatric facility personnel. In addition, the hospital records show that services furnished were intensive treatment services, admission or related services, or equivalent services.

## 2023-05-01 NOTE — PROGRESS NOTES
Problem: Depressed Mood (Adult/Pediatric)  Goal: *STG: Participates in treatment plan  Outcome: Progressing Towards Goal  Note: Isolative to room and self, reports anxiety remains, fixed delusions remain her focus. Medication compliant. Denies SI, no self harming behaviors.  Staff focus is on offering support  Goal: *STG: Demonstrates reduction in symptoms and increase in insight into coping skills/future focused  Outcome: Progressing Towards Goal  Goal: Interventions  Outcome: Progressing Towards Goal

## 2023-05-02 PROCEDURE — 93005 ELECTROCARDIOGRAM TRACING: CPT

## 2023-05-02 PROCEDURE — 9990 CHARGE CONVERSION

## 2023-05-02 PROCEDURE — 74011250637 HC RX REV CODE- 250/637: Performed by: PSYCHIATRY & NEUROLOGY

## 2023-05-02 PROCEDURE — 65220000003 HC RM SEMIPRIVATE PSYCH

## 2023-05-02 PROCEDURE — 74011250637 HC RX REV CODE- 250/637: Performed by: NURSE PRACTITIONER

## 2023-05-02 RX ADMIN — CHOLECALCIFEROL TAB 25 MCG (1000 UNIT) 1000 UNITS: 25 TAB at 09:02

## 2023-05-02 RX ADMIN — QUETIAPINE FUMARATE 350 MG: 100 TABLET ORAL at 21:14

## 2023-05-02 RX ADMIN — THERA TABS 1 TABLET: TAB at 09:02

## 2023-05-02 RX ADMIN — LEVOTHYROXINE SODIUM 100 MCG: 50 TABLET ORAL at 06:16

## 2023-05-02 RX ADMIN — CLOPIDOGREL BISULFATE 75 MG: 75 TABLET, FILM COATED ORAL at 09:02

## 2023-05-02 RX ADMIN — METOPROLOL TARTRATE 12.5 MG: 25 TABLET, FILM COATED ORAL at 09:00

## 2023-05-02 RX ADMIN — QUETIAPINE FUMARATE 75 MG: 25 TABLET ORAL at 09:01

## 2023-05-02 RX ADMIN — METOPROLOL TARTRATE 12.5 MG: 25 TABLET, FILM COATED ORAL at 17:05

## 2023-05-02 RX ADMIN — ATORVASTATIN CALCIUM 20 MG: 10 TABLET, FILM COATED ORAL at 17:05

## 2023-05-02 RX ADMIN — MIRTAZAPINE 45 MG: 15 TABLET, FILM COATED ORAL at 21:14

## 2023-05-02 RX ADMIN — VENLAFAXINE HYDROCHLORIDE 150 MG: 150 CAPSULE, EXTENDED RELEASE ORAL at 09:02

## 2023-05-02 RX ADMIN — PANTOPRAZOLE SODIUM 40 MG: 40 TABLET, DELAYED RELEASE ORAL at 09:02

## 2023-05-02 NOTE — PROGRESS NOTES
Problem: Falls - Risk of  Goal: *Absence of Falls  Description: Document Sheri Dobbs Fall Risk and appropriate interventions in the flowsheet. Outcome: Progressing Towards Goal  Note: Fall Risk Interventions:  Mobility Interventions: Utilize walker, cane, or other assistive device    Medication Interventions: Teach patient to arise slowly      Patient received resting quietly in bed. No signs of distress. Even and unlabored breathing. Staff will continue to monitor safety q15 and provide support.

## 2023-05-03 LAB
ATRIAL RATE: 60 BPM
CALCULATED P AXIS, ECG09: 47 DEGREES
CALCULATED R AXIS, ECG10: -38 DEGREES
CALCULATED T AXIS, ECG11: 90 DEGREES
DIAGNOSIS, 93000: NORMAL
P-R INTERVAL, ECG05: 136 MS
Q-T INTERVAL, ECG07: 470 MS
QRS DURATION, ECG06: 144 MS
QTC CALCULATION (BEZET), ECG08: 470 MS
VENTRICULAR RATE, ECG03: 60 BPM

## 2023-05-03 PROCEDURE — 74011250637 HC RX REV CODE- 250/637: Performed by: PSYCHIATRY & NEUROLOGY

## 2023-05-03 PROCEDURE — 65220000003 HC RM SEMIPRIVATE PSYCH

## 2023-05-03 PROCEDURE — 74011250637 HC RX REV CODE- 250/637: Performed by: NURSE PRACTITIONER

## 2023-05-03 PROCEDURE — 9990 CHARGE CONVERSION

## 2023-05-03 RX ORDER — QUETIAPINE FUMARATE 100 MG/1
100 TABLET, FILM COATED ORAL DAILY
Status: DISCONTINUED | OUTPATIENT
Start: 2023-05-04 | End: 2023-05-04

## 2023-05-03 RX ADMIN — LEVOTHYROXINE SODIUM 100 MCG: 50 TABLET ORAL at 06:14

## 2023-05-03 RX ADMIN — THERA TABS 1 TABLET: TAB at 08:58

## 2023-05-03 RX ADMIN — PANTOPRAZOLE SODIUM 40 MG: 40 TABLET, DELAYED RELEASE ORAL at 08:57

## 2023-05-03 RX ADMIN — VENLAFAXINE HYDROCHLORIDE 150 MG: 150 CAPSULE, EXTENDED RELEASE ORAL at 08:57

## 2023-05-03 RX ADMIN — MIRTAZAPINE 45 MG: 15 TABLET, FILM COATED ORAL at 21:03

## 2023-05-03 RX ADMIN — QUETIAPINE FUMARATE 350 MG: 100 TABLET ORAL at 21:05

## 2023-05-03 RX ADMIN — ATORVASTATIN CALCIUM 20 MG: 10 TABLET, FILM COATED ORAL at 18:16

## 2023-05-03 RX ADMIN — METOPROLOL TARTRATE 12.5 MG: 25 TABLET, FILM COATED ORAL at 08:58

## 2023-05-03 RX ADMIN — CLOPIDOGREL BISULFATE 75 MG: 75 TABLET, FILM COATED ORAL at 08:57

## 2023-05-03 RX ADMIN — QUETIAPINE FUMARATE 75 MG: 25 TABLET ORAL at 08:57

## 2023-05-03 RX ADMIN — CHOLECALCIFEROL TAB 25 MCG (1000 UNIT) 1000 UNITS: 25 TAB at 08:57

## 2023-05-03 RX ADMIN — METOPROLOL TARTRATE 12.5 MG: 25 TABLET, FILM COATED ORAL at 18:16

## 2023-05-04 LAB
SARS-COV-2 RNA RESP QL NAA+PROBE: NOT DETECTED
SOURCE, COVRS: NORMAL

## 2023-05-04 PROCEDURE — 87635 SARS-COV-2 COVID-19 AMP PRB: CPT

## 2023-05-04 PROCEDURE — 9990 CHARGE CONVERSION

## 2023-05-04 PROCEDURE — 74011250637 HC RX REV CODE- 250/637: Performed by: PSYCHIATRY & NEUROLOGY

## 2023-05-04 PROCEDURE — 74011250637 HC RX REV CODE- 250/637: Performed by: NURSE PRACTITIONER

## 2023-05-04 PROCEDURE — 65220000003 HC RM SEMIPRIVATE PSYCH

## 2023-05-04 RX ORDER — RISPERIDONE 1 MG/1
2 TABLET, FILM COATED ORAL DAILY
Status: DISCONTINUED | OUTPATIENT
Start: 2023-05-05 | End: 2023-05-06 | Stop reason: HOSPADM

## 2023-05-04 RX ADMIN — QUETIAPINE FUMARATE 100 MG: 100 TABLET ORAL at 10:26

## 2023-05-04 RX ADMIN — QUETIAPINE FUMARATE 350 MG: 100 TABLET ORAL at 20:30

## 2023-05-04 RX ADMIN — VENLAFAXINE HYDROCHLORIDE 150 MG: 150 CAPSULE, EXTENDED RELEASE ORAL at 10:04

## 2023-05-04 RX ADMIN — LEVOTHYROXINE SODIUM 100 MCG: 50 TABLET ORAL at 06:54

## 2023-05-04 RX ADMIN — PANTOPRAZOLE SODIUM 40 MG: 40 TABLET, DELAYED RELEASE ORAL at 10:26

## 2023-05-04 RX ADMIN — MIRTAZAPINE 45 MG: 15 TABLET, FILM COATED ORAL at 20:29

## 2023-05-04 RX ADMIN — CLOPIDOGREL BISULFATE 75 MG: 75 TABLET, FILM COATED ORAL at 10:26

## 2023-05-04 RX ADMIN — METOPROLOL TARTRATE 12.5 MG: 25 TABLET, FILM COATED ORAL at 18:03

## 2023-05-04 RX ADMIN — THERA TABS 1 TABLET: TAB at 10:26

## 2023-05-04 RX ADMIN — CHOLECALCIFEROL TAB 25 MCG (1000 UNIT) 1000 UNITS: 25 TAB at 10:26

## 2023-05-04 RX ADMIN — ATORVASTATIN CALCIUM 20 MG: 10 TABLET, FILM COATED ORAL at 18:03

## 2023-05-04 RX ADMIN — METOPROLOL TARTRATE 12.5 MG: 25 TABLET, FILM COATED ORAL at 10:03

## 2023-05-05 VITALS
SYSTOLIC BLOOD PRESSURE: 150 MMHG | TEMPERATURE: 97.4 F | OXYGEN SATURATION: 96 % | RESPIRATION RATE: 16 BRPM | DIASTOLIC BLOOD PRESSURE: 74 MMHG | HEART RATE: 66 BPM | HEIGHT: 64 IN | BODY MASS INDEX: 24.75 KG/M2 | WEIGHT: 145 LBS

## 2023-05-05 PROCEDURE — 74011250637 HC RX REV CODE- 250/637: Performed by: PSYCHIATRY & NEUROLOGY

## 2023-05-05 PROCEDURE — 74011250637 HC RX REV CODE- 250/637: Performed by: NURSE PRACTITIONER

## 2023-05-05 PROCEDURE — 9990 CHARGE CONVERSION

## 2023-05-05 PROCEDURE — 65220000003 HC RM SEMIPRIVATE PSYCH

## 2023-05-05 RX ORDER — ESTRADIOL 0.1 MG/G
2 CREAM VAGINAL
Status: DISCONTINUED | OUTPATIENT
Start: 2023-05-07 | End: 2023-05-24 | Stop reason: HOSPADM

## 2023-05-05 RX ORDER — ACETAMINOPHEN 325 MG/1
650 TABLET ORAL EVERY 4 HOURS PRN
Status: DISCONTINUED | OUTPATIENT
Start: 2023-05-06 | End: 2023-05-24 | Stop reason: HOSPADM

## 2023-05-05 RX ORDER — PANTOPRAZOLE SODIUM 40 MG/1
40 TABLET, DELAYED RELEASE ORAL DAILY
Status: DISCONTINUED | OUTPATIENT
Start: 2023-05-06 | End: 2023-05-24 | Stop reason: HOSPADM

## 2023-05-05 RX ORDER — ATORVASTATIN CALCIUM 10 MG/1
20 TABLET, FILM COATED ORAL EVERY EVENING
Status: DISCONTINUED | OUTPATIENT
Start: 2023-05-06 | End: 2023-05-12

## 2023-05-05 RX ORDER — MIRTAZAPINE 15 MG/1
45 TABLET, FILM COATED ORAL
Status: DISCONTINUED | OUTPATIENT
Start: 2023-05-06 | End: 2023-05-24 | Stop reason: HOSPADM

## 2023-05-05 RX ORDER — OLANZAPINE 2.5 MG/1
2.5 TABLET ORAL EVERY 6 HOURS PRN
Status: DISCONTINUED | OUTPATIENT
Start: 2023-05-06 | End: 2023-05-24 | Stop reason: HOSPADM

## 2023-05-05 RX ORDER — RISPERIDONE 1 MG/1
2 TABLET ORAL DAILY
Status: DISCONTINUED | OUTPATIENT
Start: 2023-05-06 | End: 2023-05-09

## 2023-05-05 RX ORDER — BENZTROPINE MESYLATE 1 MG/1
0.5 TABLET ORAL 2 TIMES DAILY PRN
Status: DISCONTINUED | OUTPATIENT
Start: 2023-05-06 | End: 2023-05-24 | Stop reason: HOSPADM

## 2023-05-05 RX ORDER — QUETIAPINE FUMARATE 100 MG/1
200 TABLET, FILM COATED ORAL
Status: DISCONTINUED | OUTPATIENT
Start: 2023-05-05 | End: 2023-05-06 | Stop reason: HOSPADM

## 2023-05-05 RX ORDER — ALUMINA, MAGNESIA, AND SIMETHICONE 2400; 2400; 240 MG/30ML; MG/30ML; MG/30ML
30 SUSPENSION ORAL EVERY 4 HOURS PRN
Status: DISCONTINUED | OUTPATIENT
Start: 2023-05-06 | End: 2023-05-24 | Stop reason: HOSPADM

## 2023-05-05 RX ORDER — LEVOTHYROXINE SODIUM 0.05 MG/1
100 TABLET ORAL DAILY
Status: DISCONTINUED | OUTPATIENT
Start: 2023-05-06 | End: 2023-05-24 | Stop reason: HOSPADM

## 2023-05-05 RX ORDER — VITAMIN B COMPLEX
1000 TABLET ORAL DAILY
Status: DISCONTINUED | OUTPATIENT
Start: 2023-05-06 | End: 2023-05-24 | Stop reason: HOSPADM

## 2023-05-05 RX ORDER — VENLAFAXINE HYDROCHLORIDE 150 MG/1
150 CAPSULE, EXTENDED RELEASE ORAL
Status: DISCONTINUED | OUTPATIENT
Start: 2023-05-06 | End: 2023-05-12

## 2023-05-05 RX ORDER — LOPERAMIDE HYDROCHLORIDE 2 MG/1
2 CAPSULE ORAL EVERY 4 HOURS PRN
Status: DISCONTINUED | OUTPATIENT
Start: 2023-05-06 | End: 2023-05-24 | Stop reason: HOSPADM

## 2023-05-05 RX ORDER — CLOPIDOGREL BISULFATE 75 MG/1
75 TABLET ORAL DAILY
Status: DISCONTINUED | OUTPATIENT
Start: 2023-05-06 | End: 2023-05-24 | Stop reason: HOSPADM

## 2023-05-05 RX ORDER — QUETIAPINE FUMARATE 100 MG/1
200 TABLET, FILM COATED ORAL
Status: DISCONTINUED | OUTPATIENT
Start: 2023-05-06 | End: 2023-05-08

## 2023-05-05 RX ORDER — RISPERIDONE 1 MG/1
2 TABLET ORAL NIGHTLY
Status: DISCONTINUED | OUTPATIENT
Start: 2023-05-06 | End: 2023-05-08

## 2023-05-05 RX ORDER — RISPERIDONE 1 MG/1
2 TABLET, FILM COATED ORAL
Status: DISCONTINUED | OUTPATIENT
Start: 2023-05-05 | End: 2023-05-06 | Stop reason: HOSPADM

## 2023-05-05 RX ORDER — QUETIAPINE FUMARATE 100 MG/1
350 TABLET, FILM COATED ORAL
Status: DISCONTINUED | OUTPATIENT
Start: 2023-05-06 | End: 2023-05-05

## 2023-05-05 RX ORDER — MULTIVITAMIN WITH IRON
1 TABLET ORAL DAILY
Status: DISCONTINUED | OUTPATIENT
Start: 2023-05-06 | End: 2023-05-24 | Stop reason: HOSPADM

## 2023-05-05 RX ADMIN — CLOPIDOGREL BISULFATE 75 MG: 75 TABLET, FILM COATED ORAL at 09:35

## 2023-05-05 RX ADMIN — METOPROLOL TARTRATE 12.5 MG: 25 TABLET, FILM COATED ORAL at 09:35

## 2023-05-05 RX ADMIN — THERA TABS 1 TABLET: TAB at 09:35

## 2023-05-05 RX ADMIN — QUETIAPINE FUMARATE 200 MG: 100 TABLET ORAL at 21:08

## 2023-05-05 RX ADMIN — ATORVASTATIN CALCIUM 20 MG: 10 TABLET, FILM COATED ORAL at 17:22

## 2023-05-05 RX ADMIN — METOPROLOL TARTRATE 12.5 MG: 25 TABLET, FILM COATED ORAL at 17:22

## 2023-05-05 RX ADMIN — VENLAFAXINE HYDROCHLORIDE 150 MG: 150 CAPSULE, EXTENDED RELEASE ORAL at 09:35

## 2023-05-05 RX ADMIN — RISPERIDONE 2 MG: 1 TABLET ORAL at 21:08

## 2023-05-05 RX ADMIN — MIRTAZAPINE 45 MG: 15 TABLET, FILM COATED ORAL at 21:09

## 2023-05-05 RX ADMIN — CHOLECALCIFEROL TAB 25 MCG (1000 UNIT) 1000 UNITS: 25 TAB at 09:35

## 2023-05-05 RX ADMIN — RISPERIDONE 2 MG: 1 TABLET ORAL at 09:35

## 2023-05-05 RX ADMIN — PANTOPRAZOLE SODIUM 40 MG: 40 TABLET, DELAYED RELEASE ORAL at 09:35

## 2023-05-05 RX ADMIN — LEVOTHYROXINE SODIUM 100 MCG: 50 TABLET ORAL at 05:36

## 2023-05-06 PROCEDURE — 1240000000 HC EMOTIONAL WELLNESS R&B

## 2023-05-06 PROCEDURE — 6370000000 HC RX 637 (ALT 250 FOR IP): Performed by: PSYCHIATRY & NEUROLOGY

## 2023-05-06 RX ADMIN — LEVOTHYROXINE SODIUM 100 MCG: 0.05 TABLET ORAL at 06:15

## 2023-05-06 RX ADMIN — VENLAFAXINE HYDROCHLORIDE 150 MG: 150 CAPSULE, EXTENDED RELEASE ORAL at 09:40

## 2023-05-06 RX ADMIN — METOPROLOL TARTRATE 12.5 MG: 25 TABLET, FILM COATED ORAL at 21:00

## 2023-05-06 RX ADMIN — CLOPIDOGREL BISULFATE 75 MG: 75 TABLET, FILM COATED ORAL at 09:40

## 2023-05-06 RX ADMIN — MIRTAZAPINE 45 MG: 15 TABLET, FILM COATED ORAL at 22:55

## 2023-05-06 RX ADMIN — PANTOPRAZOLE SODIUM 40 MG: 40 TABLET, DELAYED RELEASE ORAL at 09:39

## 2023-05-06 RX ADMIN — ATORVASTATIN CALCIUM 20 MG: 10 TABLET, FILM COATED ORAL at 17:09

## 2023-05-06 RX ADMIN — METOPROLOL TARTRATE 12.5 MG: 25 TABLET, FILM COATED ORAL at 09:40

## 2023-05-06 RX ADMIN — QUETIAPINE FUMARATE 200 MG: 100 TABLET ORAL at 21:34

## 2023-05-06 RX ADMIN — Medication 1000 UNITS: at 09:40

## 2023-05-06 RX ADMIN — RISPERIDONE 2 MG: 1 TABLET ORAL at 21:03

## 2023-05-06 RX ADMIN — THERA TABS 1 TABLET: TAB at 09:39

## 2023-05-06 RX ADMIN — RISPERIDONE 2 MG: 1 TABLET ORAL at 09:39

## 2023-05-06 ASSESSMENT — PAIN SCALES - GENERAL
PAINLEVEL_OUTOF10: 0
PAINLEVEL_OUTOF10: 0

## 2023-05-07 PROCEDURE — 6370000000 HC RX 637 (ALT 250 FOR IP): Performed by: PSYCHIATRY & NEUROLOGY

## 2023-05-07 PROCEDURE — 1240000000 HC EMOTIONAL WELLNESS R&B

## 2023-05-07 RX ADMIN — Medication 1000 UNITS: at 09:22

## 2023-05-07 RX ADMIN — VENLAFAXINE HYDROCHLORIDE 150 MG: 150 CAPSULE, EXTENDED RELEASE ORAL at 09:22

## 2023-05-07 RX ADMIN — RISPERIDONE 2 MG: 1 TABLET ORAL at 09:22

## 2023-05-07 RX ADMIN — PANTOPRAZOLE SODIUM 40 MG: 40 TABLET, DELAYED RELEASE ORAL at 09:22

## 2023-05-07 RX ADMIN — LEVOTHYROXINE SODIUM 100 MCG: 0.05 TABLET ORAL at 06:21

## 2023-05-07 RX ADMIN — RISPERIDONE 2 MG: 1 TABLET ORAL at 20:37

## 2023-05-07 RX ADMIN — MIRTAZAPINE 45 MG: 15 TABLET, FILM COATED ORAL at 21:25

## 2023-05-07 RX ADMIN — ATORVASTATIN CALCIUM 20 MG: 10 TABLET, FILM COATED ORAL at 17:00

## 2023-05-07 RX ADMIN — CLOPIDOGREL BISULFATE 75 MG: 75 TABLET, FILM COATED ORAL at 09:22

## 2023-05-07 RX ADMIN — METOPROLOL TARTRATE 12.5 MG: 25 TABLET, FILM COATED ORAL at 20:37

## 2023-05-07 RX ADMIN — ESTRADIOL 2 G: 0.1 CREAM VAGINAL at 20:39

## 2023-05-07 RX ADMIN — QUETIAPINE FUMARATE 200 MG: 100 TABLET ORAL at 21:21

## 2023-05-07 RX ADMIN — THERA TABS 1 TABLET: TAB at 09:22

## 2023-05-07 ASSESSMENT — PAIN SCALES - GENERAL: PAINLEVEL_OUTOF10: 0

## 2023-05-08 PROCEDURE — 6370000000 HC RX 637 (ALT 250 FOR IP): Performed by: PSYCHIATRY & NEUROLOGY

## 2023-05-08 PROCEDURE — 1240000000 HC EMOTIONAL WELLNESS R&B

## 2023-05-08 RX ORDER — QUETIAPINE FUMARATE 100 MG/1
100 TABLET, FILM COATED ORAL
Status: DISCONTINUED | OUTPATIENT
Start: 2023-05-08 | End: 2023-05-09

## 2023-05-08 RX ORDER — M-VIT,TX,IRON,MINS/CALC/FOLIC 27MG-0.4MG
1 TABLET ORAL DAILY
COMMUNITY

## 2023-05-08 RX ORDER — RISPERIDONE 1 MG/1
3 TABLET ORAL NIGHTLY
Status: DISCONTINUED | OUTPATIENT
Start: 2023-05-08 | End: 2023-05-09

## 2023-05-08 RX ORDER — PANTOPRAZOLE SODIUM 40 MG/1
40 TABLET, DELAYED RELEASE ORAL DAILY
Status: ON HOLD | COMMUNITY
End: 2023-05-23 | Stop reason: HOSPADM

## 2023-05-08 RX ORDER — CLOPIDOGREL BISULFATE 75 MG/1
75 TABLET ORAL DAILY
COMMUNITY

## 2023-05-08 RX ADMIN — ATORVASTATIN CALCIUM 20 MG: 10 TABLET, FILM COATED ORAL at 17:27

## 2023-05-08 RX ADMIN — METOPROLOL TARTRATE 12.5 MG: 25 TABLET, FILM COATED ORAL at 09:13

## 2023-05-08 RX ADMIN — QUETIAPINE FUMARATE 100 MG: 100 TABLET ORAL at 21:33

## 2023-05-08 RX ADMIN — MIRTAZAPINE 45 MG: 15 TABLET, FILM COATED ORAL at 21:33

## 2023-05-08 RX ADMIN — Medication 1000 UNITS: at 09:12

## 2023-05-08 RX ADMIN — RISPERIDONE 3 MG: 1 TABLET ORAL at 20:42

## 2023-05-08 RX ADMIN — RISPERIDONE 2 MG: 1 TABLET ORAL at 09:12

## 2023-05-08 RX ADMIN — CLOPIDOGREL BISULFATE 75 MG: 75 TABLET, FILM COATED ORAL at 09:12

## 2023-05-08 RX ADMIN — METOPROLOL TARTRATE 12.5 MG: 25 TABLET, FILM COATED ORAL at 20:42

## 2023-05-08 RX ADMIN — VENLAFAXINE HYDROCHLORIDE 150 MG: 150 CAPSULE, EXTENDED RELEASE ORAL at 09:12

## 2023-05-08 RX ADMIN — LEVOTHYROXINE SODIUM 100 MCG: 0.05 TABLET ORAL at 06:17

## 2023-05-08 RX ADMIN — PANTOPRAZOLE SODIUM 40 MG: 40 TABLET, DELAYED RELEASE ORAL at 09:12

## 2023-05-08 RX ADMIN — THERA TABS 1 TABLET: TAB at 09:14

## 2023-05-08 ASSESSMENT — PAIN SCALES - GENERAL
PAINLEVEL_OUTOF10: 0
PAINLEVEL_OUTOF10: 0

## 2023-05-09 PROCEDURE — 6370000000 HC RX 637 (ALT 250 FOR IP): Performed by: PSYCHIATRY & NEUROLOGY

## 2023-05-09 PROCEDURE — 1240000000 HC EMOTIONAL WELLNESS R&B

## 2023-05-09 RX ORDER — RISPERIDONE 1 MG/1
4 TABLET ORAL NIGHTLY
Status: DISCONTINUED | OUTPATIENT
Start: 2023-05-09 | End: 2023-05-09

## 2023-05-09 RX ORDER — RISPERIDONE 1 MG/1
1 TABLET ORAL DAILY
Status: DISCONTINUED | OUTPATIENT
Start: 2023-05-10 | End: 2023-05-10

## 2023-05-09 RX ORDER — RISPERIDONE 1 MG/1
4 TABLET ORAL NIGHTLY
Status: DISCONTINUED | OUTPATIENT
Start: 2023-05-10 | End: 2023-05-19

## 2023-05-09 RX ORDER — RISPERIDONE 1 MG/1
3 TABLET ORAL NIGHTLY
Status: COMPLETED | OUTPATIENT
Start: 2023-05-09 | End: 2023-05-09

## 2023-05-09 RX ADMIN — LEVOTHYROXINE SODIUM 100 MCG: 0.05 TABLET ORAL at 05:51

## 2023-05-09 RX ADMIN — METOPROLOL TARTRATE 12.5 MG: 25 TABLET, FILM COATED ORAL at 22:30

## 2023-05-09 RX ADMIN — METOPROLOL TARTRATE 12.5 MG: 25 TABLET, FILM COATED ORAL at 09:18

## 2023-05-09 RX ADMIN — PANTOPRAZOLE SODIUM 40 MG: 40 TABLET, DELAYED RELEASE ORAL at 09:18

## 2023-05-09 RX ADMIN — Medication 1000 UNITS: at 09:17

## 2023-05-09 RX ADMIN — VENLAFAXINE HYDROCHLORIDE 150 MG: 150 CAPSULE, EXTENDED RELEASE ORAL at 09:18

## 2023-05-09 RX ADMIN — THERA TABS 1 TABLET: TAB at 09:18

## 2023-05-09 RX ADMIN — ATORVASTATIN CALCIUM 20 MG: 10 TABLET, FILM COATED ORAL at 17:24

## 2023-05-09 RX ADMIN — RISPERIDONE 3 MG: 1 TABLET ORAL at 22:29

## 2023-05-09 RX ADMIN — RISPERIDONE 2 MG: 1 TABLET ORAL at 09:18

## 2023-05-09 RX ADMIN — MIRTAZAPINE 45 MG: 15 TABLET, FILM COATED ORAL at 22:33

## 2023-05-09 RX ADMIN — CLOPIDOGREL BISULFATE 75 MG: 75 TABLET, FILM COATED ORAL at 09:17

## 2023-05-10 PROCEDURE — 1240000000 HC EMOTIONAL WELLNESS R&B

## 2023-05-10 PROCEDURE — 6370000000 HC RX 637 (ALT 250 FOR IP): Performed by: PSYCHIATRY & NEUROLOGY

## 2023-05-10 RX ADMIN — THERA TABS 1 TABLET: TAB at 09:59

## 2023-05-10 RX ADMIN — RISPERIDONE 4 MG: 1 TABLET ORAL at 20:19

## 2023-05-10 RX ADMIN — CLOPIDOGREL BISULFATE 75 MG: 75 TABLET, FILM COATED ORAL at 09:59

## 2023-05-10 RX ADMIN — METOPROLOL TARTRATE 12.5 MG: 25 TABLET, FILM COATED ORAL at 09:58

## 2023-05-10 RX ADMIN — METOPROLOL TARTRATE 12.5 MG: 25 TABLET, FILM COATED ORAL at 20:18

## 2023-05-10 RX ADMIN — MIRTAZAPINE 45 MG: 15 TABLET, FILM COATED ORAL at 21:38

## 2023-05-10 RX ADMIN — ATORVASTATIN CALCIUM 20 MG: 10 TABLET, FILM COATED ORAL at 17:59

## 2023-05-10 RX ADMIN — LEVOTHYROXINE SODIUM 100 MCG: 0.05 TABLET ORAL at 06:46

## 2023-05-10 RX ADMIN — RISPERIDONE 1 MG: 1 TABLET ORAL at 09:59

## 2023-05-10 RX ADMIN — PANTOPRAZOLE SODIUM 40 MG: 40 TABLET, DELAYED RELEASE ORAL at 09:58

## 2023-05-10 RX ADMIN — VENLAFAXINE HYDROCHLORIDE 150 MG: 150 CAPSULE, EXTENDED RELEASE ORAL at 09:58

## 2023-05-10 RX ADMIN — Medication 1000 UNITS: at 09:58

## 2023-05-10 ASSESSMENT — PAIN SCALES - GENERAL
PAINLEVEL_OUTOF10: 0
PAINLEVEL_OUTOF10: 0

## 2023-05-11 PROCEDURE — 1240000000 HC EMOTIONAL WELLNESS R&B

## 2023-05-11 PROCEDURE — 6370000000 HC RX 637 (ALT 250 FOR IP): Performed by: PSYCHIATRY & NEUROLOGY

## 2023-05-11 RX ADMIN — MIRTAZAPINE 45 MG: 15 TABLET, FILM COATED ORAL at 20:42

## 2023-05-11 RX ADMIN — ATORVASTATIN CALCIUM 20 MG: 10 TABLET, FILM COATED ORAL at 17:43

## 2023-05-11 RX ADMIN — PANTOPRAZOLE SODIUM 40 MG: 40 TABLET, DELAYED RELEASE ORAL at 08:59

## 2023-05-11 RX ADMIN — METOPROLOL TARTRATE 12.5 MG: 25 TABLET, FILM COATED ORAL at 08:59

## 2023-05-11 RX ADMIN — VENLAFAXINE HYDROCHLORIDE 150 MG: 150 CAPSULE, EXTENDED RELEASE ORAL at 08:59

## 2023-05-11 RX ADMIN — CLOPIDOGREL BISULFATE 75 MG: 75 TABLET, FILM COATED ORAL at 08:59

## 2023-05-11 RX ADMIN — METOPROLOL TARTRATE 12.5 MG: 25 TABLET, FILM COATED ORAL at 20:42

## 2023-05-11 RX ADMIN — LEVOTHYROXINE SODIUM 100 MCG: 0.05 TABLET ORAL at 06:37

## 2023-05-11 RX ADMIN — Medication 1000 UNITS: at 08:59

## 2023-05-11 RX ADMIN — RISPERIDONE 4 MG: 1 TABLET ORAL at 21:02

## 2023-05-11 RX ADMIN — THERA TABS 1 TABLET: TAB at 08:59

## 2023-05-11 ASSESSMENT — PAIN - FUNCTIONAL ASSESSMENT: PAIN_FUNCTIONAL_ASSESSMENT: NONE - DENIES PAIN

## 2023-05-11 ASSESSMENT — PAIN SCALES - GENERAL: PAINLEVEL_OUTOF10: 0

## 2023-05-12 PROCEDURE — 6370000000 HC RX 637 (ALT 250 FOR IP): Performed by: PSYCHIATRY & NEUROLOGY

## 2023-05-12 PROCEDURE — 1240000000 HC EMOTIONAL WELLNESS R&B

## 2023-05-12 RX ORDER — VENLAFAXINE HYDROCHLORIDE 37.5 MG/1
75 CAPSULE, EXTENDED RELEASE ORAL ONCE
Status: COMPLETED | OUTPATIENT
Start: 2023-05-12 | End: 2023-05-12

## 2023-05-12 RX ORDER — ATORVASTATIN CALCIUM 10 MG/1
20 TABLET, FILM COATED ORAL
Status: DISCONTINUED | OUTPATIENT
Start: 2023-05-12 | End: 2023-05-24 | Stop reason: HOSPADM

## 2023-05-12 RX ADMIN — RISPERIDONE 4 MG: 1 TABLET ORAL at 20:48

## 2023-05-12 RX ADMIN — ATORVASTATIN CALCIUM 20 MG: 10 TABLET, FILM COATED ORAL at 20:49

## 2023-05-12 RX ADMIN — METOPROLOL TARTRATE 12.5 MG: 25 TABLET, FILM COATED ORAL at 08:13

## 2023-05-12 RX ADMIN — THERA TABS 1 TABLET: TAB at 08:14

## 2023-05-12 RX ADMIN — LEVOTHYROXINE SODIUM 100 MCG: 0.05 TABLET ORAL at 06:01

## 2023-05-12 RX ADMIN — CLOPIDOGREL BISULFATE 75 MG: 75 TABLET, FILM COATED ORAL at 08:14

## 2023-05-12 RX ADMIN — MIRTAZAPINE 45 MG: 15 TABLET, FILM COATED ORAL at 20:48

## 2023-05-12 RX ADMIN — VENLAFAXINE HYDROCHLORIDE 75 MG: 37.5 CAPSULE, EXTENDED RELEASE ORAL at 11:15

## 2023-05-12 RX ADMIN — METOPROLOL TARTRATE 12.5 MG: 25 TABLET, FILM COATED ORAL at 20:49

## 2023-05-12 RX ADMIN — VENLAFAXINE HYDROCHLORIDE 150 MG: 150 CAPSULE, EXTENDED RELEASE ORAL at 08:13

## 2023-05-12 RX ADMIN — Medication 1000 UNITS: at 08:14

## 2023-05-12 RX ADMIN — PANTOPRAZOLE SODIUM 40 MG: 40 TABLET, DELAYED RELEASE ORAL at 08:13

## 2023-05-12 ASSESSMENT — PAIN SCALES - GENERAL
PAINLEVEL_OUTOF10: 0
PAINLEVEL_OUTOF10: 0

## 2023-05-12 ASSESSMENT — PAIN - FUNCTIONAL ASSESSMENT: PAIN_FUNCTIONAL_ASSESSMENT: NONE - DENIES PAIN

## 2023-05-13 PROCEDURE — 6370000000 HC RX 637 (ALT 250 FOR IP): Performed by: PSYCHIATRY & NEUROLOGY

## 2023-05-13 PROCEDURE — 1240000000 HC EMOTIONAL WELLNESS R&B

## 2023-05-13 RX ADMIN — MIRTAZAPINE 45 MG: 15 TABLET, FILM COATED ORAL at 21:33

## 2023-05-13 RX ADMIN — ATORVASTATIN CALCIUM 20 MG: 10 TABLET, FILM COATED ORAL at 21:34

## 2023-05-13 RX ADMIN — THERA TABS 1 TABLET: TAB at 08:19

## 2023-05-13 RX ADMIN — METOPROLOL TARTRATE 12.5 MG: 25 TABLET, FILM COATED ORAL at 21:32

## 2023-05-13 RX ADMIN — Medication 1000 UNITS: at 08:19

## 2023-05-13 RX ADMIN — METOPROLOL TARTRATE 12.5 MG: 25 TABLET, FILM COATED ORAL at 08:19

## 2023-05-13 RX ADMIN — RISPERIDONE 4 MG: 1 TABLET ORAL at 21:36

## 2023-05-13 RX ADMIN — PANTOPRAZOLE SODIUM 40 MG: 40 TABLET, DELAYED RELEASE ORAL at 08:19

## 2023-05-13 RX ADMIN — LEVOTHYROXINE SODIUM 100 MCG: 0.05 TABLET ORAL at 06:17

## 2023-05-13 RX ADMIN — VENLAFAXINE HYDROCHLORIDE 225 MG: 150 CAPSULE, EXTENDED RELEASE ORAL at 08:19

## 2023-05-13 RX ADMIN — CLOPIDOGREL BISULFATE 75 MG: 75 TABLET, FILM COATED ORAL at 08:19

## 2023-05-14 ENCOUNTER — APPOINTMENT (OUTPATIENT)
Facility: HOSPITAL | Age: 83
DRG: 881 | End: 2023-05-14
Attending: PSYCHIATRY & NEUROLOGY
Payer: MEDICARE

## 2023-05-14 PROCEDURE — 6370000000 HC RX 637 (ALT 250 FOR IP): Performed by: PSYCHIATRY & NEUROLOGY

## 2023-05-14 PROCEDURE — 73620 X-RAY EXAM OF FOOT: CPT

## 2023-05-14 PROCEDURE — 1240000000 HC EMOTIONAL WELLNESS R&B

## 2023-05-14 PROCEDURE — 73600 X-RAY EXAM OF ANKLE: CPT

## 2023-05-14 RX ADMIN — Medication 1000 UNITS: at 09:03

## 2023-05-14 RX ADMIN — RISPERIDONE 4 MG: 1 TABLET ORAL at 21:22

## 2023-05-14 RX ADMIN — THERA TABS 1 TABLET: TAB at 09:03

## 2023-05-14 RX ADMIN — CLOPIDOGREL BISULFATE 75 MG: 75 TABLET, FILM COATED ORAL at 09:03

## 2023-05-14 RX ADMIN — PANTOPRAZOLE SODIUM 40 MG: 40 TABLET, DELAYED RELEASE ORAL at 09:03

## 2023-05-14 RX ADMIN — METOPROLOL TARTRATE 12.5 MG: 25 TABLET, FILM COATED ORAL at 09:03

## 2023-05-14 RX ADMIN — LEVOTHYROXINE SODIUM 100 MCG: 0.05 TABLET ORAL at 06:04

## 2023-05-14 RX ADMIN — METOPROLOL TARTRATE 12.5 MG: 25 TABLET, FILM COATED ORAL at 21:23

## 2023-05-14 RX ADMIN — MIRTAZAPINE 45 MG: 15 TABLET, FILM COATED ORAL at 21:23

## 2023-05-14 RX ADMIN — ATORVASTATIN CALCIUM 20 MG: 10 TABLET, FILM COATED ORAL at 21:22

## 2023-05-14 RX ADMIN — VENLAFAXINE HYDROCHLORIDE 225 MG: 150 CAPSULE, EXTENDED RELEASE ORAL at 09:03

## 2023-05-14 ASSESSMENT — PAIN SCALES - GENERAL
PAINLEVEL_OUTOF10: 0
PAINLEVEL_OUTOF10: 0

## 2023-05-15 PROCEDURE — 1240000000 HC EMOTIONAL WELLNESS R&B

## 2023-05-15 PROCEDURE — 6370000000 HC RX 637 (ALT 250 FOR IP): Performed by: PSYCHIATRY & NEUROLOGY

## 2023-05-15 RX ADMIN — Medication 1000 UNITS: at 08:47

## 2023-05-15 RX ADMIN — ATORVASTATIN CALCIUM 20 MG: 10 TABLET, FILM COATED ORAL at 21:44

## 2023-05-15 RX ADMIN — RISPERIDONE 4 MG: 1 TABLET ORAL at 21:44

## 2023-05-15 RX ADMIN — LEVOTHYROXINE SODIUM 100 MCG: 0.05 TABLET ORAL at 06:25

## 2023-05-15 RX ADMIN — MIRTAZAPINE 45 MG: 15 TABLET, FILM COATED ORAL at 21:44

## 2023-05-15 RX ADMIN — THERA TABS 1 TABLET: TAB at 08:47

## 2023-05-15 RX ADMIN — METOPROLOL TARTRATE 12.5 MG: 25 TABLET, FILM COATED ORAL at 08:48

## 2023-05-15 RX ADMIN — METOPROLOL TARTRATE 12.5 MG: 25 TABLET, FILM COATED ORAL at 21:45

## 2023-05-15 RX ADMIN — VENLAFAXINE HYDROCHLORIDE 225 MG: 150 CAPSULE, EXTENDED RELEASE ORAL at 08:47

## 2023-05-15 RX ADMIN — PANTOPRAZOLE SODIUM 40 MG: 40 TABLET, DELAYED RELEASE ORAL at 08:48

## 2023-05-15 RX ADMIN — CLOPIDOGREL BISULFATE 75 MG: 75 TABLET, FILM COATED ORAL at 08:47

## 2023-05-15 ASSESSMENT — LIFESTYLE VARIABLES: HOW OFTEN DO YOU HAVE A DRINK CONTAINING ALCOHOL: NEVER

## 2023-05-15 NOTE — PLAN OF CARE
Patient received resting in bed with eyes closed. NAD and no complaints noted. Safety measures in place. Will continue to monitor with q15min rounds.    Problem: Safety - Adult  Goal: Free from fall injury  Outcome: Progressing

## 2023-05-15 NOTE — DISCHARGE INSTRUCTIONS
DISCHARGE SUMMARY    NAME:Ulises Lam  : 1940  MRN: [id number]    The patient David Gomez exhibits the ability to control behavior in a less restrictive environment. Patient's level of functioning is improving. No assaultive/destructive behavior has been observed for the past 24 hours. No suicidal/homicidal threat or behavior has been observed for the past 24 hours. There is no evidence of serious medication side effects. Patient has not been in physical or protective restraints for at least the past 24 hours. If weapons involved, how are they secured? ***    Is patient aware of and in agreement with discharge plan? ***    Arrangements for medication:  Prescriptions ***    Copy of discharge instructions to provider?:  ***    Arrangements for transportation home:  ***    Keep all follow up appointments as scheduled, continue to take prescribed medications per physician instructions.   Mental health crisis number:  170 or your local mental health crisis line number at [facility] WARM LINE      8-576-307-MHAV ([phone])      M-F: 9am to 9pm      Sat & Sun: 5pm - 9pm  National suicide prevention lines:                             5-701-ZMBZNNW (7-928-166-706-383-4911)       3-762-142-TALK ([phone])    Crisis Text Line:  Text HOME to 641188

## 2023-05-15 NOTE — PLAN OF CARE
Patient cooperative with taking medications. Eats meals in dining room, otherwise tends to be isolative to room. Patient says she slept good last night but woke up early and could not go back to sleep. Patient calm this morning, dull affect, euthymic mood. Encouraged patient to participate in treatment team and encouraged engagement on unit. Problem: Depression  Goal: Will be euthymic at discharge  Description: INTERVENTIONS:  1. Administer medication as ordered  2. Provide emotional support via 1:1 interaction with staff  3. Encourage involvement in milieu/groups/activities  4. Monitor for social isolation  Outcome: Not Progressing     Problem: Anxiety  Goal: Will report anxiety at manageable levels  Description: INTERVENTIONS:  1. Administer medication as ordered  2. Teach and rehearse alternative coping skills  3.  Provide emotional support with 1:1 interaction with staff  Recent Flowsheet Documentation  Taken 5/15/2023 5082 by Edmundo Castillo RN  Will report anxiety at manageable levels: Administer medication as ordered

## 2023-05-15 NOTE — PLAN OF CARE
Patient interactive and engaging in conversation with roommate. Out in dining room for meals, otherwise stays in room. Flat affect, though patient appears brighter today and less anxious. Compliant with taking medications. Problem: Anxiety  Goal: Will report anxiety at manageable levels  Description: INTERVENTIONS:  1. Administer medication as ordered  2. Teach and rehearse alternative coping skills  3. Provide emotional support with 1:1 interaction with staff  Outcome: Progressing  Flowsheets (Taken 5/15/2023 1949)  Will report anxiety at manageable levels: Administer medication as ordered     Problem: Depression  Goal: Will be euthymic at discharge  Description: INTERVENTIONS:  1. Administer medication as ordered  2. Provide emotional support via 1:1 interaction with staff  3. Encourage involvement in milieu/groups/activities  4.  Monitor for social isolation  Outcome: Not Progressing

## 2023-05-15 NOTE — INTERDISCIPLINARY ROUNDS
Behavioral Health Interdisciplinary Rounds     Patient Name: Nic Houser  Age: 82 y.o. Room/Bed:  2/  Primary Diagnosis: <principal problem not specified>   Admission Status: Voluntary    Readmission within 30 days: no  Power of  in place: no  Patient requires a blocked bed: no            Sleep hours: 7       Participation in Care/Groups:   yea  Medication Compliant?:  yea  PRNS (last 24 hours):  none     Restraints (last 24 hours):  no  __________________________________________________  OQ Admission Analysis Survey completed:  OQ Admission Analysis Survey score:  OQ Discharge Analysis Survey completed:  OQ Discharge Analysis Survey score:   __________________________________________________     Alcohol screening (AUDIT) completed -     If applicable, date SBIRT discussed in treatment team AND documented:    Tobacco - patient is a smoker:    Illegal Drugs use:      24 hour chart check complete:  yes      _______________________________________________    Patient goal(s) for today:   Treatment team focus/goals: Plan to have family meeting today. Progress note: We discussed with her family the transfer to [facility] , when bed available. She remains depressed, still very paranoia regarding her family wanting her to return home. Spiritual Care Consult:   Financial concerns/prescription coverage:  medicare   Family contact:   - son - niece and daughter - in -law                       Family requesting physician contact today:    Discharge plan: she will return home when ready for discharge   Access to weapons :          no                                                    Outpatient provider(s): transfer to [facility] for ECT   Patient's preferred phone number for follow up call :   Patient's preferred e-mail address :    LOS:  49  Expected LOS: TBD     Participating treatment team members:  Nicjesisca Houser, Jeffery Mcmanus - Darlene Eddy RN

## 2023-05-16 PROCEDURE — 6370000000 HC RX 637 (ALT 250 FOR IP): Performed by: PSYCHIATRY & NEUROLOGY

## 2023-05-16 PROCEDURE — 1240000000 HC EMOTIONAL WELLNESS R&B

## 2023-05-16 RX ADMIN — CLOPIDOGREL BISULFATE 75 MG: 75 TABLET, FILM COATED ORAL at 08:48

## 2023-05-16 RX ADMIN — Medication 1000 UNITS: at 08:48

## 2023-05-16 RX ADMIN — RISPERIDONE 4 MG: 1 TABLET ORAL at 21:17

## 2023-05-16 RX ADMIN — METOPROLOL TARTRATE 12.5 MG: 25 TABLET, FILM COATED ORAL at 20:54

## 2023-05-16 RX ADMIN — PANTOPRAZOLE SODIUM 40 MG: 40 TABLET, DELAYED RELEASE ORAL at 08:48

## 2023-05-16 RX ADMIN — THERA TABS 1 TABLET: TAB at 08:47

## 2023-05-16 RX ADMIN — METOPROLOL TARTRATE 12.5 MG: 25 TABLET, FILM COATED ORAL at 08:47

## 2023-05-16 RX ADMIN — LEVOTHYROXINE SODIUM 100 MCG: 0.05 TABLET ORAL at 06:08

## 2023-05-16 RX ADMIN — MIRTAZAPINE 45 MG: 15 TABLET, FILM COATED ORAL at 20:55

## 2023-05-16 RX ADMIN — VENLAFAXINE HYDROCHLORIDE 225 MG: 150 CAPSULE, EXTENDED RELEASE ORAL at 08:47

## 2023-05-16 RX ADMIN — ATORVASTATIN CALCIUM 20 MG: 10 TABLET, FILM COATED ORAL at 20:54

## 2023-05-16 ASSESSMENT — PAIN SCALES - GENERAL
PAINLEVEL_OUTOF10: 0
PAINLEVEL_OUTOF10: 0

## 2023-05-16 NOTE — PLAN OF CARE
Problem: Psychosis  Goal: Will report no hallucinations or delusions  Description: INTERVENTIONS:  1. Administer medication as  ordered  2. Assist with reality testing to support increasing orientation  3. Assess if patient's hallucinations or delusions are encouraging self harm or harm to others and intervene as appropriate  Outcome: Progressing     Problem: Depression  Goal: Will be euthymic at discharge  Description: INTERVENTIONS:  1. Administer medication as ordered  2. Provide emotional support via 1:1 interaction with staff  3. Encourage involvement in milieu/groups/activities  4.  Monitor for social isolation  Outcome: Progressing

## 2023-05-16 NOTE — PROGRESS NOTES
Behavioral Health Treatment Team Note         Patient goal(s) for today:   Treatment team focus/goals: Plan to continue to titrate her medications   Progress note : she denies SI, still very paranoid and guarded regarding her family and the staff at [facility]     LOS:  50  Expected LOS: TBD     Financial concerns/prescription coverage:  medicare   Family contact:  , son and niece       Family requesting physician contact today:  No  Discharge plan: she will return home   Guns in the home: no        Outpatient provider(s): [facility]     Participating treatment team members:  Sola Valladares Dr., RN

## 2023-05-16 NOTE — PROGRESS NOTES
SPIRITUALITY GROUP      Meeting Topic: Hope    Meeting Time:  45-60 minutes    Meeting Goal: Patients will define hope, reflect on hope versus eligio, and practice building hope through relationships, goals, coping and spirituality. Lisa Mota attended and participated in the group appropriately respective of current diagnosis. For additional spiritual care, please contact the  on-call at [phone]). Rev.  Marcus Westfall MDiv, MS, [facility]  Staff

## 2023-05-17 PROCEDURE — 6370000000 HC RX 637 (ALT 250 FOR IP): Performed by: PSYCHIATRY & NEUROLOGY

## 2023-05-17 PROCEDURE — 1240000000 HC EMOTIONAL WELLNESS R&B

## 2023-05-17 RX ADMIN — Medication 1000 UNITS: at 09:08

## 2023-05-17 RX ADMIN — VENLAFAXINE HYDROCHLORIDE 225 MG: 150 CAPSULE, EXTENDED RELEASE ORAL at 09:08

## 2023-05-17 RX ADMIN — METOPROLOL TARTRATE 12.5 MG: 25 TABLET, FILM COATED ORAL at 21:22

## 2023-05-17 RX ADMIN — ATORVASTATIN CALCIUM 20 MG: 10 TABLET, FILM COATED ORAL at 21:22

## 2023-05-17 RX ADMIN — CLOPIDOGREL BISULFATE 75 MG: 75 TABLET, FILM COATED ORAL at 09:08

## 2023-05-17 RX ADMIN — RISPERIDONE 4 MG: 1 TABLET ORAL at 21:22

## 2023-05-17 RX ADMIN — MIRTAZAPINE 45 MG: 15 TABLET, FILM COATED ORAL at 21:22

## 2023-05-17 RX ADMIN — METOPROLOL TARTRATE 12.5 MG: 25 TABLET, FILM COATED ORAL at 09:08

## 2023-05-17 RX ADMIN — THERA TABS 1 TABLET: TAB at 09:08

## 2023-05-17 RX ADMIN — LEVOTHYROXINE SODIUM 100 MCG: 0.05 TABLET ORAL at 06:14

## 2023-05-17 RX ADMIN — PANTOPRAZOLE SODIUM 40 MG: 40 TABLET, DELAYED RELEASE ORAL at 09:08

## 2023-05-17 ASSESSMENT — PAIN SCALES - GENERAL: PAINLEVEL_OUTOF10: 0

## 2023-05-17 NOTE — PLAN OF CARE
Problem: Depression  Goal: Will be euthymic at discharge  Description: INTERVENTIONS:  1. Administer medication as ordered  2. Provide emotional support via 1:1 interaction with staff  3. Encourage involvement in milieu/groups/activities  4. Monitor for social isolation  Outcome: Progressing     Problem: Anxiety  Goal: Will report anxiety at manageable levels  Description: INTERVENTIONS:  1. Administer medication as ordered  2. Teach and rehearse alternative coping skills  3. Provide emotional support with 1:1 interaction with staff  Outcome: Progressing     Problem: Safety - Adult  Goal: Free from fall injury  5/17/2023 1541 by Iram Snowden RN  Outcome: Progressing     Pt isolative to room except meals. Denies current SI/HI/AVH. Remains medication and meal compliant. Will continue to monitor q15 minutes for safety.

## 2023-05-17 NOTE — INTERDISCIPLINARY ROUNDS
Behavioral Health Interdisciplinary Rounds     Patient Name: Benton Clement  Age: 82 y.o. Room/Bed:  [facility]  Primary Diagnosis: <principal problem not specified>   Admission Status: Voluntary    Readmission within 30 days: no  Power of  in place: no  Patient requires a blocked bed: no            Sleep hours: 7       Participation in Care/Groups:   yes  Medication Compliant?:  yes  PRNS (last 24 hours):  none     Restraints (last 24 hours):  no  __________________________________________________  OQ Admission Analysis Survey completed:  OQ Admission Analysis Survey score:  OQ Discharge Analysis Survey completed:  OQ Discharge Analysis Survey score:   __________________________________________________     Alcohol screening (AUDIT) completed -     If applicable, date SBIRT discussed in treatment team AND documented:    Tobacco - patient is a smoker:    Illegal Drugs use:      24 hour chart check complete:  yes      _______________________________________________    Patient goal(s) for today:   Treatment team focus/goals:   Progress note:      Spiritual Care Consult:   Financial concerns/prescription coverage:    Family contact:                        Family requesting physician contact today:    Discharge plan:   Access to weapons :                                                              Outpatient provider(s):   Patient's preferred phone number for follow up call :   Patient's preferred e-mail address :    LOS:  51  Expected LOS:     Participating treatment team members:  Benton Clement, * (assigned SW),

## 2023-05-18 ENCOUNTER — APPOINTMENT (OUTPATIENT)
Facility: HOSPITAL | Age: 83
DRG: 881 | End: 2023-05-18
Attending: PSYCHIATRY & NEUROLOGY
Payer: MEDICARE

## 2023-05-18 LAB
ALBUMIN SERPL-MCNC: 3.2 G/DL (ref 3.5–5)
ALBUMIN/GLOB SERPL: 1 (ref 1.1–2.2)
ALP SERPL-CCNC: 64 U/L (ref 45–117)
ALT SERPL-CCNC: 26 U/L (ref 12–78)
ANION GAP SERPL CALC-SCNC: 6 MMOL/L (ref 5–15)
AST SERPL-CCNC: 18 U/L (ref 15–37)
BASOPHILS # BLD: 0 K/UL (ref 0–0.1)
BASOPHILS NFR BLD: 1 % (ref 0–1)
BILIRUB SERPL-MCNC: 0.4 MG/DL (ref 0.2–1)
BUN SERPL-MCNC: 16 MG/DL (ref 6–20)
BUN/CREAT SERPL: 22 (ref 12–20)
CALCIUM SERPL-MCNC: 9.3 MG/DL (ref 8.5–10.1)
CHLORIDE SERPL-SCNC: 102 MMOL/L (ref 97–108)
CO2 SERPL-SCNC: 27 MMOL/L (ref 21–32)
CREAT SERPL-MCNC: 0.73 MG/DL (ref 0.55–1.02)
CRP SERPL-MCNC: <0.29 MG/DL (ref 0–0.6)
DIFFERENTIAL METHOD BLD: ABNORMAL
EOSINOPHIL # BLD: 0.1 K/UL (ref 0–0.4)
EOSINOPHIL NFR BLD: 2 % (ref 0–7)
ERYTHROCYTE [DISTWIDTH] IN BLOOD BY AUTOMATED COUNT: 13.8 % (ref 11.5–14.5)
ERYTHROCYTE [SEDIMENTATION RATE] IN BLOOD: 21 MM/HR (ref 0–30)
GLOBULIN SER CALC-MCNC: 3.1 G/DL (ref 2–4)
GLUCOSE SERPL-MCNC: 100 MG/DL (ref 65–100)
HCT VFR BLD AUTO: 38 % (ref 35–47)
HGB BLD-MCNC: 12.3 G/DL (ref 11.5–16)
IMM GRANULOCYTES # BLD AUTO: 0.1 K/UL (ref 0–0.04)
IMM GRANULOCYTES NFR BLD AUTO: 2 % (ref 0–0.5)
LYMPHOCYTES # BLD: 1.1 K/UL (ref 0.8–3.5)
LYMPHOCYTES NFR BLD: 17 % (ref 12–49)
MCH RBC QN AUTO: 29.9 PG (ref 26–34)
MCHC RBC AUTO-ENTMCNC: 32.4 G/DL (ref 30–36.5)
MCV RBC AUTO: 92.5 FL (ref 80–99)
MONOCYTES # BLD: 0.5 K/UL (ref 0–1)
MONOCYTES NFR BLD: 7 % (ref 5–13)
NEUTS SEG # BLD: 4.6 K/UL (ref 1.8–8)
NEUTS SEG NFR BLD: 71 % (ref 32–75)
NRBC # BLD: 0 K/UL (ref 0–0.01)
NRBC BLD-RTO: 0 PER 100 WBC
PLATELET # BLD AUTO: 180 K/UL (ref 150–400)
PMV BLD AUTO: 10 FL (ref 8.9–12.9)
POTASSIUM SERPL-SCNC: 3.8 MMOL/L (ref 3.5–5.1)
PROT SERPL-MCNC: 6.3 G/DL (ref 6.4–8.2)
RBC # BLD AUTO: 4.11 M/UL (ref 3.8–5.2)
SODIUM SERPL-SCNC: 135 MMOL/L (ref 136–145)
WBC # BLD AUTO: 6.5 K/UL (ref 3.6–11)

## 2023-05-18 PROCEDURE — 6370000000 HC RX 637 (ALT 250 FOR IP): Performed by: PSYCHIATRY & NEUROLOGY

## 2023-05-18 PROCEDURE — 73718 MRI LOWER EXTREMITY W/O DYE: CPT

## 2023-05-18 PROCEDURE — 85025 COMPLETE CBC W/AUTO DIFF WBC: CPT

## 2023-05-18 PROCEDURE — 80053 COMPREHEN METABOLIC PANEL: CPT

## 2023-05-18 PROCEDURE — 1240000000 HC EMOTIONAL WELLNESS R&B

## 2023-05-18 PROCEDURE — 85652 RBC SED RATE AUTOMATED: CPT

## 2023-05-18 PROCEDURE — 86140 C-REACTIVE PROTEIN: CPT

## 2023-05-18 PROCEDURE — 36415 COLL VENOUS BLD VENIPUNCTURE: CPT

## 2023-05-18 RX ADMIN — VENLAFAXINE HYDROCHLORIDE 225 MG: 150 CAPSULE, EXTENDED RELEASE ORAL at 09:22

## 2023-05-18 RX ADMIN — METOPROLOL TARTRATE 12.5 MG: 25 TABLET, FILM COATED ORAL at 09:21

## 2023-05-18 RX ADMIN — CLOPIDOGREL BISULFATE 75 MG: 75 TABLET, FILM COATED ORAL at 09:21

## 2023-05-18 RX ADMIN — Medication 1000 UNITS: at 09:21

## 2023-05-18 RX ADMIN — MIRTAZAPINE 45 MG: 15 TABLET, FILM COATED ORAL at 21:32

## 2023-05-18 RX ADMIN — LEVOTHYROXINE SODIUM 100 MCG: 0.05 TABLET ORAL at 06:37

## 2023-05-18 RX ADMIN — METOPROLOL TARTRATE 12.5 MG: 25 TABLET, FILM COATED ORAL at 21:33

## 2023-05-18 RX ADMIN — RISPERIDONE 4 MG: 1 TABLET ORAL at 21:33

## 2023-05-18 RX ADMIN — PANTOPRAZOLE SODIUM 40 MG: 40 TABLET, DELAYED RELEASE ORAL at 09:23

## 2023-05-18 RX ADMIN — THERA TABS 1 TABLET: TAB at 09:21

## 2023-05-18 RX ADMIN — ATORVASTATIN CALCIUM 20 MG: 10 TABLET, FILM COATED ORAL at 21:33

## 2023-05-18 ASSESSMENT — PAIN SCALES - GENERAL
PAINLEVEL_OUTOF10: 0
PAINLEVEL_OUTOF10: 0

## 2023-05-18 NOTE — PLAN OF CARE
Problem: Safety - Adult  Goal: Free from fall injury  5/18/2023 0035 by Emir Cortes LPN  Outcome: Progressing     Pt received resting in bed with eyes closed. Breathing is even and unlabored. Walkways are free and clear from clutter.

## 2023-05-18 NOTE — PROGRESS NOTES
Physical Therapy Note  05/18/2023    Order acknowledged and chart reviewed in prep for for PT eval. Spoke with RN who reports no functional changes; pt remains up ad sung with steady gait using RW. No c/o pain, xrays negative following recent unwitnessed ground level fall. Pt's current functional status reamins consistent with last PT eval/dc on 3/28/23. Formal skilled PT re-eval not indicated at this time, will complete orders and sign off.      Thank you,  Lisa Fallon, PT, DPT

## 2023-05-18 NOTE — PROGRESS NOTES
Hospitalist Progress Note  TAMMY Ag NP  Answering service:         Date of Service:  2023  NAME:  Dali Ledesma  :  1940  MRN:  [id number]    Admission Summary:     Ms. Louisa Tellez is a 82 y.o. female with a past medical history of depression, SI with tramadol overdose, hypothyroidism, GERD, vertigo, coronary artery disease, arthritis, hyperlipidemia, hypertension, obesity. She presented to [id number] [place] [address] ED and was admitted from 3/19-3/27 for acute encephalopathy which has since resolved. She was transferred to [facility] behavioral health unit on 3/27. Hospitalist service consulted for ground-level fall which occurred on . Per provider note : Discussed ground-level fall with patient, she complains of mild pain and bruising around lateral malleolus. She states she fractured her fifth metatarsal in October when she twisted her ankle walking down the stairs. She denies history of falls and does not use walker or cane at home, but has been using a walker recently at [facility].  she states she was trying to get out of the chair when her knees gave out, she landed on her knees, which has some bruising otherwise not painful. She states her fifth metatarsal has not really healed since October, and the pain is worse since her fall . She denies dizziness or loss of consciousness, no history of falls. Inquired about bilateral lower extremities, she states she has had this for years and that it is fat that seems to run in her family causing large BLE, not fluid. Interval history / Subjective:        Patient was seen and examined this morning, she was lying in bed in no acute distress. Cooperative and interactive with assessment.   She reports no acute events since seen by the nurse practitioner last night, discussed some of the lab results (which have been normal) thus

## 2023-05-18 NOTE — INTERDISCIPLINARY ROUNDS
Behavioral Health Treatment Team Note         Patient goal(s) for today: Communicate needs to staff   Treatment team focus/goals: Assess pt, manage medications, discharge planning   Progress note: Pts mood and affect have brightened. Family has visited pt. Pt scheduled for MRI. Sw working on [facility] referral for ECT.    LOS:  13                  Expected LOS: TBD     Financial concerns/prescription coverage: No  Family contact:    Josep Hugo, Son                            Family requesting physician contact today: No  Discharge plan: TBD   Guns in the home: No                                                      Outpatient provider(s):     Participating treatment team members:   Pako Torres MSW, Fransico Garcia RN, Melvin Fabry, Dr. Ned Davis

## 2023-05-18 NOTE — PLAN OF CARE
Problem: Depression  Goal: Will be euthymic at discharge  Description: INTERVENTIONS:  1. Administer medication as ordered  2. Provide emotional support via 1:1 interaction with staff  3. Encourage involvement in milieu/groups/activities  4. Monitor for social isolation  Outcome: Progressing  Note: Out on unit for meals, meds and exercise. Social w roommate frequently. Using humor with staff this afternoon. Denies SI, no self harming behaviors. No paranoid statements made this shift. Will continue to offer support and reassurance. Problem: Psychosis  Goal: Will report no hallucinations or delusions  Description: INTERVENTIONS:  1. Administer medication as  ordered  2. Assist with reality testing to support increasing orientation  3.  Assess if patient's hallucinations or delusions are encouraging self harm or harm to others and intervene as appropriate  Outcome: Progressing     Problem: Safety - Adult  Goal: Free from fall injury  5/18/2023 0035 by Michael Saucedo LPN  Outcome: Progressing

## 2023-05-18 NOTE — CONSULTS
TABLET BY MOUTH EVERY DAY IN THE EVENING 12/16/22   Ar Automatic Reconciliation   busPIRone (BUSPAR) 10 MG tablet Take 1 tablet by mouth 2 times daily 1/4/23   Ar Automatic Reconciliation   vitamin D (CHOLECALCIFEROL) 25 MCG (1000 UT) TABS tablet Take 1 tablet by mouth daily    Ar Automatic Reconciliation   estradiol (ESTRACE) 0.1 MG/GM vaginal cream Place 2 g vaginally once a week ceived the following from [facility]: Outside name: estradioL (ESTRACE) 0.01 % (0.1 mg/gram) vaginal cream 2/28/22   Ar Automatic Reconciliation   levothyroxine (SYNTHROID) 100 MCG tablet Take 1 tablet by mouth Daily 12/2/22   Ar Automatic Reconciliation   mirtazapine (REMERON) 15 MG tablet Take 1 tablet by mouth nightly    Ar Automatic Reconciliation   Simethicone 125 MG CAPS Take 1 capsule by mouth 4 times daily as needed    Ar Automatic Reconciliation     Allergies   Allergen Reactions    Amlodipine      Other reaction(s): Unknown (comments)    Codeine Nausea Only    Erythromycin Other (See Comments)     GI upset    Losartan Swelling     Lip swelling    Meloxicam Swelling     Lip swelling    Penicillins Hives     Fainted    Epinephrine Palpitations      Family History   Problem Relation Age of Onset    Dementia Mother         vascular    Elevated Lipids Mother     Coronary Art Dis Mother     Osteoarthritis Mother     Diabetes Mother     Gall Bladder Disease Mother     Heart Disease Mother     Diabetes Paternal Aunt     Hypertension Maternal Aunt     Gall Bladder Disease Maternal Aunt     Diabetes Maternal Aunt     Diabetes Paternal Grandmother     Cancer Maternal Grandmother         pancreatic    Diabetes Maternal Grandmother     No Known Problems Son     Hypertension Son     Obesity Son     Stroke Mother     Hypertension Mother     Hypertension Maternal Aunt       Social History:  reports that she has never smoked.  She has never used smokeless tobacco. She reports that she does not drink alcohol and does not use

## 2023-05-19 PROCEDURE — 1240000000 HC EMOTIONAL WELLNESS R&B

## 2023-05-19 PROCEDURE — 6370000000 HC RX 637 (ALT 250 FOR IP): Performed by: PSYCHIATRY & NEUROLOGY

## 2023-05-19 RX ADMIN — Medication 1000 UNITS: at 08:50

## 2023-05-19 RX ADMIN — LEVOTHYROXINE SODIUM 100 MCG: 0.05 TABLET ORAL at 06:07

## 2023-05-19 RX ADMIN — VENLAFAXINE HYDROCHLORIDE 225 MG: 150 CAPSULE, EXTENDED RELEASE ORAL at 08:50

## 2023-05-19 RX ADMIN — THERA TABS 1 TABLET: TAB at 08:50

## 2023-05-19 RX ADMIN — RISPERIDONE 5 MG: 3 TABLET ORAL at 20:50

## 2023-05-19 RX ADMIN — MIRTAZAPINE 45 MG: 15 TABLET, FILM COATED ORAL at 20:51

## 2023-05-19 RX ADMIN — ATORVASTATIN CALCIUM 20 MG: 10 TABLET, FILM COATED ORAL at 20:52

## 2023-05-19 RX ADMIN — METOPROLOL TARTRATE 12.5 MG: 25 TABLET, FILM COATED ORAL at 20:51

## 2023-05-19 RX ADMIN — PANTOPRAZOLE SODIUM 40 MG: 40 TABLET, DELAYED RELEASE ORAL at 08:50

## 2023-05-19 RX ADMIN — CLOPIDOGREL BISULFATE 75 MG: 75 TABLET, FILM COATED ORAL at 08:51

## 2023-05-19 RX ADMIN — METOPROLOL TARTRATE 12.5 MG: 25 TABLET, FILM COATED ORAL at 08:50

## 2023-05-19 ASSESSMENT — PAIN SCALES - GENERAL
PAINLEVEL_OUTOF10: 0

## 2023-05-19 NOTE — PLAN OF CARE
Problem: Depression  Goal: Will be euthymic at discharge  Description: INTERVENTIONS:  1. Administer medication as ordered  2. Provide emotional support via 1:1 interaction with staff  3. Encourage involvement in milieu/groups/activities  4. Monitor for social isolation  5/19/2023 1606 by Netta Quiñones RN  Outcome: Progressing     Problem: Psychosis  Goal: Will report no hallucinations or delusions  Description: INTERVENTIONS:  1. Administer medication as  ordered  2. Assist with reality testing to support increasing orientation  3. Assess if patient's hallucinations or delusions are encouraging self harm or harm to others and intervene as appropriate  5/19/2023 1606 by Netta Quiñones RN  Outcome: Progressing     Problem: Anxiety  Goal: Will report anxiety at manageable levels  Description: INTERVENTIONS:  1. Administer medication as ordered  2. Teach and rehearse alternative coping skills  3. Provide emotional support with 1:1 interaction with staff  Outcome: Progressing     Problem: Safety - Adult  Goal: Free from fall injury  5/19/2023 1606 by Netta Quiñones RN  Outcome: Progressing    Pt out on unit engaged with peers. Denies current SI/HI/AVH. Remains medication and meal compliant. Will continue to monitor q15 minutes for safety.

## 2023-05-19 NOTE — PLAN OF CARE
Problem: Depression  Goal: Will be euthymic at discharge  Description: INTERVENTIONS:  1. Administer medication as ordered  2. Provide emotional support via 1:1 interaction with staff  3. Encourage involvement in milieu/groups/activities  4. Monitor for social isolation  5/19/2023 0935 by Jerilyn Guerra RN  Outcome: Progressing  5/19/2023 0933 by Jerilyn Guerra RN  Outcome: Progressing     Problem: Psychosis  Goal: Will report no hallucinations or delusions  Description: INTERVENTIONS:  1. Administer medication as  ordered  2. Assist with reality testing to support increasing orientation  3. Assess if patient's hallucinations or delusions are encouraging self harm or harm to others and intervene as appropriate  5/19/2023 0935 by Jerilyn Guerra RN  Outcome: Progressing  5/19/2023 0933 by Jerilyn Guerra RN  Note: Out on unit brighter affect, mood stable. Continues to report thoughts that she is not wanted by peers/staff and family. Plan for ECT at [address] [place] [address] remains in place. Increase risperdal. Denies SI, no self harming behaviors. Staff focus is on offering support. Fall risk: gait steady using walker.  Verbalizes and demosntrates understanding of fall prevention education     Problem: Safety - Adult  Goal: Free from fall injury  Outcome: Progressing

## 2023-05-19 NOTE — PROGRESS NOTES
Hospitalist Progress Note  TAMMY Collins NP  Answering service: [phone]        Date of Service:  2023  NAME:  Branden Chase  :  1940  MRN:  [id number]    Admission Summary:     Ms. Panchito Field is a 82 y.o. female with a past medical history of depression, SI with tramadol overdose, hypothyroidism, GERD, vertigo, coronary artery disease, arthritis, hyperlipidemia, hypertension, obesity. She presented to [facility] ED and was admitted from 3/19-3/27 for acute encephalopathy which has since resolved. She was transferred to [facility] behavioral health unit on 3/27. Hospitalist service consulted for ground-level fall which occurred on . Per provider note : Discussed ground-level fall with patient, she complains of mild pain and bruising around lateral malleolus. She states she fractured her fifth metatarsal in October when she twisted her ankle walking down the stairs. She denies history of falls and does not use walker or cane at home, but has been using a walker recently at [facility].  she states she was trying to get out of the chair when her knees gave out, she landed on her knees, which has some bruising otherwise not painful. She states her fifth metatarsal has not really healed since October, and the pain is worse since her fall . She denies dizziness or loss of consciousness, no history of falls. Inquired about bilateral lower extremities, she states she has had this for years and that it is fat that seems to run in her family causing large BLE, not fluid. Interval history / Subjective:        Patient was seen and examined, she was standing up ambulating around her room putting another shirt on. Updated her on recent lab work which was in within normal limits, no plans for MRI of her foot.   She did appear to be ambulating well on her foot though reports she wants

## 2023-05-19 NOTE — CARE COORDINATION
SW updated her niece (family coordinator ) regarding possible discharge on Monday to [facility] for ECT.

## 2023-05-20 PROCEDURE — 1240000000 HC EMOTIONAL WELLNESS R&B

## 2023-05-20 PROCEDURE — 6370000000 HC RX 637 (ALT 250 FOR IP): Performed by: PSYCHIATRY & NEUROLOGY

## 2023-05-20 RX ADMIN — CLOPIDOGREL BISULFATE 75 MG: 75 TABLET, FILM COATED ORAL at 08:22

## 2023-05-20 RX ADMIN — PANTOPRAZOLE SODIUM 40 MG: 40 TABLET, DELAYED RELEASE ORAL at 08:22

## 2023-05-20 RX ADMIN — ATORVASTATIN CALCIUM 20 MG: 10 TABLET, FILM COATED ORAL at 21:07

## 2023-05-20 RX ADMIN — MIRTAZAPINE 45 MG: 15 TABLET, FILM COATED ORAL at 22:00

## 2023-05-20 RX ADMIN — Medication 1000 UNITS: at 08:22

## 2023-05-20 RX ADMIN — LEVOTHYROXINE SODIUM 100 MCG: 0.05 TABLET ORAL at 06:31

## 2023-05-20 RX ADMIN — VENLAFAXINE HYDROCHLORIDE 225 MG: 150 CAPSULE, EXTENDED RELEASE ORAL at 08:22

## 2023-05-20 RX ADMIN — METOPROLOL TARTRATE 12.5 MG: 25 TABLET, FILM COATED ORAL at 21:07

## 2023-05-20 RX ADMIN — METOPROLOL TARTRATE 12.5 MG: 25 TABLET, FILM COATED ORAL at 08:22

## 2023-05-20 RX ADMIN — THERA TABS 1 TABLET: TAB at 08:22

## 2023-05-20 RX ADMIN — RISPERIDONE 5 MG: 3 TABLET ORAL at 21:07

## 2023-05-20 NOTE — PLAN OF CARE
Problem: Safety - Adult  Goal: Free from fall injury  5/20/2023 0317 by Elsie Minaya RN  Outcome: Progressing  5/19/2023 1606 by Jameson Laureano RN  Outcome: Progressing      Room to remain free of clutter,and  night light (floor light)to remain on. Medication Interventions: Teach patient to arise slowly. At present:patient resting with eyes closed ,resp even regular and unlabored. No distress noted. Q 15 min checks are ongoing for safety.

## 2023-05-20 NOTE — PLAN OF CARE
Problem: Depression  Goal: Will be euthymic at discharge  Description: INTERVENTIONS:  1. Administer medication as ordered  2. Provide emotional support via 1:1 interaction with staff  3. Encourage involvement in milieu/groups/activities  4.  Monitor for social isolation  Outcome: Progressing

## 2023-05-20 NOTE — PLAN OF CARE
Problem: Anxiety  Goal: Will report anxiety at manageable levels  Description: INTERVENTIONS:  1. Administer medication as ordered  2. Teach and rehearse alternative coping skills  3. Provide emotional support with 1:1 interaction with staff  Outcome: Progressing  Flowsheets (Taken 5/20/2023 1412)  Will report anxiety at manageable levels: Administer medication as ordered  Note: Pt participated in treatment team. Feels mood is \"the same\". No thoughts of self harm. Out on the unit and interacting with staff. Ate meals and taking medications. Stated her appetite is \"good\".

## 2023-05-21 VITALS
DIASTOLIC BLOOD PRESSURE: 79 MMHG | HEIGHT: 64 IN | BODY MASS INDEX: 24.24 KG/M2 | RESPIRATION RATE: 16 BRPM | OXYGEN SATURATION: 97 % | WEIGHT: 142 LBS | TEMPERATURE: 98.1 F | SYSTOLIC BLOOD PRESSURE: 131 MMHG | HEART RATE: 65 BPM

## 2023-05-21 LAB
SARS-COV-2 RDRP RESP QL NAA+PROBE: NOT DETECTED
SOURCE: NORMAL

## 2023-05-21 PROCEDURE — 6370000000 HC RX 637 (ALT 250 FOR IP): Performed by: PSYCHIATRY & NEUROLOGY

## 2023-05-21 PROCEDURE — 1240000000 HC EMOTIONAL WELLNESS R&B

## 2023-05-21 PROCEDURE — 87635 SARS-COV-2 COVID-19 AMP PRB: CPT

## 2023-05-21 RX ADMIN — PANTOPRAZOLE SODIUM 40 MG: 40 TABLET, DELAYED RELEASE ORAL at 08:08

## 2023-05-21 RX ADMIN — LEVOTHYROXINE SODIUM 100 MCG: 0.05 TABLET ORAL at 06:20

## 2023-05-21 RX ADMIN — MIRTAZAPINE 45 MG: 15 TABLET, FILM COATED ORAL at 21:20

## 2023-05-21 RX ADMIN — ATORVASTATIN CALCIUM 20 MG: 10 TABLET, FILM COATED ORAL at 20:41

## 2023-05-21 RX ADMIN — Medication 1000 UNITS: at 08:08

## 2023-05-21 RX ADMIN — METOPROLOL TARTRATE 12.5 MG: 25 TABLET, FILM COATED ORAL at 20:41

## 2023-05-21 RX ADMIN — ESTRADIOL 2 G: 0.1 CREAM VAGINAL at 21:19

## 2023-05-21 RX ADMIN — RISPERIDONE 5 MG: 3 TABLET ORAL at 20:41

## 2023-05-21 RX ADMIN — VENLAFAXINE HYDROCHLORIDE 225 MG: 150 CAPSULE, EXTENDED RELEASE ORAL at 08:05

## 2023-05-21 RX ADMIN — METOPROLOL TARTRATE 12.5 MG: 25 TABLET, FILM COATED ORAL at 08:05

## 2023-05-21 RX ADMIN — CLOPIDOGREL BISULFATE 75 MG: 75 TABLET, FILM COATED ORAL at 08:08

## 2023-05-21 RX ADMIN — THERA TABS 1 TABLET: TAB at 08:08

## 2023-05-21 ASSESSMENT — PAIN - FUNCTIONAL ASSESSMENT: PAIN_FUNCTIONAL_ASSESSMENT: NONE - DENIES PAIN

## 2023-05-21 ASSESSMENT — PAIN SCALES - GENERAL: PAINLEVEL_OUTOF10: 0

## 2023-05-22 PROCEDURE — 6370000000 HC RX 637 (ALT 250 FOR IP): Performed by: PSYCHIATRY & NEUROLOGY

## 2023-05-22 PROCEDURE — 1240000000 HC EMOTIONAL WELLNESS R&B

## 2023-05-22 RX ADMIN — ATORVASTATIN CALCIUM 20 MG: 10 TABLET, FILM COATED ORAL at 21:17

## 2023-05-22 RX ADMIN — METOPROLOL TARTRATE 12.5 MG: 25 TABLET, FILM COATED ORAL at 21:17

## 2023-05-22 RX ADMIN — LEVOTHYROXINE SODIUM 100 MCG: 0.05 TABLET ORAL at 06:04

## 2023-05-22 RX ADMIN — Medication 1000 UNITS: at 08:37

## 2023-05-22 RX ADMIN — CLOPIDOGREL BISULFATE 75 MG: 75 TABLET, FILM COATED ORAL at 08:37

## 2023-05-22 RX ADMIN — PANTOPRAZOLE SODIUM 40 MG: 40 TABLET, DELAYED RELEASE ORAL at 08:37

## 2023-05-22 RX ADMIN — THERA TABS 1 TABLET: TAB at 08:37

## 2023-05-22 RX ADMIN — RISPERIDONE 5 MG: 3 TABLET ORAL at 21:17

## 2023-05-22 RX ADMIN — MIRTAZAPINE 45 MG: 15 TABLET, FILM COATED ORAL at 21:17

## 2023-05-22 RX ADMIN — METOPROLOL TARTRATE 12.5 MG: 25 TABLET, FILM COATED ORAL at 08:36

## 2023-05-22 RX ADMIN — VENLAFAXINE HYDROCHLORIDE 225 MG: 150 CAPSULE, EXTENDED RELEASE ORAL at 08:36

## 2023-05-22 ASSESSMENT — PAIN SCALES - GENERAL
PAINLEVEL_OUTOF10: 0
PAINLEVEL_OUTOF10: 0

## 2023-05-22 NOTE — PLAN OF CARE
Problem: Anxiety  Goal: Will report anxiety at manageable levels  Description: INTERVENTIONS:  1. Administer medication as ordered  2. Teach and rehearse alternative coping skills  3. Provide emotional support with 1:1 interaction with staff  Outcome: Progressing  Flowsheets  Taken 5/22/2023 1556 by Zenaida Beal RN  Will report anxiety at manageable levels: Administer medication as ordered    Problem: Pain  Goal: Verbalizes/displays adequate comfort level or baseline comfort level  Outcome: Progressing    Administer medication as ordered   Provide emotional support with 1:1 interaction with staff   Problem: Depression  Goal: Will be euthymic at discharge  Description: INTERVENTIONS:  1. Administer medication as ordered  2. Provide emotional support via 1:1 interaction with staff  3. Encourage involvement in milieu/groups/activities  4.  Monitor for social isolation  Outcome: Progressing

## 2023-05-22 NOTE — INTERDISCIPLINARY ROUNDS
Behavioral Health Interdisciplinary Rounds     Patient Name: Holley Londono  Age: 82 y.o. Room/Bed:  [facility]  Primary Diagnosis: <principal problem not specified>   Admission Status: Voluntary    Readmission within 30 days: no  Power of  in place: no  Patient requires a blocked bed: no            Sleep hours: 7       Participation in Care/Groups:   yes  Medication Compliant?:  yes  PRNS (last 24 hours):  none     Restraints (last 24 hours):  no  __________________________________________________  OQ Admission Analysis Survey completed:  OQ Admission Analysis Survey score:  OQ Discharge Analysis Survey completed:  OQ Discharge Analysis Survey score:   __________________________________________________     Alcohol screening (AUDIT) completed -     If applicable, date SBIRT discussed in treatment team AND documented:    Tobacco - patient is a smoker:    Illegal Drugs use:      24 hour chart check complete:  yes      _______________________________________________    Patient goal(s) for today:   Treatment team focus/goals:   Progress note:      Spiritual Care Consult:   Financial concerns/prescription coverage:    Family contact:                        Family requesting physician contact today:    Discharge plan:   Access to weapons :                                                              Outpatient provider(s):   Patient's preferred phone number for follow up call :   Patient's preferred e-mail address :    LOS:  56  Expected LOS:     Participating treatment team members:  Amy Flynn Dr.

## 2023-05-23 PROCEDURE — 1240000000 HC EMOTIONAL WELLNESS R&B

## 2023-05-23 PROCEDURE — 6370000000 HC RX 637 (ALT 250 FOR IP): Performed by: PSYCHIATRY & NEUROLOGY

## 2023-05-23 RX ORDER — ATORVASTATIN CALCIUM 20 MG/1
20 TABLET, FILM COATED ORAL
Qty: 30 TABLET | Refills: 0 | Status: SHIPPED
Start: 2023-05-23 | End: 2023-06-22

## 2023-05-23 RX ORDER — VENLAFAXINE HYDROCHLORIDE 150 MG/1
300 CAPSULE, EXTENDED RELEASE ORAL
Status: DISCONTINUED | OUTPATIENT
Start: 2023-05-24 | End: 2023-05-24 | Stop reason: HOSPADM

## 2023-05-23 RX ORDER — PANTOPRAZOLE SODIUM 40 MG/1
40 TABLET, DELAYED RELEASE ORAL DAILY
Qty: 30 TABLET | Refills: 0 | Status: SHIPPED
Start: 2023-05-24 | End: 2023-06-23

## 2023-05-23 RX ORDER — RISPERIDONE 1 MG/1
5 TABLET ORAL NIGHTLY
Qty: 150 TABLET | Refills: 0 | Status: SHIPPED
Start: 2023-05-23 | End: 2023-06-22

## 2023-05-23 RX ORDER — LEVOTHYROXINE SODIUM 0.1 MG/1
100 TABLET ORAL DAILY
Qty: 30 TABLET | Refills: 0 | Status: SHIPPED
Start: 2023-05-24 | End: 2023-06-23

## 2023-05-23 RX ORDER — VENLAFAXINE HYDROCHLORIDE 150 MG/1
300 CAPSULE, EXTENDED RELEASE ORAL
Qty: 60 CAPSULE | Refills: 0 | Status: SHIPPED
Start: 2023-05-24 | End: 2023-06-23

## 2023-05-23 RX ORDER — MIRTAZAPINE 45 MG/1
45 TABLET, FILM COATED ORAL
Qty: 30 TABLET | Refills: 0 | Status: SHIPPED
Start: 2023-05-23 | End: 2023-06-22

## 2023-05-23 RX ORDER — CHOLECALCIFEROL (VITAMIN D3) 25 MCG
1000 TABLET ORAL DAILY
Qty: 30 TABLET | Refills: 0 | Status: SHIPPED
Start: 2023-05-24 | End: 2023-06-23

## 2023-05-23 RX ADMIN — PANTOPRAZOLE SODIUM 40 MG: 40 TABLET, DELAYED RELEASE ORAL at 09:19

## 2023-05-23 RX ADMIN — ATORVASTATIN CALCIUM 20 MG: 10 TABLET, FILM COATED ORAL at 21:52

## 2023-05-23 RX ADMIN — LEVOTHYROXINE SODIUM 100 MCG: 0.05 TABLET ORAL at 06:24

## 2023-05-23 RX ADMIN — MIRTAZAPINE 45 MG: 15 TABLET, FILM COATED ORAL at 21:52

## 2023-05-23 RX ADMIN — METOPROLOL TARTRATE 12.5 MG: 25 TABLET, FILM COATED ORAL at 21:51

## 2023-05-23 RX ADMIN — Medication 1000 UNITS: at 09:18

## 2023-05-23 RX ADMIN — THERA TABS 1 TABLET: TAB at 09:18

## 2023-05-23 RX ADMIN — RISPERIDONE 5 MG: 3 TABLET ORAL at 21:52

## 2023-05-23 RX ADMIN — METOPROLOL TARTRATE 12.5 MG: 25 TABLET, FILM COATED ORAL at 09:17

## 2023-05-23 RX ADMIN — VENLAFAXINE HYDROCHLORIDE 225 MG: 150 CAPSULE, EXTENDED RELEASE ORAL at 09:18

## 2023-05-23 RX ADMIN — CLOPIDOGREL BISULFATE 75 MG: 75 TABLET, FILM COATED ORAL at 09:18

## 2023-05-23 NOTE — PROGRESS NOTES
Referral source:  initiated due to [employer] length of stay Shaylee BEAR. I reviewed the patient's medical record prior to this encounter. Honored pt's request for a Bible and devotional. No other spiritual needs were identified during this visit. Outcome: [employer] identified meaningful resources and expressed appreciation for spiritual care provided during this encounter. Plan of Care: [employer] is aware of 's availability and was encouraged to request support as needed. For additional spiritual care, please contact the  on-call at ([phone]). Rev.  Cheri Parikh MDiv, MS, [facility]  Staff

## 2023-05-23 NOTE — PLAN OF CARE
Problem: Safety - Adult  Goal: Free from fall injury  Outcome: Progressing   Patient received, appears to be asleep, eyes closed, breathing even and unlabored. No signs of distress noted. Will continue to monitor for safety.

## 2023-05-23 NOTE — DISCHARGE SUMMARY
DISCHARGE SUMMARY    Some parts of the discharge summary are from the initial Psychiatric interview that was done on admission by the admitting psychiatrist.     Date of Admission: 3/27/2023    Date of Discharge: 5/23/2023     TYPE OF DISCHARGE:   REGULAR -  YES      ADMISSION EVALUATION:  CHIEF COMPLAINT:  \"Not feeling well. \"     HISTORY OF PRESENTING COMPLAINT:  Fernando Moore is a [de-identified] y.o. / female who is currently admitted to the acute/general side of 7th floor behavioral health Unit at [facility].      Mrs. Janet Last was transferred from medicine upon being medically cleared after she was admitted on 3/19 with intentional overdose on Tramadol. Upon my evaluation, she was awake, alert x3. She thought it was 27th of March, otherwise she was oriented to self, place, situation, month and year. She appeared with significant psychomotor retardation. Nursing report that patient presented with significant anxiety, having difficulty making decisions about simple matters, including eating and deciding whether or not to enter or exit her room. Patient has struggled with depression since 82. She reports being on remeron and that it made her function, but not 'happy.'  Emily Juliocesar says that in the past two weeks describes her appetite being decreased and that she has lost weight. She feels that food doesn't taste as pleasurable. She describes poor sleep lately. She tells me that she feels hopeless and that her situation is not getting any better. This culminated to low frustration tolerance, and says that she heard what her family were talking about in terms of 'what they were going to do with her.' As a result she said that she decided to take an overdose in order 'to escape.' She denies that this was a planned event. She says it was impulsive.                 Allergies   Allergen Reactions    Amlodipine Unknown (comments)    Codeine Nausea Only    Epinephrine Palpitations

## 2023-05-23 NOTE — PLAN OF CARE
Pt calm, cooperative, more verbal and engaging than in previous times of working with patient. Denies S/I, H/I, A/H and V/H. Visible on unit. Med/Meal Compliant. Anxious, depressed, subdued      Problem: Depression  Goal: Will be euthymic at discharge  Description: INTERVENTIONS:  1. Administer medication as ordered  2. Provide emotional support via 1:1 interaction with staff  3. Encourage involvement in milieu/groups/activities  4. Monitor for social isolation  Outcome: Progressing     Problem: Psychosis  Goal: Will report no hallucinations or delusions  Description: INTERVENTIONS:  1. Administer medication as  ordered  2. Assist with reality testing to support increasing orientation  3. Assess if patient's hallucinations or delusions are encouraging self harm or harm to others and intervene as appropriate  Outcome: Progressing     Problem: Anxiety  Goal: Will report anxiety at manageable levels  Description: INTERVENTIONS:  1. Administer medication as ordered  2. Teach and rehearse alternative coping skills  3.  Provide emotional support with 1:1 interaction with staff  Outcome: Progressing  Flowsheets (Taken 5/23/2023 0738)  Will report anxiety at manageable levels:   Administer medication as ordered   Teach and rehearse alternative coping skills   Provide emotional support with 1:1 interaction with staff     Problem: Safety - Adult  Goal: Free from fall injury  5/23/2023 0129 by Rey Ca RN  Outcome: Progressing     Problem: Discharge Planning  Goal: Discharge to home or other facility with appropriate resources  Recent Flowsheet Documentation  Taken 5/23/2023 0738 by Alanna Kanner, RN  Discharge to home or other facility with appropriate resources:   Identify barriers to discharge with patient and caregiver   Arrange for needed discharge resources and transportation as appropriate   Identify discharge learning needs (meds, wound care, etc)   Arrange for interpreters to assist at discharge as needed

## 2023-05-23 NOTE — INTERDISCIPLINARY ROUNDS
Behavioral Health Interdisciplinary Rounds     Patient Name: Rico Fortune  Age: 82 y.o. Room/Bed:  [facility]  Primary Diagnosis: <principal problem not specified>   Admission Status: Voluntary    Readmission within 30 days: no  Power of  in place: no  Patient requires a blocked bed: no          Reason for blocked bed:   Sleep hours: 7      Participation in Care/Groups:  n/a  Medication Compliant?:  yes  PRNS (last 24 hours): none    Restraints (last 24 hours):  no  __________________________________________________  OQ Admission Analysis Survey completed:  OQ Admission Analysis Survey score:  OQ Discharge Analysis Survey completed:  OQ Discharge Analysis Survey score:   __________________________________________________     Alcohol screening (AUDIT) completed -     If applicable, date SBIRT discussed in treatment team AND documented:    Tobacco - patient is a smoker:    Illegal Drugs use:      24 hour chart check complete:     _______________________________________________    Patient goal(s) for today:   Treatment team focus/goals: Plan to transfer to [facility] for ECT   Progress note:      Spiritual Care Consult:   Financial concerns/prescription coverage:    Family contact:                        Family requesting physician contact today:    Discharge plan:   Access to weapons :                                                              Outpatient provider(s):   Patient's preferred phone number for follow up call :   Patient's preferred e-mail address :    LOS:  57  Expected LOS: today     Participating treatment team members:  Mortimer Seed

## 2023-05-23 NOTE — PLAN OF CARE
Problem: Depression  Goal: Will be euthymic at discharge  Description: INTERVENTIONS:  1. Administer medication as ordered  2. Provide emotional support via 1:1 interaction with staff  3. Encourage involvement in milieu/groups/activities  4. Monitor for social isolation  5/23/2023 1826 by Nik Bradford RN  Outcome: Progressing     Problem: Psychosis  Goal: Will report no hallucinations or delusions  Description: INTERVENTIONS:  1. Administer medication as  ordered  2. Assist with reality testing to support increasing orientation  3. Assess if patient's hallucinations or delusions are encouraging self harm or harm to others and intervene as appropriate  5/23/2023 1826 by Nik Bradford RN  Outcome: Progressing     Problem: Anxiety  Goal: Will report anxiety at manageable levels  Description: INTERVENTIONS:  1. Administer medication as ordered  2. Teach and rehearse alternative coping skills  3. Provide emotional support with 1:1 interaction with staff  5/23/2023 1826 by Nik Bradford RN  Outcome: Progressing     Problem: Safety - Adult  Goal: Free from fall injury  Outcome: Progressing   Pt out on unit engaged with peers. Denies current SI/HI/AVH. Remains medication and meal complaint. Will continue to monitor q15 minutes for safety.

## 2023-05-23 NOTE — PROGRESS NOTES
Behavioral Services                                              Medicare Re-Certification    I certify that the inpatient psychiatric hospital services furnished since the previous certification/re-certification were, and continue to be, medically necessary for;    [x] (1) Treatment which could reasonably be expected to improve the patient's condition,    [] (2) Or for diagnostic study. Estimated length of stay/service 3-5 days    Plan for post-hospital care Inpatient ECT at [facility]    This patient continues to need, on a daily basis, active treatment furnished directly by or requiring the supervision of inpatient psychiatric personnel.     Electronically signed by Sahara Yepez MD on 5/23/2023 at 11:03 AM

## 2023-05-24 VITALS
HEART RATE: 61 BPM | TEMPERATURE: 97.8 F | HEIGHT: 64 IN | BODY MASS INDEX: 24.24 KG/M2 | WEIGHT: 142 LBS | RESPIRATION RATE: 15 BRPM | SYSTOLIC BLOOD PRESSURE: 155 MMHG | OXYGEN SATURATION: 96 % | DIASTOLIC BLOOD PRESSURE: 80 MMHG

## 2023-05-24 PROCEDURE — 6370000000 HC RX 637 (ALT 250 FOR IP): Performed by: PSYCHIATRY & NEUROLOGY

## 2023-05-24 RX ADMIN — LEVOTHYROXINE SODIUM 100 MCG: 0.05 TABLET ORAL at 06:18

## 2023-05-24 RX ADMIN — METOPROLOL TARTRATE 12.5 MG: 25 TABLET, FILM COATED ORAL at 09:21

## 2023-05-24 RX ADMIN — THERA TABS 1 TABLET: TAB at 09:22

## 2023-05-24 RX ADMIN — VENLAFAXINE HYDROCHLORIDE 300 MG: 150 CAPSULE, EXTENDED RELEASE ORAL at 09:21

## 2023-05-24 RX ADMIN — Medication 1000 UNITS: at 09:21

## 2023-05-24 RX ADMIN — CLOPIDOGREL BISULFATE 75 MG: 75 TABLET, FILM COATED ORAL at 09:21

## 2023-05-24 RX ADMIN — PANTOPRAZOLE SODIUM 40 MG: 40 TABLET, DELAYED RELEASE ORAL at 09:21

## 2023-05-24 ASSESSMENT — PAIN SCALES - GENERAL
PAINLEVEL_OUTOF10: 0
PAINLEVEL_OUTOF10: 0

## 2023-05-24 NOTE — INTERDISCIPLINARY ROUNDS
Behavioral Health Interdisciplinary Rounds     Patient Name: Yovana Soto  Age: 82 y.o. Room/Bed:  [facility]  Primary Diagnosis: <principal problem not specified>   Admission Status: Voluntary    Readmission within 30 days: no  Power of  in place: no  Patient requires a blocked bed: no          Reason for blocked bed:   Sleep hours: 7       Participation in Care/Groups:  no  Medication Compliant?:  yes  PRNS (last 24 hours):  no     Restraints (last 24 hours):  no  __________________________________________________  OQ Admission Analysis Survey completed:  OQ Admission Analysis Survey score:  OQ Discharge Analysis Survey completed:  OQ Discharge Analysis Survey score:   __________________________________________________     Alcohol screening (AUDIT) completed -     If applicable, date SBIRT discussed in treatment team AND documented:    Tobacco -    Illegal Drugs use:      24 hour chart check complete: yes     _______________________________________________    Patient goal(s) for today:   Treatment team focus/goals: discharge to [facility] to start ECT   Progress note:      Spiritual Care Consult:   Financial concerns/prescription coverage:    Family contact:                        Family requesting physician contact today:    Discharge plan:   Access to weapons :                                                              Outpatient provider(s):   Patient's preferred phone number for follow up call :   Patient's preferred e-mail address :    LOS:  58  Expected LOS: Today    Participating treatment team members: Belkis Lowe Dr., PharmD.  Jerilyn Hutchinson

## 2023-05-24 NOTE — BH NOTE
Behavioral Health Interdisciplinary Rounds     Patient Name: Chikis Villatoro  Age: 82 y.o.   Room/Bed:  [facility]  Primary Diagnosis: <principal problem not specified>   Admission Status: Voluntary    Readmission within 30 days: no  Power of  in place: no  Patient requires a blocked bed: no          Reason for blocked bed: NA  Sleep hours: 7       Participation in Care/Groups:  no  Medication Compliant?: yes  PRNS (last 24 hours): no   Restraints (last 24 hours):  no  __________________________________________________  OQ Admission Analysis Survey completed:  OQ Admission Analysis Survey score:  OQ Discharge Analysis Survey completed:  OQ Discharge Analysis Survey score:   __________________________________________________     Alcohol screening (AUDIT) completed -     If applicable, date SBIRT discussed in treatment team AND documented:    Tobacco - patient is a smoker:    Illegal Drugs use:      24 hour chart check complete: yes     ___________________________________________
Behavioral Health Transition Record to Provider    Patient Name: Meagan Esteban  YOB: 1940  Medical Record Number: [id number]  Date of Admission: 3/27/2023  Date of Discharge: 5/24/2023    Attending Provider: No att. providers found  Discharging Provider: Dr. Hung Nj   To contact this individual call [phone] and ask the  to page. If unavailable, ask to be transferred to [facility] Provider on call. [facility] Provider will be available on call 24/7 and during holidays. Primary Care Provider: Alicia Jarrett MD    Allergies   Allergen Reactions    Amlodipine      Other reaction(s): Unknown (comments)    Codeine Nausea Only    Erythromycin Other (See Comments)     GI upset    Losartan Swelling     Lip swelling    Meloxicam Swelling     Lip swelling    Penicillins Hives     Fainted    Epinephrine Palpitations       Reason for Admission: CHIEF COMPLAINT:  \"Not feeling well. \"     HISTORY OF PRESENTING COMPLAINT:  Meagan Esteban is a [de-identified] y.o. / female who is currently admitted to the acute/general side of 7th floor behavioral health Unit at [facility].      Mrs. Yary Tristan was transferred from medicine upon being medically cleared after she was admitted on 3/19 with intentional overdose on Tramadol. Upon my evaluation, she was awake, alert x3. She thought it was 27th of March, otherwise she was oriented to self, place, situation, month and year. She appeared with significant psychomotor retardation. Nursing report that patient presented with significant anxiety, having difficulty making decisions about simple matters, including eating and deciding whether or not to enter or exit her room. Patient has struggled with depression since 82. She reports being on remeron and that it made her function, but not 'happy.'  Facundo Osborne says that in the past two weeks describes her appetite being decreased and that she has lost weight.  She feels that food
Care Management Note:         Patient will be transferred  to [facility] for ECT. She is scheduled to be picked up at 19:45 going to [address] [facility] 3 room 33A .       Sw will update her family
Chief Complaint:   Juventino Prajapati still think these things, my sons. \"      Length of Stay: 57 days      Interval History:  05/23/2023  Kashif Soler says that her sons aren't telling her  truly what they think. She believes they are hiding their true intentions and don't want her to go home. Upon having a family meeting, her sons and all her family members truly want her to return home and want the best for her. She says that she doesn't recall the last time she experiencing AH. At this time she denies suicide intents, thoughts or plans. 05/22/2023  Mrs. Minerva Gardner says that she's been watching the young patients come and go. She says that she has resolved to just do what she is being told. She says that she is helpless. 05/21/2023  Kashif Soler is exhibiting slight improvement. She smiled a few times during assessment. She is denying SI/plan/intent, HI/plan/intent, and AVH. Nursing reports pt has been exhibiting a brighter affect and has been eating more. She reports good sleep. No acute incidents or other changes or new concerns at this time. 05/20/2023  Kashif Soler states she is \"half present. \" She reports she slept very soundly last night. Her mood is \"about the same. \" She denies any SI/plan/intent, states \"I wouldn't do that again. \" Denies HI/plan/intent, denies AVH. Appetite is good. 05/19/2023  Nursing report patient slept well. She says that she is trying to accept what she can't change. She feels that she is not well enough to 'survive' at home. She feels her health has deteriorated. Kashif Soler firmly believes without proof that staff here don't like her, as well as her sons want her demise. She still reports audio hallucinations. 05/18/2023  Patient was evaluated by our hospital colleagues and recommendations provided for obtain MRI for evaluation of previous fracture or osteomyelitis if inflammatory markers are elevated CRP, ESR, CBC pending, and to continue use of walker and eventually to consult PT.   I evaluated
Chief Complaint:   \"I'm half present. \"      Length of Stay: 54 days      Interval History:  2023  Sonido Mason states she is \"half present. \" She reports she slept very soundly last night. Her mood is \"about the same. \" She denies any SI/plan/intent, states \"I wouldn't do that again. \" Denies HI/plan/intent, denies AVH. Appetite is good. 2023  Nursing report patient slept well. She says that she is trying to accept what she can't change. She feels that she is not well enough to 'survive' at home. She feels her health has deteriorated. Sonido Mason firmly believes without proof that staff here don't like her, as well as her sons want her demise. She still reports audio hallucinations. 2023  Patient was evaluated by our hospital colleagues and recommendations provided for obtain MRI for evaluation of previous fracture or osteomyelitis if inflammatory markers are elevated CRP, ESR, CBC pending, and to continue use of walker and eventually to consult PT. I evaluated her in her room today and she was unchanged. She reports depressive symptoms and recurrent paranoia that others are talking about her, not liking her, and that her children don't want her to live.    2023  Nursing reported no acute events overnight. I evaluate patient and her room. Mrs. Caroline Botello remains unchanged. She presents with recurrent paranoia that her children with her demise. She expresses that she would not put it past him to actually do something to harm her and bury her. 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 Patient says that she is afraid to go home even though it is her  who is residing with her and her children. She denies to me that she has any thoughts, intents or plans to harm herself or anybody else. She remains in agreement with her family to changing her status from DNR to full code to undergo ECT.    2023  Patient reported patient slept 7 hours overnight.   She is eating
Chief Complaint:   \"Same doctor, same. \"      Length of Stay: 51 days        Interval History:  2023  Nursing reported no acute events overnight. I evaluate patient and her room. Mrs. Louisa Tellez remains unchanged. She presents with recurrent paranoia that her children with her demise. She expresses that she would not put it past him to actually do something to harm her and bury her. 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 Patient says that she is afraid to go home even though it is her  who is residing with her and her children. She denies to me that she has any thoughts, intents or plans to harm herself or anybody else. She remains in agreement with her family to changing her status from DNR to full code to undergo ECT.    2023  Patient reported patient slept 7 hours overnight. She is eating well and taking her medications. They evaluated her in the day area. She appears unchanged, and had poor eye contact throughout. She tells me that her sons do not want her to return home and truly want to \"do me in.\"    05/15/2023  Nursing report that patient is seen out on the unit. She is eating her food and taking her medications. She slept well. Today we had a family meeting to provide support, answer questions, and provide some reality testing with regards to patients delusions that her sons are trying to 'do her in.'    2023  Nursing report that patient had a fall yesterday upon standing from being seated in the day area. She didn't hit her head. She said that she fell down on her knees. R knee had a bruise, but it wasn't painful. Today she complains of pain in her L ankle. She feels that it is painful when she walks, but is continuing to not exert too much pressure on it. She says that she wishes to go back and fix things, and that it is hard to be here, especially with people not liking her. Remains significant internally preoccupied.   She's tolerated
GROUP THERAPY PROGRESS NOTE    Patient did not participate in Coping Skills group.     Lanny KANG, [facility]
GROUP THERAPY PROGRESS NOTE    Patient did not participate in Safety Planning group.     Dheeraj April MSW, [facility]
GROUP THERAPY PROGRESS NOTE    Patient did not participate in Safety Planning group.     Rafal Mcdonald MSW, [facility]
GROUP THERAPY PROGRESS NOTE    Patient did not participate in psychotherapy group.     Edna KANG, [facility]-A
GROUP THERAPY PROGRESS NOTE    Patient did not participate in psychotherapy group.     Lizz KANG, [facility]-A
GROUP THERAPY PROGRESS NOTE    Patient did not participate in recreational therapy group.     PEARL Brown, [facility]
GROUP THERAPY PROGRESS NOTE    Patient did not participate in recreational therapy group.     PEARL Maldonado, [facility]-A
GROUP THERAPY PROGRESS NOTE    Patient did not participate in recreational therapy group.     Rosy Santiago, PEARL, [facility]-A
GROUP THERAPY PROGRESS NOTE  Pt did not participate in community group led by nursing students.   PEARL Mcdaniel, [facility]-A
TRANSFER - OUT REPORT:    Verbal report given to DARELL Camarillo on Melani Roldan  being transferred to [facility] for routine progression of patient care       Report consisted of patient's Situation, Background, Assessment and   Recommendations(SBAR). Information from the following report(s) Nurse Handoff Report, Adult Overview, and MAR was reviewed with the receiving nurse. [place] Assessment: No data recorded  Lines:       Opportunity for questions and clarification was provided.
Transport called and updated  time to 2330. [facility] notified.
04/20/2023   Nursing report that patient had a good night's sleep, that she is eating her meals and taking her medications. However, this morning she was having several episodes of belching. She is more isolative. Upon my evaluation, she had poor eye contact and appeared significant internally pre-occupied. She was anxious. She had difficulty paying attention during my evaluation. She does deny experiencing hallucinations. She does deny having suicidal thoughts or intents. However, she tells me that just doesn't feel that we would let her leave, because she isn't functioning. 04/19/2023   Nursing report this morning patient is quite anxious. Upon entering treatment room she has many episodes of eructation. She appears quite anxious, wringing her hands. She makes poor eye contact. She is unable to participate meaningfully in my evaluation. 04/18/2023   Nursing report patient sleeping 7 hours. Patient is eating her meals, but her diet is easy chew. Patient says that she feels about the same with regards to her mood. But staff say that patient is less anxious, she does attend group with prompting. She had her  visit yesterday. She says that she is safe, but that she isn't sure about discharge home, because it is up to us. She denies SI. Rocky Lobo says that she isn't listening to the voices. 04/17/2023   Patient appears flat. She says that she doesn't have anybody, that she has no place to go. Rocky Lobo has many cognitive distortion. She is significantly internally preoccupied. Says that she is not hearing the voices because she feels she is losing her hearing. 04/16/2023   Nursing report patient had broken sleep overnight. Patient shares that she can't believe she is depressed at this time in her life. She says that she has no intention of harming herself or ending her life. She says that she doesn't hear the voices any more since the door is closed.    Rocky Lobo is afraid of
50mg po qday and leave 250mg po qhs. Increase remeron from 30mg po qhs to 45mg po qhs   Continue zoloft 200mg po qhs.      04/19/2023   Continue current medications   Added mylanta for eructation and indigestion prn.      04/18/2023   Will give her medications some time as they were just increased. Imodium for diarrhea prn      04/17/2023   Increase seroquel from 250 to 300mg po qhs   Increase zoloft 150mg to 200mg po qhs   Continue remeron 30mg po qhs      04/16/2023   Continue current medication regimen   EKG today to follow QTC.      04/15/2023   Will continue current regimen of medications. Will give thought to cross taper to a different antipsychotic.      04/14/2023   Will continue current regimen of medications and give them some time as they were just increased. 04/13/2023   D/c Serroquel 50mg po qday, instead increase seroquel 150mg po qhs to 200mg po qhs. Increase zoloft from 125mg to 150mg po qhs. Continue remeron 30mg po qhs.      04/12/2023   Increase seroquel from 150mg po qhs to 200mg po qhs. Continue seroquel 50mg po qday   Increase Zoloft from 100mg to 125mg po qhs    Continue Remeron 30mg po qhs.      04/11/2023   Increase seroquel from 150mg po qhs to 200mg po qhs. Continue seroquel 50mg po qday   Continue Zoloft 100mg po qhs    Continue Remeron 30mg po qhs. Family meeting tomorrow 1pm      04/10/2023   SW to contact family for further collateral in order to start planning disposition for d/c.      04/09/2023   Continue current care. 04/08/2023   Continue current care. 04/07/2023   Continue zoloft 100mg po qhs   Continue Seroquel 150mg po qhs, Continue Serouqel 50mg po qday that was just increased. Reached out to her 's niece, Ms. Lola Stone [phone], provided update and treatment options including further med dosage increases, cross taper to different regimen, as well as ECT.  Antonina Clemons said she would talk to patient's sons and call back  with further
changed yesterday. If patient isn't progressing well, would consider ECT. 04/03/2023   To reduce the burden of somnolence, will switch zoloft to night time and further increase it from 50 to 75mg po qhs. Will increase seroquel from 50mg to 100mg po qhs to target psychotic symptoms. 04/02/2023   Continue current care   Medication modification as appropriate   Disposition planning with social work      04/01/2023   Continue current care   Medications modification as appropriate   Disposition planning with social work      03/31/2023   Continue zoloft 50mg po qday   Continue Remeron 30mg po qhs   Increase seroquel from 25mg po qhs to 50mg po qhs.      03/30/2023   Continue zoloft 50mg po qday   Continue Remeron 30mg po qhs   Start seroquel 25mg po qhs.      03/29/2023    Increase zoloft 25mg to 50mg po qday. Continue Remeron 30mg po qhs (was increased yesterday)   SW to reach out to family to discern what was being said and if any of patient's statements are factual, in order for us to discern is patient's symptoms are secondary to delusions. A coordinated, multidisplinary treatment team round was conducted with the patient, nurses, pharmcist,  and writer present. Discussions held with , and/or with family members; Complete current electronic health record for patient  was reviewed in full including consultant notes, ancillary staff notes, nurses and tech notes, labs and vitals. I certify that this patients inpatient psychiatric hospital services furnished since the previous certification were, and continue to be, required for treatment that could reasonably be expected to improve the patient's condition, or for diagnostic study,  and that the patient continues to need, on a daily basis, active treatment furnished directly by or requiring the supervision of inpatient psychiatric facility personnel.  In addition, the hospital records show that services furnished were
options including further med dosage increases, cross taper to different regimen, as well as ECT. Stephane Briceño said she would talk to patient's sons and call back  with further input. 04/06/2023   Continue zoloft 100mg po qhs   Continue Seroquel 150mg po qhs   Increase seroquel from 25mg po qam to 50mg po qam. Will give one dose extra this day to make the difference. 04/05/2023   Increase zoloft from 75mg to 100mg po qhs   Schedule seroquel 25mg po qday   Increase seroquel from 100 to 150mg po qhs      04/04/2023   Continue medications at current dosages, since they were just changed yesterday. If patient isn't progressing well, would consider ECT. 04/03/2023   To reduce the burden of somnolence, will switch zoloft to night time and further increase it from 50 to 75mg po qhs. Will increase seroquel from 50mg to 100mg po qhs to target psychotic symptoms. 04/02/2023   Continue current care   Medication modification as appropriate   Disposition planning with social work      04/01/2023   Continue current care   Medications modification as appropriate   Disposition planning with social work      03/31/2023   Continue zoloft 50mg po qday   Continue Remeron 30mg po qhs   Increase seroquel from 25mg po qhs to 50mg po qhs.      03/30/2023   Continue zoloft 50mg po qday   Continue Remeron 30mg po qhs   Start seroquel 25mg po qhs.      03/29/2023    Increase zoloft 25mg to 50mg po qday. Continue Remeron 30mg po qhs (was increased yesterday)   SW to reach out to family to discern what was being said and if any of patient's statements are factual, in order for us to discern is patient's symptoms are secondary to delusions. A coordinated, multidisplinary treatment team round was conducted with the patient, nurses, pharmcist,  and writer present. Discussions held with , and/or with family members;  Complete current electronic health record for patient  was reviewed in full including
being said and if any of patient's statements are factual, in order for us to discern is patient's symptoms are secondary to delusions. A coordinated, multidisplinary treatment team round was conducted with the patient, nurses, pharmcist,  and writer present. Discussions held with , and/or with family members; Complete current electronic health record for patient  was reviewed in full including consultant notes, ancillary staff notes, nurses and tech notes, labs and vitals. I certify that this patients inpatient psychiatric hospital services furnished since the previous certification were, and continue to be, required for treatment that could reasonably be expected to improve the patient's condition, or for diagnostic study,  and that the patient continues to need, on a daily basis, active treatment furnished directly by or requiring the supervision of inpatient psychiatric facility personnel. In addition, the hospital records show that services furnished were intensive treatment  services, admission or related services, or equivalent services.

## 2025-03-04 NOTE — ED PROVIDER NOTES
This patient was walking and her foot hit the edge of a carpet causing her to trip. She fell on her left knee injuring it. She falls once or twice a week. She was able to put her hands out to brace her fall. No head injury. No pain to her hands or wrists. Her frequency of falling is stable according to her . She tried no medication for pain. Pain is worse with walking or bending the knee. She was able to walk into the ED. Old chart reviewed - seen in ED last month for UTI w hematuria. DC home. Past Medical History:  
Diagnosis Date  Allergic rhinitis  Arthritis  Depression  Hyperlipidemia  Impaired fasting glucose  Unspecified essential hypertension  Unspecified hypothyroidism  Uterine prolapse  Vertigo Past Surgical History:  
Procedure Laterality Date  HX BREAST BIOPSY Bilateral   
 >5, all benign  HX BREAST BIOPSY Left 11/29/2017  
 benign  HX COLONOSCOPY  8/15/12  
 diverticulosis  HX ENDOSCOPY  8/15/12  
 hiatal hernia  HX HAMMER TOE REPAIR Left   
 w/ bunion surgery (per previous PCP)  HX PARTIAL HYSTERECTOMY  HX TONSILLECTOMY Family History:  
Problem Relation Age of Onset  Thyroid Disease Father   
     hypo  Coronary Artery Disease Father   
     s/p CABG  
 Heart Disease Father   
     s/p valve replacement  Diabetes Brother  Thyroid Disease Brother  Cancer Brother   
     kidney  Coronary Artery Disease Brother   
     s/p CABG  Dementia Mother   
     vascular  Elevated Lipids Mother  Coronary Artery Disease Mother  Obesity Son   
[facility] Hypertension Son  No Known Problems Son  Diabetes Maternal Grandmother  Cancer Maternal Grandmother   
     pancreatic  Diabetes Paternal Grandmother Social History Socioeconomic History  Marital status:  Spouse name: Not on file  Number of children: Not on file  Years of education: Not on file  Highest education level: Not on file Social Needs  Financial resource strain: Not on file  Food insecurity - worry: Not on file  Food insecurity - inability: Not on file  Transportation needs - medical: Not on file  Transportation needs - non-medical: Not on file Occupational History  Not on file Tobacco Use  Smoking status: Never Smoker  Smokeless tobacco: Never Used Substance and Sexual Activity  Alcohol use: No  
 Drug use: No  
 Sexual activity: Not Currently Partners: Male Other Topics Concern  Not on file Social History Narrative Lives in [facility] with  of 48 years. Has 2 sons. Retired. Used to work as a  for [employer] mostly at the middle school level. Likes to read. ALLERGIES: Codeine; Epinephrine; Erythromycin; and Pcn [penicillins] Review of Systems Vitals:  
 01/31/19 1228 BP: 135/82 Pulse: 82 Resp: 16 Temp: 97.5 °F (36.4 °C) SpO2: 98% Weight: 79 kg (174 lb 2.6 oz) Height: 5' 4\" (1.626 m) Physical Exam  
Constitutional: She appears well-developed and well-nourished. No distress. Neck: No tracheal deviation present. Cardiovascular: Intact distal pulses. Pulmonary/Chest: Effort normal.  
Musculoskeletal:  
Bruised L knee near patella. Tender mildly. Distal nv exam normal. 
 
Good ROM with some pain. Neurological: She is alert. Skin: Skin is warm and dry. Psychiatric: She has a normal mood and affect. MDM Procedures Reviewed xrays Ace wrap Ortho f/u  
 
otc pain meds. No. BRITTNEY screening performed.  STOP BANG Legend: 0-2 = LOW Risk; 3-4 = INTERMEDIATE Risk; 5-8 = HIGH Risk

## (undated) DEVICE — SET ADMIN 16ML TBNG L100IN 2 Y INJ SITE IV PIGGY BK DISP (ORDER IN MULIPLES OF 48)

## (undated) DEVICE — 1200 GUARD II KIT W/5MM TUBE W/O VAC TUBE: Brand: GUARDIAN

## (undated) DEVICE — Device

## (undated) DEVICE — SYR 3ML LL TIP 1/10ML GRAD --

## (undated) DEVICE — BASIN EMSIS 16OZ GRAPHITE PLAS KID SHP MOLD GRAD FOR ORAL

## (undated) DEVICE — TOWEL 4 PLY TISS 19X30 SUE WHT

## (undated) DEVICE — ELECTRODE,RADIOTRANSLUCENT,FOAM,5PK: Brand: MEDLINE

## (undated) DEVICE — SYR ASSEMB INFL BLLN 60ML --

## (undated) DEVICE — YANKAUER,TAPERED BULBOUS TIP,W/O VENT: Brand: MEDLINE

## (undated) DEVICE — Z DISCONTINUED PER MEDLINE LINE GAS SAMPLING O2/CO2 LNG AD 13 FT NSL W/ TBNG FILTERLINE

## (undated) DEVICE — HYPODERMIC SAFETY NEEDLE: Brand: MAGELLAN

## (undated) DEVICE — CATH IV AUTOGRD BC PNK 20GA 25 -- INSYTE

## (undated) DEVICE — NEONATAL-ADULT SPO2 SENSOR: Brand: NELLCOR

## (undated) DEVICE — BLOCK BITE ENDOSCP AD 21 MM W/ DIL BLU LF DISP

## (undated) DEVICE — SYR 10ML LUER LOK 1/5ML GRAD --